# Patient Record
Sex: FEMALE | Race: WHITE | NOT HISPANIC OR LATINO | Employment: OTHER | URBAN - NONMETROPOLITAN AREA
[De-identification: names, ages, dates, MRNs, and addresses within clinical notes are randomized per-mention and may not be internally consistent; named-entity substitution may affect disease eponyms.]

---

## 2017-02-23 DIAGNOSIS — R10.9 RECURRING ABDOMINAL PAIN: ICD-10-CM

## 2017-02-23 DIAGNOSIS — K66.0 INTESTINAL ADHESIONS: ICD-10-CM

## 2017-02-23 NOTE — TELEPHONE ENCOUNTER
Tramadol 50mg      Last Written Prescription Date:  12/20/16  Last Fill Quantity: 20,   # refills: 0  Last Office Visit with [facility], [facility] or [facility] prescribing provider: 4/26/16  Future Office visit:       Routing refill request to provider for review/approval because:  Drug not on the [facility], [facility] or [facility] refill protocol or controlled substance    Olivia Lofton-Technician  [facility]  [phone]

## 2017-02-27 RX ORDER — TRAMADOL HYDROCHLORIDE 50 MG/1
50-100 TABLET ORAL EVERY 6 HOURS PRN
Qty: 20 TABLET | Refills: 0 | Status: SHIPPED | OUTPATIENT
Start: 2017-02-27 | End: 2017-07-17

## 2017-03-14 ENCOUNTER — TELEPHONE (OUTPATIENT)
Dept: FAMILY MEDICINE | Facility: OTHER | Age: 67
End: 2017-03-14

## 2017-03-14 ENCOUNTER — OFFICE VISIT (OUTPATIENT)
Dept: FAMILY MEDICINE | Facility: OTHER | Age: 67
End: 2017-03-14
Payer: COMMERCIAL

## 2017-03-14 ENCOUNTER — HOSPITAL ENCOUNTER (OUTPATIENT)
Dept: CT IMAGING | Facility: CLINIC | Age: 67
Discharge: HOME | End: 2017-03-14
Attending: PHYSICIAN ASSISTANT | Admitting: PHYSICIAN ASSISTANT
Payer: COMMERCIAL

## 2017-03-14 VITALS
DIASTOLIC BLOOD PRESSURE: 100 MMHG | RESPIRATION RATE: 20 BRPM | HEART RATE: 80 BPM | SYSTOLIC BLOOD PRESSURE: 160 MMHG | TEMPERATURE: 96.4 F

## 2017-03-14 DIAGNOSIS — G44.009 CLUSTER HEADACHE, NOT INTRACTABLE, UNSPECIFIED CHRONICITY PATTERN: Primary | ICD-10-CM

## 2017-03-14 DIAGNOSIS — G44.009 CLUSTER HEADACHE, NOT INTRACTABLE, UNSPECIFIED CHRONICITY PATTERN: ICD-10-CM

## 2017-03-14 DIAGNOSIS — R11.0 NAUSEA: ICD-10-CM

## 2017-03-14 PROCEDURE — 70450 CT HEAD/BRAIN W/O DYE: CPT

## 2017-03-14 PROCEDURE — 99214 OFFICE O/P EST MOD 30 MIN: CPT | Mod: 25 | Performed by: PHYSICIAN ASSISTANT

## 2017-03-14 PROCEDURE — 96372 THER/PROPH/DIAG INJ SC/IM: CPT | Performed by: PHYSICIAN ASSISTANT

## 2017-03-14 RX ORDER — ONDANSETRON 4 MG/1
8 TABLET, FILM COATED ORAL ONCE
Qty: 2 TABLET | Refills: 0 | COMMUNITY
Start: 2017-03-14 | End: 2017-05-08

## 2017-03-14 RX ORDER — TRAMADOL HYDROCHLORIDE 50 MG/1
50-100 TABLET ORAL EVERY 6 HOURS PRN
Qty: 60 TABLET | Refills: 0 | Status: SHIPPED | OUTPATIENT
Start: 2017-03-14 | End: 2017-05-08

## 2017-03-14 RX ORDER — ONDANSETRON 8 MG/1
8 TABLET, ORALLY DISINTEGRATING ORAL
Qty: 20 TABLET | Refills: 1 | Status: SHIPPED | OUTPATIENT
Start: 2017-03-14 | End: 2017-07-05

## 2017-03-14 RX ORDER — PROPRANOLOL HYDROCHLORIDE 40 MG/1
40 TABLET ORAL 2 TIMES DAILY
Qty: 60 TABLET | Refills: 0 | Status: SHIPPED | OUTPATIENT
Start: 2017-03-14 | End: 2017-04-21

## 2017-03-14 ASSESSMENT — PAIN SCALES - GENERAL: PAINLEVEL: WORST PAIN (10)

## 2017-03-14 NOTE — TELEPHONE ENCOUNTER
": 1950  PHONE #'s: [phone] (home)     PRESENTING PROBLEM:  Migraine HA since  NOC.  Last one was a long time ago.     NURSING ASSESSMENT  Description:  Nauseated, photophobic, HA pain scale 10/10. Pt in tears on the phone.   Onset/duration:  3/12/17  Precip. factors:  \" I had one other migraine like this, but it was years ago. \"   Assoc. Sx:  As above.Denies blurred or double vision. Is NOT confused. Is walking fine, but really nauseated.   Improves/worsens Sx:   Worse.   Pain scale (1-10)   10/10  Sx specific meds:  Ibuprofen, Tylenol- \"NOTHING HELPS.\"  LMP/preg/breast feeding:  NA  Last exam/Tx:  Has NOT been seen for this.     RECOMMENDED DISPOSITION:  To ED, another person to drive - REFUSES the ER.  \" Please , I just want to try getting something from the pharmacy for this. Dr. Byrne is my doctor.\"   Rn informed patient that Dr. MUÑOZ is out of office today. She would need to see another provider for this. Darrin Martinez, PAC, has an opening at 9:40 AM.  \" I will need to lay down as soon as I get there. \"  [facility] RN called [facility] RN to inform of this pt request. She will have their  notify her when she arrives to get her in a room to lay down.    Will comply with recommendation: NO - wants to see [facility] provider this AM ASAP.    If further questions/concerns or if Sx do not improve, worsen or new Sx develop, call your PCP or [facility] as soon as possible.    NOTES:  Disposition was determined by the first positive assessment question, therefore all previous assessment questions were negative.  Informed to check provider manual or call insurance company to assure coverage.    Guideline used:  Headache  Telephone Triage Protocols for Nurses, Fifth Edition, Migdalia Lovelace RN    "

## 2017-03-14 NOTE — MR AVS SNAPSHOT
"              After Visit Summary   3/14/2017    Komal Miller    MRN: [id number]           Patient Information     Date Of Birth          1950        Visit Information        Provider Department      3/14/2017 9:40 AM Darrin Borrego PA-C [facility]        Today's Diagnoses     Cluster headache, not intractable, unspecified chronicity pattern    -  1    Headache           Follow-ups after your visit        Future tests that were ordered for you today     Open Future Orders        Priority Expected Expires Ordered    CT Head w/o Contrast Routine  3/14/2018 3/14/2017            Who to contact     If you have questions or need follow up information about today's clinic visit or your schedule please contact [facility] directly at [phone].  Normal or non-critical lab and imaging results will be communicated to you by [employer], letter or phone within 4 business days after the clinic has received the results. If you do not hear from us within 7 days, please contact the clinic through [employer] or phone. If you have a critical or abnormal lab result, we will notify you by phone as soon as possible.  Submit refill requests through [employer] or call your pharmacy and they will forward the refill request to us. Please allow 3 business days for your refill to be completed.          Additional Information About Your Visit        MyChart Information     [employer] lets you send messages to your doctor, view your test results, renew your prescriptions, schedule appointments and more. To sign up, go to [email or web address] . Click on \"Log in\" on the left side of the screen, which will take you to the Welcome page. Then click on \"Sign up Now\" on the right side of the page.     You will be asked to enter the access code listed below, as well as some personal information. Please follow the directions to create your username and password.     Your access code is: [email or web address]  Expires: 6/12/2017 10:19 " AM     Your access code will  in 90 days. If you need help or a new code, please call your [facility] or [phone].        Care EveryWhere ID     This is your Care EveryWhere ID. This could be used by other organizations to access your [facility] medical records  [id number]        Your Vitals Were     Pulse Temperature Respirations             80 96.4  F (35.8  C) (Oral) 20          Blood Pressure from Last 3 Encounters:   17 (!) 160/100   16 160/88   16 138/88    Weight from Last 3 Encounters:   16 251 lb (113.9 kg)   16 248 lb 6.4 oz (112.7 kg)   12/28/15 254 lb 8 oz (115.4 kg)              We Performed the Following     INJECTION INTRAMUSCULAR OR SUB-Q     KETOROLAC TROMETHAMINE 15MG        Primary Care Provider Office Phone # Fax #    Augustine Byrne [phone]       [facility] [address] [facility] [address]        Thank you!     Thank you for choosing [facility]  for your care. Our goal is always to provide you with excellent care. Hearing back from our patients is one way we can continue to improve our services. Please take a few minutes to complete the written survey that you may receive in the mail after your visit with us. Thank you!             Your Updated Medication List - Protect others around you: Learn how to safely use, store and throw away your medicines at www.disposemymeds.org.          This list is accurate as of: 3/14/17 10:19 AM.  Always use your most recent med list.                   Brand Name Dispense Instructions for use    GARLIC PO          lisinopril-hydrochlorothiazide 20-25 MG per tablet    PRINZIDE/ZESTORETIC    90 tablet    TAKE ONE TABLET BY MOUTH EVERY DAY       omeprazole 40 MG capsule    priLOSEC    90 capsule    TAKE ONE CAPSULE BY MOUTH EVERY DAY .TAKE 30 TO 60 MINUTES BEFORE A MEAL       ondansetron 4 MG tablet    ZOFRAN    2 tablet    Take 2 tablets (8 mg) by mouth once for  1 dose       traMADol 50 MG tablet    ULTRAM    20 tablet    Take 1-2 tablets ( mg) by mouth every 6 hours as needed for moderate pain Call pt when ready

## 2017-03-14 NOTE — NURSING NOTE
"Chief Complaint   Patient presents with     Headache     started 3/12/2017       Initial BP (!) 160/100 (BP Location: Right arm, Patient Position: Chair, Cuff Size: Adult Large)  Pulse 80  Temp 96.4  F (35.8  C) (Oral)  Resp 20 Estimated body mass index is 41.13 kg/(m^2) as calculated from the following:    Height as of 4/26/16: 5' 5.5\" (1.664 m).    Weight as of 8/14/16: 251 lb (113.9 kg).  Medication Reconciliation: complete   Anu EDUARDO LPN    Due to severe headache patient declined weight and height and wanted to go lay down.  "

## 2017-03-14 NOTE — NURSING NOTE
The following medication was given:     MEDICATION: Ondansetron Orally Disintegrating Tablets 4mg  ROUTE: PO  SITE: Medication was given orally   DOSE: 4 mg  LOT #: [id number]  :  [employer]  EXPIRATION DATE:  6/2018  NDC#: 65862-390-10    The following medication was given:     MEDICATION: Ondansetron Orally Disintegrating Tablets 4mg  ROUTE: PO  SITE: Medication was given orally   DOSE: 4 mg  LOT #: [id number]  :  [employer]  EXPIRATION DATE:  5/2017  NDC#: 65862-390-10    The following medication was given:     MEDICATION: Toradol 60 mg  ROUTE: IM  SITE: RUQ - Gluteus  DOSE: 60 mg/ 2mL  LOT #: [id number]  :  [employer]  EXPIRATION DATE:  02/2018  NDC#: 28757-038-40    Medications given per verbal order of Darrin Bolaños PA-C  Patient instructed to remain in clinic for 20 minutes afterwards, and to report any adverse reaction to me immediately.  Prior to injection verified patient identity using patient's name and date of birth.  Irene Kilpatrick MA     3/14/2017

## 2017-03-14 NOTE — PROGRESS NOTES
SUBJECTIVE:                                                    Komal Miller is a 66 year old female who presents to clinic today for the following health issues:      Headache     Onset: started on 3/12/2017    Description:   Location: Left temporal region slowly progressive and building.   Character: squeezing pain, pressure over the left temporal region without any throbbing.  Frequency:  Ongoing over the last 24 hours or more.  Duration:  States it has not resolved since beginning on March 12    Intensity: severe    Progression of Symptoms:  worsening    Accompanying Signs & Symptoms:  Stiff neck: no  Neck or upper back pain: no  Fever: no  Sinus pressure: no  Nausea or vomiting: YES- both  Dizziness: no  Numbness: no  Weakness: no  Visual changes: no   History:   Head trauma: no  Family history of migraines: YES- states she had one 20 years ago or more.  Previous tests for headaches: denies  Neurologist evaluations: no  Able to do daily activities: no  Wake with a headaches: no  Do headaches wake you up: no  Daily pain medication use: no  Work/school stressors/changes: no    Precipitating factors:   Does light make it worse: YES- causes nausea and vomiting today  Does sound make it worse: YES    Alleviating factors:  Does sleep help: no         Therapies Tried and outcome: Ibuprofen (Advil, Motrin) and Tylenol without resolution.    Problem list and histories reviewed & adjusted, as indicated.  Additional history: as documented    Patient Active Problem List   Diagnosis     Absence of menstruation     Esophageal reflux     Recurring abdominal pain     Hyperlipidemia LDL goal <130     Hypertension goal BP (blood pressure) < 140/90     Advanced directives, counseling/discussion     Small bowel obstruction (H)     DVT prophylaxis     Cervical adenopathy     Intestinal adhesions     Gastroesophageal reflux disease with esophagitis     Past Surgical History   Procedure Laterality Date     C total abdom  hysterectomy       Hysterectomy, Total Abdominal     Hc removal of ovary/tube(s)       Salpingo-Oophorectomy, bilateral     Hc dilation/curettage diag/ther non ob       D & C     C appendectomy       C ligate fallopian tube       Colonoscopy  06/10/08     Internal hemorrhoids, otherwise normal.     Colonoscopy  01/20/10     Hc ugi endoscopy, simple exam  01/20/10     Video capsule endoscopy  02/15/10     normal sm intestine     Hc lap, surg enterolysis  05/07/10     Laparoscopy diagnostic (general)  5/6/2014     Procedure: LAPAROSCOPY DIAGNOSTIC (GENERAL);  Surgeon: Seth Matthews MD;  Location: [facility] OR     Laparotomy, lysis adhesions, combined  5/6/2014     Procedure: COMBINED LAPAROTOMY, LYSIS ADHESIONS;  Surgeon: Seth Matthews MD;  Location: [facility] OR       Social History   Substance Use Topics     Smoking status: Former Smoker     Packs/day: 0.50     Years: 20.00     Types: Cigarettes     Quit date: 1/1/1995     Smokeless tobacco: Never Used     Alcohol use Yes      Comment: very seldom     Family History   Problem Relation Age of Onset     C.A.D. Mother      bi-pass     DIABETES Mother      late onset     Allergies Mother      xray dye     Arthritis Mother      CANCER Mother      ovary     Respiratory Mother      C.A.D. Father      bi-pass     Hypertension Brother      Breast Cancer Sister      Allergies Sister      Allergies Brother      Eye Disorder Maternal Aunt      GASTROINTESTINAL DISEASE Brother      Gynecology Sister      Thyroid Disease Sister            Reviewed and updated as needed this visit by clinical staff  Allergies  Med Hx       Reviewed and updated as needed this visit by Provider  Med Hx         ROS:  Constitutional, HEENT, cardiovascular, pulmonary, gi and gu systems are negative, except as otherwise noted.    OBJECTIVE:                                                    BP (!) 160/100 (BP Location: Right arm, Patient Position: Chair, Cuff Size: Adult Large)  Pulse 80  Temp 96.4  F  (35.8  C) (Oral)  Resp 20  There is no height or weight on file to calculate BMI.  GENERAL: healthy, alert and no distress  HENT: ear canals and TM's normal, nose and mouth without ulcers or lesions  NECK: no adenopathy, no asymmetry, masses, or scars and thyroid normal to palpation  RESP: lungs clear to auscultation - no rales, rhonchi or wheezes  CV: regular rate and rhythm, normal S1 S2, no S3 or S4, no murmur, click or rub, no peripheral edema and peripheral pulses strong  ABDOMEN: soft, nontender, no hepatosplenomegaly, no masses and bowel sounds normal  MS: no gross musculoskeletal defects noted, no edema  NEURO: Normal strength and tone, mentation intact and speech normal  PSYCH: mentation appears normal, tearful, anxious and fatigued    Diagnostic Test Results:  No results found for this or any previous visit (from the past 24 hour(s)).     ASSESSMENT/PLAN:                                                    1. Cluster headache, not intractable, unspecified chronicity pattern  Other than light and sound sensitivity her headache seems to be more of a cluster type then of a migraine type. I am concerned because she has not had a headache like this in 20 years and this is new for her. We'll evaluate aggressively.  - CT Head w/o Contrast; Future    2. Headache  As above see #1  - KETOROLAC TROMETHAMINE 15MG  - INJECTION INTRAMUSCULAR OR SUB-Q  - ondansetron (ZOFRAN) 4 MG tablet; Take 2 tablets (8 mg) by mouth once for 1 dose  Dispense: 2 tablet; Refill: 0    3. Nausea  Treated today with Zofran and hopefully this will help and we can then add this to her medication mix if her CT scan is clear.  Follow-up with primary care practitioner of choice when necessary after this.    Darrin Bolaños PA-C  [facility]

## 2017-04-04 DIAGNOSIS — K21.00 GASTROESOPHAGEAL REFLUX DISEASE WITH ESOPHAGITIS: Primary | ICD-10-CM

## 2017-04-04 DIAGNOSIS — I10 HYPERTENSION GOAL BP (BLOOD PRESSURE) < 140/90: ICD-10-CM

## 2017-04-04 RX ORDER — OMEPRAZOLE 40 MG/1
CAPSULE, DELAYED RELEASE ORAL
Qty: 90 CAPSULE | Refills: 3 | Status: SHIPPED | OUTPATIENT
Start: 2017-04-04 | End: 2018-06-05

## 2017-04-04 RX ORDER — LISINOPRIL AND HYDROCHLOROTHIAZIDE 20; 25 MG/1; MG/1
TABLET ORAL
Qty: 90 TABLET | Refills: 0 | Status: SHIPPED | OUTPATIENT
Start: 2017-04-04 | End: 2017-05-08

## 2017-04-04 NOTE — TELEPHONE ENCOUNTER
Omeprazole 40mg      Last Written Prescription Date: 12/05/14  Last Fill Quantity: 90,  # refills: 3   Last Office Visit with [facility], [facility] or [facility] prescribing provider: 3/14/17    Olivia Lofton-Technician  [facility]  [phone]

## 2017-04-04 NOTE — TELEPHONE ENCOUNTER
lisinopril-hydrochlorothiazide (PRINZIDE,ZESTORETIC) 20-25 MG  Last Written Prescription Date: 4/5/16  Last Fill Quantity: 90, # refills: 3  Last Office Visit with [facility] or [facility] prescribing provider: 3/14/17       Potassium   Date Value Ref Range Status   04/26/2016 3.7 3.4 - 5.3 mmol/L Final     Creatinine   Date Value Ref Range Status   04/26/2016 0.65 0.52 - 1.04 mg/dL Final     BP Readings from Last 3 Encounters:   03/14/17 (!) 160/100   08/14/16 160/88   04/26/16 138/88

## 2017-04-04 NOTE — LETTER
[address] [address] [phone]          April 4, 2017  Komal Miller  [address] [facility] [address]            Dear Komal Miller      We at [facility] want to uphold a high quality of care for our patients. In order to provide the best possible care for you we would like to partner with you to manage your preventative health care.   In coordination of your health care, we see that you are overdue for the following [facility] as your last blood pressure was out of range.  Please call and schedule an appointment at your earliest convenience.      Sincerely,        FELTON Byrne M.D.  [address]   [address]  Tel. [phone]  Fax [phone]

## 2017-04-21 DIAGNOSIS — R11.0 NAUSEA: ICD-10-CM

## 2017-04-21 DIAGNOSIS — G44.009 CLUSTER HEADACHE, NOT INTRACTABLE, UNSPECIFIED CHRONICITY PATTERN: ICD-10-CM

## 2017-04-21 NOTE — TELEPHONE ENCOUNTER
Propranolol       Last Written Prescription Date: 3/14/2017  Last Fill Quantity: 60, # refills: 0  Last Office Visit with [facility], [facility] or [facility] prescribing provider: 3/14/2017       BP Readings from Last 3 Encounters:   03/14/17 (!) 160/100   08/14/16 160/88   04/26/16 138/88

## 2017-04-24 RX ORDER — PROPRANOLOL HYDROCHLORIDE 40 MG/1
TABLET ORAL
Qty: 60 TABLET | Refills: 5 | Status: SHIPPED | OUTPATIENT
Start: 2017-04-24 | End: 2017-05-08

## 2017-04-24 NOTE — TELEPHONE ENCOUNTER
Routing refill request to provider for review/approval because:  Labs out of range:  BP    Maylin Gorman, RN  [facility]

## 2017-05-08 ENCOUNTER — OFFICE VISIT (OUTPATIENT)
Dept: FAMILY MEDICINE | Facility: OTHER | Age: 67
End: 2017-05-08
Payer: COMMERCIAL

## 2017-05-08 VITALS
HEIGHT: 66 IN | WEIGHT: 246 LBS | OXYGEN SATURATION: 94 % | BODY MASS INDEX: 39.53 KG/M2 | HEART RATE: 70 BPM | SYSTOLIC BLOOD PRESSURE: 140 MMHG | RESPIRATION RATE: 20 BRPM | DIASTOLIC BLOOD PRESSURE: 78 MMHG | TEMPERATURE: 97.4 F

## 2017-05-08 DIAGNOSIS — I10 HYPERTENSION GOAL BP (BLOOD PRESSURE) < 140/90: ICD-10-CM

## 2017-05-08 DIAGNOSIS — E28.39 OTHER PRIMARY OVARIAN FAILURE: ICD-10-CM

## 2017-05-08 DIAGNOSIS — E66.01 MORBID OBESITY, UNSPECIFIED OBESITY TYPE (H): ICD-10-CM

## 2017-05-08 DIAGNOSIS — G44.009 CLUSTER HEADACHE, NOT INTRACTABLE, UNSPECIFIED CHRONICITY PATTERN: ICD-10-CM

## 2017-05-08 DIAGNOSIS — Z00.00 MEDICARE WELCOME VISIT: Primary | ICD-10-CM

## 2017-05-08 DIAGNOSIS — Z12.31 VISIT FOR SCREENING MAMMOGRAM: ICD-10-CM

## 2017-05-08 DIAGNOSIS — R11.0 NAUSEA: ICD-10-CM

## 2017-05-08 PROBLEM — G44.001 INTRACTABLE CLUSTER HEADACHE SYNDROME, UNSPECIFIED CHRONICITY PATTERN: Status: RESOLVED | Noted: 2017-05-08 | Resolved: 2017-05-08

## 2017-05-08 PROBLEM — G44.001 INTRACTABLE CLUSTER HEADACHE SYNDROME, UNSPECIFIED CHRONICITY PATTERN: Status: ACTIVE | Noted: 2017-05-08

## 2017-05-08 LAB
ALBUMIN SERPL-MCNC: 3.7 G/DL (ref 3.4–5)
ALP SERPL-CCNC: 59 U/L (ref 40–150)
ALT SERPL W P-5'-P-CCNC: 23 U/L (ref 0–50)
ANION GAP SERPL CALCULATED.3IONS-SCNC: 9 MMOL/L (ref 3–14)
AST SERPL W P-5'-P-CCNC: 14 U/L (ref 0–45)
BILIRUB SERPL-MCNC: 0.3 MG/DL (ref 0.2–1.3)
BUN SERPL-MCNC: 10 MG/DL (ref 7–30)
CALCIUM SERPL-MCNC: 9.1 MG/DL (ref 8.5–10.1)
CHLORIDE SERPL-SCNC: 98 MMOL/L (ref 94–109)
CHOLEST SERPL-MCNC: 215 MG/DL
CO2 SERPL-SCNC: 27 MMOL/L (ref 20–32)
CREAT SERPL-MCNC: 0.61 MG/DL (ref 0.52–1.04)
GFR SERPL CREATININE-BSD FRML MDRD: NORMAL ML/MIN/1.7M2
GLUCOSE SERPL-MCNC: 93 MG/DL (ref 70–99)
HDLC SERPL-MCNC: 50 MG/DL
LDLC SERPL CALC-MCNC: 145 MG/DL
NONHDLC SERPL-MCNC: 165 MG/DL
POTASSIUM SERPL-SCNC: 3.8 MMOL/L (ref 3.4–5.3)
PROT SERPL-MCNC: 7.5 G/DL (ref 6.8–8.8)
SODIUM SERPL-SCNC: 134 MMOL/L (ref 133–144)
TRIGL SERPL-MCNC: 99 MG/DL
TSH SERPL DL<=0.05 MIU/L-ACNC: 0.68 MU/L (ref 0.4–4)

## 2017-05-08 PROCEDURE — 36415 COLL VENOUS BLD VENIPUNCTURE: CPT | Performed by: FAMILY MEDICINE

## 2017-05-08 PROCEDURE — 80053 COMPREHEN METABOLIC PANEL: CPT | Performed by: FAMILY MEDICINE

## 2017-05-08 PROCEDURE — 84443 ASSAY THYROID STIM HORMONE: CPT | Performed by: FAMILY MEDICINE

## 2017-05-08 PROCEDURE — G0402 INITIAL PREVENTIVE EXAM: HCPCS | Performed by: FAMILY MEDICINE

## 2017-05-08 PROCEDURE — 80061 LIPID PANEL: CPT | Performed by: FAMILY MEDICINE

## 2017-05-08 RX ORDER — PROPRANOLOL HYDROCHLORIDE 40 MG/1
40 TABLET ORAL 2 TIMES DAILY
Qty: 180 TABLET | Refills: 3 | Status: SHIPPED | OUTPATIENT
Start: 2017-05-08 | End: 2017-12-20

## 2017-05-08 RX ORDER — LISINOPRIL AND HYDROCHLOROTHIAZIDE 20; 25 MG/1; MG/1
1 TABLET ORAL DAILY
Qty: 90 TABLET | Refills: 3 | Status: SHIPPED | OUTPATIENT
Start: 2017-05-08 | End: 2018-06-05

## 2017-05-08 ASSESSMENT — PAIN SCALES - GENERAL: PAINLEVEL: NO PAIN (0)

## 2017-05-08 NOTE — MR AVS SNAPSHOT
After Visit Summary   5/8/2017    Komal Miller    MRN: [id number]           Patient Information     Date Of Birth          1950        Visit Information        Provider Department      5/8/2017 1:00 PM Augustine Byrne MD [facility]'s Diagnoses     Medicare welcome visit    -  1    Morbid obesity, unspecified obesity type (H)        Cluster headache, not intractable, unspecified chronicity pattern        Hypertension goal BP (blood pressure) < 140/90        Nausea        Other primary ovarian failure         Visit for screening mammogram          Care Instructions          Services Typically covered by Medicare Recommended Completed   Vaccines    Pneumonoccol    Influenza    Hepatitis B (if medium/high risk)     Once for patients after age 65    Yearly  Medium/high risk factors:    End Stage Kidney Disease    Hemophiliacs who received Factor XIII or IX concentrates    Clients of institutions for developmentally disabled    Persons who live in same house as a Hepatitis B carrier    Homosexual men    Illicit injectable drug users    Health care workers     Mammogram Covered: One-time screen between age 35-39, annually for age 40+     Pap and Pelvic Exam Covered: Annually if  high risk,  or childbearing age with abnormal Pap in last 3 years.  Q24 months for all other women     Prostate Cancer Screening    Digital rectal exam    PSA Covered: Annually for all men > age 50     Corolrectal Cancer Screening Screening colonoscopy every 10 years, more often for high risk patients     Diabetes Self-Management Training Requires referral by treating physician for patient with diabetes     Diabetes Screening    Fasting blood sugar or glucose tolerance test   Once yearly, twice yearly if prediabetic     Cardiovascular Screening Blood Tests    Total Cholesterol    HDL    Triglycerides Every 5 years     Medical Nutrition Therapy for Diabetes or Renal Disease Requires referral by  treating physician for patient with diabetes or kidney disease     Glaucoma Screening Annually for patients with one of the following risk factors:    Diabetes Mellitus    Family history of Glaucoma    -American age 50 and over    -American age 65 and over     Bone Mass Measurement Every 24 months if one of the following risk factors:    Estrogen deficiency    Vertebral abnormalities on x-ray indicative of Osteoporosis, Osteopenia, or Vertebral fracture    Receiving/expected to receive the equivalent of at least 5 mg of Prednisone per day for > 3 months    Hyperparathyroidism    Patient being monitored for response to Osteoporosis Therapy     One-time AAA screen  Must be ordered as part of Medicare IPPE   Any patient with a family history of AAA    Males Age 65-75, with history of smoking at least 100 cigarettes in lifetime     Smoking Cessation Counseling Beneficiaries who use tobacco are eligible to receive 2 cessation attempts per year; each attempt includes maximum of 4 sessions     HIV Screening Annually for beneficiaries at increased risk:       Increased risk for HIV infection is defined in the  National Coverage Determinations (NCD) Manual,  Publication 100-03 Sections 190.14 (diagnostic) and 210.7 (screening). See http://www.cms.gov/manuals/downloads/ryo135c1_Bxhf2.pdf and http://www.cms.gov/manuals/downloads/eaa912l2_Wmoy8.pdf on the Internet.  Three times per pregnancy for beneficiaries who are pregnant.     Future Annual Wellness Visit Annually, for all beneficiaries.       Preventive Health Recommendations    Female Ages 65 +    Yearly exam:     See your health care provider every year in order to  o Review health changes.   o Discuss preventive care.    o Review your medicines if your doctor has prescribed any.      You no longer need a yearly Pap test unless you've had an abnormal Pap test in the past 10 years. If you have vaginal symptoms, such as bleeding or discharge, be sure to talk  with your provider about a Pap test.      Every 1 to 2 years, have a mammogram.  If you are over 69, talk with your health care provider about whether or not you want to continue having screening mammograms.      Every 10 years, have a colonoscopy. Or, have a yearly FIT test (stool test). These exams will check for colon cancer.       Have a cholesterol test every 5 years, or more often if your doctor advises it.       Have a diabetes test (fasting glucose) every three years. If you are at risk for diabetes, you should have this test more often.       At age 65, have a bone density scan (DEXA) to check for osteoporosis (brittle bone disease).    Shots:    Get a flu shot each year.    Get a tetanus shot every 10 years.    Talk to your doctor about your pneumonia vaccines. There are now two you should receive - Pneumovax (PPSV 23) and Prevnar (PCV 13).    Talk to your doctor about the shingles vaccine.    Talk to your doctor about the hepatitis B vaccine.    Nutrition:     Eat at least 5 servings of fruits and vegetables each day.      Eat whole-grain bread, whole-wheat pasta and brown rice instead of white grains and rice.      Talk to your provider about Calcium and Vitamin D.     Lifestyle    Exercise at least 150 minutes a week (30 minutes a day, 5 days a week). This will help you control your weight and prevent disease.      Limit alcohol to one drink per day.      No smoking.       Wear sunscreen to prevent skin cancer.       See your dentist twice a year for an exam and cleaning.      See your eye doctor every 1 to 2 years to screen for conditions such as glaucoma, macular degeneration and cataracts.        Follow-ups after your visit        Future tests that were ordered for you today     Open Future Orders        Priority Expected Expires Ordered    DX Hip/Pelvis/Spine Routine  5/8/2018 5/8/2017    *MA Screening Digital Bilateral Routine  5/8/2018 5/8/2017            Who to contact     If you have questions  "or need follow up information about today's clinic visit or your schedule please contact [facility] directly at [phone].  Normal or non-critical lab and imaging results will be communicated to you by [employer], letter or phone within 4 business days after the clinic has received the results. If you do not hear from us within 7 days, please contact the clinic through [employer] or phone. If you have a critical or abnormal lab result, we will notify you by phone as soon as possible.  Submit refill requests through [employer] or call your pharmacy and they will forward the refill request to us. Please allow 3 business days for your refill to be completed.          Additional Information About Your Visit        [employer] Information     [employer] lets you send messages to your doctor, view your test results, renew your prescriptions, schedule appointments and more. To sign up, go to [email or web address] . Click on \"Log in\" on the left side of the screen, which will take you to the Welcome page. Then click on \"Sign up Now\" on the right side of the page.     You will be asked to enter the access code listed below, as well as some personal information. Please follow the directions to create your username and password.     Your access code is: [email or web address]  Expires: 2017 10:19 AM     Your access code will  in 90 days. If you need help or a new code, please call your [facility] clinic or [phone].        Care EveryWhere ID     This is your Care EveryWhere ID. This could be used by other organizations to access your [facility] medical records  [id number]        Your Vitals Were     Pulse Temperature Respirations Height Pulse Oximetry BMI (Body Mass Index)    70 97.4  F (36.3  C) (Temporal) 20 5' 5.5\" (1.664 m) 94% 40.31 kg/m2       Blood Pressure from Last 3 Encounters:   17 140/78   17 (!) 160/100   16 160/88    Weight from Last 3 Encounters:   17 246 lb (111.6 kg)   16 251 " lb (113.9 kg)   04/26/16 248 lb 6.4 oz (112.7 kg)              We Performed the Following     Comprehensive metabolic panel     Lipid panel reflex to direct LDL     MIGRAINE ACTION PLAN     TSH          Today's Medication Changes          These changes are accurate as of: 5/8/17  2:03 PM.  If you have any questions, ask your nurse or doctor.               These medicines have changed or have updated prescriptions.        Dose/Directions    lisinopril-hydrochlorothiazide 20-25 MG per tablet   Commonly known as:  PRINZIDE/ZESTORETIC   This may have changed:  See the new instructions.   Used for:  Hypertension goal BP (blood pressure) < 140/90   Changed by:  Augustine Byrne MD        Dose:  1 tablet   Take 1 tablet by mouth daily   Quantity:  90 tablet   Refills:  3       propranolol 40 MG tablet   Commonly known as:  INDERAL   This may have changed:  See the new instructions.   Used for:  Cluster headache, not intractable, unspecified chronicity pattern, Nausea   Changed by:  Augustine Byrne MD        Dose:  40 mg   Take 1 tablet (40 mg) by mouth 2 times daily   Quantity:  180 tablet   Refills:  3            Where to get your medicines      These medications were sent to [facility] [address]   [address] [facility] [address]     Phone:  [phone]     lisinopril-hydrochlorothiazide 20-25 MG per tablet    propranolol 40 MG tablet                Primary Care Provider Office Phone # Fax #    Augustine Byrne [phone]       [facility] [address] [facility] [address]        Thank you!     Thank you for choosing [facility]  for your care. Our goal is always to provide you with excellent care. Hearing back from our patients is one way we can continue to improve our services. Please take a few minutes to complete the written survey that you may receive in the mail after your visit with us. Thank you!             Your  Updated Medication List - Protect others around you: Learn how to safely use, store and throw away your medicines at www.disposemymeds.org.          This list is accurate as of: 5/8/17  2:03 PM.  Always use your most recent med list.                   Brand Name Dispense Instructions for use    GARLIC PO          lisinopril-hydrochlorothiazide 20-25 MG per tablet    PRINZIDE/ZESTORETIC    90 tablet    Take 1 tablet by mouth daily       omeprazole 40 MG capsule    priLOSEC    90 capsule    TAKE ONE CAPSULE BY MOUTH EVERY DAY .TAKE 30 TO 60 MINUTES BEFORE A MEAL       ondansetron 8 MG ODT tab    ZOFRAN ODT    20 tablet    Take 1 tablet (8 mg) by mouth 3 times daily (before meals)       propranolol 40 MG tablet    INDERAL    180 tablet    Take 1 tablet (40 mg) by mouth 2 times daily       traMADol 50 MG tablet    ULTRAM    20 tablet    Take 1-2 tablets ( mg) by mouth every 6 hours as needed for moderate pain Call pt when ready

## 2017-05-08 NOTE — PROGRESS NOTES
SUBJECTIVE:                                                            Komal Miller is a 67 year old female who presents for Preventive Visit.      Are you in the first 12 months of your Medicare Part B coverage?  Yes,  Visual Acuity:  Right Eye: 20/60   Left Eye: 20/50  Both Eyes: 20/60    Healthy Habits:    Do you get at least three servings of calcium containing foods daily (dairy, green leafy vegetables, etc.)? yes    Amount of exercise or daily activities, outside of work: little    Problems taking medications regularly Yes stopped cholesterol med one month after ordered    Medication side effects: No    Have you had an eye exam in the past two years? yes    Do you see a dentist twice per year? once    Do you have sleep apnea, excessive snoring or daytime drowsiness?no    COGNITIVE SCREEN  1) Repeat 3 items (Banana, Sunrise, Chair)    2) Clock draw: NORMAL  3) 3 item recall: Recalls 3 objects  Results: 3 items recalled: COGNITIVE IMPAIRMENT LESS LIKELY    Mini-CogTM Copyright ALYCE Telles. Licensed by the author for use in [facility]; reprinted with permission ([email or web address]). All rights reserved.            Hyperlipidemia Follow-Up      Rate your low fat/cholesterol diet?: fair    Taking statin?  Stopped due to one month after ordered    Other lipid medications/supplements?:  garlic     Hypertension Follow-up      Outpatient blood pressures are not being checked.    Low Salt Diet: no added salt     Migraine Follow-Up    Headaches symptoms:  Improved     Frequency: only once     Duration of headaches: NA    Able to do normal daily activities/work with migraines: No - not at that time    Rescue/Relief medication:              Effectiveness:  relief    Preventative medication: Propanonol    Neurologic complications: No new stroke-like symptoms, loss of vision or speech, numbness or weakness    In the past 4 weeks, how often have you gone to Urgent Care or the emergency room because of your  headaches?  0       Reviewed and updated as needed this visit by clinical staff  Tobacco  Allergies  Meds  Med Hx  Surg Hx  Fam Hx  Soc Hx        Reviewed and updated as needed this visit by Provider        Social History   Substance Use Topics     Smoking status: Former Smoker     Packs/day: 0.50     Years: 20.00     Types: Cigarettes     Quit date: 1/1/1995     Smokeless tobacco: Never Used     Alcohol use Yes      Comment: very seldom       The patient does not drink >3 drinks per day nor >7 drinks per week.    Today's PHQ-2 Score:   PHQ-2 ( 1999 Pfizer) 5/8/2017 4/26/2016   Q1: Little interest or pleasure in doing things 0 0   Q2: Feeling down, depressed or hopeless 0 0   PHQ-2 Score 0 0       Do you feel safe in your environment - Yes    Do you have a Health Care Directive?:     Current providers sharing in care for this patient include:   Patient Care Team:  Augustine Byrne MD as PCP - General      Hearing impairment: No    Ability to successfully perform activities of daily living: Yes, no assistance needed     Fall risk:  Fallen 2 or more times in the past year?: No  Any fall with injury in the past year?: No    Home safety:  none identified      The following health maintenance items are reviewed in Epic and correct as of today:  Health Maintenance   Topic Date Due     HEPATITIS C SCREENING  05/05/1970     MAMMO SCREEN Q2 YR (SYSTEM ASSIGNED)  03/07/2010     LIPID MONITORING Q1 YEAR( NO INBASKET)  04/26/2017     ADVANCE DIRECTIVE PLANNING Q5 YRS (NO INBASKET)  05/07/2017     FALL RISK ASSESSMENT  05/05/2017     DEXA SCAN SCREENING (SYSTEM ASSIGNED)  05/05/2017     PNEUMOCOCCAL (1 of 2 - PCV13) 05/05/2017     INFLUENZA VACCINE (SYSTEM ASSIGNED)  09/01/2017     TETANUS IMMUNIZATION (SYSTEM ASSIGNED)  03/04/2019     COLON CANCER SCREEN (SYSTEM ASSIGNED)  01/20/2020         Mammogram Screening: Patient over age 50, mutual decision to screen reflected in health maintenance.     ROS:  C: NEGATIVE for  "fever, chills, change in weight  E/M: NEGATIVE for ear, mouth and throat problems  R: NEGATIVE for significant cough or SOB  CV: NEGATIVE for chest pain, palpitations or peripheral edema    Problem list, Medication list, Allergies, and Medical/Social/Surgical histories reviewed in [facility] and updated as appropriate.  OBJECTIVE:                                                            /78 (BP Location: Right arm, Patient Position: Chair, Cuff Size: Adult Large)  Pulse 70  Temp 97.4  F (36.3  C) (Temporal)  Resp 20  Ht 5' 5.5\" (1.664 m)  Wt 246 lb (111.6 kg)  SpO2 94%  BMI 40.31 kg/m2 Estimated body mass index is 40.31 kg/(m^2) as calculated from the following:    Height as of this encounter: 5' 5.5\" (1.664 m).    Weight as of this encounter: 246 lb (111.6 kg).  EXAM:   GENERAL APPEARANCE: healthy, alert and no distress  EYES: Eyes grossly normal to inspection, PERRL and conjunctivae and sclerae normal  HENT: ear canals and TM's normal, nose and mouth without ulcers or lesions, oropharynx clear and oral mucous membranes moist  NECK: no adenopathy, no asymmetry, masses, or scars and thyroid normal to palpation  RESP: lungs clear to auscultation - no rales, rhonchi or wheezes  BREAST:Not examijned  CV: regular rate and rhythm, normal S1 S2, no S3 or S4, no murmur, click or rub, no peripheral edema and peripheral pulses strong  ABDOMEN: soft, nontender, no hepatosplenomegaly, no masses and bowel sounds normal  MS: no musculoskeletal defects are noted and gait is age appropriate without ataxia  SKIN: no suspicious lesions or rashes  NEURO: Normal strength and tone, sensory exam grossly normal, mentation intact and speech normal  PSYCH: mentation appears normal and affect normal/bright    ASSESSMENT / PLAN:                                                                ICD-10-CM    1. Medicare welcome visit Z00.00 DX Hip/Pelvis/Spine     *MA Screening Digital Bilateral   2. Morbid obesity, unspecified obesity " "type (H) E66.01    3. Cluster headache, not intractable, unspecified chronicity pattern G44.009 MIGRAINE ACTION PLAN   4. Hypertension goal BP (blood pressure) < 140/90 I10    5. Nausea R11.0        End of Life Planning:  Patient currently has an advanced directive: No.  I have verified the patient's ablity to prepare an advanced directive/make health care decisions.  Literature was provided to assist patient in preparing an advanced directive.    COUNSELING:  Reviewed preventive health counseling, as reflected in patient instructions       Osteoporosis Prevention/Bone Health        Estimated body mass index is 40.31 kg/(m^2) as calculated from the following:    Height as of this encounter: 5' 5.5\" (1.664 m).    Weight as of this encounter: 246 lb (111.6 kg).     reports that she quit smoking about 22 years ago. Her smoking use included Cigarettes. She has a 10.00 pack-year smoking history. She has never used smokeless tobacco.      Appropriate preventive services were discussed with this patient, including applicable screening as appropriate for cardiovascular disease, diabetes, osteopenia/osteoporosis, and glaucoma.  As appropriate for age/gender, discussed screening for colorectal cancer, prostate cancer, breast cancer, and cervical cancer. Checklist reviewing preventive services available has been given to the patient.    Reviewed patients plan of care and provided an AVS. The Basic Care Plan (routine screening as documented in Health Maintenance) for Komal meets the Care Plan requirement. This Care Plan has been established and reviewed with the Patient.    Counseling Resources:  ATP IV Guidelines  Pooled Cohorts Equation Calculator  Breast Cancer Risk Calculator  FRAX Risk Assessment  ICSI Preventive Guidelines  Dietary Guidelines for Americans, 2010  USDA's MyPlate  ASA Prophylaxis  Lung CA Screening    Augustine Byrne MD, MD  [facility]  "

## 2017-05-08 NOTE — PATIENT INSTRUCTIONS
Services Typically covered by Medicare Recommended Completed   Vaccines    Pneumonoccol    Influenza    Hepatitis B (if medium/high risk)     Once for patients after age 65    Yearly  Medium/high risk factors:    End Stage Kidney Disease    Hemophiliacs who received Factor XIII or IX concentrates    Clients of institutions for developmentally disabled    Persons who live in same house as a Hepatitis B carrier    Homosexual men    Illicit injectable drug users    Health care workers     Mammogram Covered: One-time screen between age 35-39, annually for age 40+     Pap and Pelvic Exam Covered: Annually if  high risk,  or childbearing age with abnormal Pap in last 3 years.  Q24 months for all other women     Prostate Cancer Screening    Digital rectal exam    PSA Covered: Annually for all men > age 50     Corolrectal Cancer Screening Screening colonoscopy every 10 years, more often for high risk patients     Diabetes Self-Management Training Requires referral by treating physician for patient with diabetes     Diabetes Screening    Fasting blood sugar or glucose tolerance test   Once yearly, twice yearly if prediabetic     Cardiovascular Screening Blood Tests    Total Cholesterol    HDL    Triglycerides Every 5 years     Medical Nutrition Therapy for Diabetes or Renal Disease Requires referral by treating physician for patient with diabetes or kidney disease     Glaucoma Screening Annually for patients with one of the following risk factors:    Diabetes Mellitus    Family history of Glaucoma    -American age 50 and over    -American age 65 and over     Bone Mass Measurement Every 24 months if one of the following risk factors:    Estrogen deficiency    Vertebral abnormalities on x-ray indicative of Osteoporosis, Osteopenia, or Vertebral fracture    Receiving/expected to receive the equivalent of at least 5 mg of Prednisone per day for > 3 months    Hyperparathyroidism    Patient being monitored for  response to Osteoporosis Therapy     One-time AAA screen  Must be ordered as part of Medicare IPPE   Any patient with a family history of AAA    Males Age 65-75, with history of smoking at least 100 cigarettes in lifetime     Smoking Cessation Counseling Beneficiaries who use tobacco are eligible to receive 2 cessation attempts per year; each attempt includes maximum of 4 sessions     HIV Screening Annually for beneficiaries at increased risk:       Increased risk for HIV infection is defined in the  National Coverage Determinations (NCD) Manual,  Publication 100-03 Sections 190.14 (diagnostic) and 210.7 (screening). See http://www.cms.gov/manuals/downloads/mwg140z9_Ogmd0.pdf and http://www.cms.gov/manuals/downloads/num593u2_Mmag5.pdf on the Internet.  Three times per pregnancy for beneficiaries who are pregnant.     Future Annual Wellness Visit Annually, for all beneficiaries.       Preventive Health Recommendations    Female Ages 65 +    Yearly exam:     See your health care provider every year in order to  o Review health changes.   o Discuss preventive care.    o Review your medicines if your doctor has prescribed any.      You no longer need a yearly Pap test unless you've had an abnormal Pap test in the past 10 years. If you have vaginal symptoms, such as bleeding or discharge, be sure to talk with your provider about a Pap test.      Every 1 to 2 years, have a mammogram.  If you are over 69, talk with your health care provider about whether or not you want to continue having screening mammograms.      Every 10 years, have a colonoscopy. Or, have a yearly FIT test (stool test). These exams will check for colon cancer.       Have a cholesterol test every 5 years, or more often if your doctor advises it.       Have a diabetes test (fasting glucose) every three years. If you are at risk for diabetes, you should have this test more often.       At age 65, have a bone density scan (DEXA) to check for osteoporosis  (brittle bone disease).    Shots:    Get a flu shot each year.    Get a tetanus shot every 10 years.    Talk to your doctor about your pneumonia vaccines. There are now two you should receive - Pneumovax (PPSV 23) and Prevnar (PCV 13).    Talk to your doctor about the shingles vaccine.    Talk to your doctor about the hepatitis B vaccine.    Nutrition:     Eat at least 5 servings of fruits and vegetables each day.      Eat whole-grain bread, whole-wheat pasta and brown rice instead of white grains and rice.      Talk to your provider about Calcium and Vitamin D.     Lifestyle    Exercise at least 150 minutes a week (30 minutes a day, 5 days a week). This will help you control your weight and prevent disease.      Limit alcohol to one drink per day.      No smoking.       Wear sunscreen to prevent skin cancer.       See your dentist twice a year for an exam and cleaning.      See your eye doctor every 1 to 2 years to screen for conditions such as glaucoma, macular degeneration and cataracts.

## 2017-05-08 NOTE — LETTER
[facility]  [address] [facility] [address]  Phone: [phone]          May 9, 2017    Komal Miller  [address] [facility] [address]          Dear Komal,      LAB RESULTS:     The results of your recent LDL were elevated, continue same medications. Your remaining labs were normal.  If you have any further questions or problems, please contact our office.          Sincerely,      FELTON Byrne M.D.

## 2017-05-08 NOTE — LETTER
[facility]  [address] [facility] [address]  Phone: [phone]          May 9, 2017    Komal Miller  [address] [facility] [address]          Dear Komal,      LAB RESULTS:       The results of your recent LDL were elevated, I suggest you resume your cholesterol lowering medication. I have sent a prescription to you pharmacy. Your remaining labs were normal.  If you have any further questions or problems, please contact our office.          Sincerely,      FELTON Byrne M.D.

## 2017-05-09 ENCOUNTER — HOSPITAL ENCOUNTER (OUTPATIENT)
Dept: MAMMOGRAPHY | Facility: CLINIC | Age: 67
End: 2017-05-09
Attending: FAMILY MEDICINE
Payer: MEDICARE

## 2017-05-09 ENCOUNTER — HOSPITAL ENCOUNTER (OUTPATIENT)
Dept: BONE DENSITY | Facility: CLINIC | Age: 67
Discharge: HOME | End: 2017-05-09
Attending: FAMILY MEDICINE | Admitting: FAMILY MEDICINE
Payer: MEDICARE

## 2017-05-09 DIAGNOSIS — Z12.31 VISIT FOR SCREENING MAMMOGRAM: ICD-10-CM

## 2017-05-09 DIAGNOSIS — E28.39 OTHER PRIMARY OVARIAN FAILURE: ICD-10-CM

## 2017-05-09 DIAGNOSIS — E78.5 HYPERLIPIDEMIA LDL GOAL <130: ICD-10-CM

## 2017-05-09 DIAGNOSIS — Z00.00 MEDICARE WELCOME VISIT: ICD-10-CM

## 2017-05-09 PROCEDURE — 77080 DXA BONE DENSITY AXIAL: CPT

## 2017-05-09 PROCEDURE — G0202 SCR MAMMO BI INCL CAD: HCPCS

## 2017-05-09 RX ORDER — PRAVASTATIN SODIUM 40 MG
40 TABLET ORAL DAILY
Qty: 90 TABLET | Refills: 1 | Status: SHIPPED | OUTPATIENT
Start: 2017-05-09 | End: 2017-07-05

## 2017-05-09 NOTE — PROGRESS NOTES
SUBJECTIVE:  Komal Mariscal is in for a well exam, which is done and templated and essentially normal.  She is due for a mammogram which is scheduled and a DEXA scan which is scheduled.  Exam appears good.  She had recently been here with a severe migraine headache which cleared on Toradol and Zofran and she was started on propranolol, both for headache prevention and in fact her blood pressure was a bit elevated.  She has had no further migraine.  We do discuss logic in this and I would suggest she stay on it.  Her sister recently had a stroke, this has prompted her to be much more health conscious.  I do explain that the propranolol should be cardiovascular benefit as is the lisinopril.  Labs are drawn today and will notify her of results.  Mammogram is scheduled.         CAYETANO JEWELL M.D.             D: 2017 17:38   T: 2017 06:31   MT: SERENE#[id number]      Name:     KOMAL MARISCAL   MRN:      [id number]        Account:      [id number]   :      1950           Visit Date:   2017      Document: [id number]

## 2017-07-05 ENCOUNTER — OFFICE VISIT (OUTPATIENT)
Dept: FAMILY MEDICINE | Facility: OTHER | Age: 67
End: 2017-07-05
Payer: COMMERCIAL

## 2017-07-05 ENCOUNTER — TELEPHONE (OUTPATIENT)
Dept: FAMILY MEDICINE | Facility: OTHER | Age: 67
End: 2017-07-05

## 2017-07-05 VITALS
OXYGEN SATURATION: 96 % | HEART RATE: 75 BPM | SYSTOLIC BLOOD PRESSURE: 150 MMHG | TEMPERATURE: 97.7 F | DIASTOLIC BLOOD PRESSURE: 86 MMHG | BODY MASS INDEX: 39.77 KG/M2 | WEIGHT: 242.7 LBS

## 2017-07-05 DIAGNOSIS — Z71.89 ADVANCED DIRECTIVES, COUNSELING/DISCUSSION: ICD-10-CM

## 2017-07-05 DIAGNOSIS — E66.01 MORBID OBESITY DUE TO EXCESS CALORIES (H): ICD-10-CM

## 2017-07-05 DIAGNOSIS — R30.0 DYSURIA: Primary | ICD-10-CM

## 2017-07-05 DIAGNOSIS — E78.5 HYPERLIPIDEMIA LDL GOAL <130: ICD-10-CM

## 2017-07-05 DIAGNOSIS — I10 HYPERTENSION GOAL BP (BLOOD PRESSURE) < 140/90: ICD-10-CM

## 2017-07-05 LAB
ALBUMIN UR-MCNC: 100 MG/DL
APPEARANCE UR: ABNORMAL
BACTERIA #/AREA URNS HPF: ABNORMAL /HPF
BILIRUB UR QL STRIP: ABNORMAL
COLOR UR AUTO: ABNORMAL
GLUCOSE UR STRIP-MCNC: NEGATIVE MG/DL
HGB UR QL STRIP: ABNORMAL
KETONES UR STRIP-MCNC: ABNORMAL MG/DL
LEUKOCYTE ESTERASE UR QL STRIP: ABNORMAL
NITRATE UR QL: POSITIVE
NON-SQ EPI CELLS #/AREA URNS LPF: ABNORMAL /LPF
PH UR STRIP: 6 PH (ref 5–7)
RBC #/AREA URNS AUTO: ABNORMAL /HPF (ref 0–2)
SP GR UR STRIP: 1.02 (ref 1–1.03)
URN SPEC COLLECT METH UR: ABNORMAL
UROBILINOGEN UR STRIP-ACNC: 1 EU/DL (ref 0.2–1)
WBC #/AREA URNS AUTO: ABNORMAL /HPF (ref 0–2)

## 2017-07-05 PROCEDURE — 87088 URINE BACTERIA CULTURE: CPT | Performed by: INTERNAL MEDICINE

## 2017-07-05 PROCEDURE — 81001 URINALYSIS AUTO W/SCOPE: CPT | Performed by: INTERNAL MEDICINE

## 2017-07-05 PROCEDURE — 99213 OFFICE O/P EST LOW 20 MIN: CPT | Performed by: INTERNAL MEDICINE

## 2017-07-05 PROCEDURE — 87086 URINE CULTURE/COLONY COUNT: CPT | Performed by: INTERNAL MEDICINE

## 2017-07-05 PROCEDURE — 87186 SC STD MICRODIL/AGAR DIL: CPT | Performed by: INTERNAL MEDICINE

## 2017-07-05 RX ORDER — CHLORAL HYDRATE 500 MG
2 CAPSULE ORAL DAILY
COMMUNITY
End: 2021-12-21

## 2017-07-05 RX ORDER — CALCIUM CARBONATE 500(1250)
1 TABLET ORAL 2 TIMES DAILY
COMMUNITY
End: 2018-06-23

## 2017-07-05 RX ORDER — SULFAMETHOXAZOLE/TRIMETHOPRIM 800-160 MG
1 TABLET ORAL 2 TIMES DAILY
Qty: 14 TABLET | Refills: 0 | Status: SHIPPED | OUTPATIENT
Start: 2017-07-05 | End: 2017-09-29

## 2017-07-05 ASSESSMENT — PAIN SCALES - GENERAL: PAINLEVEL: MILD PAIN (2)

## 2017-07-05 NOTE — MR AVS SNAPSHOT
"              After Visit Summary   2017    Komal Miller    MRN: [id number]           Patient Information     Date Of Birth          1950        Visit Information        Provider Department      2017 3:20 PM Wesly Lehman DO [facility]        Today's Diagnoses     Dysuria    -  1    Hypertension goal BP (blood pressure) < 140/90        Hyperlipidemia LDL goal <130        Advanced directives, counseling/discussion        Morbid obesity due to excess calories (H)           Follow-ups after your visit        Who to contact     If you have questions or need follow up information about today's clinic visit or your schedule please contact [facility] directly at [phone].  Normal or non-critical lab and imaging results will be communicated to you by [employer], letter or phone within 4 business days after the clinic has received the results. If you do not hear from us within 7 days, please contact the clinic through [employer] or phone. If you have a critical or abnormal lab result, we will notify you by phone as soon as possible.  Submit refill requests through [employer] or call your pharmacy and they will forward the refill request to us. Please allow 3 business days for your refill to be completed.          Additional Information About Your Visit        MyChart Information     [employer]Position lets you send messages to your doctor, view your test results, renew your prescriptions, schedule appointments and more. To sign up, go to [email or web address] . Click on \"Log in\" on the left side of the screen, which will take you to the Welcome page. Then click on \"Sign up Now\" on the right side of the page.     You will be asked to enter the access code listed below, as well as some personal information. Please follow the directions to create your username and password.     Your access code is: [email or web address]  Expires: 10/24/2017  6:42 PM     Your access code will  in 90 days. " If you need help or a new code, please call your [facility] clinic or [phone].        Care EveryWhere ID     This is your Care EveryWhere ID. This could be used by other organizations to access your [facility] medical records  [id number]        Your Vitals Were     Pulse Temperature Pulse Oximetry BMI (Body Mass Index)          75 97.7  F (36.5  C) (Temporal) 96% 39.77 kg/m2         Blood Pressure from Last 3 Encounters:   07/05/17 150/86   05/08/17 140/78   03/14/17 (!) 160/100    Weight from Last 3 Encounters:   07/05/17 242 lb 11.2 oz (110.1 kg)   05/08/17 246 lb (111.6 kg)   08/14/16 251 lb (113.9 kg)              We Performed the Following     *UA reflex to Microscopic and Culture ([place] [facility] (except [place] and [place])     Urine Culture Aerobic Bacterial     Urine Microscopic          Today's Medication Changes          These changes are accurate as of: 7/5/17 11:59 PM.  If you have any questions, ask your nurse or doctor.               Start taking these medicines.        Dose/Directions    sulfamethoxazole-trimethoprim 800-160 MG per tablet   Commonly known as:  BACTRIM DS/SEPTRA DS   Used for:  Dysuria   Started by:  Wesly Lehman DO        Dose:  1 tablet   Take 1 tablet by mouth 2 times daily   Quantity:  14 tablet   Refills:  0            Where to get your medicines      These medications were sent to [facility] [place] [address]   [address] [facility] [address]     Phone:  [phone]     sulfamethoxazole-trimethoprim 800-160 MG per tablet                Primary Care Provider Office Phone # Fax #    Augustine Byrne [phone]       [facility] [address] [facility] [address]        Equal Access to Services     NIK RODRIGUEZ AH: Jessica Acosta, waaxda luqadaha, qaybta kaalmada brigitte, israel ho. So [facility] [phone].    ATENCIÓN: Lev montgomery,  tiene a dumont disposición servicios gratuitos de asistencia lingüística. Ketan verdugo [phone].    We comply with applicable federal civil rights laws and [place] laws. We do not discriminate on the basis of race, color, national origin, age, disability sex, sexual orientation or gender identity.            Thank you!     Thank you for choosing [facility]  for your care. Our goal is always to provide you with excellent care. Hearing back from our patients is one way we can continue to improve our services. Please take a few minutes to complete the written survey that you may receive in the mail after your visit with us. Thank you!             Your Updated Medication List - Protect others around you: Learn how to safely use, store and throw away your medicines at www.disposemymeds.org.          This list is accurate as of: 7/5/17 11:59 PM.  Always use your most recent med list.                   Brand Name Dispense Instructions for use Diagnosis    calcium carbonate 1250 MG tablet    OS-BAKARI 500 mg [place]. [place]     Take 1 tablet by mouth 2 times daily        fish oil-omega-3 fatty acids 1000 MG capsule      Take 2 g by mouth daily        GARLIC PO           lisinopril-hydrochlorothiazide 20-25 MG per tablet    PRINZIDE/ZESTORETIC    90 tablet    Take 1 tablet by mouth daily    Hypertension goal BP (blood pressure) < 140/90       omeprazole 40 MG capsule    priLOSEC    90 capsule    TAKE ONE CAPSULE BY MOUTH EVERY DAY .TAKE 30 TO 60 MINUTES BEFORE A MEAL    Gastroesophageal reflux disease with esophagitis       propranolol 40 MG tablet    INDERAL    180 tablet    Take 1 tablet (40 mg) by mouth 2 times daily    Cluster headache, not intractable, unspecified chronicity pattern, Nausea       sulfamethoxazole-trimethoprim 800-160 MG per tablet    BACTRIM DS/SEPTRA DS    14 tablet    Take 1 tablet by mouth 2 times daily    Dysuria

## 2017-07-05 NOTE — PROGRESS NOTES
SUBJECTIVE:                                                    Komal Miller is a 67 year old female who presents to clinic today for the following health issues:    URINARY TRACT SYMPTOMS      Duration: last night    Description  dysuria and hematuria    Intensity:  mild    Accompanying signs and symptoms:  Fever/chills: no   Flank pain no   Nausea and vomiting: no   Vaginal symptoms: none  Abdominal/Pelvic Pain: YES    History  History of frequent UTI's: no   History of kidney stones: YES  Sexually Active: no   Possibility of pregnancy: No    Precipitating or alleviating factors: None    Therapies tried and outcome: pyridium    Outcome: helped        Problem list and histories reviewed & adjusted, as indicated.  Additional history: as documented                  Chief Complaint           The patient is a pleasant 67-year-old female who presents today with frequency, urgency and dysuria.  She's also has some scant hematuria which began last evening.  She has some suprapubic discomfort but denies any flank or side pain.  She does not have a previous history of urinary tract infection but does have a distant history of kidney stones.  She has tried some over-the-counter Pyridium and this does seem to have helped briefly.  Her past history is primarily positive for Hyperlipidemia and hypertension.These are currently controlled on her current medications.                       PAST, FAMILY,SOCIAL HISTORY:     Medical  History:   has a past medical history of Unspecified essential hypertension.     Surgical History:   has a past surgical history that includes TOTAL ABDOM HYSTERECTOMY; REMOVAL OF OVARY/TUBE(S); DILATION/CURETTAGE DIAG/THER NON OB; APPENDECTOMY; LIGATE FALLOPIAN TUBE; colonoscopy (06/10/08); colonoscopy (01/20/10); UPPER GI ENDOSCOPY,EXAM (01/20/10); video capsule endoscopy (02/15/10); LAP, SURG ENTEROLYSIS (05/07/10); Laparoscopy diagnostic (general) (5/6/2014); and Laparotomy, lysis adhesions,  combined (5/6/2014).     Social History:   reports that she quit smoking about 22 years ago. Her smoking use included Cigarettes. She has a 10.00 pack-year smoking history. She has never used smokeless tobacco. She reports that she drinks alcohol. She reports that she does not use illicit drugs.     Family History:  family history includes Allergies in her brother, mother, and sister; Arthritis in her mother; Breast Cancer in her sister; C.A.D. in her father and mother; CANCER in her mother; DIABETES in her mother; Eye Disorder in her maternal aunt; GASTROINTESTINAL DISEASE in her brother; Gynecology in her sister; Hypertension in her brother; Respiratory in her mother; Thyroid Disease in her sister.            MEDICATIONS  Current Outpatient Prescriptions   Medication Sig Dispense Refill     calcium carbonate (OS-BAKARI 500 MG [place]. [place]) 1250 MG tablet Take 1 tablet by mouth 2 times daily       sulfamethoxazole-trimethoprim (BACTRIM DS/SEPTRA DS) 800-160 MG per tablet Take 1 tablet by mouth 2 times daily 14 tablet 0     lisinopril-hydrochlorothiazide (PRINZIDE/ZESTORETIC) 20-25 MG per tablet Take 1 tablet by mouth daily 90 tablet 3     propranolol (INDERAL) 40 MG tablet Take 1 tablet (40 mg) by mouth 2 times daily 180 tablet 3     omeprazole (PRILOSEC) 40 MG capsule TAKE ONE CAPSULE BY MOUTH EVERY DAY .TAKE 30 TO 60 MINUTES BEFORE A MEAL 90 capsule 3     GARLIC PO        traMADol (ULTRAM) 50 MG tablet Take 1-2 tablets ( mg) by mouth every 6 hours as needed for moderate pain 30 tablet 0     ciprofloxacin (CIPRO) 500 MG tablet Take 1 tablet (500 mg) by mouth 2 times daily 14 tablet 0     fish oil-omega-3 fatty acids 1000 MG capsule Take 2 g by mouth daily           --------------------------------------------------------------------------------------------------------------------                  Review of Systems     LUNGS: Pt denies: cough,excess sputum, hemoptysis, or shortness of breath.   HEART: Pt denies:  chest pain, arrythmia, syncope, tachy or bradyarrhythmia.   GI: Pt denies: nausea, vomitting, diarrhea, constipation, melena, or hematochezia.   NEURO: Pt denies: seizures, strokes, diplopia, weakness, paraesthesias, or paralysis.                        Examination       Vital Signs:  B/P: 150/86, T: 97.7, P: 75, R: Data Unavailable, BMI: Body mass index is 39.77 kg/(m^2).   Constitutional: The patient appears to be in no acute distress. The patient appears to be adequately hydrated. No acute respiratory or hemodynamic distress is noted at this time.   LUNGS: clear bilaterally, airflow is brisk, no intercostal retraction or stridor is noted. No coughing is noted during visit.   HEART:  regular without rubs, clicks, gallops, or murmurs. PMI is nondisplaced. Upstrokes are brisk. S1,S2 are heard.   GI: Abdomen is soft, without rebound, guarding or tenderness. Bowel sounds are appropriate. No renal bruits are heard.    URINARY: Pablo's punch is negative. Moderate suprapubic tenderness is noted with palpation.                       Decision-Making        1. Dysuria  Patient urinary analysis is grossly positive.  Will start antibiotic therapy and weight culture  - *UA reflex to Microscopic and Culture ([place] and [facility] (except [place] and [place])  - Urine Microscopic  - sulfamethoxazole-trimethoprim (BACTRIM DS/SEPTRA DS) 800-160 MG per tablet; Take 1 tablet by mouth 2 times daily  Dispense: 14 tablet; Refill: 0  - Urine Culture Aerobic Bacterial    2. Hypertension goal BP (blood pressure) < 140/90  Continue current medication.  Blood pressure is slightly elevated but this may be due to her discomfort.  Would recommend home blood pressure checks regularly    3. Hyperlipidemia LDL goal <130  Continue Dietary restriction.  Will follow up with primary care provider regarding  Ongoing statin usage.  4. Advanced directives, counseling/discussion  Information disseminated       And morbid obesity                       FOLLOW UP   I have asked the patient to make an appointment for followup with me Or her primary care provider in the upcoming week for repeat urinary analysis and blood pressure check        I have carefully explained the diagnosis and treatment options with the patient. The patient has displayed an understanding of the above, and all subsequent questions were answered.               DO PATRICE Arellano    Portions of this note were produced using [employer]  Although every attempt at real-time proof reading has been made, occasional grammar/syntax errors may have been missed.

## 2017-07-05 NOTE — NURSING NOTE
"Chief Complaint   Patient presents with     UTI       Initial /86 (BP Location: Left arm, Patient Position: Chair, Cuff Size: Adult Large)  Pulse 75  Temp 97.7  F (36.5  C) (Temporal)  Wt 242 lb 11.2 oz (110.1 kg)  SpO2 96%  BMI 39.77 kg/m2 Estimated body mass index is 39.77 kg/(m^2) as calculated from the following:    Height as of 5/8/17: 5' 5.5\" (1.664 m).    Weight as of this encounter: 242 lb 11.2 oz (110.1 kg).  Medication Reconciliation: complete  Charley BENNETT    "

## 2017-07-05 NOTE — TELEPHONE ENCOUNTER
"Reason for call:  Patient reporting a symptom    Symptom or request: burning with bleeding when urinating last night, bought some \"numbing cream\" but this morning still having mild bleeding. Wondering if she needs to be seen?    Duration (how long have symptoms been present): 1 day     Have you been treated for this before? No    Additional comments:     Phone Number patient can be reached at:  Home number on file [phone] (home)    Best Time:  any    Can we leave a detailed message on this number:  YES    Call taken on 7/5/2017 at 9:42 AM by Tanika Rivas    "

## 2017-07-08 ENCOUNTER — TELEPHONE (OUTPATIENT)
Dept: NURSING | Facility: CLINIC | Age: 67
End: 2017-07-08

## 2017-07-08 ENCOUNTER — NURSE TRIAGE (OUTPATIENT)
Dept: NURSING | Facility: CLINIC | Age: 67
End: 2017-07-08

## 2017-07-08 DIAGNOSIS — N39.0 URINARY TRACT INFECTION: Primary | ICD-10-CM

## 2017-07-08 LAB
BACTERIA SPEC CULT: ABNORMAL
MICRO REPORT STATUS: ABNORMAL
MICROORGANISM SPEC CULT: ABNORMAL
SPECIMEN SOURCE: ABNORMAL

## 2017-07-08 RX ORDER — CIPROFLOXACIN 500 MG/1
500 TABLET, FILM COATED ORAL 2 TIMES DAILY
Qty: 14 TABLET | Refills: 0 | Status: SHIPPED | OUTPATIENT
Start: 2017-07-08 | End: 2017-09-29

## 2017-07-08 NOTE — TELEPHONE ENCOUNTER
Reason for Disposition    [1] SEVERE pain (e.g., excruciating) AND [2] no improvement 2 hours after pain medications    Additional Information    Negative: Shock suspected (e.g., cold/pale/clammy skin, too weak to stand, low BP, rapid pulse)    Negative: Sounds like a life-threatening emergency to the triager    Negative: Urinary tract infection suspected, but not taking antibiotics    Negative: [1] Unable to urinate (or only a few drops) > 4 hours AND     [2] bladder feels very full (e.g., palpable bladder or strong urge to urinate)    Negative: Passing pure blood or large blood clots (i.e., size > a dime)  (Exceptions: flecks, small strands, or pinkish-red color)    Negative: Patient sounds very sick or weak to the triager    Protocols used: URINARY TRACT INFECTION ON ANTIBIOTIC FOLLOW-UP CALL - FEMALE-ADULT-      Dr Lehman called back and advised

## 2017-07-08 NOTE — TELEPHONE ENCOUNTER
worse on antibiotics since Wednesday. Blood in urine. 11:13 a.m. paged Dr Lehman to call RN via page .  Reviewed sensitivities with the MD who ordered Cipro 500 mg BID for 7 days. Order sent to [facility] pharmacy. Called and notified patient further advising pushing fluids, using Tylenol along with what she is already doing, taking over the counter pyridium.  Keena Jung RN-[facility]

## 2017-07-08 NOTE — TELEPHONE ENCOUNTER
Regarding: Bladder infection with pain  ----- Message from Jesenia Sneed sent at 7/8/2017 10:50 AM CDT -----  Reason for call:  Patient reporting a symptom    Symptom or request: Pain, Bladder infection    Duration (how long have symptoms been present): 4days    Have you been treated for this before? Yes    Additional comments:     Phone Number patient can be reached at:  [phone]    Best Time:      Can we leave a detailed message on this number:  YES    Call taken on 7/8/2017 at 10:49 AM by Jesenia Sneed

## 2017-07-13 DIAGNOSIS — K66.0 INTESTINAL ADHESIONS: ICD-10-CM

## 2017-07-13 DIAGNOSIS — R10.9 RECURRING ABDOMINAL PAIN: ICD-10-CM

## 2017-07-13 NOTE — TELEPHONE ENCOUNTER
Reason for Call:  Medication or medication refill:    Do you use a [facility]?  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 Komal is aware Dr Byrne is out of clinic until 7-17-18 and is ok to wait until next week for her refill.    Can we leave a detailed message on this number? YES    Phone number patient can be reached at: Home number on file [phone] (home)    Best Time:     Call taken on 7/13/2017 at 1:09 PM by Sally Moody

## 2017-07-17 RX ORDER — TRAMADOL HYDROCHLORIDE 50 MG/1
50-100 TABLET ORAL EVERY 6 HOURS PRN
Qty: 30 TABLET | Refills: 0 | Status: SHIPPED | OUTPATIENT
Start: 2017-07-17 | End: 2017-09-11

## 2017-07-26 PROBLEM — E66.01 MORBID OBESITY DUE TO EXCESS CALORIES (H): Status: ACTIVE | Noted: 2017-07-26

## 2017-09-05 ENCOUNTER — DOCUMENTATION ONLY (OUTPATIENT)
Dept: OTHER | Facility: CLINIC | Age: 67
End: 2017-09-05

## 2017-09-05 DIAGNOSIS — Z71.89 ADVANCED DIRECTIVES, COUNSELING/DISCUSSION: Chronic | ICD-10-CM

## 2017-09-11 DIAGNOSIS — R10.9 RECURRING ABDOMINAL PAIN: ICD-10-CM

## 2017-09-11 DIAGNOSIS — K66.0 INTESTINAL ADHESIONS: ICD-10-CM

## 2017-09-11 RX ORDER — TRAMADOL HYDROCHLORIDE 50 MG/1
TABLET ORAL
Qty: 30 TABLET | Refills: 0 | Status: SHIPPED | OUTPATIENT
Start: 2017-09-11 | End: 2017-10-23

## 2017-09-11 NOTE — TELEPHONE ENCOUNTER
traMADol (ULTRAM) 50 MG tablet  7/5/17      Last Written Prescription Date:  7/17/17  Last Fill Quantity: 30,   # refills: 0  Last Office Visit with [facility] or  Health prescribing provider: 7/5/17  Future Office visit:       Routing refill request to provider for review/approval because:  Drug not on the [facility], [facility] or [facility] refill protocol or controlled substance

## 2017-09-15 ENCOUNTER — DOCUMENTATION ONLY (OUTPATIENT)
Dept: OTHER | Facility: CLINIC | Age: 67
End: 2017-09-15

## 2017-09-29 ENCOUNTER — OFFICE VISIT (OUTPATIENT)
Dept: FAMILY MEDICINE | Facility: OTHER | Age: 67
End: 2017-09-29
Payer: COMMERCIAL

## 2017-09-29 DIAGNOSIS — G44.009 CLUSTER HEADACHE, NOT INTRACTABLE, UNSPECIFIED CHRONICITY PATTERN: Primary | ICD-10-CM

## 2017-09-29 PROCEDURE — 96372 THER/PROPH/DIAG INJ SC/IM: CPT | Performed by: INTERNAL MEDICINE

## 2017-09-29 PROCEDURE — 99214 OFFICE O/P EST MOD 30 MIN: CPT | Mod: 25 | Performed by: INTERNAL MEDICINE

## 2017-09-29 RX ORDER — KETOROLAC TROMETHAMINE 30 MG/ML
60 INJECTION, SOLUTION INTRAMUSCULAR; INTRAVENOUS ONCE
Qty: 2 ML | Refills: 0 | COMMUNITY
Start: 2017-09-29 | End: 2018-03-13

## 2017-09-29 ASSESSMENT — PAIN SCALES - GENERAL: PAINLEVEL: WORST PAIN (10)

## 2017-09-29 NOTE — NURSING NOTE
"Chief Complaint   Patient presents with     Headache       Initial /80 (BP Location: Left arm, Patient Position: Chair, Cuff Size: Adult Large)  Pulse 72  Temp 96.5  F (35.8  C) (Temporal)  SpO2 95% Estimated body mass index is 39.77 kg/(m^2) as calculated from the following:    Height as of 5/8/17: 5' 5.5\" (1.664 m).    Weight as of 7/5/17: 242 lb 11.2 oz (110.1 kg).  Medication Reconciliation: complete'  Charley BENNETT    "

## 2017-09-29 NOTE — PROGRESS NOTES
SUBJECTIVE:   Komal Miller is a 67 year old female who presents to clinic today for the following health issues:    Headaches      Duration: yesterday    Description  Location: unilateral in the left frontal area   Character: unsure  Frequency:  Has only had 1 other one  Duration:      Intensity:  severe    Accompanying signs and symptoms:    Precipitating or Alleviating factors:  Nausea/vomiting: always  Dizziness: no  Weakness or numbness: no  Visual changes: none  Fever: no   Sinus or URI symptoms no     History  Head trauma: no  Family history of migraines: no   Previous tests for headaches: YES  Neurologist evaluations: no   Able to do daily activities when headache present: no  Wake with headaches: YES  Daily pain medication use: no   Any changes in: none    Precipitating or Alleviating factors (light/sound/sleep/caffeine): none    Therapies tried and outcome: Ibuprofen (Advil, Motrin) and Excedrin    Outcome - not effective  Frequent/daily pain medication use: no        CHIEF COMPLAINT:    The patient is a pleasant 67-year-old female who presents today with a headache. It is primarily on the left side and she's had for last day and a half. It eased up a little bit last night when she tried to sleep but did not go away completely. It is made worse with bright light. Cervical range of motion does not seem to affect it. She's had no aura either visual or sensory. She had an episode like this in March and was worked up thoroughly including CAT scan and it was unremarkable. I do not believe that we need to reinvent the wheel and repeat that at this time. She does have some tramadol but has not taken that for her headache. She is on propranolol for suppression and this generally works up until today. Symptoms of headache or more typical for cluster headache given the extended period of time between episodes. She notes no neurologic lateralization, weakness, slurred speech, or acute visual changes.                          PAST, FAMILY,SOCIAL HISTORY:     Medical  History:   has a past medical history of Unspecified essential hypertension.     Surgical History:   has a past surgical history that includes TOTAL ABDOM HYSTERECTOMY; REMOVAL OF OVARY/TUBE(S); DILATION/CURETTAGE DIAG/THER NON OB; APPENDECTOMY; LIGATE FALLOPIAN TUBE; colonoscopy (06/10/08); colonoscopy (01/20/10); UPPER GI ENDOSCOPY,EXAM (01/20/10); video capsule endoscopy (02/15/10); LAP, SURG ENTEROLYSIS (05/07/10); Laparoscopy diagnostic (general) (5/6/2014); and Laparotomy, lysis adhesions, combined (5/6/2014).     Social History:   reports that she quit smoking about 22 years ago. Her smoking use included Cigarettes. She has a 10.00 pack-year smoking history. She has never used smokeless tobacco. She reports that she drinks alcohol. She reports that she does not use illicit drugs.     Family History:  family history includes Allergies in her brother, mother, and sister; Arthritis in her mother; Breast Cancer in her sister; C.A.D. in her father and mother; CANCER in her mother; DIABETES in her mother; Eye Disorder in her maternal aunt; GASTROINTESTINAL DISEASE in her brother; Gynecology in her sister; Hypertension in her brother; Respiratory in her mother; Thyroid Disease in her sister.            MEDICATIONS  Current Outpatient Prescriptions   Medication Sig Dispense Refill     traMADol (ULTRAM) 50 MG tablet TAKE 1 TO 2 TABLETS EVERY 6 HOURS AS NEEDED FOR MODERATE PAIN 30 tablet 0     calcium carbonate (OS-BAKARI 500 MG [place]. [place]) 1250 MG tablet Take 1 tablet by mouth 2 times daily       fish oil-omega-3 fatty acids 1000 MG capsule Take 2 g by mouth daily       lisinopril-hydrochlorothiazide (PRINZIDE/ZESTORETIC) 20-25 MG per tablet Take 1 tablet by mouth daily 90 tablet 3     propranolol (INDERAL) 40 MG tablet Take 1 tablet (40 mg) by mouth 2 times daily 180 tablet 3     omeprazole (PRILOSEC) 40 MG capsule TAKE ONE CAPSULE BY MOUTH EVERY DAY .TAKE 30 TO 60  MINUTES BEFORE A MEAL 90 capsule 3     GARLIC PO            --------------------------------------------------------------------------------------------------------------------                          REVIEW OF SYSTEMS:         LUNGS: Pt denies: cough,excess sputum, hemoptysis, or shortness of breath.   HEART: Pt denies: chest pain, arrythmia, syncope, tachy or bradyarrhythmia or excess edema.   GI: Pt denies: nausea, vomitting, diarrhea, constipation, melena, or hematochezia.   NEURO: Pt denies: seizures, strokes, diplopia, weakness, paraesthesias, or paralysis. As the headache as described. Rates the headache at approximately 9/10 maximum.                          EXAMINATION:         /80 (BP Location: Left arm, Patient Position: Chair, Cuff Size: Adult Large)  Pulse 72  Temp 96.5  F (35.8  C) (Temporal)  SpO2 95%   Constitutional: The patient appears to be in moderate acute distress. The patient appears to be adequately hydrated. No acute respiratory or hemodynamic distress is noted at this time.   LUNGS: clear bilaterally, airflow is brisk, no intercostal retraction or stridor is noted. No coughing is noted during visit.   HEART:  regular without rubs, clicks, gallops, or murmurs. PMI is nondisplaced. Upstrokes are brisk. S1,S2 are heard.   GI: Abdomen is soft, without rebound, guarding or tenderness. Bowel sounds are appropriate. No renal bruits are heard.    NEURO: Pt is alert and appropriate. No neurologic lateralization is noted. Cranial nerves 2-12 are intact. Peripheral sensory and motor function are grossly normal. Photophobia is noted. Pupils are equal, round and reactive to light and accommodation.                          DECISION MAKIN. Cluster headache, not intractable, unspecified chronicity pattern  60 mg of Toradol and 4 mg of sublingual Zofran given.  Patient instructed to go home and try to get some rest.  May take her tramadol when she gets home.  If symptoms worsen or do not  resolve, should present to the emergency department. Have explained to the patient that at this time, we do not have facilities or medications for more aggressive pain management.    The patient is obese.  We have discussed weight loss through responsible caloric restriction and exercise as possible.                               FOLLOW UP    I have asked the patient to make an appointment for follow up with me as needed        I have carefully explained the diagnosis and treatment options with the patient. The patient has displayed an understanding of the above, and all subsequent questions were answered.         DO PATRICE Arellano    Portions of this note were produced using [employer]  Although every attempt at real-time proof reading has been made, occasional grammar/syntax errors may have been missed.

## 2017-09-29 NOTE — MR AVS SNAPSHOT
"              After Visit Summary   2017    Komal Miller    MRN: [id number]           Patient Information     Date Of Birth          1950        Visit Information        Provider Department      2017 1:20 PM Wesly Lehman DO [facility]        Today's Diagnoses     Cluster headache, not intractable, unspecified chronicity pattern    -  1       Follow-ups after your visit        Who to contact     If you have questions or need follow up information about today's clinic visit or your schedule please contact [facility] directly at [phone].  Normal or non-critical lab and imaging results will be communicated to you by [employer], letter or phone within 4 business days after the clinic has received the results. If you do not hear from us within 7 days, please contact the clinic through [employer] or phone. If you have a critical or abnormal lab result, we will notify you by phone as soon as possible.  Submit refill requests through [employer] or call your pharmacy and they will forward the refill request to us. Please allow 3 business days for your refill to be completed.          Additional Information About Your Visit        MyChart Information     [employer] lets you send messages to your doctor, view your test results, renew your prescriptions, schedule appointments and more. To sign up, go to [email or web address] . Click on \"Log in\" on the left side of the screen, which will take you to the Welcome page. Then click on \"Sign up Now\" on the right side of the page.     You will be asked to enter the access code listed below, as well as some personal information. Please follow the directions to create your username and password.     Your access code is: [email or web address]  Expires: 2018  9:46 PM     Your access code will  in 90 days. If you need help or a new code, please call your [facility] or [phone].        Care EveryWhere ID     This is your Care " EveryWhere ID. This could be used by other organizations to access your [place] medical records  [id number]        Your Vitals Were     Pulse Temperature Pulse Oximetry             72 96.5  F (35.8  C) (Temporal) 95%          Blood Pressure from Last 3 Encounters:   09/29/17 135/80   07/05/17 150/86   05/08/17 140/78    Weight from Last 3 Encounters:   07/05/17 242 lb 11.2 oz (110.1 kg)   05/08/17 246 lb (111.6 kg)   08/14/16 251 lb (113.9 kg)              We Performed the Following     INJECTION INTRAMUSCULAR OR SUB-Q     KETOROLAC TROMETHAMINE 15MG        Primary Care Provider Office Phone # Fax #    Augustine Byrne [phone]       [address] [facility] [address]        Equal Access to Services     NIK RODRIGUEZ : Hadii chencho sousa hadasho Sodenise, waaxda luqadaha, qaybta kaalmada brigitte, israel villatoro . So [facility] [phone].    ATENCIÓN: Si habla español, tiene a dumont disposición servicios gratuitos de asistencia lingüística. Gradyame al [phone].    We comply with applicable federal civil rights laws and [place] laws. We do not discriminate on the basis of race, color, national origin, age, disability, sex, sexual orientation, or gender identity.            Thank you!     Thank you for choosing [facility]  for your care. Our goal is always to provide you with excellent care. Hearing back from our patients is one way we can continue to improve our services. Please take a few minutes to complete the written survey that you may receive in the mail after your visit with us. Thank you!             Your Updated Medication List - Protect others around you: Learn how to safely use, store and throw away your medicines at www.disposemymeds.org.          This list is accurate as of: 9/29/17 11:59 PM.  Always use your most recent med list.                   Brand Name Dispense Instructions for use Diagnosis    calcium carbonate 1250 MG tablet    OS-BAKARI  500 mg [place]. [place]     Take 1 tablet by mouth 2 times daily        fish oil-omega-3 fatty acids 1000 MG capsule      Take 2 g by mouth daily        GARLIC PO           ketorolac 60 MG/2ML Soln injection    TORADOL    2 mL    Inject 2 mLs (60 mg) into the muscle once for 1 dose    Cluster headache, not intractable, unspecified chronicity pattern       lisinopril-hydrochlorothiazide 20-25 MG per tablet    PRINZIDE/ZESTORETIC    90 tablet    Take 1 tablet by mouth daily    Hypertension goal BP (blood pressure) < 140/90       omeprazole 40 MG capsule    priLOSEC    90 capsule    TAKE ONE CAPSULE BY MOUTH EVERY DAY .TAKE 30 TO 60 MINUTES BEFORE A MEAL    Gastroesophageal reflux disease with esophagitis       propranolol 40 MG tablet    INDERAL    180 tablet    Take 1 tablet (40 mg) by mouth 2 times daily    Cluster headache, not intractable, unspecified chronicity pattern, Nausea

## 2017-10-23 ENCOUNTER — TELEPHONE (OUTPATIENT)
Dept: FAMILY MEDICINE | Facility: OTHER | Age: 67
End: 2017-10-23

## 2017-10-23 DIAGNOSIS — R10.9 RECURRING ABDOMINAL PAIN: ICD-10-CM

## 2017-10-23 DIAGNOSIS — K66.0 INTESTINAL ADHESIONS: ICD-10-CM

## 2017-10-24 RX ORDER — TRAMADOL HYDROCHLORIDE 50 MG/1
TABLET ORAL
Qty: 30 TABLET | Refills: 0 | Status: SHIPPED | OUTPATIENT
Start: 2017-10-24 | End: 2018-01-09

## 2017-10-24 NOTE — TELEPHONE ENCOUNTER
traMADol (ULTRAM) 50 MG tablet   Last Written Prescription Date:  09/11/2017  Last Fill Quantity: 30,   # refills: 0  Future Office visit:       Routing refill request to provider for review/approval because:  Drug not on the [facility], [facility] or [facility] refill protocol or controlled substance

## 2017-10-30 VITALS
OXYGEN SATURATION: 95 % | TEMPERATURE: 96.5 F | HEART RATE: 72 BPM | SYSTOLIC BLOOD PRESSURE: 135 MMHG | DIASTOLIC BLOOD PRESSURE: 80 MMHG

## 2017-11-06 NOTE — TELEPHONE ENCOUNTER
[facility] stated they didn't rec this refill (Tramadol). Please call. [phone].

## 2017-11-14 NOTE — TELEPHONE ENCOUNTER
Patient calling to follow up on RX, please advise if this can be approved today, patient states this has been back & forth for 3-4 wks.  Thank you,  Esther Carlson  Patient Representative

## 2017-12-19 ENCOUNTER — TELEPHONE (OUTPATIENT)
Dept: FAMILY MEDICINE | Facility: OTHER | Age: 67
End: 2017-12-19

## 2017-12-19 DIAGNOSIS — R11.0 NAUSEA: ICD-10-CM

## 2017-12-19 DIAGNOSIS — G44.009 CLUSTER HEADACHE, NOT INTRACTABLE, UNSPECIFIED CHRONICITY PATTERN: ICD-10-CM

## 2017-12-19 NOTE — TELEPHONE ENCOUNTER
Reason for Call:  Other call back    Detailed comments: On patients Propanolol prescription she received from [facility] the directions say to take 1 tablet every day. When this was first prescribed it was to take 1 tablet 2 times daily. [facility] has it wrong. Or did you change it? Can you call them and fix this? Please call pt and let them know when this has been addressed.     Phone Number Patient can be reached at: Home number on file [phone] (home)    Best Time: anytime    Can we leave a detailed message on this number? YES    Call taken on 12/19/2017 at 10:22 AM by Priscila Donahue

## 2017-12-20 RX ORDER — PROPRANOLOL HYDROCHLORIDE 40 MG/1
40 TABLET ORAL 2 TIMES DAILY
Qty: 180 TABLET | Refills: 3 | Status: SHIPPED | OUTPATIENT
Start: 2017-12-20 | End: 2018-06-05

## 2017-12-21 ENCOUNTER — TELEPHONE (OUTPATIENT)
Dept: FAMILY MEDICINE | Facility: OTHER | Age: 67
End: 2017-12-21

## 2018-01-09 ENCOUNTER — TELEPHONE (OUTPATIENT)
Dept: FAMILY MEDICINE | Facility: OTHER | Age: 68
End: 2018-01-09

## 2018-01-09 DIAGNOSIS — R10.9 RECURRING ABDOMINAL PAIN: ICD-10-CM

## 2018-01-09 DIAGNOSIS — K66.0 INTESTINAL ADHESIONS: ICD-10-CM

## 2018-01-09 RX ORDER — TRAMADOL HYDROCHLORIDE 50 MG/1
TABLET ORAL
Qty: 30 TABLET | Refills: 0 | Status: SHIPPED | OUTPATIENT
Start: 2018-01-09 | End: 2018-03-06

## 2018-03-06 DIAGNOSIS — K66.0 INTESTINAL ADHESIONS: ICD-10-CM

## 2018-03-06 DIAGNOSIS — R10.9 RECURRING ABDOMINAL PAIN: ICD-10-CM

## 2018-03-06 NOTE — TELEPHONE ENCOUNTER
Tramadol 50 MG       Last Written Prescription Date:  1/9/18  Last Fill Quantity: 30,   # refills: 0  Last Office Visit: 9-29-17  Future Office visit:       Routing refill request to provider for review/approval because:  Drug not on the [facility], [facility] or [facility] refill protocol or controlled substance

## 2018-03-07 RX ORDER — TRAMADOL HYDROCHLORIDE 50 MG/1
TABLET ORAL
Qty: 30 TABLET | Refills: 0 | Status: SHIPPED | OUTPATIENT
Start: 2018-03-07 | End: 2018-03-13

## 2018-03-13 ENCOUNTER — OFFICE VISIT (OUTPATIENT)
Dept: FAMILY MEDICINE | Facility: OTHER | Age: 68
End: 2018-03-13
Payer: COMMERCIAL

## 2018-03-13 ENCOUNTER — RADIANT APPOINTMENT (OUTPATIENT)
Dept: GENERAL RADIOLOGY | Facility: OTHER | Age: 68
End: 2018-03-13
Attending: PHYSICIAN ASSISTANT
Payer: COMMERCIAL

## 2018-03-13 VITALS
DIASTOLIC BLOOD PRESSURE: 80 MMHG | OXYGEN SATURATION: 93 % | RESPIRATION RATE: 16 BRPM | HEART RATE: 76 BPM | WEIGHT: 255 LBS | BODY MASS INDEX: 40.98 KG/M2 | HEIGHT: 66 IN | SYSTOLIC BLOOD PRESSURE: 136 MMHG | TEMPERATURE: 97.8 F

## 2018-03-13 DIAGNOSIS — R10.9 RECURRING ABDOMINAL PAIN: ICD-10-CM

## 2018-03-13 DIAGNOSIS — M70.52 PES ANSERINUS BURSITIS OF BOTH KNEES: ICD-10-CM

## 2018-03-13 DIAGNOSIS — M25.561 PAIN IN BOTH KNEES, UNSPECIFIED CHRONICITY: Primary | ICD-10-CM

## 2018-03-13 DIAGNOSIS — M25.562 PAIN IN BOTH KNEES, UNSPECIFIED CHRONICITY: Primary | ICD-10-CM

## 2018-03-13 DIAGNOSIS — M25.561 PAIN IN BOTH KNEES, UNSPECIFIED CHRONICITY: ICD-10-CM

## 2018-03-13 DIAGNOSIS — M70.51 PES ANSERINUS BURSITIS OF BOTH KNEES: ICD-10-CM

## 2018-03-13 DIAGNOSIS — M25.562 PAIN IN BOTH KNEES, UNSPECIFIED CHRONICITY: ICD-10-CM

## 2018-03-13 DIAGNOSIS — K66.0 INTESTINAL ADHESIONS: ICD-10-CM

## 2018-03-13 PROCEDURE — 73565 X-RAY EXAM OF KNEES: CPT | Mod: FY

## 2018-03-13 PROCEDURE — 99214 OFFICE O/P EST MOD 30 MIN: CPT | Performed by: PHYSICIAN ASSISTANT

## 2018-03-13 RX ORDER — TRAMADOL HYDROCHLORIDE 50 MG/1
TABLET ORAL
Qty: 30 TABLET | Refills: 0 | Status: SHIPPED | OUTPATIENT
Start: 2018-03-13 | End: 2018-05-30

## 2018-03-13 ASSESSMENT — PAIN SCALES - GENERAL: PAINLEVEL: MODERATE PAIN (5)

## 2018-03-13 NOTE — MR AVS SNAPSHOT
After Visit Summary   3/13/2018    Komal Miller    MRN: [id number]           Patient Information     Date Of Birth          1950        Visit Information        Provider Department      3/13/2018 9:40 AM Lashae Eli PA-C [facility]        Today's Diagnoses     Pain in both knees, unspecified chronicity    -  1    Pes anserinus bursitis of both knees        Intestinal adhesions        Recurring abdominal pain          Care Instructions    I will treat for bursitis/knee pain with an antiinflammatory medication you can take 2x daily for the next 3-5 days. I will also have you get xrays to evaluate knees and have you see sports medicine for further evaluation and treatment.           Follow-ups after your visit        Additional Services     ORTHO  REFERRAL       [facility] is referring you to the Orthopedic  Services at [facility].       The  Representative will assist you in the coordination of your Orthopedic and Musculoskeletal Care as prescribed by your physician.    The  Representative will call you within 1 business day to help schedule your appointment, or you may contact the  Representative at:    All areas ~ [phone]     Type of Referral : Non Surgical       Timeframe requested: 1 - 2 days    Coverage of these services is subject to the terms and limitations of your health insurance plan.  Please call member services at your health plan with any benefit or coverage questions.      If X-rays, CT or MRI's have been performed, please contact the facility where they were done to arrange for , prior to your scheduled appointment.  Please bring this referral request to your appointment and present it to your specialist.                  Future tests that were ordered for you today     Open Future Orders        Priority Expected Expires Ordered    XR Knee Bilateral 1/2 Views  "Routine 3/13/2018 3/13/2019 3/13/2018            Who to contact     If you have questions or need follow up information about today's clinic visit or your schedule please contact [facility] directly at [phone].  Normal or non-critical lab and imaging results will be communicated to you by [employer], letter or phone within 4 business days after the clinic has received the results. If you do not hear from us within 7 days, please contact the clinic through [employer] or phone. If you have a critical or abnormal lab result, we will notify you by phone as soon as possible.  Submit refill requests through [employer] or call your pharmacy and they will forward the refill request to us. Please allow 3 business days for your refill to be completed.          Additional Information About Your Visit        [employer] Information     [employer] lets you send messages to your doctor, view your test results, renew your prescriptions, schedule appointments and more. To sign up, go to [email or web address] . Click on \"Log in\" on the left side of the screen, which will take you to the Welcome page. Then click on \"Sign up Now\" on the right side of the page.     You will be asked to enter the access code listed below, as well as some personal information. Please follow the directions to create your username and password.     Your access code is: [email or web address]  Expires: 2018 10:13 AM     Your access code will  in 90 days. If you need help or a new code, please call your [facility] or [phone].        Care EveryWhere ID     This is your Care EveryWhere ID. This could be used by other organizations to access your [place] medical records  [id number]        Your Vitals Were     Pulse Temperature Respirations Height Pulse Oximetry BMI (Body Mass Index)    76 97.8  F (36.6  C) (Oral) 16 5' 6\" (1.676 m) 93% 41.16 kg/m2       Blood Pressure from Last 3 Encounters:   18 136/80   17 135/80   17 " 150/86    Weight from Last 3 Encounters:   03/13/18 255 lb (115.7 kg)   07/05/17 242 lb 11.2 oz (110.1 kg)   05/08/17 246 lb (111.6 kg)              We Performed the Following     ORTHO  REFERRAL          Today's Medication Changes          These changes are accurate as of 3/13/18 10:13 AM.  If you have any questions, ask your nurse or doctor.               These medicines have changed or have updated prescriptions.        Dose/Directions    traMADol 50 MG tablet   Commonly known as:  ULTRAM   This may have changed:  See the new instructions.   Used for:  Intestinal adhesions, Recurring abdominal pain   Changed by:  Lashae Eli PA-C        TAKE 1 TO 2 TABLETS EVERY 6 HOURS AS NEEDED  FOR  MODERATE  PAIN   Quantity:  30 tablet   Refills:  0            Where to get your medicines      Some of these will need a paper prescription and others can be bought over the counter.  Ask your nurse if you have questions.     Bring a paper prescription for each of these medications     traMADol 50 MG tablet                Primary Care Provider Fax #    Physician No Ref-Primary [phone]       No address on file        Equal Access to Services     [facility]: Haddeo hensleyo Sodenise, waaxda luqadaha, qaybta kaalmada adehangyarenetta, israel villatoro . So [facility] [phone].    ATENCIÓN: Si habla español, tiene a dumont disposición servicios gratuitos de asistencia lingüística. Llame al [phone].    We comply with applicable federal civil rights laws and [place] laws. We do not discriminate on the basis of race, color, national origin, age, disability, sex, sexual orientation, or gender identity.            Thank you!     Thank you for choosing [facility]  for your care. Our goal is always to provide you with excellent care. Hearing back from our patients is one way we can continue to improve our services. Please take a few minutes to complete the written survey that you  may receive in the mail after your visit with us. Thank you!             Your Updated Medication List - Protect others around you: Learn how to safely use, store and throw away your medicines at www.disposemymeds.org.          This list is accurate as of 3/13/18 10:13 AM.  Always use your most recent med list.                   Brand Name Dispense Instructions for use Diagnosis    calcium carbonate 1250 MG tablet    OS-BAKARI 500 mg [place]. [place]     Take 1 tablet by mouth 2 times daily        fish oil-omega-3 fatty acids 1000 MG capsule      Take 2 g by mouth daily        GARLIC PO           lisinopril-hydrochlorothiazide 20-25 MG per tablet    PRINZIDE/ZESTORETIC    90 tablet    Take 1 tablet by mouth daily    Hypertension goal BP (blood pressure) < 140/90       omeprazole 40 MG capsule    priLOSEC    90 capsule    TAKE ONE CAPSULE BY MOUTH EVERY DAY .TAKE 30 TO 60 MINUTES BEFORE A MEAL    Gastroesophageal reflux disease with esophagitis       propranolol 40 MG tablet    INDERAL    180 tablet    Take 1 tablet (40 mg) by mouth 2 times daily    Cluster headache, not intractable, unspecified chronicity pattern, Nausea       traMADol 50 MG tablet    ULTRAM    30 tablet    TAKE 1 TO 2 TABLETS EVERY 6 HOURS AS NEEDED  FOR  MODERATE  PAIN    Intestinal adhesions, Recurring abdominal pain

## 2018-03-13 NOTE — PATIENT INSTRUCTIONS
I will treat for bursitis/knee pain with an antiinflammatory medication you can take 2x daily for the next 3-5 days. I will also have you get xrays to evaluate knees and have you see sports medicine for further evaluation and treatment.

## 2018-03-13 NOTE — NURSING NOTE
"Chief Complaint   Patient presents with     Knee Pain     bilateral knee pain, x 3 weeks       Initial /80 (BP Location: Right arm, Patient Position: Chair, Cuff Size: Adult Large)  Pulse 76  Temp 97.8  F (36.6  C) (Oral)  Resp 16  Ht 5' 6\" (1.676 m)  Wt 255 lb (115.7 kg)  SpO2 93%  BMI 41.16 kg/m2 Estimated body mass index is 41.16 kg/(m^2) as calculated from the following:    Height as of this encounter: 5' 6\" (1.676 m).    Weight as of this encounter: 255 lb (115.7 kg).  Medication Reconciliation: complete     Anu EDUARDO LPN      "

## 2018-03-13 NOTE — PROGRESS NOTES
"  SUBJECTIVE:   Komal Miller is a 67 year old female who presents to clinic today for the following health issues:      Joint Pain    Onset: 3 weeks    Description:   Location: bilateral knee  Character: Gnawing    Intensity: severe    Progression of Symptoms: better    Accompanying Signs & Symptoms:  Other symptoms: none    History:   Previous similar pain: YES      Precipitating factors:   Trauma or overuse: YES- overuse    Alleviating factors:  Improved by: Tramadol    Therapies Tried and outcome: Tramadol, ibuprofen, good relief    Patient is here due to acute flare in bilateral knee pain over the last 3 weeks. She has a history of pain and was seen by the arthritis center in [place] and reports that she had rooster comb injections which were very helpful. This flare which she reports is worse on the left than the right involves pain below the joint space and to the inside of the knee.  Pain is worse with use of the knee and with touch to the area. SHe has had some improvement with tramadol in the past and is requesting a refill of that but is also questioning if injections would be helpful.     -------------------------------------    Problem list and histories reviewed & adjusted, as indicated.  Additional history: as documented    BP Readings from Last 3 Encounters:   03/13/18 136/80   09/29/17 135/80   07/05/17 150/86    Wt Readings from Last 3 Encounters:   03/13/18 255 lb (115.7 kg)   07/05/17 242 lb 11.2 oz (110.1 kg)   05/08/17 246 lb (111.6 kg)         Reviewed and updated as needed this visit by clinical staff  Tobacco  Allergies  Med Hx  Surg Hx  Fam Hx  Soc Hx      Reviewed and updated as needed this visit by Provider         ROS:  No other associated symptoms such as joint swelling, fevers or skin changes    OBJECTIVE:     /80 (BP Location: Right arm, Patient Position: Chair, Cuff Size: Adult Large)  Pulse 76  Temp 97.8  F (36.6  C) (Oral)  Resp 16  Ht 5' 6\" (1.676 m)  Wt 255 " lb (115.7 kg)  SpO2 93%  BMI 41.16 kg/m2  Body mass index is 41.16 kg/(m^2).  GENERAL: healthy, alert and no distress  MS: pain over pes anserine area of both knees, no other swelling or  Deformity noted on exam   SKIN: no suspicious lesions or rashes    Diagnostic Test Results:  Xray- read pending     ASSESSMENT/PLAN:       ICD-10-CM    1. Pain in both knees, unspecified chronicity M25.561 ORTHO  REFERRAL    M25.562 XR Knee AP Standing Bilateral     CANCELED: XR Knee Bilateral 1/2 Views   2. Pes anserinus bursitis of both knees M70.51 XR Knee AP Standing Bilateral    M70.52    3. Intestinal adhesions K66.0 traMADol (ULTRAM) 50 MG tablet   4. Recurring abdominal pain R10.9 traMADol (ULTRAM) 50 MG tablet       I will refill tramadol for pain and inflammation and will have patient see sports medicine for further evaluation and treatment of bilateral knee pain.   See Patient Instructions    Lashae Eli PA-C  [facility]

## 2018-03-20 ENCOUNTER — OFFICE VISIT (OUTPATIENT)
Dept: ORTHOPEDICS | Facility: CLINIC | Age: 68
End: 2018-03-20
Payer: COMMERCIAL

## 2018-03-20 ENCOUNTER — RADIANT APPOINTMENT (OUTPATIENT)
Dept: GENERAL RADIOLOGY | Facility: CLINIC | Age: 68
End: 2018-03-20
Attending: PHYSICAL MEDICINE & REHABILITATION
Payer: COMMERCIAL

## 2018-03-20 VITALS
DIASTOLIC BLOOD PRESSURE: 93 MMHG | SYSTOLIC BLOOD PRESSURE: 173 MMHG | WEIGHT: 255 LBS | HEART RATE: 59 BPM | BODY MASS INDEX: 40.98 KG/M2 | HEIGHT: 66 IN

## 2018-03-20 DIAGNOSIS — M25.561 BILATERAL KNEE PAIN: ICD-10-CM

## 2018-03-20 DIAGNOSIS — M17.0 BILATERAL PRIMARY OSTEOARTHRITIS OF KNEE: ICD-10-CM

## 2018-03-20 DIAGNOSIS — M70.50 PES ANSERINE BURSITIS: ICD-10-CM

## 2018-03-20 DIAGNOSIS — G89.29 CHRONIC PAIN OF BOTH KNEES: Primary | ICD-10-CM

## 2018-03-20 DIAGNOSIS — M25.561 CHRONIC PAIN OF BOTH KNEES: Primary | ICD-10-CM

## 2018-03-20 DIAGNOSIS — M25.562 BILATERAL KNEE PAIN: ICD-10-CM

## 2018-03-20 DIAGNOSIS — M25.562 CHRONIC PAIN OF BOTH KNEES: Primary | ICD-10-CM

## 2018-03-20 PROCEDURE — 99203 OFFICE O/P NEW LOW 30 MIN: CPT | Mod: 25 | Performed by: PHYSICAL MEDICINE & REHABILITATION

## 2018-03-20 PROCEDURE — 20610 DRAIN/INJ JOINT/BURSA W/O US: CPT | Mod: 50 | Performed by: PHYSICAL MEDICINE & REHABILITATION

## 2018-03-20 PROCEDURE — 73560 X-RAY EXAM OF KNEE 1 OR 2: CPT | Mod: TC

## 2018-03-20 RX ORDER — TRIAMCINOLONE ACETONIDE 40 MG/ML
80 INJECTION, SUSPENSION INTRA-ARTICULAR; INTRAMUSCULAR ONCE
Qty: 2 ML | Refills: 0 | OUTPATIENT
Start: 2018-03-20 | End: 2018-03-20

## 2018-03-20 NOTE — PATIENT INSTRUCTIONS
Today's Plan of Care:  -Steroid injections performed today.  Take it easy over the next few days. Keep in mind that the steroid may take up to 3 days to start working and up to 2 weeks to reach maximal effect.  Ice 15-20 minutes as needed for soreness.  Ibuprofen and/or Tylenol as needed for pain relief.  -Rehab: Physical Therapy: [facility] - [phone]. Please do 5-6 days of exercises per week between formal sessions and the home exercises they provide.  Ice or ice massage 15-20 minutes for pain relief or swelling as needed    Information for gel injections provided:    Your diagnosis codes are:  Chronic pain of both knees  - Primary M25.561, M25.562, G89.29     Bilateral primary osteoarthritis of knee  M17.0     Gel injection codes:  Synvisc One -   Euflexxa -   Gel One - 68926    -We also discussed other future treatment options:  Gel injections    Follow Up: 6-8 weeks or sooner if symptoms fail to improve or worsen. Please call with any questions or concerns.

## 2018-03-20 NOTE — MR AVS SNAPSHOT
After Visit Summary   3/20/2018    Komal Miller    MRN: [id number]           Patient Information     Date Of Birth          1950        Visit Information        Provider Department      3/20/2018 9:00 AM Freda Bang MD [facility]        Today's Diagnoses     Chronic pain of both knees    -  1    Bilateral primary osteoarthritis of knee          Care Instructions    Today's Plan of Care:  -Steroid injections performed today.  Take it easy over the next few days. Keep in mind that the steroid may take up to 3 days to start working and up to 2 weeks to reach maximal effect.  Ice 15-20 minutes as needed for soreness.  Ibuprofen and/or Tylenol as needed for pain relief.  -Rehab: Physical Therapy: [facility] - [phone]. Please do 5-6 days of exercises per week between formal sessions and the home exercises they provide.  Ice or ice massage 15-20 minutes for pain relief or swelling as needed    Information for gel injections provided:    Your diagnosis codes are:  Chronic pain of both knees  - Primary M25.561, M25.562, G89.29     Bilateral primary osteoarthritis of knee  M17.0     Gel injection codes:  Synvisc One -   Euflexxa -   Gel One - 44408    -We also discussed other future treatment options:  Gel injections    Follow Up: 6-8 weeks or sooner if symptoms fail to improve or worsen. Please call with any questions or concerns.               Follow-ups after your visit        Additional Services     PHYSICAL THERAPY REFERRAL       *This therapy referral will be filtered to a centralized scheduling office at [facility] and the patient will receive a call to schedule an appointment at a [place] location most convenient for them. *     [facility] provides Physical Therapy evaluation and treatment and many specialty services across the [place] system.  If requesting a specialty program, please choose  "from the list below.    If you have not heard from the scheduling office within 2 business days, please call [phone] for all locations, with the exception of [place], please call [phone] and [place], please call [phone]  Treatment: Evaluation & Treatment  Special Instructions/Modalities: none  Special Programs: None    Please be aware that coverage of these services is subject to the terms and limitations of your health insurance plan.  Call member services at your health plan with any benefit or coverage questions.      **Note to Provider:  If you are referring outside of [place] for the therapy appointment, please list the name of the location in the \"special instructions\" above, print the referral and give to the patient to schedule the appointment.                  Who to contact     If you have questions or need follow up information about today's clinic visit or your schedule please contact [facility] directly at [phone].  Normal or non-critical lab and imaging results will be communicated to you by MyChart, letter or phone within 4 business days after the clinic has received the results. If you do not hear from us within 7 days, please contact the clinic through [employer] or phone. If you have a critical or abnormal lab result, we will notify you by phone as soon as possible.  Submit refill requests through [employer] or call your pharmacy and they will forward the refill request to us. Please allow 3 business days for your refill to be completed.          Additional Information About Your Visit        [employer] Information     [employer] lets you send messages to your doctor, view your test results, renew your prescriptions, schedule appointments and more. To sign up, go to [email or web address] . Click on \"Log in\" on the left side of the screen, which will take you to the Welcome page. Then click on \"Sign up Now\" on the right side of the page.     You will be asked to enter " "the access code listed below, as well as some personal information. Please follow the directions to create your username and password.     Your access code is: [email or web address]  Expires: 2018 10:13 AM     Your access code will  in 90 days. If you need help or a new code, please call your [facility] clinic or [phone].        Care EveryWhere ID     This is your Care EveryWhere ID. This could be used by other organizations to access your [facility] medical records  [id number]        Your Vitals Were     Pulse Height BMI (Body Mass Index)             59 5' 6\" (1.676 m) 41.16 kg/m2          Blood Pressure from Last 3 Encounters:   18 (!) 173/93   18 136/80   17 135/80    Weight from Last 3 Encounters:   18 255 lb (115.7 kg)   18 255 lb (115.7 kg)   17 242 lb 11.2 oz (110.1 kg)              We Performed the Following     PHYSICAL THERAPY REFERRAL        Primary Care Provider Fax #    Physician No Ref-Primary [phone]       No address on file        Equal Access to Services     [facility] : Hadii chencho Acosta, wamarvinda cee, qaybta kaalmada aderonnie, israel villatoro . So [facility] [phone].    ATENCIÓN: Si habla español, tiene a dumont disposición servicios gratuitos de asistencia lingüística. Llame al [phone].    We comply with applicable federal civil rights laws and [place] laws. We do not discriminate on the basis of race, color, national origin, age, disability, sex, sexual orientation, or gender identity.            Thank you!     Thank you for choosing [facility]  for your care. Our goal is always to provide you with excellent care. Hearing back from our patients is one way we can continue to improve our services. Please take a few minutes to complete the written survey that you may receive in the mail after your visit with us. Thank you!             Your Updated Medication List - Protect others around you: " Learn how to safely use, store and throw away your medicines at www.disposemymeds.org.          This list is accurate as of 3/20/18 10:05 AM.  Always use your most recent med list.                   Brand Name Dispense Instructions for use Diagnosis    calcium carbonate 1250 MG tablet    OS-BAKARI 500 mg [place]. [place]     Take 1 tablet by mouth 2 times daily        fish oil-omega-3 fatty acids 1000 MG capsule      Take 2 g by mouth daily        GARLIC PO           lisinopril-hydrochlorothiazide 20-25 MG per tablet    PRINZIDE/ZESTORETIC    90 tablet    Take 1 tablet by mouth daily    Hypertension goal BP (blood pressure) < 140/90       omeprazole 40 MG capsule    priLOSEC    90 capsule    TAKE ONE CAPSULE BY MOUTH EVERY DAY .TAKE 30 TO 60 MINUTES BEFORE A MEAL    Gastroesophageal reflux disease with esophagitis       propranolol 40 MG tablet    INDERAL    180 tablet    Take 1 tablet (40 mg) by mouth 2 times daily    Cluster headache, not intractable, unspecified chronicity pattern, Nausea       traMADol 50 MG tablet    ULTRAM    30 tablet    TAKE 1 TO 2 TABLETS EVERY 6 HOURS AS NEEDED  FOR  MODERATE  PAIN    Intestinal adhesions, Recurring abdominal pain

## 2018-03-20 NOTE — LETTER
3/20/2018         RE: Komal Miller  [address]  [place] [address]        Dear Colleague,    Thank you for referring your patient, Komal Miller, to the [facility]. Please see a copy of my visit note below.    Sports Medicine Clinic Visit    PCP: No Ref-Primary, Physician    CC: Patient presents with:  Knee right  Knee left      HPI:  Komal Miller is a 67 year old female who is seen in consultation at the request of Lashae Eli PA-C.   She notes bilateral medial knee pain that began ~ 6 years ago with insidious onset.  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  She was seen at the [facility] ~ 1-1.5 years ago and had gel injections.  She rates the pain at a  8/10 at its worst and a 4/10 currently.  Symptoms are relieved with tramadol. Symptoms are worsened by stairs. She endorses pain.   She denies swelling, bruising, popping, grinding, catching, locking, instability, numbness, tingling, weakness, pain in other joints and fever, chills.  Other treatment has included gel injection which was really helpful.     Review of Systems:  Musculoskeletal: as above  Remainder of review of systems is negative including constitutional, eyes, ENT, CV, pulmonary, GI, , endocrine, skin, hematologic, and neurologic except as noted in HPI or medical history.    History reviewed. No pertinent past surgical/medical/family/social history other than as mentioned in HPI.    Past Medical History:   Diagnosis Date     Unspecified essential hypertension      Past Surgical History:   Procedure Laterality Date     C APPENDECTOMY       C LIGATE FALLOPIAN TUBE       C TOTAL ABDOM HYSTERECTOMY      Hysterectomy, Total Abdominal     COLONOSCOPY  06/10/08    Internal hemorrhoids, otherwise normal.     COLONOSCOPY  01/20/10     HC DILATION/CURETTAGE DIAG/THER NON OB      D & C     HC LAP, SURG ENTEROLYSIS  05/07/10     HC REMOVAL OF  OVARY/TUBE(S)      Salpingo-Oophorectomy, bilateral     HC UGI ENDOSCOPY, SIMPLE EXAM  01/20/10     LAPAROSCOPY DIAGNOSTIC (GENERAL)  5/6/2014    Procedure: LAPAROSCOPY DIAGNOSTIC (GENERAL);  Surgeon: Seth Matthews MD;  Location: [facility] OR     LAPAROTOMY, LYSIS ADHESIONS, COMBINED  5/6/2014    Procedure: COMBINED LAPAROTOMY, LYSIS ADHESIONS;  Surgeon: Seth Matthews MD;  Location: [facility] OR     VIDEO CAPSULE ENDOSCOPY  02/15/10    normal sm intestine     Family History   Problem Relation Age of Onset     C.A.D. Mother      bi-pass     DIABETES Mother      late onset     Allergies Mother      xray dye     Arthritis Mother      CANCER Mother      ovary     Respiratory Mother      C.A.D. 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[employer]     Social History Main Topics     Smoking status: Former Smoker     Packs/day: 0.50     Years: 20.00     Types: Cigarettes     Quit date: 1/1/1995     Smokeless tobacco: Never Used     Alcohol use Yes      Comment: very seldom     Drug use: No     Sexual activity: No     Other Topics Concern      Service No     Blood Transfusions Yes     with children--several units     Caffeine Concern No     coffee  5 cups/day      occ diet pepsi     Occupational Exposure Yes     works at the [employer] ([employer]  body fluids, comm. diseases     Hobby Hazards No     Sleep Concern Yes     sleeps more restless some nights     Stress Concern No     Weight Concern Yes     w'd like to weigh 50-60# less     Special Diet No     Back Care No     Exercise  "Yes     walking     Bike Helmet No     Seat Belt Yes     Self-Exams No     Social History Narrative       She is retired. She helps at the school sometimes.     Current Outpatient Prescriptions   Medication     traMADol (ULTRAM) 50 MG tablet     propranolol (INDERAL) 40 MG tablet     calcium carbonate (OS-BAKARI 500 MG [place]. [place]) 1250 MG tablet     fish oil-omega-3 fatty acids 1000 MG capsule     lisinopril-hydrochlorothiazide (PRINZIDE/ZESTORETIC) 20-25 MG per tablet     omeprazole (PRILOSEC) 40 MG capsule     GARLIC PO     No current facility-administered medications for this visit.      Allergies   Allergen Reactions     No Known Drug Allergies          Objective:  BP (!) 173/93  Pulse 59  Ht 5' 6\" (1.676 m)  Wt 255 lb (115.7 kg)  BMI 41.16 kg/m2    General: Alert and in no distress    Head: Normocephalic, atraumatic  Eyes: no scleral icterus or conjunctival erythema   Oropharynx:  Mucous membranes moist  Skin: no erythema, petechiae, or jaundice  CV: regular rhythm by palpation, 2+ distal pulses  Resp: normal respiratory effort without conversational dyspnea   Psych: normal mood and affect    Gait: Waddling gait  Neuro: Motor strength and sensation as noted below    Musculoskeletal:    Bilateral Knee exam    Inspection:  No erythema, ecchymosis, warmth, or edema.  Bulbous appearing knees bilaterally.      Palpation: Very tender over the bilateral pes anserine bursae    Knee ROM: Full active and passive ROM without pain    Hip ROM: Full active and passive ROM without pain    Patellar Motion:      Normal patellar tracking noted through range of motion    Strength:  5/5 Hip flexion/abduction/adduction, knee extension/flexion, ankle plantarflexion/dorsiflexion, great toe extension, toe flexion    Sensation:  Intact to light touch in the bilateral lower extremities        Radiology:  X-rays ordered and independent visualization of images performed.  Shows moderate to severe medial joint space narrowing bilaterally " and mild degenerative changes of the patellofemoral compartment.  Recent Results (from the past 744 hour(s))   XR Knee AP Standing Bilateral    Narrative    KNEE AP STANDING BILATERAL  3/13/2018 10:47 AM     HISTORY:  Pain in both knees, unspecified chronicity. Pes anserinus  bursitis of both knees.     COMPARISON: None.      Impression    IMPRESSION: There is moderate to severe narrowing of both medial  compartment joint spaces. The lateral joint spaces are maintained.     JOHNNY LINDER MD         Procedure:  Discussed steroid injection, including risks, potential benefits, and alternatives.  The patient expressed understanding.  Obtained verbal and written consent and the patient elected to proceed.  Procedure: Steroid injections was performed under aseptic technique at the bilateral pes anserine bursae using 3 mL of 1% plain Lidocaine and 1 mL of 40 mg/mL Kenalog.  Bandages applied. This was well tolerated.      Assessment:  1. Chronic pain of both knees    2. Bilateral primary osteoarthritis of knee    3. Pes anserine bursitis        Plan:  Discussed the assessment with the patient and developed a plan together:  -Steroid injections at the pes anserine bursae were performed today.  Take it easy over the next few days. Keep in mind that the steroid may take up to 3 days to start working and up to 2 weeks to reach maximal effect.  Ice 15-20 minutes as needed for soreness.  Ibuprofen and/or Tylenol as needed for pain relief.  -Rehab: Physical Therapy: [facility] - [phone]. Please do 5-6 days of exercises per week between formal sessions and the home exercises they provide.  -Ice or ice massage 15-20 minutes for pain relief or swelling as needed  -Also discussed viscosupplementation as this was helpful previously.    Information for gel injections provided:    Your diagnosis codes are:  Chronic pain of both knees  - Primary M25.561, M25.562, G89.29     Bilateral primary osteoarthritis of knee   M17.0     Gel injection codes:  Synvisc One -   Euflexxa -   Gel One - 28900    -We also discussed other future treatment options:  Gel injections    -Follow up with primary care provider regarding elevated blood pressure    Follow Up: 6-8 weeks or sooner if symptoms fail to improve or worsen. Please call with any questions or concerns.     Danita Bang MD, [facility]      Again, thank you for allowing me to participate in the care of your patient.        Sincerely,        Freda Bang MD

## 2018-03-23 ENCOUNTER — HOSPITAL ENCOUNTER (OUTPATIENT)
Dept: PHYSICAL THERAPY | Facility: CLINIC | Age: 68
Setting detail: THERAPIES SERIES
End: 2018-03-23
Attending: PHYSICAL MEDICINE & REHABILITATION
Payer: MEDICARE

## 2018-03-23 PROCEDURE — 40000718 ZZHC STATISTIC PT DEPARTMENT ORTHO VISIT: Performed by: PHYSICAL THERAPIST

## 2018-03-23 PROCEDURE — 97112 NEUROMUSCULAR REEDUCATION: CPT | Mod: GP | Performed by: PHYSICAL THERAPIST

## 2018-03-23 PROCEDURE — 97110 THERAPEUTIC EXERCISES: CPT | Mod: GP | Performed by: PHYSICAL THERAPIST

## 2018-03-23 PROCEDURE — G8979 MOBILITY GOAL STATUS: HCPCS | Mod: GP,CH | Performed by: PHYSICAL THERAPIST

## 2018-03-23 PROCEDURE — 97161 PT EVAL LOW COMPLEX 20 MIN: CPT | Mod: GP | Performed by: PHYSICAL THERAPIST

## 2018-03-23 PROCEDURE — G8978 MOBILITY CURRENT STATUS: HCPCS | Mod: GP,CI | Performed by: PHYSICAL THERAPIST

## 2018-03-23 NOTE — PROGRESS NOTES
03/23/18 1032   General Information   Type of Visit Initial OP Ortho PT Evaluation   Start of Care Date 03/23/18   Referring Physician Lashae Eli PAC   Patient/Family Goals Statement Learn to protect her joints   Orders Evaluate and Treat   Date of Order 03/20/18   Insurance Type Medicare;Other   Insurance Comments/Visits Authorized Medicare for HB supplement, BCBS [place] level   Medical Diagnosis Chronic pain of both knees, bilateral primary osteoarthritis of  knee   Surgical/Medical history reviewed Yes   Precautions/Limitations no known precautions/limitations   Weight-Bearing Status - LUE full weight-bearing   Weight-Bearing Status - RUE full weight-bearing   Weight-Bearing Status - LLE full weight-bearing   Weight-Bearing Status - RLE full weight-bearing       Present No   Body Part(s)   Body Part(s) Knee   Presentation and Etiology   Pertinent history of current problem (include personal factors and/or comorbidities that impact the POC) 68 yo female with history bilateral knee pain. She has had injections and this helped. 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 She does not feel she needs PT for pain as she does not have any since the injections. She does feel she would appreciate joint protection measures and home program. She and her  enjoy walking.    Impairments (previously she had pain)   Functional Limitations perform activities of daily living;perform required work activities;perform desired leisure / sports activities  (when symptomatic)   Symptom Location Medial knee symptoms bilaterally   How/Where did it occur (Walking up the bleachers - increased acute symptoms)   Onset date of current episode/exacerbation 03/13/18   Chronicity Chronic   Pain rating (0-10 point scale) Denies pain   Pain/symptoms exacerbated by M. Other   Pain exacerbation comment Bleachers   Pain/symptoms eased by (injections)   Progression of  symptoms since onset: Improved  (injection)   Current / Previous Interventions   Diagnostic Tests: X-ray   X-ray Results Results   X-ray results Per EPIC: moderate to severe narrowing of the bilateral compartment joint spaces, lateral joint spaces are maintained   Prior Level of Function   Functional Level Prior Comment Independent and likes to walk, go to local school sports.    Current Level of Function   Current Community Support Family/friend caregiver   Patient role/employment history F. Retired  (Teaching one day per week)   [facility]   Home/community accessibility No concerns   Current equipment-Gait/Locomotion None   Fall Risk Screen   Fall screen completed by PT   Have you fallen 2 or more times in the past year? No   Have you fallen and had an injury in the past year? No   Is patient a fall risk? No   Functional Scales   Functional Scales Other   Other Scales  LEFS: 72/80   Knee Objective Findings   Observation NO acute distress   Integumentary  negative   Posture In standing: Forward head and increased lumbar lordosis. Stands with knees locked.    Gait/Locomotion Normal   Balance/Proprioception (Single Leg Stance) 12 seconds R and 6 seconds L for single leg stand hands on hips   Knee ROM Comment Equal - approximately 0-120 degrees supine   Knee/Hip Strength Comments KNee seated: DF: 4-/5, quads: 5/5, Hamstrings: 5/5 L, 4+/5 R; Hip rotation: 4+/5; Uses her trunk to move sit<->stand   Planned Therapy Interventions   Planned Therapy Interventions Comment Home program   Planned Modality Interventions   Planned Modality Interventions Comments as needed   Clinical Impression   Criteria for Skilled Therapeutic Interventions Met yes, treatment indicated   PT Diagnosis Weakness and poor functional strength   Influenced by the following impairments weakness, decreased balance   Functional limitations due to impairments squatting, steps are most limited at this time   Clinical Presentation  Stable/Uncomplicated   Clinical Presentation Rationale decreased balance and strength   Clinical Decision Making (Complexity) Low complexity   Predicted Duration of Therapy Intervention (days/wks) 2 total visits over the next 4 weeks   Risk & Benefits of therapy have been explained Yes   Patient, Family & other staff in agreement with plan of care Yes   Clinical Impression Comments 66 yo female who has had a very active life. She understands that she needs to complete a program for strength and to protect her joints She is not having any knee pain today and has felt great since the injection. She has had knee pain for yrs, but increased with climbing bleachers. We agreed she would return x 1 more visit within 4 weeks to reassess and advance exercises as needed   Education Assessment   Preferred Learning Style Listening   Barriers to Learning No barriers   ORTHO GOALS   PT Ortho Eval Goals 1   Ortho Goal 1   Goal Identifier Home program   Goal Description Komal will complete her home program and exercises so she can walk 1-2 miles, negotiate 12 steps with or without railing without increasing knee symptoms   Target Date 04/20/18   Total Evaluation Time   Total Evaluation Time 16   Therapy Certification   Certification date from 03/23/18   Certification date to 04/20/18   Medical Diagnosis Chronic pain of both knees, bilateral primary osteoarthritis of  knee     Thank you for referring Komal  to [facility] - Katherine Mcnulty, PT  [phone]

## 2018-03-23 NOTE — PROGRESS NOTES
[facility]          OUTPATIENT PHYSICAL THERAPY ORTHOPEDIC EVALUATION  PLAN OF TREATMENT FOR OUTPATIENT REHABILITATION  (COMPLETE FOR INITIAL CLAIMS ONLY)  Patient's Last Name, First Name, M.I.  YOB: 1950  Komal Miller    Provider s Name:  [facility]   Medical Record No.  [id number]   Start of Care Date:  03/23/18   Onset Date:  03/13/18   Type:     _X__PT   ___OT   ___SLP Medical Diagnosis:  Chronic pain of both knees, bilateral primary osteoarthritis of  knee     PT Diagnosis:  Weakness and poor functional strength   Visits from SOC:  1      _________________________________________________________________________________  Plan of Treatment/Functional Goals:     Home program     as needed  Goals  Goal Identifier: Home program  Goal Description: Komal will complete her home program and exercises so she can walk 1-2 miles, negotiate 12 steps with or without railing without increasing knee symptoms  Target Date: 04/20/18      Therapy Frequency:     Predicted Duration of Therapy Intervention:  2 total visits over the next 4 weeks    Micheline Mcnulty, PT                 I CERTIFY THE NEED FOR THESE SERVICES FURNISHED UNDER        THIS PLAN OF TREATMENT AND WHILE UNDER MY CARE     (Physician co-signature of this document indicates review and certification of the therapy plan).                       Certification Date From:  03/23/18   Certification Date To:  04/20/18    Referring Provider:  Lashae SWAN    Initial Assessment        See Epic Evaluation Start of Care Date: 03/23/18

## 2018-05-02 ENCOUNTER — TELEPHONE (OUTPATIENT)
Dept: PHYSICAL THERAPY | Facility: CLINIC | Age: 68
End: 2018-05-02

## 2018-05-02 NOTE — ADDENDUM NOTE
Encounter addended by: Micheline Mcnulty PT on: 5/2/2018  8:57 AM<BR>     Actions taken: Sign clinical note, Episode resolved

## 2018-05-02 NOTE — DISCHARGE SUMMARY
Outpatient Physical Therapy Discharge Note     Patient: Komal Miller  : 1950    Beginning/End Dates of Reporting Period:  1 visit: 3-23-18    Referring Provider: Lashae Mueller [facility]    Therapy Diagnosis: Weakness and poor functional strength     Client Self Report: PLease see initial evaluation.She had her injection and no longer has pain. She reports she changed the way she squats as well.     Objective Measurements:  Please see initial evaluation    Goals:  Goal Identifier Home program   Goal Description Komal will complete her home program and exercises so she can walk 1-2 miles, negotiate 12 steps with or without railing without increasing knee symptoms   Target Date 18   Date Met      Progress:         Progress Toward Goals:   Not assessed this period.    Plan:  Discharge from therapy.    Discharge:    Reason for Discharge: Patient has met all goals.  Medicare G-code: Patient did not attend a final scheduled session prior to discharge. Unable to determine discharge functional status - only seen for initial evaluation did not need follow up appt.     Equipment Issued: none    Discharge Plan: Patient to continue home program.    Thank you for referring Emily  to [facility] [place]    Micheline Mcnulty, PT  [phone]

## 2018-05-15 ENCOUNTER — OFFICE VISIT (OUTPATIENT)
Dept: FAMILY MEDICINE | Facility: OTHER | Age: 68
End: 2018-05-15
Payer: COMMERCIAL

## 2018-05-15 VITALS
HEART RATE: 94 BPM | RESPIRATION RATE: 16 BRPM | BODY MASS INDEX: 40.75 KG/M2 | OXYGEN SATURATION: 94 % | DIASTOLIC BLOOD PRESSURE: 94 MMHG | WEIGHT: 252.5 LBS | TEMPERATURE: 96.4 F | SYSTOLIC BLOOD PRESSURE: 142 MMHG

## 2018-05-15 DIAGNOSIS — Z11.59 NEED FOR HEPATITIS C SCREENING TEST: ICD-10-CM

## 2018-05-15 DIAGNOSIS — Z00.00 ENCOUNTER FOR ROUTINE ADULT HEALTH EXAMINATION WITHOUT ABNORMAL FINDINGS: ICD-10-CM

## 2018-05-15 DIAGNOSIS — E66.01 MORBID OBESITY DUE TO EXCESS CALORIES (H): ICD-10-CM

## 2018-05-15 DIAGNOSIS — I10 HYPERTENSION GOAL BP (BLOOD PRESSURE) < 140/90: ICD-10-CM

## 2018-05-15 DIAGNOSIS — E78.5 HYPERLIPIDEMIA LDL GOAL <130: Primary | ICD-10-CM

## 2018-05-15 LAB
ALBUMIN SERPL-MCNC: 3.2 G/DL (ref 3.4–5)
ALBUMIN UR-MCNC: ABNORMAL MG/DL
ALP SERPL-CCNC: 59 U/L (ref 40–150)
ALT SERPL W P-5'-P-CCNC: 24 U/L (ref 0–50)
AMORPH CRY #/AREA URNS HPF: ABNORMAL /HPF
ANION GAP SERPL CALCULATED.3IONS-SCNC: 7 MMOL/L (ref 3–14)
APPEARANCE UR: CLEAR
AST SERPL W P-5'-P-CCNC: 15 U/L (ref 0–45)
BACTERIA #/AREA URNS HPF: ABNORMAL /HPF
BILIRUB DIRECT SERPL-MCNC: 0.1 MG/DL (ref 0–0.2)
BILIRUB SERPL-MCNC: 0.3 MG/DL (ref 0.2–1.3)
BILIRUB UR QL STRIP: NEGATIVE
BUN SERPL-MCNC: 9 MG/DL (ref 7–30)
CALCIUM SERPL-MCNC: 8.3 MG/DL (ref 8.5–10.1)
CHLORIDE SERPL-SCNC: 101 MMOL/L (ref 94–109)
CHOLEST SERPL-MCNC: 229 MG/DL
CO2 SERPL-SCNC: 27 MMOL/L (ref 20–32)
COLOR UR AUTO: YELLOW
CREAT SERPL-MCNC: 0.69 MG/DL (ref 0.52–1.04)
GFR SERPL CREATININE-BSD FRML MDRD: 85 ML/MIN/1.7M2
GLUCOSE SERPL-MCNC: 93 MG/DL (ref 70–99)
GLUCOSE UR STRIP-MCNC: NEGATIVE MG/DL
HDLC SERPL-MCNC: 66 MG/DL
HGB UR QL STRIP: NEGATIVE
HYALINE CASTS #/AREA URNS LPF: ABNORMAL /LPF
KETONES UR STRIP-MCNC: NEGATIVE MG/DL
LDLC SERPL CALC-MCNC: 148 MG/DL
LEUKOCYTE ESTERASE UR QL STRIP: NEGATIVE
MUCOUS THREADS #/AREA URNS LPF: PRESENT /LPF
NITRATE UR QL: NEGATIVE
NON-SQ EPI CELLS #/AREA URNS LPF: ABNORMAL /LPF
NONHDLC SERPL-MCNC: 163 MG/DL
PH UR STRIP: 8.5 PH (ref 5–7)
POTASSIUM SERPL-SCNC: 4 MMOL/L (ref 3.4–5.3)
PROT SERPL-MCNC: 6.9 G/DL (ref 6.8–8.8)
RBC #/AREA URNS AUTO: ABNORMAL /HPF
SODIUM SERPL-SCNC: 135 MMOL/L (ref 133–144)
SOURCE: ABNORMAL
SP GR UR STRIP: 1.01 (ref 1–1.03)
TRIGL SERPL-MCNC: 76 MG/DL
UROBILINOGEN UR STRIP-ACNC: 0.2 EU/DL (ref 0.2–1)
WBC #/AREA URNS AUTO: ABNORMAL /HPF

## 2018-05-15 PROCEDURE — G0438 PPPS, INITIAL VISIT: HCPCS | Performed by: INTERNAL MEDICINE

## 2018-05-15 PROCEDURE — 36415 COLL VENOUS BLD VENIPUNCTURE: CPT | Performed by: INTERNAL MEDICINE

## 2018-05-15 PROCEDURE — 80076 HEPATIC FUNCTION PANEL: CPT | Performed by: INTERNAL MEDICINE

## 2018-05-15 PROCEDURE — 80061 LIPID PANEL: CPT | Performed by: INTERNAL MEDICINE

## 2018-05-15 PROCEDURE — 81001 URINALYSIS AUTO W/SCOPE: CPT | Performed by: INTERNAL MEDICINE

## 2018-05-15 PROCEDURE — 80048 BASIC METABOLIC PNL TOTAL CA: CPT | Performed by: INTERNAL MEDICINE

## 2018-05-15 ASSESSMENT — ACTIVITIES OF DAILY LIVING (ADL)
I_NEED_ASSISTANCE_FOR_THE_FOLLOWING_DAILY_ACTIVITIES:: NO ASSISTANCE IS NEEDED
CURRENT_FUNCTION: NO ASSISTANCE NEEDED

## 2018-05-15 ASSESSMENT — PATIENT HEALTH QUESTIONNAIRE - PHQ9
SUM OF ALL RESPONSES TO PHQ QUESTIONS 1-9: 0
SUM OF ALL RESPONSES TO PHQ QUESTIONS 1-9: 0
10. IF YOU CHECKED OFF ANY PROBLEMS, HOW DIFFICULT HAVE THESE PROBLEMS MADE IT FOR YOU TO DO YOUR WORK, TAKE CARE OF THINGS AT HOME, OR GET ALONG WITH OTHER PEOPLE: NOT DIFFICULT AT ALL

## 2018-05-15 ASSESSMENT — PAIN SCALES - GENERAL: PAINLEVEL: NO PAIN (0)

## 2018-05-15 NOTE — PROGRESS NOTES
SUBJECTIVE:   Komal Miller is a 68 year old female who presents for Preventive Visit.    Are you in the first 12 months of your Medicare coverage?  No    Physical   Annual:     Getting at least 3 servings of Calcium per day::  Yes    Bi-annual eye exam::  Yes    Dental care twice a year::  Yes    Sleep apnea or symptoms of sleep apnea::  Daytime drowsiness    Diet::  Regular (no restrictions)    Taking medications regularly::  Yes    Medication side effects::  Not applicable    Additional concerns today::  No    Ability to successfully perform activities of daily living: no assistance needed  Home Safety:  No safety concerns identified  Hearing Impairment: no hearing concerns      Fall risk:  Fallen 2 or more times in the past year?: No  Any fall with injury in the past year?: No    COGNITIVE SCREEN  1) Repeat 3 items (Banana, Sunrise, Chair)    2) Clock draw: NORMAL  3) 3 item recall: Recalls 3 objects  Results: 3 items recalled: COGNITIVE IMPAIRMENT LESS LIKELY    Mini-CogTM Copyright ALYCE Telles. Licensed by the author for use in [facility]; reprinted with permission (soob@[facility]). All rights reserved.        Reviewed and updated as needed this visit by clinical staff  Tobacco  Allergies  Meds  Med Hx  Surg Hx  Fam Hx  Soc Hx        Reviewed and updated as needed this visit by Provider        Social History   Substance Use Topics     Smoking status: Former Smoker     Packs/day: 0.50     Years: 20.00     Types: Cigarettes     Quit date: 1/1/1995     Smokeless tobacco: Never Used     Alcohol use Yes      Comment: very seldom       Alcohol Use 5/15/2018   If you drink alcohol do you typically have greater than 3 drinks per day OR greater than 7 drinks per week? No   No flowsheet data found.      Today's PHQ-2 Score:   PHQ-2 ( 1999 [employer]) 5/15/2018   Q1: Little interest or pleasure in doing things 3   Q2: Feeling down, depressed or hopeless 0   PHQ-2 Score 3   Q1: Little interest or pleasure in  doing things Nearly every day   Q2: Feeling down, depressed or hopeless Not at all   PHQ-2 Score 3       Do you feel safe in your environment - Yes    Do you have a Health Care Directive?: Yes: Advance Directive has been received and scanned.    Current providers sharing in care for this patient include:   Patient Care Team:  Wesly Lehman DO as PCP - General (Internal Medicine)    The following health maintenance items are reviewed in Epic and correct as of today:  Health Maintenance   Topic Date Due     HEPATITIS C SCREENING  05/05/1970     PNEUMOCOCCAL (1 of 2 - PCV13) 05/05/2017     LIPID MONITORING Q1 YEAR  05/08/2018     FALL RISK ASSESSMENT  05/08/2018     INFLUENZA VACCINE (Season Ended) 09/01/2018     TETANUS IMMUNIZATION (SYSTEM ASSIGNED)  03/04/2019     MAMMO SCREEN Q2 YR (SYSTEM ASSIGNED)  05/09/2019     COLON CANCER SCREEN (SYSTEM ASSIGNED)  01/20/2020     ADVANCE DIRECTIVE PLANNING Q5 YRS  09/05/2022     DEXA SCAN SCREENING (SYSTEM ASSIGNED)  Completed     Labs reviewed in EPIC  BP Readings from Last 3 Encounters:   05/15/18 (!) 142/94   03/20/18 (!) 173/93   03/13/18 136/80    Wt Readings from Last 3 Encounters:   05/15/18 252 lb 8 oz (114.5 kg)   03/20/18 255 lb (115.7 kg)   03/13/18 255 lb (115.7 kg)                  Patient Active Problem List   Diagnosis     Absence of menstruation     Esophageal reflux     Recurring abdominal pain     Hyperlipidemia LDL goal <130     Hypertension goal BP (blood pressure) < 140/90     Advance Care Planning     Small bowel obstruction     DVT prophylaxis     Cervical adenopathy     Intestinal adhesions     Gastroesophageal reflux disease with esophagitis     Cluster headache, not intractable, unspecified chronicity pattern     Morbid obesity due to excess calories (H)     Past Surgical History:   Procedure Laterality Date     C APPENDECTOMY       C LIGATE FALLOPIAN TUBE       C TOTAL ABDOM HYSTERECTOMY      Hysterectomy, Total Abdominal      COLONOSCOPY  06/10/08    Internal hemorrhoids, otherwise normal.     COLONOSCOPY  01/20/10     HC DILATION/CURETTAGE DIAG/THER NON OB      D & C     HC LAP, SURG ENTEROLYSIS  05/07/10     HC REMOVAL OF OVARY/TUBE(S)      Salpingo-Oophorectomy, bilateral     HC UGI ENDOSCOPY, SIMPLE EXAM  01/20/10     LAPAROSCOPY DIAGNOSTIC (GENERAL)  5/6/2014    Procedure: LAPAROSCOPY DIAGNOSTIC (GENERAL);  Surgeon: Seth Matthews MD;  Location: [facility] OR     LAPAROTOMY, LYSIS ADHESIONS, COMBINED  5/6/2014    Procedure: COMBINED LAPAROTOMY, LYSIS ADHESIONS;  Surgeon: Seth Matthews MD;  Location: [facility] OR     VIDEO CAPSULE ENDOSCOPY  02/15/10    normal sm intestine       Social History   Substance Use Topics     Smoking status: Former Smoker     Packs/day: 0.50     Years: 20.00     Types: Cigarettes     Quit date: 1/1/1995     Smokeless tobacco: Never Used     Alcohol use Yes      Comment: very seldom     Family History   Problem Relation Age of Onset     C.A.D. Mother      bi-pass     DIABETES Mother      late onset     Allergies Mother      xray dye     Arthritis Mother      CANCER Mother      ovary     Respiratory Mother      C.A.D. Father      bi-pass     Hypertension Brother      Breast Cancer Sister      Allergies Sister      Allergies Brother      Eye Disorder Maternal Aunt      GASTROINTESTINAL DISEASE Brother      Gynecology Sister      Thyroid Disease Sister          Current Outpatient Prescriptions   Medication Sig Dispense Refill     fish oil-omega-3 fatty acids 1000 MG capsule Take 2 g by mouth daily       GARLIC PO        lisinopril-hydrochlorothiazide (PRINZIDE/ZESTORETIC) 20-25 MG per tablet Take 1 tablet by mouth daily 90 tablet 3     propranolol (INDERAL) 40 MG tablet Take 1 tablet (40 mg) by mouth 2 times daily 180 tablet 3     TURMERIC PO        calcium carbonate (OS-BAKARI 500 MG [place]. [place]) 1250 MG tablet Take 1 tablet by mouth 2 times daily       omeprazole (PRILOSEC) 40 MG capsule TAKE ONE CAPSULE BY MOUTH  "EVERY DAY .TAKE 30 TO 60 MINUTES BEFORE A MEAL (Patient not taking: Reported on 5/15/2018) 90 capsule 3     traMADol (ULTRAM) 50 MG tablet TAKE 1 TO 2 TABLETS EVERY 6 HOURS AS NEEDED  FOR  MODERATE  PAIN (Patient not taking: Reported on 5/15/2018) 30 tablet 0     Allergies   Allergen Reactions     No Known Drug Allergies            Review of Systems  CONSTITUTIONAL: NEGATIVE for fever, chills, change in weight  INTEGUMENTARY/SKIN: NEGATIVE for worrisome rashes, moles or lesions  EYES: NEGATIVE for vision changes or irritation  ENT/MOUTH: NEGATIVE for ear, mouth and throat problems  RESP: NEGATIVE for significant cough or SOB  BREAST: NEGATIVE for masses, tenderness or discharge  CV: NEGATIVE for chest pain, palpitations or peripheral edema  GI: NEGATIVE for nausea, abdominal pain, heartburn, or change in bowel habits  : NEGATIVE for frequency, dysuria, or hematuria  MUSCULOSKELETAL: NEGATIVE for significant arthralgias or myalgia  NEURO: NEGATIVE for weakness, dizziness or paresthesias  ENDOCRINE: NEGATIVE for temperature intolerance, skin/hair changes  HEME: NEGATIVE for bleeding problems  PSYCHIATRIC: NEGATIVE for changes in mood or affect    OBJECTIVE:   BP (!) 142/94  Pulse 94  Temp 96.4  F (35.8  C) (Temporal)  Resp 16  Wt 252 lb 8 oz (114.5 kg)  SpO2 94%  BMI 40.75 kg/m2 Estimated body mass index is 40.75 kg/(m^2) as calculated from the following:    Height as of 3/20/18: 5' 6\" (1.676 m).    Weight as of this encounter: 252 lb 8 oz (114.5 kg).  Physical Exam  GENERAL APPEARANCE: healthy, alert and no distress  EYES: Eyes grossly normal to inspection, PERRL and conjunctivae and sclerae normal  HENT: ear canals and TM's normal, nose and mouth without ulcers or lesions, oropharynx clear and oral mucous membranes moist  NECK: no adenopathy, no asymmetry, masses, or scars and thyroid normal to palpation  RESP: lungs clear to auscultation - no rales, rhonchi or wheezes  CV: regular rate and rhythm, normal S1 " "S2, no S3 or S4, no murmur, click or rub, no peripheral edema and peripheral pulses strong  ABDOMEN: soft, nontender, no hepatosplenomegaly, no masses and bowel sounds normal.  Abdomen is obese.  MS: no musculoskeletal defects are noted and gait is age appropriate without ataxia  SKIN: no suspicious lesions or rashes  NEURO: Normal strength and tone, sensory exam grossly normal, mentation intact and speech normal  PSYCH: mentation appears normal and affect normal/bright    ASSESSMENT / PLAN:       ICD-10-CM    1. Hyperlipidemia LDL goal <130 E78.5 Lipid Profile     Hepatic panel   2. Hypertension goal BP (blood pressure) < 140/90 I10 Basic metabolic panel     *UA reflex to Microscopic and Culture ([facility] (except [place] and [place])   3. Morbid obesity due to excess calories (H) E66.01    4. Encounter for routine adult health examination without abnormal findings Z00.00    5. Need for hepatitis C screening test Z11.59 **Hepatitis C Screen Reflex to RNA FUTURE anytime       End of Life Planning:  Patient currently has an advanced directive: No.  I have verified the patient's ablity to prepare an advanced directive/make health care decisions.  Literature was provided to assist patient in preparing an advanced directive.    COUNSELING:  Reviewed preventive health counseling, as reflected in patient instructions       Regular exercise       Healthy diet/nutrition       Vision screening       Hearing screening       Dental care       Aspirin Prophylaxsis       Hepatitis C screening        Estimated body mass index is 40.75 kg/(m^2) as calculated from the following:    Height as of 3/20/18: 5' 6\" (1.676 m).    Weight as of this encounter: 252 lb 8 oz (114.5 kg).  Weight management plan: Discussed healthy diet and exercise guidelines and patient will follow up in 3 months in clinic to re-evaluate.   reports that she quit smoking about 23 years ago. Her smoking use included Cigarettes. She has a " 10.00 pack-year smoking history. She has never used smokeless tobacco.      Appropriate preventive services were discussed with this patient, including applicable screening as appropriate for cardiovascular disease, diabetes, osteopenia/osteoporosis, and glaucoma.  As appropriate for age/gender, discussed screening for colorectal cancer, prostate cancer, breast cancer, and cervical cancer. Checklist reviewing preventive services available has been given to the patient.    Reviewed patients plan of care and provided an AVS. The Basic Care Plan (routine screening as documented in Health Maintenance) for Komal meets the Care Plan requirement. This Care Plan has been established and reviewed with the Patient.    Counseling Resources:  ATP IV Guidelines  Pooled Cohorts Equation Calculator  Breast Cancer Risk Calculator  FRAX Risk Assessment  ICSI Preventive Guidelines  Dietary Guidelines for Americans, 2010  USDA's MyPlate  ASA Prophylaxis  Lung CA Screening    Wesly Lehman DO  [facility]

## 2018-05-15 NOTE — LETTER
May 18, 2018      Komal Miller  [address]  [place] [facility] [address]        Dear ,    We are writing to inform you of your test results.    The urinary analysis demonstrates essentially normal findings.   The chemistry panel was normal including blood sugar and renal function.   The cholesterol is slightly elevated with an LDL of 148.   The liver tests are normal.    Resulted Orders   Lipid Profile   Result Value Ref [facility]    Cholesterol 229 (H) <200 mg/dL      Comment:      Desirable:       <200 mg/dl    Triglycerides 76 <150 mg/dL      Comment:      Fasting specimen    HDL Cholesterol 66 >49 mg/dL    LDL Cholesterol Calculated 148 (H) <100 mg/dL      Comment:      Above desirable:  100-129 mg/dl  Borderline High:  130-159 mg/dL  High:             160-189 mg/dL  Very high:       >189 mg/dl      Non HDL Cholesterol 163 (H) <130 mg/dL      Comment:      Above Desirable:  130-159 mg/dl  Borderline high:  160-189 mg/dl  High:             190-219 mg/dl  Very high:       >219 mg/dl     Basic metabolic panel   Result Value Ref [facility]    Sodium 135 133 - 144 mmol/L    Potassium 4.0 3.4 - 5.3 mmol/L    Chloride 101 94 - 109 mmol/L    Carbon Dioxide 27 20 - 32 mmol/L    Anion Gap 7 3 - 14 mmol/L    Glucose 93 70 - 99 mg/dL      Comment:      Fasting specimen    Urea Nitrogen 9 7 - 30 mg/dL    Creatinine 0.69 0.52 - 1.04 mg/dL    GFR Estimate 85 >60 mL/min/1.7m2      Comment:      Non  GFR Calc    GFR Estimate If Black >90 >60 mL/min/1.7m2      Comment:       GFR Calc    Calcium 8.3 (L) 8.5 - 10.1 mg/dL   *UA reflex to Microscopic and Culture ([facility] (except [place] and [place])   Result Value Ref [facility]    Color Urine Yellow     Appearance Urine Clear     Glucose Urine Negative NEG^Negative mg/dL    Bilirubin Urine Negative NEG^Negative    Ketones Urine Negative NEG^Negative mg/dL    Specific Gravity Urine 1.015 1.003 - 1.035    Blood Urine Negative  NEG^Negative    pH Urine 8.5 (H) 5.0 - 7.0 pH    Protein Albumin Urine Trace (A) NEG^Negative mg/dL    Urobilinogen Urine 0.2 0.2 - 1.0 EU/dL    Nitrite Urine Negative NEG^Negative    Leukocyte Esterase Urine Negative NEG^Negative    Source Midstream Urine    Hepatic panel   Result Value Ref [facility]    Bilirubin Direct 0.1 0.0 - 0.2 mg/dL    Bilirubin Total 0.3 0.2 - 1.3 mg/dL    Albumin 3.2 (L) 3.4 - 5.0 g/dL    Protein Total 6.9 6.8 - 8.8 g/dL    Alkaline Phosphatase 59 40 - 150 U/L    ALT 24 0 - 50 U/L    AST 15 0 - 45 U/L   Urine Microscopic   Result Value Ref [facility]    WBC Urine 0 - 5 OTO5^0 - 5 /HPF    RBC Urine O - 2 OTO2^O - 2 /HPF    Hyaline Casts 2-5 (A) OTO2^O - 2 /LPF    Squamous Epithelial /LPF Urine Few FEW^Few /LPF    Bacteria Urine Few (A) NEG^Negative /HPF    Amorphous Crystals Many (A) NEG^Negative /HPF    Mucous Urine Present (A) NEG^Negative /LPF       If you have any questions or concerns, please call the clinic at the number listed above.       Sincerely,        Wesly Lehman DO

## 2018-05-15 NOTE — MR AVS SNAPSHOT
"              After Visit Summary   5/15/2018    Komal Miller    MRN: [id number]           Patient Information     Date Of Birth          1950        Visit Information        Provider Department      5/15/2018 8:00 AM Wesly Lehman DO [facility]        Today's Diagnoses     Hyperlipidemia LDL goal <130    -  1    Hypertension goal BP (blood pressure) < 140/90        Morbid obesity due to excess calories (H)        Encounter for routine adult health examination without abnormal findings        Need for hepatitis C screening test           Follow-ups after your visit        Follow-up notes from your care team     Return in about 4 months (around 9/15/2018).      Future tests that were ordered for you today     Open Future Orders        Priority Expected Expires Ordered    **Hepatitis C Screen Reflex to RNA FUTURE anytime Routine 5/15/2018 5/15/2019 5/15/2018            Who to contact     If you have questions or need follow up information about today's clinic visit or your schedule please contact [facility] directly at [phone].  Normal or non-critical lab and imaging results will be communicated to you by [employer], letter or phone within 4 business days after the clinic has received the results. If you do not hear from us within 7 days, please contact the clinic through [employer] or phone. If you have a critical or abnormal lab result, we will notify you by phone as soon as possible.  Submit refill requests through [employer] or call your pharmacy and they will forward the refill request to us. Please allow 3 business days for your refill to be completed.          Additional Information About Your Visit        Imagin[employer] Information     [employer] lets you send messages to your doctor, view your test results, renew your prescriptions, schedule appointments and more. To sign up, go to [email or web address] . Click on \"Log in\" on the left side of the screen, which will take " "you to the Welcome page. Then click on \"Sign up Now\" on the right side of the page.     You will be asked to enter the access code listed below, as well as some personal information. Please follow the directions to create your username and password.     Your access code is: [email or web address]  Expires: 2018 10:13 AM     Your access code will  in 90 days. If you need help or a new code, please call your [facility] clinic or [phone].        Care EveryWhere ID     This is your Care EveryWhere ID. This could be used by other organizations to access your [facility] medical records  [id number]        Your Vitals Were     Pulse Temperature Respirations Pulse Oximetry BMI (Body Mass Index)       94 96.4  F (35.8  C) (Temporal) 16 94% 40.75 kg/m2        Blood Pressure from Last 3 Encounters:   05/15/18 (!) 142/94   18 (!) 173/93   18 136/80    Weight from Last 3 Encounters:   05/15/18 252 lb 8 oz (114.5 kg)   18 255 lb (115.7 kg)   18 255 lb (115.7 kg)              We Performed the Following     *UA reflex to Microscopic and Culture ([place] [facility] (except [place] and [place])     Basic metabolic panel     Hepatic panel     Lipid Profile     Urine Microscopic        Primary Care Provider Office Phone # Fax #    Kgpoahbe Apolinar Leroyadair, [phone]       [address] [place] [address]        Equal Access to Services     [facility]: Hadii aad ku hadasho Soomaali, waaxda luqadaha, qaybta kaalmada adeegyada, waxisabela ho. So [facility] [phone].    ATENCIÓN: Si jiela ulises, tiene a dumont disposición servicios gratuitos de asistencia lingüística. Llame al [phone].    We comply with applicable federal civil rights laws and [place] laws. We do not discriminate on the basis of race, color, national origin, age, disability, sex, sexual orientation, or gender identity.            Thank you!     Thank you for choosing [facility]  for your care. Our goal is always to provide you with excellent care. Hearing back from our patients is one way we can continue to improve our services. Please take a few minutes to complete the written survey that you may receive in the mail after your visit with us. Thank you!             Your Updated Medication List - Protect others around you: Learn how to safely use, store and throw away your medicines at www.disposemymeds.org.          This list is accurate as of 5/15/18 11:59 PM.  Always use your most recent med list.                   Brand Name Dispense Instructions for use Diagnosis    calcium carbonate 500 MG tablet    OS-BAKARI 500 mg [place]. Ca     Take 1 tablet by mouth 2 times daily        fish oil-omega-3 fatty acids 1000 MG capsule      Take 2 g by mouth daily        GARLIC PO           lisinopril-hydrochlorothiazide 20-25 MG per tablet    PRINZIDE/ZESTORETIC    90 tablet    Take 1 tablet by mouth daily    Hypertension goal BP (blood pressure) < 140/90       omeprazole 40 MG capsule    priLOSEC    90 capsule    TAKE ONE CAPSULE BY MOUTH EVERY DAY .TAKE 30 TO 60 MINUTES BEFORE A MEAL    Gastroesophageal reflux disease with esophagitis       propranolol 40 MG tablet    INDERAL    180 tablet    Take 1 tablet (40 mg) by mouth 2 times daily    Cluster headache, not intractable, unspecified chronicity pattern, Nausea       traMADol 50 MG tablet    ULTRAM    30 tablet    TAKE 1 TO 2 TABLETS EVERY 6 HOURS AS NEEDED  FOR  MODERATE  PAIN    Intestinal adhesions, Recurring abdominal pain       TURMERIC PO

## 2018-05-16 ASSESSMENT — PATIENT HEALTH QUESTIONNAIRE - PHQ9: SUM OF ALL RESPONSES TO PHQ QUESTIONS 1-9: 0

## 2018-05-17 ENCOUNTER — TELEPHONE (OUTPATIENT)
Dept: FAMILY MEDICINE | Facility: OTHER | Age: 68
End: 2018-05-17

## 2018-05-17 NOTE — PROGRESS NOTES
Please contact the patient and notify her of the following:  The urinary analysis demonstrates essentially normal findings.  The chemistry panel was normal including blood sugar and renal function.  The cholesterol is slightly elevated with an LDL of 148.  The liver tests are normal.    Thank you.   DO PATRICE Arellano

## 2018-05-17 NOTE — TELEPHONE ENCOUNTER
Reason for Call:  Request for results:    Name of test or procedure: labs    Date of test of procedure:  luis     Location of the test or procedure: 05/15/2018    OK to leave the result message on voice mail or with a family member? YES    Phone number Patient can be reached at:  Home number on file [phone] (home)    Additional comments:     Call taken on 5/17/2018 at 2:42 PM by Tanika Rivas

## 2018-05-30 DIAGNOSIS — R10.9 RECURRING ABDOMINAL PAIN: ICD-10-CM

## 2018-05-30 DIAGNOSIS — K66.0 INTESTINAL ADHESIONS: ICD-10-CM

## 2018-05-30 RX ORDER — TRAMADOL HYDROCHLORIDE 50 MG/1
TABLET ORAL
Qty: 30 TABLET | Refills: 0 | Status: SHIPPED | OUTPATIENT
Start: 2018-05-30 | End: 2018-07-23

## 2018-06-05 DIAGNOSIS — I10 HYPERTENSION GOAL BP (BLOOD PRESSURE) < 140/90: ICD-10-CM

## 2018-06-05 DIAGNOSIS — R11.0 NAUSEA: ICD-10-CM

## 2018-06-05 DIAGNOSIS — G44.009 CLUSTER HEADACHE, NOT INTRACTABLE, UNSPECIFIED CHRONICITY PATTERN: ICD-10-CM

## 2018-06-05 DIAGNOSIS — K21.00 GASTROESOPHAGEAL REFLUX DISEASE WITH ESOPHAGITIS: ICD-10-CM

## 2018-06-05 RX ORDER — LISINOPRIL AND HYDROCHLOROTHIAZIDE 20; 25 MG/1; MG/1
1 TABLET ORAL DAILY
Qty: 90 TABLET | Refills: 3 | Status: SHIPPED | OUTPATIENT
Start: 2018-06-05 | End: 2019-05-31

## 2018-06-05 RX ORDER — PROPRANOLOL HYDROCHLORIDE 40 MG/1
40 TABLET ORAL 2 TIMES DAILY
Qty: 180 TABLET | Refills: 3 | Status: SHIPPED | OUTPATIENT
Start: 2018-06-05 | End: 2019-05-31

## 2018-06-05 RX ORDER — OMEPRAZOLE 40 MG/1
CAPSULE, DELAYED RELEASE ORAL
Qty: 90 CAPSULE | Refills: 3 | Status: SHIPPED | OUTPATIENT
Start: 2018-06-05 | End: 2019-05-31

## 2018-06-05 NOTE — TELEPHONE ENCOUNTER
"Requested Prescriptions   Pending Prescriptions Disp Refills     lisinopril-hydrochlorothiazide (PRINZIDE/ZESTORETIC) 20-25 MG per tablet 90 tablet 3     Sig: Take 1 tablet by mouth daily    Diuretics (Including Combos) Protocol Failed    6/5/2018 12:48 PM       Failed - Blood pressure under 140/90 in past 12 months    BP Readings from Last 3 Encounters:   05/15/18 (!) 142/94   03/20/18 (!) 173/93   03/13/18 136/80                Passed - Recent (12 mo) or future (30 days) visit within the authorizing provider's specialty    Patient had office visit in the last 12 months or has a visit in the next 30 days with authorizing provider or within the authorizing provider's specialty.  See \"Patient Info\" tab in inbasket, or \"Choose Columns\" in Meds & Orders section of the refill encounter.           Passed - Patient is age 18 or older       Passed - No active pregancy on record       Passed - Normal serum creatinine on file in past 12 months    Recent Labs   Lab Test  05/15/18   0827   CR  0.69             Passed - Normal serum potassium on file in past 12 months    Recent Labs   Lab Test  05/15/18   0827   POTASSIUM  4.0                   Passed - Normal serum sodium on file in past 12 months    Recent Labs   Lab Test  05/15/18   0827   NA  135             Passed - No positive pregnancy test in past 12 months        omeprazole (PRILOSEC) 40 MG capsule 90 capsule 3     Sig: TAKE ONE CAPSULE BY MOUTH EVERY DAY .TAKE 30 TO 60 MINUTES BEFORE A MEAL    PPI Protocol Passed    6/5/2018 12:48 PM       Passed - Not on Clopidogrel (unless Pantoprazole ordered)       Passed - No diagnosis of osteoporosis on record       Passed - Recent (12 mo) or future (30 days) visit within the authorizing provider's specialty    Patient had office visit in the last 12 months or has a visit in the next 30 days with authorizing provider or within the authorizing provider's specialty.  See \"Patient Info\" tab in inbasket, or \"Choose Columns\" in Meds " "& Orders section of the refill encounter.           Passed - Patient is age 18 or older       Passed - No active pregnacy on record       Passed - No positive pregnancy test in past 12 months        propranolol (INDERAL) 40 MG tablet 180 tablet 3     Sig: Take 1 tablet (40 mg) by mouth 2 times daily    Beta-Blockers Protocol Failed    6/5/2018 12:48 PM       Failed - Blood pressure under 140/90 in past 12 months    BP Readings from Last 3 Encounters:   05/15/18 (!) 142/94   03/20/18 (!) 173/93   03/13/18 136/80                Passed - Patient is age 6 or older       Passed - Recent (12 mo) or future (30 days) visit within the authorizing provider's specialty    Patient had office visit in the last 12 months or has a visit in the next 30 days with authorizing provider or within the authorizing provider's specialty.  See \"Patient Info\" tab in inbasket, or \"Choose Columns\" in Meds & Orders section of the refill encounter.              Last Written Prescription Date:  5/8/17  Last Fill Quantity: 90,  # refills: 3   Last Office Visit with [facility] or [facility] prescribing provider:  5/15/18   Future Office Visit:     Omeprazole 40 GM       Last Written Prescription Date:  4/4/17  Last Fill Quantity: 90,   # refills: 3  Last Office Visit: 5/15/18  Future Office visit:         Propranolol 40 MG       Last Written Prescription Date:  12/20/17  Last Fill Quantity: 180,   # refills: 3  Last Office Visit: 5/15/18  Future Office visit:           "

## 2018-06-05 NOTE — TELEPHONE ENCOUNTER
Reason for Call:  Medication or medication refill:    Do you use a [facility]?  Name of the pharmacy and phone number for the current request:  [facility]     Name of the medication requested: Prescriptions are about to  and [facility] will need new prescriptions sent  for Lisinopril, Omeprazole and Propranolol    Other request:      Can we leave a detailed message on this number? YES    Phone number patient can be reached at: Home number on file [phone] (home)    Best Time:      Call taken on 2018 at 8:58 AM by Liyah Zuleta

## 2018-06-23 ENCOUNTER — HOSPITAL ENCOUNTER (EMERGENCY)
Facility: CLINIC | Age: 68
Discharge: HOME | End: 2018-06-23
Attending: FAMILY MEDICINE | Admitting: FAMILY MEDICINE
Payer: COMMERCIAL

## 2018-06-23 VITALS
SYSTOLIC BLOOD PRESSURE: 184 MMHG | OXYGEN SATURATION: 98 % | HEART RATE: 70 BPM | RESPIRATION RATE: 18 BRPM | TEMPERATURE: 98.6 F | BODY MASS INDEX: 38.74 KG/M2 | WEIGHT: 240 LBS | DIASTOLIC BLOOD PRESSURE: 102 MMHG

## 2018-06-23 DIAGNOSIS — M62.838 SPASM OF MUSCLE: ICD-10-CM

## 2018-06-23 DIAGNOSIS — M71.21 BAKER'S CYST OF KNEE, RIGHT: ICD-10-CM

## 2018-06-23 DIAGNOSIS — E66.01 MORBID OBESITY DUE TO EXCESS CALORIES (H): ICD-10-CM

## 2018-06-23 DIAGNOSIS — M17.11 PRIMARY OSTEOARTHRITIS OF RIGHT KNEE: ICD-10-CM

## 2018-06-23 DIAGNOSIS — I10 HYPERTENSION GOAL BP (BLOOD PRESSURE) < 140/90: ICD-10-CM

## 2018-06-23 PROCEDURE — 99282 EMERGENCY DEPT VISIT SF MDM: CPT | Performed by: FAMILY MEDICINE

## 2018-06-23 PROCEDURE — 99284 EMERGENCY DEPT VISIT MOD MDM: CPT | Mod: Z6 | Performed by: FAMILY MEDICINE

## 2018-06-23 RX ORDER — PREDNISONE 20 MG/1
20 TABLET ORAL 2 TIMES DAILY
Qty: 10 TABLET | Refills: 0 | Status: SHIPPED | OUTPATIENT
Start: 2018-06-23 | End: 2018-06-28

## 2018-06-23 ASSESSMENT — ENCOUNTER SYMPTOMS
FLANK PAIN: 0
FEVER: 0
EYE REDNESS: 0
ABDOMINAL PAIN: 0
NAUSEA: 0
FACIAL ASYMMETRY: 0
SHORTNESS OF BREATH: 0
CHILLS: 0
SORE THROAT: 0
COUGH: 0
BACK PAIN: 0
WEAKNESS: 0
DIARRHEA: 0
HEMATURIA: 0
LIGHT-HEADEDNESS: 0
HEADACHES: 0
DIZZINESS: 0
NUMBNESS: 0
POLYDIPSIA: 0
EYE PAIN: 0
CONFUSION: 0

## 2018-06-23 NOTE — ED TRIAGE NOTES
Pt c/o right upper outer thigh and behind her right knee/ham string.  Denies any recent travel/over use.  Pt is having to use a cane which she normally does not use.

## 2018-06-23 NOTE — ED AVS SNAPSHOT
[facility]    [address] [place] [address]    Phone:  [phone]    Fax:  [phone]                                       Komal Miller   MRN: [id number]    Department:  [facility]   Date of Visit:  6/23/2018           After Visit Summary Signature Page     I have received my discharge instructions, and my questions have been answered. I have discussed any challenges I see with this plan with the nurse or doctor.    ..........................................................................................................................................  Patient/Patient Representative Signature      ..........................................................................................................................................  Patient Representative Print Name and Relationship to Patient    ..................................................               ................................................  Date                                            Time    ..........................................................................................................................................  Reviewed by Signature/Title    ...................................................              ..............................................  Date                                                            Time

## 2018-06-23 NOTE — ED AVS SNAPSHOT
[facility]    [address] [place] [address]    Phone:  [phone]    Fax:  [phone]                                       Komal Miller   MRN: [id number]    Department:  [facility]   Date of Visit:  6/23/2018           Patient Information     Date Of Birth          1950        Your diagnoses for this visit were:     Primary osteoarthritis of right knee     Baker's cyst of knee, right     Spasm of muscle     Morbid obesity due to excess calories (H)     Hypertension goal BP (blood pressure) < 140/90        You were seen by Phoenix Chapa MD.      Follow-up Information     Schedule an appointment as soon as possible for a visit with Freda Bang MD.    Specialty:  Family Medicine - Sports Medicine    Why:  Discuss another joint injection    Contact information:    [address]  [facility] [address]  [phone]          Follow up with Wesly Lehman DO.    Specialty:  Internal Medicine    Why:  You need to follow-up as your blood pressure is out of control.    Contact information:    [address] [facility] [address] [phone]          Follow up with [facility].    Specialty:  EMERGENCY MEDICINE    Why:  If symptoms worsen    Contact information:    [address] [place] [address] [phone]    Additional information:    From [address]: Exit at [address] on south side of [place]. Turn right on [address]. Turn left at stoplight on [address]. [facility] will be in view two blocks ahead        Discharge Instructions       Follow-up next week for another knee injection. Until then take prednisone 20 mg twice a day. Carry your cane for safety. Return to the ED if further problems.    24 Hour Appointment Hotline       To make an appointment at any [facility] clinic, call [phone] ([phone]). If you don't have a  family doctor or clinic, we will help you find one. [facility] clinics are conveniently located to serve the needs of you and your family.             Review of your medicines      START taking        Dose / Directions Last dose taken    predniSONE 20 MG tablet   Commonly known as:  DELTASONE   Dose:  20 mg   Quantity:  10 tablet        Take 1 tablet (20 mg) by mouth 2 times daily for 5 days   Refills:  0          Our records show that you are taking the medicines listed below. If these are incorrect, please call your family doctor or clinic.        Dose / Directions Last dose taken    fish oil-omega-3 fatty acids 1000 MG capsule   Dose:  2 g        Take 2 g by mouth daily   Refills:  0        GARLIC PO        Refills:  0        lisinopril-hydrochlorothiazide 20-25 MG per tablet   Commonly known as:  PRINZIDE/ZESTORETIC   Dose:  1 tablet   Quantity:  90 tablet        Take 1 tablet by mouth daily   Refills:  3        omeprazole 40 MG capsule   Commonly known as:  priLOSEC   Quantity:  90 capsule        TAKE ONE CAPSULE BY MOUTH EVERY DAY .TAKE 30 TO 60 MINUTES BEFORE A MEAL   Refills:  3        propranolol 40 MG tablet   Commonly known as:  INDERAL   Dose:  40 mg   Quantity:  180 tablet        Take 1 tablet (40 mg) by mouth 2 times daily   Refills:  3        traMADol 50 MG tablet   Commonly known as:  ULTRAM   Quantity:  30 tablet        TAKE 1 TO 2 TABLETS EVERY 6 HOURS AS NEEDED  FOR  MODERATE  PAIN   Refills:  0        TURMERIC PO        Refills:  0                Prescriptions were sent or printed at these locations (1 Prescription)                   [facility] [place] - [address]   [address], [facility] [address]    Telephone:  [phone]   Fax:  [phone]   Hours:                  E-Prescribed (1 of 1)         predniSONE (DELTASONE) 20 MG tablet                Orders Needing Specimen Collection     None      Pending Results     No orders found from 6/21/2018 to 6/24/2018.  "           Pending Culture Results     No orders found from 2018 to 2018.            Pending Results Instructions     If you had any lab results that were not finalized at the time of your Discharge, you can call the ED Lab Result RN at [phone]. You will be contacted by this team for any positive Lab results or changes in treatment. The nurses are available 7 days a week from 10A to 6:30P.  You can leave a message 24 hours per day and they will return your call.        Thank you for choosing [facility]       Thank you for choosing [facility] for your care. Our goal is always to provide you with excellent care. Hearing back from our patients is one way we can continue to improve our services. Please take a few minutes to complete the written survey that you may receive in the mail after you visit with us. Thank you!        [employer] Information     [employer] lets you send messages to your doctor, view your test results, renew your prescriptions, schedule appointments and more. To sign up, go to [email or web address] . Click on \"Log in\" on the left side of the screen, which will take you to the Welcome page. Then click on \"Sign up Now\" on the right side of the page.     You will be asked to enter the access code listed below, as well as some personal information. Please follow the directions to create your username and password.     Your access code is: [email or web address]  Expires: 2018 12:06 PM     Your access code will  in 90 days. If you need help or a new code, please call your [facility] clinic or [phone].        Care EveryWhere ID     This is your Care EveryWhere ID. This could be used by other organizations to access your [facility] medical records  [id number]        Equal Access to Services     NIK RODRIGUEZ : isamar Timmons, israel brown. So [facility] [phone].    ATENCIÓN: Si jo ann potts " disposición servicios gratuitos de asistencia lingüística. Ketan al [phone].    We comply with applicable federal civil rights laws and [place] laws. We do not discriminate on the basis of race, color, national origin, age, disability, sex, sexual orientation, or gender identity.            After Visit Summary       This is your record. Keep this with you and show to your community pharmacist(s) and doctor(s) at your next visit.

## 2018-06-23 NOTE — DISCHARGE INSTRUCTIONS
Follow-up next week for another knee injection. Until then take prednisone 20 mg twice a day. Carry your cane for safety. Return to the ED if further problems.

## 2018-06-23 NOTE — ED PROVIDER NOTES
History     Chief Complaint   Patient presents with     Leg Pain     HPI  Komal Miller is a 68 year old female who presents to the emergency department with right leg pain. She has been having chronic pain in both knees but particularly her right. She had pain in the muscle behind the knee and one hand breath superior. She notices no swelling in this leg. She has no history or risk factors for DVT. No family history of DVT or PE. No long car trips or sedentary periods. She also has a little discomfort to her right upper lateral hip area right at the trochanter. She has no hip joint pain and has had no hip arthritis. She has been told she has severe osteoarthritis of both knees and is near bone-on-bone. She has had Synvisc and steroid injections which give her great relief. She has never been told that she has a Baker's cyst. She has been walking a little different secondary to the pain and has thought that it may be muscle discomfort because of the way she is walking. She will get twinges of severe pain and it feels like her leg gives out. She has no leg weakness. She has no neurological symptoms of right-sided weakness. No facial droop. No headache. No history of stroke or TIAs. No risk factors. Her right knee buckling is always associated with pain and she believes it's a pain response.    Problem List:    Patient Active Problem List    Diagnosis Date Noted     Morbid obesity due to excess calories (H) 07/26/2017     Priority: Medium     Cluster headache, not intractable, unspecified chronicity pattern 05/08/2017     Priority: Medium     Gastroesophageal reflux disease with esophagitis 10/02/2015     Priority: Medium     Intestinal adhesions 05/06/2014     Priority: Medium     Cervical adenopathy 04/29/2014     Priority: Medium     Small bowel obstruction 04/28/2014     Priority: Medium     DVT prophylaxis 04/28/2014     Priority: Medium     Advance Care Planning 05/07/2012     Priority: Medium     Discussed  advance care planning with patient; information given to patient to review. 5/7/2012        Hypertension goal BP (blood pressure) < 140/90 11/15/2011     Priority: Medium     Hyperlipidemia LDL goal <130 04/26/2011     Priority: Medium     Recurring abdominal pain 12/21/2009     Priority: Medium     Esophageal reflux 04/18/2006     Priority: Medium     Absence of menstruation 11/04/2005     Priority: Medium        Past Medical History:    Past Medical History:   Diagnosis Date     Unspecified essential hypertension        Past Surgical History:    Past Surgical History:   Procedure Laterality Date     C APPENDECTOMY       C LIGATE FALLOPIAN TUBE       C TOTAL ABDOM HYSTERECTOMY      Hysterectomy, Total Abdominal     COLONOSCOPY  06/10/08    Internal hemorrhoids, otherwise normal.     COLONOSCOPY  01/20/10     HC DILATION/CURETTAGE DIAG/THER NON OB      D & C     HC LAP, SURG ENTEROLYSIS  05/07/10     HC REMOVAL OF OVARY/TUBE(S)      Salpingo-Oophorectomy, bilateral     HC UGI ENDOSCOPY, SIMPLE EXAM  01/20/10     LAPAROSCOPY DIAGNOSTIC (GENERAL)  5/6/2014    Procedure: LAPAROSCOPY DIAGNOSTIC (GENERAL);  Surgeon: Seth Matthews MD;  Location: [facility]     LAPAROTOMY, LYSIS ADHESIONS, COMBINED  5/6/2014    Procedure: COMBINED LAPAROTOMY, LYSIS ADHESIONS;  Surgeon: Seth Matthews MD;  Location: [facility]     VIDEO CAPSULE ENDOSCOPY  02/15/10    normal sm intestine       Family History:    Family History   Problem Relation Age of Onset     C.A.D. Mother      bi-pass     Diabetes Mother      late onset     Allergies Mother      xray dye     Arthritis Mother      Cancer Mother      ovary     Respiratory Mother      C.A.D. Father      bi-pass     Hypertension Brother      Breast Cancer Sister      Allergies Sister      Allergies Brother      Eye Disorder Maternal Aunt      GASTROINTESTINAL DISEASE Brother      Gynecology Sister      Thyroid Disease Sister        Social History:  Marital Status:   [2]  Social  History   Substance Use Topics     Smoking status: Former Smoker     Packs/day: 0.50     Years: 20.00     Types: Cigarettes     Quit date: 1/1/1995     Smokeless tobacco: Never Used     Alcohol use Yes      Comment: very seldom        Medications:      fish oil-omega-3 fatty acids 1000 MG capsule   GARLIC PO   lisinopril-hydrochlorothiazide (PRINZIDE/ZESTORETIC) 20-25 MG per tablet   omeprazole (PRILOSEC) 40 MG capsule   predniSONE (DELTASONE) 20 MG tablet   propranolol (INDERAL) 40 MG tablet   traMADol (ULTRAM) 50 MG tablet   TURMERIC PO         Review of Systems   Constitutional: Negative for chills and fever.   HENT: Negative for congestion and sore throat.    Eyes: Negative for pain, redness and visual disturbance.   Respiratory: Negative for cough and shortness of breath.    Cardiovascular: Negative for chest pain.   Gastrointestinal: Negative for abdominal pain, diarrhea and nausea.   Endocrine: Negative for polydipsia and polyuria.   Genitourinary: Negative for flank pain and hematuria.   Musculoskeletal: Negative for back pain.   Skin: Negative for pallor.   Allergic/Immunologic: Negative for immunocompromised state.   Neurological: Negative for dizziness, syncope, facial asymmetry, weakness, light-headedness, numbness and headaches.   Psychiatric/Behavioral: Negative for confusion.       Physical Exam   BP: (!) 208/113  Pulse: 70  Temp: 98.6  F (37  C)  Resp: 18  Weight: 108.9 kg (240 lb)  SpO2: 98 %      Physical Exam   Constitutional: She is oriented to person, place, and time.   Pleasant smiling obese female. She does not appear toxic or ill. She does not appear in any distress. She appears well-nourished well-hydrated. She is hypertensive.BP (!) 184/102  Pulse 70  Temp 98.6  F (37  C) (Oral)  Resp 18  Wt 108.9 kg (240 lb)  SpO2 98%  BMI 38.74 kg/m2     HENT:   Head: Normocephalic.   Right Ear: External ear normal.   Left Ear: External ear normal.   Nose: Nose normal.   Mouth/Throat: Oropharynx is  clear and moist.   Eyes: Conjunctivae are normal.   Neck: Normal range of motion. Neck supple.   Cardiovascular: Normal rate, regular rhythm and normal heart sounds.    Pulmonary/Chest: Effort normal and breath sounds normal.   Abdominal: Soft.   Musculoskeletal:        Right knee: She exhibits decreased range of motion and swelling. She exhibits no bony tenderness. Tenderness found. Lateral joint line tenderness noted.        Legs:  Patient has right lateral joint line tenderness. She has a little discomfort in the back and superior to the knee joint. This would be consistent with a Baker's cyst however I do not palpate one. No ultrasound done. She also has some tenderness to the upper right lateral thigh area. No rash to this area. There is no pain in the right hip or knee and they allow range of motion unrestricted. Distal CMS to both legs is normal. Leg sizes identical. There is no right leg swelling. Negative Homans sign. No palpable calf tenderness or cords.   Neurological: She is alert and oriented to person, place, and time. She has normal strength. No cranial nerve deficit or sensory deficit. She displays a negative Romberg sign. GCS eye subscore is 4. GCS verbal subscore is 5. GCS motor subscore is 6.   Right alert oriented. Face is symmetric. Pupils are equal react to accommodate. Extraocular movements intact without nystagmus. Good equal bilateral hand  and flexion and extension of arms. Good bilateral leg strength with hip flexion and knee flexion and extension. Reflexes equal. No evidence of any neurological deficits.   Skin: Skin is warm and dry.   Psychiatric: She has a normal mood and affect. Her behavior is normal.   Nursing note and vitals reviewed.      ED Course     ED Course     Procedures               Critical Care time:  none               No results found for this or any previous visit (from the past 24 hour(s)).    Medications - No data to display    Assessments & Plan (with Medical  Decision Making)   MDM--68-year-old female who has chronic osteoarthritis principally of her knees. She has had previous injection of both steroids and Synvisc. She has been having pain behind her right knee and has started feeling muscle spasms and pain above and anterior to the right knee. She also has some discomfort to the right lateral hip area. She is having no hip joint pain. The pain in her right knee is chronic but she will have severe spasms at time which cause her leg to give out. She otherwise is not having any weakness of this leg. No weakness of her hand grasp or right arm strength. No facial droop. Neurological exam is normal. On examination she has pain and swelling behind her knee consistent with a Baker's cyst. She also has right lateral joint line tenderness consistent with her DJD of her right knee. I recommend the patient continue to carry a cane for safety. She should follow-up for another joint injection with sports medicine. Discussed other options and she would like to take some prednisone until she is seen and I prescribed 20 mg b.i.d. for 5 days maximum. Tylenol for discomfort. She has no history or risk of DVT and I see no evidence of DVT or swelling but I did discuss this with her.  I have reviewed the nursing notes.    I have reviewed the findings, diagnosis, plan and need for follow up with the patient.       New Prescriptions    PREDNISONE (DELTASONE) 20 MG TABLET    Take 1 tablet (20 mg) by mouth 2 times daily for 5 days       Final diagnoses:   Primary osteoarthritis of right knee   Baker's cyst of knee, right   Spasm of muscle   Morbid obesity due to excess calories (H)   Hypertension goal BP (blood pressure) < 140/90       6/23/2018   [facility] EMERGENCY DEPARTMENT     Eduard, [place] MUÑOZ MD  06/23/18 [id number]

## 2018-06-26 ENCOUNTER — OFFICE VISIT (OUTPATIENT)
Dept: ORTHOPEDICS | Facility: CLINIC | Age: 68
End: 2018-06-26
Payer: COMMERCIAL

## 2018-06-26 VITALS
BODY MASS INDEX: 40.82 KG/M2 | HEART RATE: 65 BPM | WEIGHT: 254 LBS | DIASTOLIC BLOOD PRESSURE: 89 MMHG | HEIGHT: 66 IN | SYSTOLIC BLOOD PRESSURE: 141 MMHG

## 2018-06-26 DIAGNOSIS — M17.0 BILATERAL PRIMARY OSTEOARTHRITIS OF KNEE: ICD-10-CM

## 2018-06-26 DIAGNOSIS — M25.562 CHRONIC PAIN OF BOTH KNEES: Primary | ICD-10-CM

## 2018-06-26 DIAGNOSIS — G89.29 CHRONIC PAIN OF BOTH KNEES: Primary | ICD-10-CM

## 2018-06-26 DIAGNOSIS — M25.561 CHRONIC PAIN OF BOTH KNEES: Primary | ICD-10-CM

## 2018-06-26 PROCEDURE — 20610 DRAIN/INJ JOINT/BURSA W/O US: CPT | Mod: 50 | Performed by: PHYSICAL MEDICINE & REHABILITATION

## 2018-06-26 PROCEDURE — 99213 OFFICE O/P EST LOW 20 MIN: CPT | Mod: 25 | Performed by: PHYSICAL MEDICINE & REHABILITATION

## 2018-06-26 RX ORDER — TRIAMCINOLONE ACETONIDE 40 MG/ML
80 INJECTION, SUSPENSION INTRA-ARTICULAR; INTRAMUSCULAR ONCE
Qty: 2 ML | Refills: 0 | OUTPATIENT
Start: 2018-06-26 | End: 2018-06-26

## 2018-06-26 NOTE — MR AVS SNAPSHOT
After Visit Summary   6/26/2018    Komal Miller    MRN: [id number]           Patient Information     Date Of Birth          1950        Visit Information        Provider Department      6/26/2018 11:20 AM Freda Bang MD [facility]        Today's Diagnoses     Chronic pain of both knees    -  1    Bilateral primary osteoarthritis of knee          Care Instructions    -Steroid injections performed today.  Take it easy over the next few days. Keep in mind that the steroid may take up to 3 days to start working and up to 2 weeks to reach maximal effect.  Ice 15-20 minutes as needed for soreness.  Ibuprofen and/or Tylenol as needed for pain relief.  -Ice or ice massage 15-20 minutes for pain relief or swelling as needed    Please call your insurance to inquire whether prior authorization is needed for the gel injection. It is also recommended that you call our Consumer Price Line [phone] to find out if there will be any cost to you for the gel injection.    Gel injection codes:  Synvisc One -   Euflexxa -   Gel One - 27069    Your diagnosis codes are:  Chronic pain of both knees  - Primary M25.561, M25.562, G89.29     Bilateral primary osteoarthritis of knee  M17.0     -Patient to follow up with Primary Care provider regarding elevated blood pressure.    -Follow up as needed.  Please call with questions or concerns.             Follow-ups after your visit        Who to contact     If you have questions or need follow up information about today's clinic visit or your schedule please contact [facility] directly at [phone].  Normal or non-critical lab and imaging results will be communicated to you by MyChart, letter or phone within 4 business days after the clinic has received the results. If you do not hear from us within 7 days, please contact the clinic through MyChart or phone. If you have a critical or abnormal lab result, we will  "notify you by phone as soon as possible.  Submit refill requests through [employer] or call your pharmacy and they will forward the refill request to us. Please allow 3 business days for your refill to be completed.          Additional Information About Your Visit        [employer] Information     [employer] lets you send messages to your doctor, view your test results, renew your prescriptions, schedule appointments and more. To sign up, go to [email or web address] . Click on \"Log in\" on the left side of the screen, which will take you to the Welcome page. Then click on \"Sign up Now\" on the right side of the page.     You will be asked to enter the access code listed below, as well as some personal information. Please follow the directions to create your username and password.     Your access code is: [email or web address]  Expires: 2018 12:06 PM     Your access code will  in 90 days. If you need help or a new code, please call your [facility] clinic or [phone].        Care EveryWhere ID     This is your Care EveryWhere ID. This could be used by other organizations to access your [facility] medical records  [id number]        Your Vitals Were     Pulse Height BMI (Body Mass Index)             65 5' 6\" (1.676 m) 41 kg/m2          Blood Pressure from Last 3 Encounters:   18 141/89   18 (!) 184/102   05/15/18 (!) 142/94    Weight from Last 3 Encounters:   18 254 lb (115.2 kg)   18 240 lb (108.9 kg)   05/15/18 252 lb 8 oz (114.5 kg)              Today, you had the following     No orders found for display       Primary Care Provider Office Phone # Fax #    Wesly Apolinar Lehman [phone]       [address] [facility] [address]        Equal Access to Services     NIK RODRIGUEZ : Jessica Acosta, isamar mike, qaisrael english . [facility] [phone].    ATENCIÓN: Si gigi montgomery, tiene a dumont disposición " servicios gratuitos de asistencia lingüística. Ketan verdugo [phone].    We comply with applicable federal civil rights laws and [place] laws. We do not discriminate on the basis of race, color, national origin, age, disability, sex, sexual orientation, or gender identity.            Thank you!     Thank you for choosing [facility]  for your care. Our goal is always to provide you with excellent care. Hearing back from our patients is one way we can continue to improve our services. Please take a few minutes to complete the written survey that you may receive in the mail after your visit with us. Thank you!             Your Updated Medication List - Protect others around you: Learn how to safely use, store and throw away your medicines at www.disposemymeds.org.          This list is accurate as of 6/26/18 11:50 AM.  Always use your most recent med list.                   Brand Name Dispense Instructions for use Diagnosis    fish oil-omega-3 fatty acids 1000 MG capsule      Take 2 g by mouth daily        GARLIC PO           lisinopril-hydrochlorothiazide 20-25 MG per tablet    PRINZIDE/ZESTORETIC    90 tablet    Take 1 tablet by mouth daily    Hypertension goal BP (blood pressure) < 140/90       omeprazole 40 MG capsule    priLOSEC    90 capsule    TAKE ONE CAPSULE BY MOUTH EVERY DAY .TAKE 30 TO 60 MINUTES BEFORE A MEAL    Gastroesophageal reflux disease with esophagitis       predniSONE 20 MG tablet    DELTASONE    10 tablet    Take 1 tablet (20 mg) by mouth 2 times daily for 5 days        propranolol 40 MG tablet    INDERAL    180 tablet    Take 1 tablet (40 mg) by mouth 2 times daily    Cluster headache, not intractable, unspecified chronicity pattern, Nausea       traMADol 50 MG tablet    ULTRAM    30 tablet    TAKE 1 TO 2 TABLETS EVERY 6 HOURS AS NEEDED  FOR  MODERATE  PAIN    Intestinal adhesions, Recurring abdominal pain       TURMERIC PO

## 2018-06-26 NOTE — LETTER
6/26/2018         RE: Komal Miller  [address]  [facility] [address]        Dear Colleague,    Thank you for referring your patient, Komal Miller, to the [facility]. Please see a copy of my visit note below.    Sports Medicine Clinic Visit - Interim History June 26, 2018    Initial Visit Date 3/20/2018    PCP: Wesly Lehman    Komal Miller is a 68 year old female who is seen in follow up for bilateral knee pain. Since last visit on 3/20/2018 patient has had a return of right knee and lateral thigh pain. She was seen in the ED on 6/23/2018 because she was having trouble with her knee/hip giving out and she was concerned due to the giving out. She had to use a cane due to the pain and the risk of falling. She has had pain in the posterior, lateral knee and hamstring. She rates the pain at a 2/10 currently. Symptoms are relieved with past CSI and prednisone (stopped today).  Symptoms are worsened by  nothing specific.     Review of Systems  Musculoskeletal: as above  Remainder of review of systems is negative including constitutional, eyes, ENT, CV, pulmonary, GI, , endocrine, skin, hematologic, and neurologic except as noted in HPI or medical history.    History reviewed. No pertinent past surgical/medical/family/social history other than as mentioned in HPI.    Patient Active Problem List   Diagnosis     Absence of menstruation     Esophageal reflux     Recurring abdominal pain     Hyperlipidemia LDL goal <130     Hypertension goal BP (blood pressure) < 140/90     Advance Care Planning     Small bowel obstruction     DVT prophylaxis     Cervical adenopathy     Intestinal adhesions     Gastroesophageal reflux disease with esophagitis     Cluster headache, not intractable, unspecified chronicity pattern     Morbid obesity due to excess calories (H)     Past Medical History:   Diagnosis Date     Unspecified essential hypertension      Past Surgical History:   Procedure  Laterality Date     C APPENDECTOMY       C LIGATE FALLOPIAN TUBE       C TOTAL ABDOM HYSTERECTOMY      Hysterectomy, Total Abdominal     COLONOSCOPY  06/10/08    Internal hemorrhoids, otherwise normal.     COLONOSCOPY  01/20/10     HC DILATION/CURETTAGE DIAG/THER NON OB      D & C     HC LAP, SURG ENTEROLYSIS  05/07/10     HC REMOVAL OF OVARY/TUBE(S)      Salpingo-Oophorectomy, bilateral     HC UGI ENDOSCOPY, SIMPLE EXAM  01/20/10     LAPAROSCOPY DIAGNOSTIC (GENERAL)  5/6/2014    Procedure: LAPAROSCOPY DIAGNOSTIC (GENERAL);  Surgeon: Seth Matthews MD;  Location: [facility]     LAPAROTOMY, LYSIS ADHESIONS, COMBINED  5/6/2014    Procedure: COMBINED LAPAROTOMY, LYSIS ADHESIONS;  Surgeon: Seth Matthews MD;  Location: [facility]     VIDEO CAPSULE ENDOSCOPY  02/15/10    normal sm intestine     Family History   Problem Relation Age of Onset     C.A.D. Mother      bi-pass     Diabetes Mother      late onset     Allergies Mother      xray dye     Arthritis Mother      Cancer Mother      ovary     Respiratory Mother      C.A.D. 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[employer]     [employer]     Social History Main Topics     Smoking status: Former Smoker     Packs/day: 0.50     Years: 20.00     Types: Cigarettes     Quit date: 1/1/1995     Smokeless tobacco: Never Used     Alcohol use Yes      Comment: very seldom     Drug use: No     Sexual activity: No     Other Topics Concern      Service No     Blood Transfusions Yes     with children--several units     Caffeine Concern No     coffee  " 5 cups/day      occ diet pepsi     Occupational Exposure Yes     works at the [employer]  body fluids, comm. diseases     Hobby Hazards No     Sleep Concern Yes     sleeps more restless some nights     Stress Concern No     Weight Concern Yes     w'd like to weigh 50-60# less     Special Diet No     Back Care No     Exercise Yes     walking     Bike Helmet No     Seat Belt Yes     Self-Exams No     Social History Narrative       She is retired. She helps at the school sometimes.     Current Outpatient Prescriptions   Medication     fish oil-omega-3 fatty acids 1000 MG capsule     GARLIC PO     lisinopril-hydrochlorothiazide (PRINZIDE/ZESTORETIC) 20-25 MG per tablet     omeprazole (PRILOSEC) 40 MG capsule     predniSONE (DELTASONE) 20 MG tablet     propranolol (INDERAL) 40 MG tablet     traMADol (ULTRAM) 50 MG tablet     TURMERIC PO     No current facility-administered medications for this visit.      Allergies   Allergen Reactions     No Known Drug Allergies          Objective:  /89  Pulse 65  Ht 5' 6\" (1.676 m)  Wt 254 lb (115.2 kg)  BMI 41 kg/m2    General: Alert and in no distress    Head: Normocephalic, atraumatic  Eyes: no scleral icterus or conjunctival erythema   Oropharynx:  Mucous membranes moist  Skin: no erythema, petechiae, or jaundice  CV: regular rhythm by palpation, 2+ distal pulses  Resp: normal respiratory effort without conversational dyspnea   Psych: normal mood and affect    Gait: Mildly antalgic  Neuro: Motor strength and sensation as noted below    Musculoskeletal:    Bilateral Knee exam    Inspection:  No erythema, ecchymosis, warmth, or edema    Palpation: Tender to palpation over the bilateral lateral joint lines    Knee ROM: Full active knee extension without pain    Strength:  5/5 Hip flexion/abduction/adduction, knee extension/flexion, ankle plantarflexion/dorsiflexion, great toe extension, toe flexion    Sensation:  Intact to light touch in the bilateral lower " extremities    Radiology:  No new imaging today    Procedure:  Discussed steroid injection, including risks, potential benefits, and alternatives.  The patient expressed understanding.  Obtained verbal and written consent and the patient elected to proceed.  Procedure: Steroid injections were performed under aseptic technique at the bilateral knee joints using 2 mL of 1% plain Lidocaine, 2 mL of 0.5% Marcaine, and 1 mL of 40 mg/mL Kenalog on each side.  Bandage applied. This was well tolerated.      Assessment:  1. Chronic pain of both knees    2. Bilateral primary osteoarthritis of knee        Plan:  Discussed the assessment with the patient and developed a plan together:  -Steroid injections performed today.  Take it easy over the next few days. Keep in mind that the steroid may take up to 3 days to start working and up to 2 weeks to reach maximal effect.  Ice 15-20 minutes as needed for soreness.  Ibuprofen and/or Tylenol as needed for pain relief.  -Ice or ice massage 15-20 minutes for pain relief or swelling as needed  -Also discussed gel injections.    She should call her insurance to inquire whether prior authorization is needed for the gel injection. It is also recommended that she call our Consumer Price Line [phone] to find out if there will be any cost to her for the gel injection.    Gel injection codes:  Synvisc One -   Euflexxa -   Gel One - 91548    Dagnosis codes are:  Chronic pain of both knees  - Primary M25.561, M25.562, G89.29     Bilateral primary osteoarthritis of knee  M17.0     -Patient to follow up with Primary Care provider regarding elevated blood pressure.    -Follow up as needed.  Please call with questions or concerns.     Danita Bang MD, [facility]  [facility]        Again, thank you for allowing me to participate in the care of your patient.        Sincerely,        Freda Bang MD

## 2018-06-26 NOTE — PATIENT INSTRUCTIONS
-Steroid injections performed today.  Take it easy over the next few days. Keep in mind that the steroid may take up to 3 days to start working and up to 2 weeks to reach maximal effect.  Ice 15-20 minutes as needed for soreness.  Ibuprofen and/or Tylenol as needed for pain relief.  -Ice or ice massage 15-20 minutes for pain relief or swelling as needed    Please call your insurance to inquire whether prior authorization is needed for the gel injection. It is also recommended that you call our Consumer Price Line [phone] to find out if there will be any cost to you for the gel injection.    Gel injection codes:  Synvisc One -   Euflexxa -   Gel One - 85162    Your diagnosis codes are:  Chronic pain of both knees  - Primary M25.561, M25.562, G89.29     Bilateral primary osteoarthritis of knee  M17.0     -Patient to follow up with Primary Care provider regarding elevated blood pressure.    -Follow up as needed.  Please call with questions or concerns.

## 2018-06-26 NOTE — PROGRESS NOTES
Sports Medicine Clinic Visit - Interim History June 26, 2018    Initial Visit Date 3/20/2018    PCP: Wesly Lehman    Komal LIDIA Miller is a 68 year old female who is seen in follow up for bilateral knee pain. Since last visit on 3/20/2018 patient has had a return of right knee and lateral thigh pain. She was seen in the ED on 6/23/2018 because she was having trouble with her knee/hip giving out and she was concerned due to the giving out. She had to use a cane due to the pain and the risk of falling. She has had pain in the posterior, lateral knee and hamstring. She rates the pain at a 2/10 currently. Symptoms are relieved with past CSI and prednisone (stopped today).  Symptoms are worsened by  nothing specific.     Review of Systems  Musculoskeletal: as above  Remainder of review of systems is negative including constitutional, eyes, ENT, CV, pulmonary, GI, , endocrine, skin, hematologic, and neurologic except as noted in HPI or medical history.    History reviewed. No pertinent past surgical/medical/family/social history other than as mentioned in HPI.    Patient Active Problem List   Diagnosis     Absence of menstruation     Esophageal reflux     Recurring abdominal pain     Hyperlipidemia LDL goal <130     Hypertension goal BP (blood pressure) < 140/90     Advance Care Planning     Small bowel obstruction     DVT prophylaxis     Cervical adenopathy     Intestinal adhesions     Gastroesophageal reflux disease with esophagitis     Cluster headache, not intractable, unspecified chronicity pattern     Morbid obesity due to excess calories (H)     Past Medical History:   Diagnosis Date     Unspecified essential hypertension      Past Surgical History:   Procedure Laterality Date     C APPENDECTOMY       C LIGATE FALLOPIAN TUBE       C TOTAL ABDOM HYSTERECTOMY      Hysterectomy, Total Abdominal     COLONOSCOPY  06/10/08    Internal hemorrhoids, otherwise normal.     COLONOSCOPY  01/20/10       DILATION/CURETTAGE DIAG/THER NON OB      D & C     HC LAP, SURG ENTEROLYSIS  05/07/10     HC REMOVAL OF OVARY/TUBE(S)      Salpingo-Oophorectomy, bilateral     HC UGI ENDOSCOPY, SIMPLE EXAM  01/20/10     LAPAROSCOPY DIAGNOSTIC (GENERAL)  5/6/2014    Procedure: LAPAROSCOPY DIAGNOSTIC (GENERAL);  Surgeon: Seth Matthews MD;  [facility]     LAPAROTOMY, LYSIS ADHESIONS, COMBINED  5/6/2014    Procedure: COMBINED LAPAROTOMY, LYSIS ADHESIONS;  Surgeon: Seth Matthews MD;  Location[facility]     VIDEO CAPSULE ENDOSCOPY  02/15/10    normal sm intestine     Family History   Problem Relation Age of Onset     C.A.D. Mother      bi-pass     Diabetes Mother      late onset     Allergies Mother      xray dye     Arthritis Mother      Cancer Mother      ovary     Respiratory Mother      C.A.D. 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 diseases     Hobby Hazards No     Sleep Concern Yes     sleeps more restless some nights     Stress Concern No  "    Weight Concern Yes     w'd like to weigh 50-60# less     Special Diet No     Back Care No     Exercise Yes     walking     Bike Helmet No     Seat Belt Yes     Self-Exams No     Social History Narrative       She is retired. She helps at the school sometimes.     Current Outpatient Prescriptions   Medication     fish oil-omega-3 fatty acids 1000 MG capsule     GARLIC PO     lisinopril-hydrochlorothiazide (PRINZIDE/ZESTORETIC) 20-25 MG per tablet     omeprazole (PRILOSEC) 40 MG capsule     predniSONE (DELTASONE) 20 MG tablet     propranolol (INDERAL) 40 MG tablet     traMADol (ULTRAM) 50 MG tablet     TURMERIC PO     No current facility-administered medications for this visit.      Allergies   Allergen Reactions     No Known Drug Allergies          Objective:  /89  Pulse 65  Ht 5' 6\" (1.676 m)  Wt 254 lb (115.2 kg)  BMI 41 kg/m2    General: Alert and in no distress    Head: Normocephalic, atraumatic  Eyes: no scleral icterus or conjunctival erythema   Oropharynx:  Mucous membranes moist  Skin: no erythema, petechiae, or jaundice  CV: regular rhythm by palpation, 2+ distal pulses  Resp: normal respiratory effort without conversational dyspnea   Psych: normal mood and affect    Gait: Mildly antalgic  Neuro: Motor strength and sensation as noted below    Musculoskeletal:    Bilateral Knee exam    Inspection:  No erythema, ecchymosis, warmth, or edema    Palpation: Tender to palpation over the bilateral lateral joint lines    Knee ROM: Full active knee extension without pain    Strength:  5/5 Hip flexion/abduction/adduction, knee extension/flexion, ankle plantarflexion/dorsiflexion, great toe extension, toe flexion    Sensation:  Intact to light touch in the bilateral lower extremities    Radiology:  No new imaging today    Procedure:  Discussed steroid injection, including risks, potential benefits, and alternatives.  The patient expressed understanding.  Obtained verbal and written consent and the patient " elected to proceed.  Procedure: Steroid injections were performed under aseptic technique at the bilateral knee joints using 2 mL of 1% plain Lidocaine, 2 mL of 0.5% Marcaine, and 1 mL of 40 mg/mL Kenalog on each side.  Bandage applied. This was well tolerated.      Assessment:  1. Chronic pain of both knees    2. Bilateral primary osteoarthritis of knee        Plan:  Discussed the assessment with the patient and developed a plan together:  -Steroid injections performed today.  Take it easy over the next few days. Keep in mind that the steroid may take up to 3 days to start working and up to 2 weeks to reach maximal effect.  Ice 15-20 minutes as needed for soreness.  Ibuprofen and/or Tylenol as needed for pain relief.  -Ice or ice massage 15-20 minutes for pain relief or swelling as needed  -Stop prednisone.  -Also discussed gel injections.    She should call her insurance to inquire whether prior authorization is needed for the gel injection. It is also recommended that she call our Consumer Price Line [phone] to find out if there will be any cost to her for the gel injection.    Gel injection codes:  Synvisc One -   Euflexxa -   Gel One - 83159    Dagnosis codes are:  Chronic pain of both knees  - Primary M25.561, M25.562, G89.29     Bilateral primary osteoarthritis of knee  M17.0     -Patient to follow up with Primary Care provider regarding elevated blood pressure.    -Follow up as needed.  Please call with questions or concerns.     Danita Bang MD, CAQ Sports Medicine  [facility]

## 2018-07-23 DIAGNOSIS — K66.0 INTESTINAL ADHESIONS: ICD-10-CM

## 2018-07-23 DIAGNOSIS — R10.9 RECURRING ABDOMINAL PAIN: ICD-10-CM

## 2018-07-23 RX ORDER — TRAMADOL HYDROCHLORIDE 50 MG/1
TABLET ORAL
Qty: 30 TABLET | Refills: 0 | Status: SHIPPED | OUTPATIENT
Start: 2018-07-23 | End: 2018-09-09

## 2018-07-23 NOTE — TELEPHONE ENCOUNTER
Requested Prescriptions   Pending Prescriptions Disp Refills     traMADol (ULTRAM) 50 MG tablet [Pharmacy Med Name: TRAMADOL HCL 50 MG Tablet] 30 tablet 0     Sig: TAKE 1 TO 2 TABLETS EVERY 6 HOURS AS NEEDED FOR MODERATE PAIN    There is no refill protocol information for this order        Last Written Prescription Date:  05/30/2018  Last Fill Quantity: 30,  # refills: 0   Last office visit: 5/15/2018 with prescribing provider: 05/15/2018  Future Office Visit:   Next 5 appointments (look out 90 days)     Aug 03, 2018  9:20 AM CDT   [facility] Bang [facility]   [facility]    [address] [place] [id number] [phone]

## 2018-08-01 ENCOUNTER — TELEPHONE (OUTPATIENT)
Dept: FAMILY MEDICINE | Facility: OTHER | Age: 68
End: 2018-08-01

## 2018-08-01 NOTE — TELEPHONE ENCOUNTER
PA initiated by pharmacy via Cover my Meds  KEY: [id number]    Prior Authorization Retail Medication Request    Medication/Dose: traMADol (ULTRAM) 50 MG tablet  ICD code (if different than what is on RX):    Intestinal adhesions [K66.0]       Recurring abdominal pain [R10.9]           Previously Tried and Failed:  hydrocodone-acetaminophen 5-325 MG per tablet   Rationale: na    Insurance Name:  PA initiated by pharmacy via Cover my Meds  Insurance ID:  na      Pharmacy Information (if different than what is on RX)  Name:  [facility]  Phone:  [phone]

## 2018-08-02 NOTE — TELEPHONE ENCOUNTER
Prior Authorization Approval    Authorization Effective Date: 8/2/2018  Authorization Expiration Date: 8/2/2019  Medication: TRAMADOL 50MG - APPROVED  Approved Dose/Quantity: DAILY  Reference #:     Insurance Company: [employer] - Phone [phone] Fax [phone]  Expected CoPay: NA     CoPay Card Available:      Foundation Assistance Needed:    Which Pharmacy is filling the prescription (Not needed for infusion/clinic administered): [facility] [address] [facility] [facility]  Pharmacy Notified: Yes  Patient Notified: No

## 2018-08-03 ENCOUNTER — OFFICE VISIT (OUTPATIENT)
Dept: ORTHOPEDICS | Facility: CLINIC | Age: 68
End: 2018-08-03
Payer: COMMERCIAL

## 2018-08-03 VITALS
DIASTOLIC BLOOD PRESSURE: 100 MMHG | SYSTOLIC BLOOD PRESSURE: 138 MMHG | WEIGHT: 249 LBS | HEIGHT: 66 IN | HEART RATE: 149 BPM | BODY MASS INDEX: 40.02 KG/M2

## 2018-08-03 DIAGNOSIS — M25.561 CHRONIC PAIN OF BOTH KNEES: Primary | ICD-10-CM

## 2018-08-03 DIAGNOSIS — M17.0 BILATERAL PRIMARY OSTEOARTHRITIS OF KNEE: ICD-10-CM

## 2018-08-03 DIAGNOSIS — M25.562 CHRONIC PAIN OF BOTH KNEES: Primary | ICD-10-CM

## 2018-08-03 DIAGNOSIS — G89.29 CHRONIC PAIN OF BOTH KNEES: Primary | ICD-10-CM

## 2018-08-03 DIAGNOSIS — M70.50 PES ANSERINE BURSITIS: ICD-10-CM

## 2018-08-03 PROCEDURE — 20610 DRAIN/INJ JOINT/BURSA W/O US: CPT | Mod: 50 | Performed by: PHYSICAL MEDICINE & REHABILITATION

## 2018-08-03 NOTE — PROGRESS NOTES
Sports Medicine Clinic Visit - Interim History August 3, 2018    Initial Visit Date 3/20/2018    PCP: Wesly Lehman    Komal LIDIA Miller is a 68 year old female who is seen in follow up for bilateral knee pain.  Since last visit on 6/26/2018 patient has had good relief with the steroid injection completed on 6/26/2018 however she is hoping to obtain gel injections today as they gave her good relief. The last gel injection she obtained was last August or September. She states that her right hip is giving out and was told it was due to her right knee.  She rates the pain at a 4/10 currently.  Symptoms are relieved with past CSI and gel injection and ibuprofen.  Symptoms are worsened by nothing specific.       Review of Systems  Musculoskeletal: as above  Remainder of review of systems is negative including constitutional, eyes, ENT, CV, pulmonary, GI, , endocrine, skin, hematologic, and neurologic except as noted in HPI or medical history.    History reviewed. No pertinent past surgical/medical/family/social history other than as mentioned in HPI.    Patient Active Problem List   Diagnosis     Absence of menstruation     Esophageal reflux     Recurring abdominal pain     Hyperlipidemia LDL goal <130     Hypertension goal BP (blood pressure) < 140/90     Advance Care Planning     Small bowel obstruction     DVT prophylaxis     Cervical adenopathy     Intestinal adhesions     Gastroesophageal reflux disease with esophagitis     Cluster headache, not intractable, unspecified chronicity pattern     Morbid obesity due to excess calories (H)     Past Medical History:   Diagnosis Date     Unspecified essential hypertension      Past Surgical History:   Procedure Laterality Date     C APPENDECTOMY       C LIGATE FALLOPIAN TUBE       C TOTAL ABDOM HYSTERECTOMY      Hysterectomy, Total Abdominal     COLONOSCOPY  06/10/08    Internal hemorrhoids, otherwise normal.     COLONOSCOPY  01/20/10     HC DILATION/CURETTAGE  DIAG/THER NON OB      D & C     HC LAP, SURG ENTEROLYSIS  05/07/10     HC REMOVAL OF OVARY/TUBE(S)      Salpingo-Oophorectomy, bilateral     HC UGI ENDOSCOPY, SIMPLE EXAM  01/20/10     LAPAROSCOPY DIAGNOSTIC (GENERAL)  5/6/2014    Procedure: LAPAROSCOPY DIAGNOSTIC (GENERAL);  Surgeon: Seth Matthews MD;  [facility]     LAPAROTOMY, LYSIS ADHESIONS, COMBINED  5/6/2014    Procedure: COMBINED LAPAROTOMY, LYSIS ADHESIONS;  Surgeon: Seth Matthews MD;  [facility]     VIDEO CAPSULE ENDOSCOPY  02/15/10    normal sm intestine     Family History   Problem Relation Age of Onset     C.A.D. Mother      bi-pass     Diabetes Mother      late onset     Allergies Mother      xray dye     Arthritis Mother      Cancer Mother      ovary     Respiratory Mother      C.A.D. 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 diseases     Hobby Hazards No     Sleep Concern Yes     sleeps more restless some nights     Stress Concern No     Weight Concern  "Yes     w'd like to weigh 50-60# less     Special Diet No     Back Care No     Exercise Yes     walking     Bike Helmet No     Seat Belt Yes     Self-Exams No     Social History Narrative       She is retired. She helps at the school sometimes.     Current Outpatient Prescriptions   Medication     fish oil-omega-3 fatty acids 1000 MG capsule     GARLIC PO     lisinopril-hydrochlorothiazide (PRINZIDE/ZESTORETIC) 20-25 MG per tablet     omeprazole (PRILOSEC) 40 MG capsule     propranolol (INDERAL) 40 MG tablet     TURMERIC PO     traMADol (ULTRAM) 50 MG tablet     No current facility-administered medications for this visit.      Allergies   Allergen Reactions     No Known Drug Allergies          Objective:  BP (!) 175/96  Pulse 149  Ht 5' 6\" (1.676 m)  Wt 249 lb (112.9 kg)  BMI 40.19 kg/m2    General: Alert and in no distress    Head: Normocephalic, atraumatic  Eyes: no scleral icterus or conjunctival erythema   Oropharynx:  Mucous membranes moist  Skin: no erythema, petechiae, or jaundice  Resp: normal respiratory effort without conversational dyspnea   Psych: normal mood and affect    Gait: Mildly antalgic      Radiology:  No new imaging    Procedure:  Discussed Synvisc One injection, including risks, potential benefits, and alternatives.  The patient expressed understanding.  Obtained verbal and written consent and the patient elected to proceed.  Procedure: Bilateral Synvisc One injections (6 mL) were performed under aseptic technique at the bilateral knees.  Bandages applied.  These were well tolerated.      Assessment:  1. Chronic pain of both knees    2. Bilateral primary osteoarthritis of knee    3. Pes anserine bursitis        Plan:  Discussed the assessment with the patient and developed a plan together:  -Synvisc One injections performed today.  Take it easy over the next few days. Keep in mind that the steroid may take up to 3 days to start working and up to 2 weeks to reach maximal effect.  Ice 15-20 " minutes as needed for soreness.  Patient's preferred over the counter medication as needed for pain as directed on packaging.    -Patient to follow up with Primary Care provider regarding elevated blood pressure.    -Follow up as needed if symptoms fail to improve or worsen.  Please call with questions or concerns.          Danita Bang MD, CAQ Sports Medicine  [facility]

## 2018-08-03 NOTE — PATIENT INSTRUCTIONS
-Synvisc One injections performed today.  Take it easy over the next few days. Keep in mind that the steroid may take up to 3 days to start working and up to 2 weeks to reach maximal effect.  Ice 15-20 minutes as needed for soreness.  Patient's preferred over the counter medication as needed for pain as directed on packaging.    -Patient to follow up with Primary Care provider regarding elevated blood pressure.    -Follow up as needed if symptoms fail to improve or worsen.  Please call with questions or concerns.

## 2018-08-03 NOTE — LETTER
8/3/2018         RE: Komal Miller  [address]  [facility] [address]        Dear Colleague,    Thank you for referring your patient, Komal Miller, to the [facility]. Please see a copy of my visit note below.    Sports Medicine Clinic Visit - Interim History August 3, 2018    Initial Visit Date 3/20/2018    PCP: Wesly Lehman    Komal Miller is a 68 year old female who is seen in follow up for bilateral knee pain.  Since last visit on 6/26/2018 patient has had good relief with the steroid injection completed on 6/26/2018 however she is hoping to obtain gel injections today as they gave her good relief. The last gel injection she obtained was last August or September. She states that her right hip is giving out and was told it was due to her right knee.  She rates the pain at a 4/10 currently.  Symptoms are relieved with past CSI and gel injection and ibuprofen.  Symptoms are worsened by nothing specific.       Review of Systems  Musculoskeletal: as above  Remainder of review of systems is negative including constitutional, eyes, ENT, CV, pulmonary, GI, , endocrine, skin, hematologic, and neurologic except as noted in HPI or medical history.    History reviewed. No pertinent past surgical/medical/family/social history other than as mentioned in HPI.    Patient Active Problem List   Diagnosis     Absence of menstruation     Esophageal reflux     Recurring abdominal pain     Hyperlipidemia LDL goal <130     Hypertension goal BP (blood pressure) < 140/90     Advance Care Planning     Small bowel obstruction     DVT prophylaxis     Cervical adenopathy     Intestinal adhesions     Gastroesophageal reflux disease with esophagitis     Cluster headache, not intractable, unspecified chronicity pattern     Morbid obesity due to excess calories (H)     Past Medical History:   Diagnosis Date     Unspecified essential hypertension      Past Surgical History:   Procedure Laterality  Date     C APPENDECTOMY       C LIGATE FALLOPIAN TUBE       C TOTAL ABDOM HYSTERECTOMY      Hysterectomy, Total Abdominal     COLONOSCOPY  06/10/08    Internal hemorrhoids, otherwise normal.     COLONOSCOPY  01/20/10     HC DILATION/CURETTAGE DIAG/THER NON OB      D & C     HC LAP, SURG ENTEROLYSIS  05/07/10     HC REMOVAL OF OVARY/TUBE(S)      Salpingo-Oophorectomy, bilateral     HC UGI ENDOSCOPY, SIMPLE EXAM  01/20/10     LAPAROSCOPY DIAGNOSTIC (GENERAL)  5/6/2014    Procedure: LAPAROSCOPY DIAGNOSTIC (GENERAL);  Surgeon: Seth Matthews MD;  [facility]     LAPAROTOMY, LYSIS ADHESIONS, COMBINED  5/6/2014    Procedure: COMBINED LAPAROTOMY, LYSIS ADHESIONS;  Surgeon: Seth Matthews MD;  [facility]     VIDEO CAPSULE ENDOSCOPY  02/15/10    normal sm intestine     Family History   Problem Relation Age of Onset     C.A.D. Mother      bi-pass     Diabetes Mother      late onset     Allergies Mother      xray dye     Arthritis Mother      Cancer Mother      ovary     Respiratory Mother      C.A.D. 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[employer]     [employer]     Social History Main Topics     Smoking status: Former Smoker     Packs/day: 0.50     Years: 20.00     Types: Cigarettes     Quit date: 1/1/1995     Smokeless tobacco: Never Used     Alcohol use Yes      Comment: very seldom     Drug use: No     Sexual activity: No     Other Topics Concern      Service No     Blood Transfusions Yes     with children--several units     Caffeine Concern No     coffee  5  "cups/day      occ diet pepsi     Occupational Exposure Yes     works at the [employer]  body fluids, comm. diseases     Hobby Hazards No     Sleep Concern Yes     sleeps more restless some nights     Stress Concern No     Weight Concern Yes     w'd like to weigh 50-60# less     Special Diet No     Back Care No     Exercise Yes     walking     Bike Helmet No     Seat Belt Yes     Self-Exams No     Social History Narrative       She is retired. She helps at the school sometimes.     Current Outpatient Prescriptions   Medication     fish oil-omega-3 fatty acids 1000 MG capsule     GARLIC PO     lisinopril-hydrochlorothiazide (PRINZIDE/ZESTORETIC) 20-25 MG per tablet     omeprazole (PRILOSEC) 40 MG capsule     propranolol (INDERAL) 40 MG tablet     TURMERIC PO     traMADol (ULTRAM) 50 MG tablet     No current facility-administered medications for this visit.      Allergies   Allergen Reactions     No Known Drug Allergies          Objective:  BP (!) 175/96  Pulse 149  Ht 5' 6\" (1.676 m)  Wt 249 lb (112.9 kg)  BMI 40.19 kg/m2    General: Alert and in no distress    Head: Normocephalic, atraumatic  Eyes: no scleral icterus or conjunctival erythema   Oropharynx:  Mucous membranes moist  Skin: no erythema, petechiae, or jaundice  Resp: normal respiratory effort without conversational dyspnea   Psych: normal mood and affect    Gait: Mildly antalgic      Radiology:  No new imaging    Procedure:  Discussed Synvisc One injection, including risks, potential benefits, and alternatives.  The patient expressed understanding.  Obtained verbal and written consent and the patient elected to proceed.  Procedure: Bilateral Synvisc One injections (6 mL) were performed under aseptic technique at the bilateral knees.  Bandages applied.  These were well tolerated.      Assessment:  1. Chronic pain of both knees    2. Bilateral primary osteoarthritis of knee    3. Pes anserine bursitis        Plan:  Discussed the assessment " with the patient and developed a plan together:  -Synvisc One injections performed today.  Take it easy over the next few days. Keep in mind that the steroid may take up to 3 days to start working and up to 2 weeks to reach maximal effect.  Ice 15-20 minutes as needed for soreness.  Patient's preferred over the counter medication as needed for pain as directed on packaging.    -Patient to follow up with Primary Care provider regarding elevated blood pressure.    -Follow up as needed if symptoms fail to improve or worsen.  Please call with questions or concerns.          Danita Bang MD, [facility] Sports Medicine  [facility]        Again, thank you for allowing me to participate in the care of your patient.        Sincerely,        Freda Bang MD

## 2018-08-03 NOTE — MR AVS SNAPSHOT
After Visit Summary   8/3/2018    Komal Miller    MRN: [id number]           Patient Information     Date Of Birth          1950        Visit Information        Provider Department      8/3/2018 9:20 AM Freda Bang MD [facility]        Today's Diagnoses     Chronic pain of both knees    -  1    Bilateral primary osteoarthritis of knee        Pes anserine bursitis          Care Instructions    -Synvisc One injections performed today.  Take it easy over the next few days. Keep in mind that the steroid may take up to 3 days to start working and up to 2 weeks to reach maximal effect.  Ice 15-20 minutes as needed for soreness.  Patient's preferred over the counter medication as needed for pain as directed on packaging.    -Patient to follow up with Primary Care provider regarding elevated blood pressure.    -Follow up as needed if symptoms fail to improve or worsen.  Please call with questions or concerns.                  Follow-ups after your visit        Who to contact     If you have questions or need follow up information about today's clinic visit or your schedule please contact [facility] directly at [phone].  Normal or non-critical lab and imaging results will be communicated to you by [email or web address], letter or phone within 4 business days after the clinic has received the results. If you do not hear from us within 7 days, please contact the clinic through [employer] or phone. If you have a critical or abnormal lab result, we will notify you by phone as soon as possible.  Submit refill requests through YODIL or call your pharmacy and they will forward the refill request to us. Please allow 3 business days for your refill to be completed.          Additional Information About Your Visit        MyChart Information     YODIL lets you send messages to your doctor, view your test results, renew your prescriptions, schedule appointments and more. To  "sign up, go to [email or web address] . Click on \"Log in\" on the left side of the screen, which will take you to the Welcome page. Then click on \"Sign up Now\" on the right side of the page.     You will be asked to enter the access code listed below, as well as some personal information. Please follow the directions to create your username and password.     Your access code is: [email or web address]  Expires: 2018 12:06 PM     Your access code will  in 90 days. If you need help or a new code, please call your [facility] clinic or [phone].        Care EveryWhere ID     This is your Care EveryWhere ID. This could be used by other organizations to access your [facility] medical records  [id number]        Your Vitals Were     Pulse Height BMI (Body Mass Index)             149 5' 6\" (1.676 m) 40.19 kg/m2          Blood Pressure from Last 3 Encounters:   18 (!) 175/96   18 141/89   18 (!) 184/102    Weight from Last 3 Encounters:   18 249 lb (112.9 kg)   18 254 lb (115.2 kg)   18 240 lb (108.9 kg)              Today, you had the following     No orders found for display       Primary Care Provider Office Phone # Fax #    Wesly Apolinar LehmanDO [phone]       [address] [place] [address]        Equal Access to Services     [facility] AH: Hadii aad ku hadasho Soomaali, waaxda luqadaha, qaybta kaalmada adeegyada, israel villatoro . So [facility] [phone].    ATENCIÓN: Si jiela ulises, tiene a dumont disposición servicios gratuitos de asistencia lingüística. Llame al [phone].    We comply with applicable federal civil rights laws and [place] laws. We do not discriminate on the basis of race, color, national origin, age, disability, sex, sexual orientation, or gender identity.            Thank you!     Thank you for choosing [facility]  for your care. Our goal is always to provide you with excellent care. Hearing " back from our patients is one way we can continue to improve our services. Please take a few minutes to complete the written survey that you may receive in the mail after your visit with us. Thank you!             Your Updated Medication List - Protect others around you: Learn how to safely use, store and throw away your medicines at www.disposemymeds.org.          This list is accurate as of 8/3/18  9:48 AM.  Always use your most recent med list.                   Brand Name Dispense Instructions for use Diagnosis    fish oil-omega-3 fatty acids 1000 MG capsule      Take 2 g by mouth daily        GARLIC PO           lisinopril-hydrochlorothiazide 20-25 MG per tablet    PRINZIDE/ZESTORETIC    90 tablet    Take 1 tablet by mouth daily    Hypertension goal BP (blood pressure) < 140/90       omeprazole 40 MG capsule    priLOSEC    90 capsule    TAKE ONE CAPSULE BY MOUTH EVERY DAY .TAKE 30 TO 60 MINUTES BEFORE A MEAL    Gastroesophageal reflux disease with esophagitis       propranolol 40 MG tablet    INDERAL    180 tablet    Take 1 tablet (40 mg) by mouth 2 times daily    Cluster headache, not intractable, unspecified chronicity pattern, Nausea       traMADol 50 MG tablet    ULTRAM    30 tablet    TAKE 1 TO 2 TABLETS EVERY 6 HOURS AS NEEDED FOR MODERATE PAIN    Intestinal adhesions, Recurring abdominal pain       TURMERIC PO

## 2018-09-09 DIAGNOSIS — R10.9 RECURRING ABDOMINAL PAIN: ICD-10-CM

## 2018-09-09 DIAGNOSIS — K66.0 INTESTINAL ADHESIONS: ICD-10-CM

## 2018-09-10 RX ORDER — TRAMADOL HYDROCHLORIDE 50 MG/1
TABLET ORAL
Qty: 30 TABLET | Refills: 0 | Status: SHIPPED | OUTPATIENT
Start: 2018-09-10 | End: 2018-10-05

## 2018-09-10 NOTE — TELEPHONE ENCOUNTER
Tramadol 50 MG      Last Written Prescription Date:  7/23/18  Last Fill Quantity: 30,   # refills: 0  Last Office Visit: 5/15/18  Future Office visit:       Routing refill request to provider for review/approval because:  Drug not on the [facility], [facility] or [facility] refill protocol or controlled substance

## 2018-09-12 ENCOUNTER — TELEPHONE (OUTPATIENT)
Dept: ORTHOPEDICS | Facility: CLINIC | Age: 68
End: 2018-09-12

## 2018-09-12 NOTE — TELEPHONE ENCOUNTER
Patient was informed that gel injections can be done 6 months and 1 day after the injection performed on 8/3/2018. Informed that if she has a return of pain prior to 6 months to contact us and discuss other treatments. informed her that Dr. Bang may consider doing a CSI if she has a return of pain before then.     All questions were answered. She was thankful for the call.    Anny Adam M.Ed., ATR, ATC

## 2018-09-12 NOTE — TELEPHONE ENCOUNTER
Reason for Call:  Other call back    Detailed comments: Patient received an injection 8/3/18 bi knees and is doing fine.  She states she waited too long the last time she had this done and wants to ask when she should plan to come back for another injection?  Please call    Phone Number Patient can be reached at: Cell number on file:    Telephone Information:   Mobile [phone]       Best Time: any    Can we leave a detailed message on this number? YES    Call taken on 9/12/2018 at 1:15 PM by Trinidad Bruno

## 2018-10-05 DIAGNOSIS — K66.0 INTESTINAL ADHESIONS: ICD-10-CM

## 2018-10-05 DIAGNOSIS — R10.9 RECURRING ABDOMINAL PAIN: ICD-10-CM

## 2018-10-05 RX ORDER — TRAMADOL HYDROCHLORIDE 50 MG/1
TABLET ORAL
Qty: 90 TABLET | Refills: 3 | Status: SHIPPED | OUTPATIENT
Start: 2018-10-05 | End: 2019-04-30

## 2018-10-05 NOTE — TELEPHONE ENCOUNTER
I called pt and informed her that Per Dr. Lehman ok to take that Tramadol 1-2 tabs every 6 hours and that will send in a refill. She asked that it be sent to her mail order and if we can put some refills on it as well.  Barbara Connor MA

## 2018-10-05 NOTE — TELEPHONE ENCOUNTER
Reason for call:  Patient reporting a symptom    Symptom or request: knee pain     Duration (how long have symptoms been present): ongoing     Have you been treated for this before? Yes    Additional comments: Has seen sports med and has injections. She has Tramadol that she usually takes 1 every 3 days. She is wondering if she can take 1 or 2 a day? Or is there another medication she can try that you may suggest? She states that Dr. Bang told her it is bursitis. If Ok to increase the Tramadol- can you send in new Rx? [facility].    Phone Number patient can be reached at:  Home number on file [phone] (home)    Best Time:  ny     Can we leave a detailed message on this number:  YES    Call taken on 10/5/2018 at 9:40 AM by Mildred Wasserman

## 2018-10-24 ENCOUNTER — TELEPHONE (OUTPATIENT)
Dept: FAMILY MEDICINE | Facility: OTHER | Age: 68
End: 2018-10-24

## 2018-10-24 NOTE — TELEPHONE ENCOUNTER
Reason for call:  Patient reporting a symptom    Symptom or request: flu sx-patient states that she feels like she has the flu but not that bad. States that she has nausea and vomiting, would like to know if she needs to come in?     Duration (how long have symptoms been present): 4 days     Have you been treated for this before? No    Additional comments:     Phone Number patient can be reached at:  Home number on file [phone] (home)    Best Time:  any    Can we leave a detailed message on this number:  YES    Call taken on 10/24/2018 at 8:44 AM by Tanika Rivas

## 2018-10-24 NOTE — TELEPHONE ENCOUNTER
Called and spoke to the patient. She is wondering if maybe she has the flu. She said she has been sick since Monday. Monday she had nausea and vomiting pretty much the entire day. Patient said Tuesday she threw up less. Today she has had no vomiting but just feels like blah. Low grade fever. Patient said she has not been able to eat much. She has been drinking fluids and keeping them down. Patient denies signs of dehydration. She said she has been resting the last 2 days. Patient denies all other symptoms. She said she has been able to keep everything down today so far. Denies cough or any other typical influenza symptom.     RECOMMENDED DISPOSITION:  Home care advice - reviewed home care advise with patient. Advised when she should be seen and any red flag symptoms she should watch for. Lots of rest, tylenol/ibuprofen for fever, and lots of fluids. Patient understands and agrees with the plan of care.   Will comply with recommendation: YES   If further questions/concerns or if Sx do not improve, worsen or new Sx develop, call your PCP or [facility] as soon as possible.    NOTES:  Disposition was determined by the first positive assessment question, therefore all previous assessment questions were negative.  Informed to check provider manual or call insurance company to assure coverage.    Guideline used: Vomiting, adult  Telephone Triage Protocols for Nurses, Fifth Edition, Migdalia Gorman RN

## 2018-11-20 ENCOUNTER — OFFICE VISIT (OUTPATIENT)
Dept: ORTHOPEDICS | Facility: CLINIC | Age: 68
End: 2018-11-20
Payer: COMMERCIAL

## 2018-11-20 VITALS — WEIGHT: 242.5 LBS | HEIGHT: 66 IN | BODY MASS INDEX: 38.97 KG/M2

## 2018-11-20 DIAGNOSIS — M17.0 BILATERAL PRIMARY OSTEOARTHRITIS OF KNEE: ICD-10-CM

## 2018-11-20 DIAGNOSIS — G89.29 CHRONIC PAIN OF BOTH KNEES: Primary | ICD-10-CM

## 2018-11-20 DIAGNOSIS — M25.562 CHRONIC PAIN OF BOTH KNEES: Primary | ICD-10-CM

## 2018-11-20 DIAGNOSIS — M25.561 CHRONIC PAIN OF BOTH KNEES: Primary | ICD-10-CM

## 2018-11-20 PROCEDURE — 20610 DRAIN/INJ JOINT/BURSA W/O US: CPT | Mod: 50 | Performed by: PHYSICAL MEDICINE & REHABILITATION

## 2018-11-20 RX ORDER — TRIAMCINOLONE ACETONIDE 40 MG/ML
40 INJECTION, SUSPENSION INTRA-ARTICULAR; INTRAMUSCULAR
Status: DISCONTINUED | OUTPATIENT
Start: 2018-11-20 | End: 2019-12-30

## 2018-11-20 RX ORDER — BUPIVACAINE HYDROCHLORIDE 5 MG/ML
2 INJECTION, SOLUTION EPIDURAL; INTRACAUDAL
Status: DISCONTINUED | OUTPATIENT
Start: 2018-11-20 | End: 2019-01-25

## 2018-11-20 RX ORDER — LIDOCAINE HYDROCHLORIDE 10 MG/ML
2 INJECTION, SOLUTION INFILTRATION; PERINEURAL
Status: DISCONTINUED | OUTPATIENT
Start: 2018-11-20 | End: 2019-01-25

## 2018-11-20 RX ADMIN — BUPIVACAINE HYDROCHLORIDE 2 ML: 5 INJECTION, SOLUTION EPIDURAL; INTRACAUDAL at 15:41

## 2018-11-20 RX ADMIN — LIDOCAINE HYDROCHLORIDE 2 ML: 10 INJECTION, SOLUTION INFILTRATION; PERINEURAL at 15:41

## 2018-11-20 RX ADMIN — TRIAMCINOLONE ACETONIDE 40 MG: 40 INJECTION, SUSPENSION INTRA-ARTICULAR; INTRAMUSCULAR at 15:41

## 2018-11-20 NOTE — MR AVS SNAPSHOT
After Visit Summary   11/20/2018    Komal Miller    MRN: [id number]           Patient Information     Date Of Birth          1950        Visit Information        Provider Department      11/20/2018 3:20 PM Freda Bang MD [facility]        Today's Diagnoses     Chronic pain of both knees    -  1    Bilateral primary osteoarthritis of knee          Care Instructions    Today's Plan of Care:  -Steroid injection performed today.  Take it easy over the next few days. Keep in mind that the steroid may take up to 3 days to start working and up to 2 weeks to reach maximal effect.  Ice 15-20 minutes as needed for soreness.  Patient's preferred over the counter medication as needed for pain as directed on packaging.    Follow Up: As needed. Please call with any questions or concerns.     Patient to follow up with Primary Care provider regarding elevated blood pressure.            Follow-ups after your visit        Who to contact     If you have questions or need follow up information about today's clinic visit or your schedule please contact [facility] directly at [phone].  Normal or non-critical lab and imaging results will be communicated to you by MyChart, letter or phone within 4 business days after the clinic has received the results. If you do not hear from us within 7 days, please contact the clinic through MyChart or phone. If you have a critical or abnormal lab result, we will notify you by phone as soon as possible.  Submit refill requests through [employer] or call your pharmacy and they will forward the refill request to us. Please allow 3 business days for your refill to be completed.          Additional Information About Your Visit        Care EveryWhere ID     This is your Care EveryWhere ID. This could be used by other organizations to access your [facility] medical records  [id number]        Your Vitals Were     Height BMI (Body Mass  "Index)                5' 6\" (1.676 m) 39.14 kg/m2           Blood Pressure from Last 3 Encounters:   08/03/18 (!) 138/100   06/26/18 141/89   06/23/18 (!) 184/102    Weight from Last 3 Encounters:   11/20/18 242 lb 8 oz (110 kg)   08/03/18 249 lb (112.9 kg)   06/26/18 254 lb (115.2 kg)              Today, you had the following     No orders found for display       Primary Care Provider Office Phone # Fax #    Wesly Lehman,  [phone]       [address] [facility] [address]        Equal Access to Services     NIK RODRIGUEZ : Jessica adkins Sodenise, wamarvinda luqadaha, qaybta kaalmada adehangyada, israel villatoro . So [facility] [phone].    ATENCIÓN: Si habla español, tiene a dumont disposición servicios gratuitos de asistencia lingüística. Llame al [phone].    We comply with applicable federal civil rights laws and [place] laws. We do not discriminate on the basis of race, color, national origin, age, disability, sex, sexual orientation, or gender identity.            Thank you!     Thank you for choosing [facility]  for your care. Our goal is always to provide you with excellent care. Hearing back from our patients is one way we can continue to improve our services. Please take a few minutes to complete the written survey that you may receive in the mail after your visit with us. Thank you!             Your Updated Medication List - Protect others around you: Learn how to safely use, store and throw away your medicines at www.disposemymeds.org.          This list is accurate as of 11/20/18  3:28 PM.  Always use your most recent med list.                   Brand Name Dispense Instructions for use Diagnosis    fish oil-omega-3 fatty acids 1000 MG capsule      Take 2 g by mouth daily        GARLIC PO           lisinopril-hydrochlorothiazide 20-25 MG per tablet    PRINZIDE/ZESTORETIC    90 tablet    Take 1 tablet by mouth daily    Hypertension " goal BP (blood pressure) < 140/90       omeprazole 40 MG capsule    priLOSEC    90 capsule    TAKE ONE CAPSULE BY MOUTH EVERY DAY .TAKE 30 TO 60 MINUTES BEFORE A MEAL    Gastroesophageal reflux disease with esophagitis       propranolol 40 MG tablet    INDERAL    180 tablet    Take 1 tablet (40 mg) by mouth 2 times daily    Cluster headache, not intractable, unspecified chronicity pattern, Nausea       traMADol 50 MG tablet    ULTRAM    90 tablet    TAKE 1 TO 2 TABLETS EVERY 6 HOURS AS NEEDED FOR  MODERATE  PAIN    Intestinal adhesions, Recurring abdominal pain       TURMERIC PO

## 2018-11-20 NOTE — LETTER
11/20/2018         RE: Komal Miller  [address]  [facility] [address]        Dear Colleague,    Thank you for referring your patient, Komal Miller, to the [facility]. Please see a copy of my visit note below.    Sports Medicine Clinic Visit - Interim History November 20, 2018    Initial Visit Date 3/20/2018      PCP: Wesly Lehman    Komal Miller is a 68 year old female who is seen in follow up for bilateral knee pain. Since last visit on 8/3/2018 patient has had some relief from the gel injections however she would like to do bilateral steroid injections today.  She notes pain over the medial joint line and just distal. Symptoms are relieved with past CSI, past gel injection, and ibuprofen.  Symptoms are worsened by weather. She was prescribed tramadol by her PCP for severe pain.        Review of Systems  Musculoskeletal: as above  Remainder of review of systems is negative including constitutional, eyes, ENT, CV, pulmonary, GI, , endocrine, skin, hematologic, and neurologic except as noted in HPI or medical history.    History reviewed. No pertinent past surgical/medical/family/social history other than as mentioned in HPI.    Patient Active Problem List   Diagnosis     Absence of menstruation     Esophageal reflux     Recurring abdominal pain     Hyperlipidemia LDL goal <130     Hypertension goal BP (blood pressure) < 140/90     Advance Care Planning     Small bowel obstruction (H)     DVT prophylaxis     Cervical adenopathy     Intestinal adhesions     Gastroesophageal reflux disease with esophagitis     Cluster headache, not intractable, unspecified chronicity pattern     Morbid obesity due to excess calories (H)     Past Medical History:   Diagnosis Date     Unspecified essential hypertension      Past Surgical History:   Procedure Laterality Date     C APPENDECTOMY       C LIGATE FALLOPIAN TUBE       C TOTAL ABDOM HYSTERECTOMY      Hysterectomy, Total  Abdominal     COLONOSCOPY  06/10/08    Internal hemorrhoids, otherwise normal.     COLONOSCOPY  01/20/10     HC DILATION/CURETTAGE DIAG/THER NON OB      D & C     HC LAP, SURG ENTEROLYSIS  05/07/10     HC REMOVAL OF OVARY/TUBE(S)      Salpingo-Oophorectomy, bilateral     HC UGI ENDOSCOPY, SIMPLE EXAM  01/20/10     LAPAROSCOPY DIAGNOSTIC (GENERAL)  5/6/2014    Procedure: LAPAROSCOPY DIAGNOSTIC (GENERAL);  Surgeon: Seth Matthews MD;  Location: [facility] OR     LAPAROTOMY, LYSIS ADHESIONS, COMBINED  5/6/2014    Procedure: COMBINED LAPAROTOMY, LYSIS ADHESIONS;  Surgeon: Seth Matthews MD;  Location: [facility] OR     VIDEO CAPSULE ENDOSCOPY  02/15/10    normal sm intestine     Family History   Problem Relation Age of Onset     C.A.D. Mother      bi-pass     Diabetes Mother      late onset     Allergies Mother      xray dye     Arthritis Mother      Cancer Mother      ovary     Respiratory Mother      C.A.D. 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  "diseases     Hobby Hazards No     Sleep Concern Yes     sleeps more restless some nights     Stress Concern No     Weight Concern Yes     w'd like to weigh 50-60# less     Special Diet No     Back Care No     Exercise Yes     walking     Bike Helmet No     Seat Belt Yes     Self-Exams No     Social History Narrative       Current Outpatient Prescriptions   Medication     traMADol (ULTRAM) 50 MG tablet     fish oil-omega-3 fatty acids 1000 MG capsule     GARLIC PO     lisinopril-hydrochlorothiazide (PRINZIDE/ZESTORETIC) 20-25 MG per tablet     omeprazole (PRILOSEC) 40 MG capsule     propranolol (INDERAL) 40 MG tablet     TURMERIC PO     No current facility-administered medications for this visit.      Allergies   Allergen Reactions     No Known Drug Allergies          Objective:  Ht 5' 6\" (1.676 m)  Wt 242 lb 8 oz (110 kg)  BMI 39.14 kg/m2    General: Alert and in no distress    Head: Normocephalic, atraumatic  Eyes: no scleral icterus or conjunctival erythema   Oropharynx:  Mucous membranes moist  Skin: no erythema, petechiae, or jaundice  Resp: normal respiratory effort without conversational dyspnea   Psych: normal mood and affect    Gait: Non-antalgic, appropriate coordination and balance   Musculoskeletal:  -Tender over the left lateral patellar facet  -Tender over the right medial joint line    Radiology:  No new imaging obtained today    Large Joint Injection/Arthocentesis  Date/Time: 11/20/2018 3:41 PM  Performed by: AYSE ARMENTA  Authorized by: AYSE ARMENTA     Indications:  Pain  Needle Size:  25 G  Guidance: landmark guided    Approach:  Anterolateral  Location:  Knee  Laterality:  Bilateral  Site:  Bilateral knee  Medications (Right):  40 mg triamcinolone acetonide 40 MG/ML; 2 mL bupivacaine (PF) 0.5 %; 2 mL lidocaine 1 %  Medications (Left):  40 mg triamcinolone acetonide 40 MG/ML; 2 mL bupivacaine (PF) 0.5 %; 2 mL lidocaine 1 %  Outcome:  Tolerated well, no immediate " complications  Procedure discussed: discussed risks, benefits, and alternatives    Consent Given by:  Patient              Assessment:  1. Chronic pain of both knees    2. Bilateral primary osteoarthritis of knee        Plan:  Discussed the assessment with the patient and developed a plan together:  -Steroid injection performed today.  Take it easy over the next few days. Keep in mind that the steroid may take up to 3 days to start working and up to 2 weeks to reach maximal effect.  Ice 15-20 minutes as needed for soreness.  Patient's preferred over the counter medication as needed for pain as directed on packaging.    Follow Up: As needed. Please call with any questions or concerns.     Patient to follow up with Primary Care provider regarding elevated blood pressure.    Danita Bang MD, [facility]  [facility]        Again, thank you for allowing me to participate in the care of your patient.        Sincerely,        Freda Bang MD

## 2018-11-20 NOTE — PATIENT INSTRUCTIONS
Today's Plan of Care:  -Steroid injection performed today.  Take it easy over the next few days. Keep in mind that the steroid may take up to 3 days to start working and up to 2 weeks to reach maximal effect.  Ice 15-20 minutes as needed for soreness.  Patient's preferred over the counter medication as needed for pain as directed on packaging.    Follow Up: As needed. Please call with any questions or concerns.     Patient to follow up with Primary Care provider regarding elevated blood pressure.

## 2018-11-20 NOTE — PROGRESS NOTES
Sports Medicine Clinic Visit - Interim History November 20, 2018    Initial Visit Date 3/20/2018      PCP: Wesly Lehman LIDIA Miller is a 68 year old female who is seen in follow up for bilateral knee pain. Since last visit on 8/3/2018 patient has had some relief from the gel injections however she would like to do bilateral steroid injections today.  She notes pain over the medial joint line and just distal. Symptoms are relieved with past CSI, past gel injection, and ibuprofen.  Symptoms are worsened by weather. She was prescribed tramadol by her PCP for severe pain.        Review of Systems  Musculoskeletal: as above  Remainder of review of systems is negative including constitutional, eyes, ENT, CV, pulmonary, GI, , endocrine, skin, hematologic, and neurologic except as noted in HPI or medical history.    History reviewed. No pertinent past surgical/medical/family/social history other than as mentioned in HPI.    Patient Active Problem List   Diagnosis     Absence of menstruation     Esophageal reflux     Recurring abdominal pain     Hyperlipidemia LDL goal <130     Hypertension goal BP (blood pressure) < 140/90     Advance Care Planning     Small bowel obstruction (H)     DVT prophylaxis     Cervical adenopathy     Intestinal adhesions     Gastroesophageal reflux disease with esophagitis     Cluster headache, not intractable, unspecified chronicity pattern     Morbid obesity due to excess calories (H)     Past Medical History:   Diagnosis Date     Unspecified essential hypertension      Past Surgical History:   Procedure Laterality Date     C APPENDECTOMY       C LIGATE FALLOPIAN TUBE       C TOTAL ABDOM HYSTERECTOMY      Hysterectomy, Total Abdominal     COLONOSCOPY  06/10/08    Internal hemorrhoids, otherwise normal.     COLONOSCOPY  01/20/10     HC DILATION/CURETTAGE DIAG/THER NON OB      D & C     HC LAP, SURG ENTEROLYSIS  05/07/10     HC REMOVAL OF OVARY/TUBE(S)       Salpingo-Oophorectomy, bilateral     HC UGI ENDOSCOPY, SIMPLE EXAM  01/20/10     LAPAROSCOPY DIAGNOSTIC (GENERAL)  5/6/2014    Procedure: LAPAROSCOPY DIAGNOSTIC (GENERAL);  Surgeon: Seth Matthews MD;  [facility]     LAPAROTOMY, LYSIS ADHESIONS, COMBINED  5/6/2014    Procedure: COMBINED LAPAROTOMY, LYSIS ADHESIONS;  Surgeon: Seth Matthews MD;  Location: [facility]     VIDEO CAPSULE ENDOSCOPY  02/15/10    normal sm intestine     Family History   Problem Relation Age of Onset     C.A.D. Mother      bi-pass     Diabetes Mother      late onset     Allergies Mother      xray dye     Arthritis Mother      Cancer Mother      ovary     Respiratory Mother      C.A.D. 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 diseases     Hobby Hazards No     Sleep Concern Yes     sleeps more restless some nights     Stress Concern No     Weight Concern Yes     w'd like to weigh 50-60# less     Special Diet No     Back Care No     Exercise Yes     walking  "    Bike Helmet No     Seat Belt Yes     Self-Exams No     Social History Narrative       Current Outpatient Prescriptions   Medication     traMADol (ULTRAM) 50 MG tablet     fish oil-omega-3 fatty acids 1000 MG capsule     GARLIC PO     lisinopril-hydrochlorothiazide (PRINZIDE/ZESTORETIC) 20-25 MG per tablet     omeprazole (PRILOSEC) 40 MG capsule     propranolol (INDERAL) 40 MG tablet     TURMERIC PO     No current facility-administered medications for this visit.      Allergies   Allergen Reactions     No Known Drug Allergies          Objective:  Ht 5' 6\" (1.676 m)  Wt 242 lb 8 oz (110 kg)  BMI 39.14 kg/m2    General: Alert and in no distress    Head: Normocephalic, atraumatic  Eyes: no scleral icterus or conjunctival erythema   Oropharynx:  Mucous membranes moist  Skin: no erythema, petechiae, or jaundice  Resp: normal respiratory effort without conversational dyspnea   Psych: normal mood and affect    Gait: Non-antalgic, appropriate coordination and balance   Musculoskeletal:  -Tender over the left lateral patellar facet  -Tender over the right medial joint line    Radiology:  No new imaging obtained today    Large Joint Injection/Arthocentesis  Date/Time: 11/20/2018 3:41 PM  Performed by: AYSE ARMENTA  Authorized by: AYSE ARMENTA     Indications:  Pain  Needle Size:  25 G  Guidance: landmark guided    Approach:  Anterolateral  Location:  Knee  Laterality:  Bilateral  Site:  Bilateral knee  Medications (Right):  40 mg triamcinolone acetonide 40 MG/ML; 2 mL bupivacaine (PF) 0.5 %; 2 mL lidocaine 1 %  Medications (Left):  40 mg triamcinolone acetonide 40 MG/ML; 2 mL bupivacaine (PF) 0.5 %; 2 mL lidocaine 1 %  Outcome:  Tolerated well, no immediate complications  Procedure discussed: discussed risks, benefits, and alternatives    Consent Given by:  Patient              Assessment:  1. Chronic pain of both knees    2. Bilateral primary osteoarthritis of knee        Plan:  Discussed the " assessment with the patient and developed a plan together:  -Steroid injection performed today.  Take it easy over the next few days. Keep in mind that the steroid may take up to 3 days to start working and up to 2 weeks to reach maximal effect.  Ice 15-20 minutes as needed for soreness.  Patient's preferred over the counter medication as needed for pain as directed on packaging.    Follow Up: As needed. Please call with any questions or concerns.     Patient to follow up with Primary Care provider regarding elevated blood pressure.    Danita Bang MD, CAQ Sports Medicine  [facility]

## 2018-12-19 ENCOUNTER — TELEPHONE (OUTPATIENT)
Dept: INTERNAL MEDICINE | Facility: CLINIC | Age: 68
End: 2018-12-19

## 2018-12-19 NOTE — TELEPHONE ENCOUNTER
She would need to be seen to discuss starting a medication for migraines.  If she wants to just come in and get a Toradol injection on the nurse schedule, I would be okay ordering that as she has done that before.     Electronically signed by Diana Ellis CNP.

## 2018-12-19 NOTE — TELEPHONE ENCOUNTER
Attempted to call the patient at [phone], no answer. Left message for a return call to the clinic when available.     RAZIA CarN, RN  [facility]

## 2018-12-19 NOTE — TELEPHONE ENCOUNTER
Reason for call:  Patient reporting a symptom    Symptom or request: Migraine    Duration (how long have symptoms been present): started yesterday, and has nausea this am. Pain is getting worse. Has had migraines  in the past.     Have you been treated for this before? Yes    Additional comments: was treated before for this by Dr Lehman.  Is asking if someone can see her today or order medications and send to [facility].    Phone Number patient can be reached at:  Cell number on file:    Telephone Information:   Mobile [phone]       Best Time:  As soon as possible    Can we leave a detailed message on this number:  YES    Call taken on 12/19/2018 at 9:01 AM by Siomara Nolan

## 2018-12-19 NOTE — TELEPHONE ENCOUNTER
Spoke to the patient. She said she has had 3 migraines in the past. All 3 times she has come into the clinic and gotten a Toradol shot. Patient said it has helped her instantly.     Patient said last night a migraine started. She said it has been slowly getting worse since. She said it usually takes 2-3 days for her migraine to be full blown to the point where she is stuck in bed. Patient is hoping to stop her migraine now before it gets so bad she is left in bed miserable. Patient is wondering if there is something that can be prescribed for her? She said she spoke to a pharmacist or friend and they suggest Imitrex or something along those lines. Patient denies nausea, vomiting, worst headache ever, confusion, weakness, numbness, tingling, or vision concerns. Patient is wondering if there is something a covering provider can send in for her.     Will route to provider to advise.     Maylin Gorman, RAZIAN, RN  [facility]

## 2018-12-19 NOTE — TELEPHONE ENCOUNTER
Called and spoke to the patient. Informed her of the message below. Patient decided at this time, she would like to wait it out and see what happens. She will call for a nurse appointment if she decides she wants to come in for a Toradol shot. Patient said she wants to see how her migraine does as it just started last night. She will also call with any new/worsening symptoms or questions/concerns.     NURA Car, RN  [facility]

## 2019-01-18 ENCOUNTER — TELEPHONE (OUTPATIENT)
Dept: ORTHOPEDICS | Facility: OTHER | Age: 69
End: 2019-01-18

## 2019-01-18 NOTE — TELEPHONE ENCOUNTER
Reason for Call:  Other appointment    Detailed comments: Patient had injections in both of her knees on 11/20 and she's wondering when she would be able to get the next one? If she would have to wait a full 90 days or if she could have it any sooner?      Phone Number Patient can be reached at: Home number on file [phone] (home)    Best Time: any     Can we leave a detailed message on this number? YES    Call taken on 1/18/2019 at 3:34 PM by Barbara Maciel

## 2019-01-18 NOTE — TELEPHONE ENCOUNTER
She would like to repeat the steroid injection. The synvisc injection was very painful. She is scheduled for 1/25.    Anny Adam M.Ed., LAT, ATC  Clinic Coordinator for Dr. Danita Bang

## 2019-01-25 ENCOUNTER — OFFICE VISIT (OUTPATIENT)
Dept: ORTHOPEDICS | Facility: CLINIC | Age: 69
End: 2019-01-25
Payer: COMMERCIAL

## 2019-01-25 VITALS
DIASTOLIC BLOOD PRESSURE: 82 MMHG | WEIGHT: 235 LBS | BODY MASS INDEX: 37.77 KG/M2 | HEIGHT: 66 IN | SYSTOLIC BLOOD PRESSURE: 118 MMHG

## 2019-01-25 DIAGNOSIS — M70.50 PES ANSERINE BURSITIS: ICD-10-CM

## 2019-01-25 DIAGNOSIS — M25.562 CHRONIC PAIN OF BOTH KNEES: ICD-10-CM

## 2019-01-25 DIAGNOSIS — M17.0 BILATERAL PRIMARY OSTEOARTHRITIS OF KNEE: Primary | ICD-10-CM

## 2019-01-25 DIAGNOSIS — G89.29 CHRONIC PAIN OF BOTH KNEES: ICD-10-CM

## 2019-01-25 DIAGNOSIS — M25.561 CHRONIC PAIN OF BOTH KNEES: ICD-10-CM

## 2019-01-25 PROCEDURE — 99213 OFFICE O/P EST LOW 20 MIN: CPT | Mod: 25 | Performed by: PHYSICAL MEDICINE & REHABILITATION

## 2019-01-25 PROCEDURE — 20610 DRAIN/INJ JOINT/BURSA W/O US: CPT | Mod: 50 | Performed by: PHYSICAL MEDICINE & REHABILITATION

## 2019-01-25 RX ORDER — TRIAMCINOLONE ACETONIDE 40 MG/ML
40 INJECTION, SUSPENSION INTRA-ARTICULAR; INTRAMUSCULAR
Status: DISCONTINUED | OUTPATIENT
Start: 2019-01-25 | End: 2019-12-30

## 2019-01-25 RX ORDER — LIDOCAINE HYDROCHLORIDE 10 MG/ML
2 INJECTION, SOLUTION INFILTRATION; PERINEURAL
Status: DISCONTINUED | OUTPATIENT
Start: 2019-01-25 | End: 2019-12-30

## 2019-01-25 RX ADMIN — LIDOCAINE HYDROCHLORIDE 2 ML: 10 INJECTION, SOLUTION INFILTRATION; PERINEURAL at 10:04

## 2019-01-25 RX ADMIN — TRIAMCINOLONE ACETONIDE 40 MG: 40 INJECTION, SUSPENSION INTRA-ARTICULAR; INTRAMUSCULAR at 10:04

## 2019-01-25 ASSESSMENT — MIFFLIN-ST. JEOR: SCORE: 1620.57

## 2019-01-25 NOTE — PATIENT INSTRUCTIONS
Today's Plan of Care:  -Steroid injection performed today.  Take it easy over the next few days. Keep in mind that the steroid may take up to 3 days to start working and up to 2 weeks to reach maximal effect.  Ice 15-20 minutes as needed for soreness.  Patient's preferred over the counter medication as needed for pain as directed on packaging.  -Rehab: Physical Therapy: [facility] - [phone]. Please do 5-6 days of exercises per week between formal sessions and the home exercises they provide.      -We also discussed other future treatment options:  Gel injections and ortho surgery referral     Please call your insurance to inquire whether prior authorization is needed for the gel injection. It is also recommended that you call our Consumer Price Line [phone] to find out if there will be any cost to you for the gel injection.  Diagnosis code: Bilateral primary osteoarthritis of knee: M17.0  Gel injection codes:  Synvisc One -   Euflexxa -   Gel One - 24200      Follow Up: As needed or sooner if symptoms fail to improve or worsen. Please call with any questions or concerns.

## 2019-01-25 NOTE — PROGRESS NOTES
Sports Medicine Clinic Visit - Interim History January 25, 2019    Initial Visit Date 6/26//2018    PCP: Wesly Lehman Angela is a 68 year old female who is seen in follow up for bilateral knee pain.  Since last visit on 11/20/2018 patient has got some relief from the gel injection. She notes the last week or so the pain has been returning. She would like to repeat the steroid injections. She received a lot of relief from the past CSI. Symptoms are relieved with CSI and Tramadol.  Symptoms are worsened by  standing, stairs, activity and walking.         Review of Systems  Musculoskeletal: as above  Remainder of review of systems is negative including constitutional, eyes, ENT, CV, pulmonary, GI, , endocrine, skin, hematologic, and neurologic except as noted in HPI or medical history.    History reviewed. No pertinent past surgical/medical/family/social history other than as mentioned in HPI.    Patient Active Problem List   Diagnosis     Absence of menstruation     Esophageal reflux     Recurring abdominal pain     Hyperlipidemia LDL goal <130     Hypertension goal BP (blood pressure) < 140/90     Advance Care Planning     Small bowel obstruction (H)     DVT prophylaxis     Cervical adenopathy     Intestinal adhesions     Gastroesophageal reflux disease with esophagitis     Cluster headache, not intractable, unspecified chronicity pattern     Morbid obesity due to excess calories (H)     Past Medical History:   Diagnosis Date     Unspecified essential hypertension      Past Surgical History:   Procedure Laterality Date     C APPENDECTOMY       C LIGATE FALLOPIAN TUBE       C TOTAL ABDOM HYSTERECTOMY      Hysterectomy, Total Abdominal     COLONOSCOPY  06/10/08    Internal hemorrhoids, otherwise normal.     COLONOSCOPY  01/20/10     HC DILATION/CURETTAGE DIAG/THER NON OB      D & C     HC LAP, SURG ENTEROLYSIS  05/07/10     HC REMOVAL OF OVARY/TUBE(S)      Salpingo-Oophorectomy, bilateral      HC UGI ENDOSCOPY, SIMPLE EXAM  01/20/10     LAPAROSCOPY DIAGNOSTIC (GENERAL)  2014    Procedure: LAPAROSCOPY DIAGNOSTIC (GENERAL);  Surgeon: Seth Matthews MD;  [facility]     LAPAROTOMY, LYSIS ADHESIONS, COMBINED  2014    Procedure: COMBINED LAPAROTOMY, LYSIS ADHESIONS;  Surgeon: Seth Matthews MD;  Location: [facility]     VIDEO CAPSULE ENDOSCOPY  02/15/10    normal sm intestine     Family History   Problem Relation Age of Onset     C.A.D. Mother         bi-pass     Diabetes Mother         late onset     Allergies Mother         xray dye     Arthritis Mother      Cancer Mother         ovary     Respiratory Mother      C.A.D. Father         bi-pass     Hypertension Brother      Breast Cancer Sister      Allergies Sister      Allergies Brother      Eye Disorder Maternal Aunt      Gastrointestinal Disease Brother      Gynecology Sister      Thyroid Disease Sister      Social History     Socioeconomic History     Marital status:      Spouse name: Waylon     Number of children: 4     Years of education: 13     Highest education level: Not on file   Social Needs     Financial resource strain: Not on file     Food insecurity - worry: Not on file     Food insecurity - inability: Not on file     Transportation needs - medical: Not on file     Transportation needs - non-medical: Not on file   Occupational History     Occupation:  dispatcher/     Employer: [employer]     Comment: [employer]   Tobacco Use     Smoking status: Former Smoker     Packs/day: 0.50     Years: 20.00     Pack years: 10.00     Types: Cigarettes     Last attempt to quit: 1995     Years since quittin.0     Smokeless tobacco: Never Used   Substance and Sexual Activity     Alcohol use: Yes     Comment: very seldom     Drug use: No     Sexual activity: No   Other Topics Concern      Service No     Blood Transfusions Yes     Comment: with children--several units  "    Caffeine Concern No     Comment: coffee  5 cups/day      occ diet pepsi     Occupational Exposure Yes     Comment: works at the [employer]s de[employer])  body fluids, comm. diseases     Hobby Hazards No     Sleep Concern Yes     Comment: sleeps more restless some nights     Stress Concern No     Weight Concern Yes     Comment: w'd like to weigh 50-60# less     Special Diet No     Back Care No     Exercise Yes     Comment: walking     Bike Helmet No     Seat Belt Yes     Self-Exams No     Parent/sibling w/ CABG, MI or angioplasty before 65F 55M? Not Asked   Social History Narrative     Not on file       She is retired. She helps at the school sometimes.     Current Outpatient Medications   Medication     lisinopril-hydrochlorothiazide (PRINZIDE/ZESTORETIC) 20-25 MG per tablet     omeprazole (PRILOSEC) 40 MG capsule     propranolol (INDERAL) 40 MG tablet     traMADol (ULTRAM) 50 MG tablet     fish oil-omega-3 fatty acids 1000 MG capsule     GARLIC PO     TURMERIC PO     Current Facility-Administered Medications   Medication     triamcinolone acetonide (KENALOG-40) injection 40 mg     triamcinolone acetonide (KENALOG-40) injection 40 mg     Allergies   Allergen Reactions     No Known Drug Allergies          Objective:  /82   Ht 1.673 m (5' 5.87\")   Wt 106.6 kg (235 lb)   BMI 38.08 kg/m      General: Alert and in no distress    Head: Normocephalic, atraumatic  Eyes: no scleral icterus or conjunctival erythema   Oropharynx:  Mucous membranes moist  Skin: no erythema, petechiae, or jaundice  Resp: normal respiratory effort without conversational dyspnea   Psych: normal mood and affect    Gait: Non-antalgic, appropriate coordination and balance   Musculoskeletal:  -Tender over the bilateral pes anserine bursa  -Tender over the right lateral joint line    Radiology:  Independent visualization of images performed  KNEE BILATERAL TWO VIEWS  3/20/2018 9:11 AM      HISTORY:  Medial bilateral knee pain.     COMPARISON: " Weightbearing AP views of the bilateral knees dated  3/13/2018. Right knee x-rays dated 4/16/2012.     FINDINGS: Lateral and sunrise views of the knees demonstrate probable  tiny bilateral knee joint effusions, slightly larger on the right.  Mild enthesopathic changes of the quadriceps tendon insertions into  the bilateral patellae. There is also mild joint space loss of the  medial aspect of the left patellofemoral compartment of the knee.  There is moderate medial compartment joint space loss of the bilateral  knees. This is better seen on the prior weightbearing AP view of the  knees.                                                                      IMPRESSION:  1. Small bilateral knee joint effusions.  2. Mild enthesopathic changes of the bilateral quadriceps tendon  insertions into the bilateral patellae.  3. Mild to moderate joint space loss of the medial aspect of the left  patellofemoral compartment of the knee.  4. Moderate medial compartment joint space loss of the bilateral  knees.      MELVIN FUNG MD    Large Joint Injection/Arthocentesis: bilateral anserine bursa  Date/Time: 1/25/2019 10:04 AM  Performed by: Freda Bang MD  Authorized by: Freda Bang MD     Indications:  Pain  Needle Size:  25 G  Guidance: landmark guided    Approach:  Anteromedial  Location:  Knee  Laterality:  Bilateral  Site:  Bilateral anserine bursa  Medications (Right):  40 mg triamcinolone 40 MG/ML; 2 mL lidocaine 1 %  Medications (Left):  40 mg triamcinolone 40 MG/ML; 2 mL lidocaine 1 %  Outcome:  Tolerated well, no immediate complications  Procedure discussed: discussed risks, benefits, and alternatives    Consent Given by:  Patient          Assessment:  1. Bilateral primary osteoarthritis of knee    2. Chronic pain of both knees    3. Pes anserine bursitis        Plan:  Discussed the assessment with the patient and developed a plan together:  -Pes anserine bursa steroid injections performed  today.  Take it easy over the next few days. Keep in mind that the steroid may take up to 3 days to start working and up to 2 weeks to reach maximal effect.  Ice 15-20 minutes as needed for soreness.  Patient's preferred over the counter medication as needed for pain as directed on packaging.  -Rehab: Physical Therapy: [facility] - [phone]. Please do 5-6 days of exercises per week between formal sessions and the home exercises they provide.      -We also discussed other future treatment options:  Gel injections and ortho surgery referral     Please call your insurance to inquire whether prior authorization is needed for the gel injection. It is also recommended that you call our Consumer Price Line [phone] to find out if there will be any cost to you for the gel injection.    Diagnosis code: Bilateral primary osteoarthritis of knee: M17.0  Gel injection codes:  Synvisc One -   Euflexxa -   Gel One - 10471      Follow Up: As needed or sooner if symptoms fail to improve or worsen. Please call with any questions or concerns.       Danita Bang MD, CAQ Sports Medicine  [facility]

## 2019-01-25 NOTE — LETTER
1/25/2019         RE: Komal Miller  [address]  [facility] [address]        Dear Colleague,    Thank you for referring your patient, Komal Miller, to the [facility]. Please see a copy of my visit note below.    Sports Medicine Clinic Visit - Interim History January 25, 2019    Initial Visit Date 6/26//2018    PCP: Wesly Lehman    Komal Miller is a 68 year old female who is seen in follow up for bilateral knee pain.  Since last visit on 11/20/2018 patient has got some relief from the gel injection. She notes the last week or so the pain has been returning. She would like to repeat the steroid injections. She received a lot of relief from the past CSI. Symptoms are relieved with CSI and Tramadol.  Symptoms are worsened by  standing, stairs, activity and walking.         Review of Systems  Musculoskeletal: as above  Remainder of review of systems is negative including constitutional, eyes, ENT, CV, pulmonary, GI, , endocrine, skin, hematologic, and neurologic except as noted in HPI or medical history.    History reviewed. No pertinent past surgical/medical/family/social history other than as mentioned in HPI.    Patient Active Problem List   Diagnosis     Absence of menstruation     Esophageal reflux     Recurring abdominal pain     Hyperlipidemia LDL goal <130     Hypertension goal BP (blood pressure) < 140/90     Advance Care Planning     Small bowel obstruction (H)     DVT prophylaxis     Cervical adenopathy     Intestinal adhesions     Gastroesophageal reflux disease with esophagitis     Cluster headache, not intractable, unspecified chronicity pattern     Morbid obesity due to excess calories (H)     Past Medical History:   Diagnosis Date     Unspecified essential hypertension      Past Surgical History:   Procedure Laterality Date     C APPENDECTOMY       C LIGATE FALLOPIAN TUBE       C TOTAL ABDOM HYSTERECTOMY      Hysterectomy, Total Abdominal     COLONOSCOPY   06/10/08    Internal hemorrhoids, otherwise normal.     COLONOSCOPY  01/20/10     HC DILATION/CURETTAGE DIAG/THER NON OB      D & C     HC LAP, SURG ENTEROLYSIS  05/07/10     HC REMOVAL OF OVARY/TUBE(S)      Salpingo-Oophorectomy, bilateral     HC UGI ENDOSCOPY, SIMPLE EXAM  01/20/10     LAPAROSCOPY DIAGNOSTIC (GENERAL)  2014    Procedure: LAPAROSCOPY DIAGNOSTIC (GENERAL);  Surgeon: Seth Matthews MD;  Location: [facility]     LAPAROTOMY, LYSIS ADHESIONS, COMBINED  2014    Procedure: COMBINED LAPAROTOMY, LYSIS ADHESIONS;  Surgeon: Seth Matthews MD;  Location: [facility]     VIDEO CAPSULE ENDOSCOPY  02/15/10    normal sm intestine     Family History   Problem Relation Age of Onset     C.A.D. Mother         bi-pass     Diabetes Mother         late onset     Allergies Mother         xray dye     Arthritis Mother      Cancer Mother         ovary     Respiratory Mother      C.A.D. Father         bi-pass     Hypertension Brother      Breast Cancer Sister      Allergies Sister      Allergies Brother      Eye Disorder Maternal Aunt      Gastrointestinal Disease Brother      Gynecology Sister      Thyroid Disease Sister      Social History     Socioeconomic History     Marital status:      Spouse name: Waylon     Number of children: 4     Years of education: 13     Highest education level: Not on file   Social Needs     Financial resource strain: Not on file     Food insecurity - worry: Not on file     Food insecurity - inability: Not on file     Transportation needs - medical: Not on file     Transportation needs - non-medical: Not on file   Occupational History     Occupation:  dispatcher/     Employer: [employer]     Comment: [employer]   Tobacco Use     Smoking status: Former Smoker     Packs/day: 0.50     Years: 20.00     Pack years: 10.00     Types: Cigarettes     Last attempt to quit: 1995     Years since quittin.0     Smokeless tobacco:  "Never Used   Substance and Sexual Activity     Alcohol use: Yes     Comment: very seldom     Drug use: No     Sexual activity: No   Other Topics Concern      Service No     Blood Transfusions Yes     Comment: with children--several units     Caffeine Concern No     Comment: coffee  5 cups/day      occ diet pepsi     Occupational Exposure Yes     Comment: works at the [employer] ('s dept)  body fluids, comm. diseases     Hobby Hazards No     Sleep Concern Yes     Comment: sleeps more restless some nights     Stress Concern No     Weight Concern Yes     Comment: w'd like to weigh 50-60# less     Special Diet No     Back Care No     Exercise Yes     Comment: walking     Bike Helmet No     Seat Belt Yes     Self-Exams No     Parent/sibling w/ CABG, MI or angioplasty before 65F 55M? Not Asked   Social History Narrative     Not on file       She is retired. She helps at the school sometimes.     Current Outpatient Medications   Medication     lisinopril-hydrochlorothiazide (PRINZIDE/ZESTORETIC) 20-25 MG per tablet     omeprazole (PRILOSEC) 40 MG capsule     propranolol (INDERAL) 40 MG tablet     traMADol (ULTRAM) 50 MG tablet     fish oil-omega-3 fatty acids 1000 MG capsule     GARLIC PO     TURMERIC PO     Current Facility-Administered Medications   Medication     triamcinolone acetonide (KENALOG-40) injection 40 mg     triamcinolone acetonide (KENALOG-40) injection 40 mg     Allergies   Allergen Reactions     No Known Drug Allergies          Objective:  /82   Ht 1.673 m (5' 5.87\")   Wt 106.6 kg (235 lb)   BMI 38.08 kg/m       General: Alert and in no distress    Head: Normocephalic, atraumatic  Eyes: no scleral icterus or conjunctival erythema   Oropharynx:  Mucous membranes moist  Skin: no erythema, petechiae, or jaundice  Resp: normal respiratory effort without conversational dyspnea   Psych: normal mood and affect    Gait: Non-antalgic, appropriate coordination and balance "   Musculoskeletal:  -Tender over the bilateral pes anserine bursa  -Tender over the right lateral joint line    Radiology:  Independent visualization of images performed  KNEE BILATERAL TWO VIEWS  3/20/2018 9:11 AM      HISTORY:  Medial bilateral knee pain.     COMPARISON: Weightbearing AP views of the bilateral knees dated  3/13/2018. Right knee x-rays dated 4/16/2012.     FINDINGS: Lateral and sunrise views of the knees demonstrate probable  tiny bilateral knee joint effusions, slightly larger on the right.  Mild enthesopathic changes of the quadriceps tendon insertions into  the bilateral patellae. There is also mild joint space loss of the  medial aspect of the left patellofemoral compartment of the knee.  There is moderate medial compartment joint space loss of the bilateral  knees. This is better seen on the prior weightbearing AP view of the  knees.                                                                      IMPRESSION:  1. Small bilateral knee joint effusions.  2. Mild enthesopathic changes of the bilateral quadriceps tendon  insertions into the bilateral patellae.  3. Mild to moderate joint space loss of the medial aspect of the left  patellofemoral compartment of the knee.  4. Moderate medial compartment joint space loss of the bilateral  knees.      MELVIN FUNG MD    Large Joint Injection/Arthocentesis: bilateral anserine bursa  Date/Time: 1/25/2019 10:04 AM  Performed by: Freda Bang MD  Authorized by: Freda Bang MD     Indications:  Pain  Needle Size:  25 G  Guidance: landmark guided    Approach:  Anteromedial  Location:  Knee  Laterality:  Bilateral  Site:  Bilateral anserine bursa  Medications (Right):  40 mg triamcinolone 40 MG/ML; 2 mL lidocaine 1 %  Medications (Left):  40 mg triamcinolone 40 MG/ML; 2 mL lidocaine 1 %  Outcome:  Tolerated well, no immediate complications  Procedure discussed: discussed risks, benefits, and alternatives    Consent Given by:   Patient          Assessment:  1. Bilateral primary osteoarthritis of knee    2. Chronic pain of both knees    3. Pes anserine bursitis        Plan:  Discussed the assessment with the patient and developed a plan together:  -Pes anserine bursa steroid injections performed today.  Take it easy over the next few days. Keep in mind that the steroid may take up to 3 days to start working and up to 2 weeks to reach maximal effect.  Ice 15-20 minutes as needed for soreness.  Patient's preferred over the counter medication as needed for pain as directed on packaging.  -Rehab: Physical Therapy: [facility] - [phone]. Please do 5-6 days of exercises per week between formal sessions and the home exercises they provide.      -We also discussed other future treatment options:  Gel injections and ortho surgery referral     Please call your insurance to inquire whether prior authorization is needed for the gel injection. It is also recommended that you call our Consumer Price Line [phone] to find out if there will be any cost to you for the gel injection.    Diagnosis code: Bilateral primary osteoarthritis of knee: M17.0  Gel injection codes:  Synvisc One -   Euflexxa -   Gel One - 66558      Follow Up: As needed or sooner if symptoms fail to improve or worsen. Please call with any questions or concerns.       Danita Bang MD, [facility] Sports Medicine  [facility]        Again, thank you for allowing me to participate in the care of your patient.        Sincerely,        Freda Bang MD

## 2019-02-08 ENCOUNTER — OFFICE VISIT (OUTPATIENT)
Dept: ORTHOPEDICS | Facility: CLINIC | Age: 69
End: 2019-02-08
Payer: COMMERCIAL

## 2019-02-08 VITALS
DIASTOLIC BLOOD PRESSURE: 96 MMHG | SYSTOLIC BLOOD PRESSURE: 144 MMHG | HEIGHT: 66 IN | WEIGHT: 235 LBS | BODY MASS INDEX: 37.77 KG/M2

## 2019-02-08 DIAGNOSIS — M25.561 CHRONIC PAIN OF BOTH KNEES: ICD-10-CM

## 2019-02-08 DIAGNOSIS — M25.562 CHRONIC PAIN OF BOTH KNEES: ICD-10-CM

## 2019-02-08 DIAGNOSIS — M17.0 BILATERAL PRIMARY OSTEOARTHRITIS OF KNEE: Primary | ICD-10-CM

## 2019-02-08 DIAGNOSIS — G89.29 CHRONIC PAIN OF BOTH KNEES: ICD-10-CM

## 2019-02-08 PROCEDURE — 20610 DRAIN/INJ JOINT/BURSA W/O US: CPT | Mod: 50 | Performed by: PHYSICAL MEDICINE & REHABILITATION

## 2019-02-08 ASSESSMENT — MIFFLIN-ST. JEOR: SCORE: 1620.64

## 2019-02-08 NOTE — PATIENT INSTRUCTIONS
Today's Plan of Care:  -Gel injections performed today.  Take it easy over the next few days. Keep in mind that the gel injection may take 4-6 weeks to reach full effect.  Ice 15-20 minutes as needed for soreness.  Patient's preferred over the counter medication as needed for pain as directed on packaging.  -Begin Rehab: Physical Therapy: [facility] - [phone]. Please do 5-6 days of exercises per week between formal sessions and the home exercises they provide.  -Could also consider orthopedic surgery referral      Follow Up: As needed or sooner if symptoms fail to improve or worsen. Please call with any questions or concerns.     Patient to follow up with Primary Care provider regarding elevated blood pressure.

## 2019-02-08 NOTE — LETTER
2/8/2019         RE: Komal Miller  [address]  [facility] [address]        Dear Colleague,    Thank you for referring your patient, Komal Miller, to the [facility]. Please see a copy of my visit note below.    Sports Medicine Clinic Visit - Interim History February 8, 2019    Initial Visit Date 3/20/2018    PCP: Wesly Lehman    Komal Miller is a 68 year old female who is seen in follow up for bilateral knee pain. Since last visit on 1/25/2019 patient has not had any relief yet from the pes anserine bursa injections. She feels that that knee joint injections have been more effective in the past. She is going to stop by physical therapy today to schedule an evaluation. She notes pain in the hips, she feels that it is how she is walking due to the knees. She rates the pain at a 3/10 currently.  Symptoms are relieved with tramadol.  Symptoms are worsened by standing, stairs, activity and walking. She is wanting to do bilateral gel injections today.        Review of Systems  Musculoskeletal: as above  Remainder of review of systems is negative including constitutional, eyes, ENT, CV, pulmonary, GI, , endocrine, skin, hematologic, and neurologic except as noted in HPI or medical history.    History reviewed. No pertinent past surgical/medical/family/social history other than as mentioned in HPI.    Patient Active Problem List   Diagnosis     Absence of menstruation     Esophageal reflux     Recurring abdominal pain     Hyperlipidemia LDL goal <130     Hypertension goal BP (blood pressure) < 140/90     Advance Care Planning     Small bowel obstruction (H)     DVT prophylaxis     Cervical adenopathy     Intestinal adhesions     Gastroesophageal reflux disease with esophagitis     Cluster headache, not intractable, unspecified chronicity pattern     Morbid obesity due to excess calories (H)     Past Medical History:   Diagnosis Date     Unspecified essential hypertension       Past Surgical History:   Procedure Laterality Date     C APPENDECTOMY       C LIGATE FALLOPIAN TUBE       C TOTAL ABDOM HYSTERECTOMY      Hysterectomy, Total Abdominal     COLONOSCOPY  06/10/08    Internal hemorrhoids, otherwise normal.     COLONOSCOPY  01/20/10     HC DILATION/CURETTAGE DIAG/THER NON OB      D & C     HC LAP, SURG ENTEROLYSIS  05/07/10     HC REMOVAL OF OVARY/TUBE(S)      Salpingo-Oophorectomy, bilateral     HC UGI ENDOSCOPY, SIMPLE EXAM  01/20/10     LAPAROSCOPY DIAGNOSTIC (GENERAL)  5/6/2014    Procedure: LAPAROSCOPY DIAGNOSTIC (GENERAL);  Surgeon: Seth Matthews MD;  Location[facility]     LAPAROTOMY, LYSIS ADHESIONS, COMBINED  5/6/2014    Procedure: COMBINED LAPAROTOMY, LYSIS ADHESIONS;  Surgeon: Seth Matthews MD;  Location: [facility]     VIDEO CAPSULE ENDOSCOPY  02/15/10    normal sm intestine     Family History   Problem Relation Age of Onset     C.A.D. Mother         bi-pass     Diabetes Mother         late onset     Allergies Mother         xray dye     Arthritis Mother      Cancer Mother         ovary     Respiratory Mother      C.A.D. Father         bi-pass     Hypertension Brother      Breast Cancer Sister      Allergies Sister      Allergies Brother      Eye Disorder Maternal Aunt      Gastrointestinal Disease Brother      Gynecology Sister      Thyroid Disease Sister      Social History     Socioeconomic History     Marital status:      Spouse name: Waylon     Number of children: 4     Years of education: 13     Highest education level: Not on file   Social Needs     Financial resource strain: Not on file     Food insecurity - worry: Not on file     Food insecurity - inability: Not on file     Transportation needs - medical: Not on file     Transportation needs - non-medical: Not on file   Occupational History     Occupation:  dispatcher/     Employer: [employer]     Comment[facility] [employer]   Tobacco Use     Smoking status:  "Former Smoker     Packs/day: 0.50     Years: 20.00     Pack years: 10.00     Types: Cigarettes     Last attempt to quit: 1995     Years since quittin.1     Smokeless tobacco: Never Used   Substance and Sexual Activity     Alcohol use: Yes     Comment: very seldom     Drug use: No     Sexual activity: No   Other Topics Concern      Service No     Blood Transfusions Yes     Comment: with children--several units     Caffeine Concern No     Comment: coffee  5 cups/day      occ diet pepsi     Occupational Exposure Yes     Comment: works at the [employer] ('s dept)  body fluids, comm. diseases     Hobby Hazards No     Sleep Concern Yes     Comment: sleeps more restless some nights     Stress Concern No     Weight Concern Yes     Comment: w'd like to weigh 50-60# less     Special Diet No     Back Care No     Exercise Yes     Comment: walking     Bike Helmet No     Seat Belt Yes     Self-Exams No     Parent/sibling w/ CABG, MI or angioplasty before 65F 55M? Not Asked   Social History Narrative     Not on file       She is retired. She helps at the school sometimes.     Current Outpatient Medications   Medication     fish oil-omega-3 fatty acids 1000 MG capsule     GARLIC PO     lisinopril-hydrochlorothiazide (PRINZIDE/ZESTORETIC) 20-25 MG per tablet     omeprazole (PRILOSEC) 40 MG capsule     propranolol (INDERAL) 40 MG tablet     traMADol (ULTRAM) 50 MG tablet     TURMERIC PO     Current Facility-Administered Medications   Medication     lidocaine 1 % injection 2 mL     lidocaine 1 % injection 2 mL     triamcinolone (KENALOG-40) injection 40 mg     triamcinolone (KENALOG-40) injection 40 mg     triamcinolone acetonide (KENALOG-40) injection 40 mg     triamcinolone acetonide (KENALOG-40) injection 40 mg     Allergies   Allergen Reactions     No Known Drug Allergies          Objective:  BP (!) 144/96   Ht 1.673 m (5' 5.87\")   Wt 106.6 kg (235 lb)   BMI 38.08 kg/m       General: Alert and in no distress  "   Head: Normocephalic, atraumatic  Eyes: no scleral icterus or conjunctival erythema   Oropharynx:  Mucous membranes moist  Skin: Bruising right pes anserine bursa  Resp: normal respiratory effort without conversational dyspnea   Psych: normal mood and affect      Radiology:  No new imaging today    Large Joint Injection/Arthocentesis: bilateral knee  Date/Time: 2/8/2019 10:57 AM  Performed by: Freda Bang MD  Authorized by: Freda Bang MD     Indications:  Osteoarthritis  Needle Size:  22 G  Guidance: landmark guided    Approach:  Anterolateral  Location:  Knee  Laterality:  Bilateral  Site:  Bilateral knee  Medications (Right):  48 mg hylan 48 MG/6ML  Medications (Left):  48 mg hylan 48 MG/6ML  Outcome:  Tolerated well, no immediate complications  Procedure discussed: discussed risks, benefits, and alternatives    Consent Given by:  Patient          Assessment:  1. Bilateral primary osteoarthritis of knee    2. Chronic pain of both knees        Plan:  Discussed the assessment with the patient and developed a plan together:  -Gel injections performed today.  Take it easy over the next few days. Keep in mind that the gel injection may take 4-6 weeks to reach full effect.  Ice 15-20 minutes as needed for soreness.  Patient's preferred over the counter medication as needed for pain as directed on packaging.  -Begin Rehab: Physical Therapy: [facility] - [phone]. Please do 5-6 days of exercises per week between formal sessions and the home exercises they provide.  -Could also consider orthopedic surgery referral      Follow Up: As needed or sooner if symptoms fail to improve or worsen. Please call with any questions or concerns.     Patient to follow up with Primary Care provider regarding elevated blood pressure.        Danita Bang MD, [facility] Sports Medicine  [facility]        Again, thank you for allowing me to participate in the care of your  patient.        Sincerely,        Freda Bang MD

## 2019-02-08 NOTE — PROGRESS NOTES
Sports Medicine Clinic Visit - Interim History February 8, 2019    Initial Visit Date 3/20/2018    PCP: Wesly Lehman Angela is a 68 year old female who is seen in follow up for bilateral knee pain. Since last visit on 1/25/2019 patient has not had any relief yet from the pes anserine bursa injections. She feels that that knee joint injections have been more effective in the past. She is going to stop by physical therapy today to schedule an evaluation. She notes pain in the hips, she feels that it is how she is walking due to the knees. She rates the pain at a 3/10 currently.  Symptoms are relieved with tramadol.  Symptoms are worsened by standing, stairs, activity and walking. She is wanting to do bilateral gel injections today.        Review of Systems  Musculoskeletal: as above  Remainder of review of systems is negative including constitutional, eyes, ENT, CV, pulmonary, GI, , endocrine, skin, hematologic, and neurologic except as noted in HPI or medical history.    History reviewed. No pertinent past surgical/medical/family/social history other than as mentioned in HPI.    Patient Active Problem List   Diagnosis     Absence of menstruation     Esophageal reflux     Recurring abdominal pain     Hyperlipidemia LDL goal <130     Hypertension goal BP (blood pressure) < 140/90     Advance Care Planning     Small bowel obstruction (H)     DVT prophylaxis     Cervical adenopathy     Intestinal adhesions     Gastroesophageal reflux disease with esophagitis     Cluster headache, not intractable, unspecified chronicity pattern     Morbid obesity due to excess calories (H)     Past Medical History:   Diagnosis Date     Unspecified essential hypertension      Past Surgical History:   Procedure Laterality Date     C APPENDECTOMY       C LIGATE FALLOPIAN TUBE       C TOTAL ABDOM HYSTERECTOMY      Hysterectomy, Total Abdominal     COLONOSCOPY  06/10/08    Internal hemorrhoids, otherwise normal.      COLONOSCOPY  01/20/10     HC DILATION/CURETTAGE DIAG/THER NON OB      D & C     HC LAP, SURG ENTEROLYSIS  05/07/10     HC REMOVAL OF OVARY/TUBE(S)      Salpingo-Oophorectomy, bilateral     HC UGI ENDOSCOPY, SIMPLE EXAM  01/20/10     LAPAROSCOPY DIAGNOSTIC (GENERAL)  2014    Procedure: LAPAROSCOPY DIAGNOSTIC (GENERAL);  Surgeon: Seth Matthews MD;  Location: [facility]     LAPAROTOMY, LYSIS ADHESIONS, COMBINED  2014    Procedure: COMBINED LAPAROTOMY, LYSIS ADHESIONS;  Surgeon: Seth Matthews MD;  Location: [facility]     VIDEO CAPSULE ENDOSCOPY  02/15/10    normal sm intestine     Family History   Problem Relation Age of Onset     C.A.D. Mother         bi-pass     Diabetes Mother         late onset     Allergies Mother         xray dye     Arthritis Mother      Cancer Mother         ovary     Respiratory Mother      C.A.D. Father         bi-pass     Hypertension Brother      Breast Cancer Sister      Allergies Sister      Allergies Brother      Eye Disorder Maternal Aunt      Gastrointestinal Disease Brother      Gynecology Sister      Thyroid Disease Sister      Social History     Socioeconomic History     Marital status:      Spouse name: Waylon     Number of children: 4     Years of education: 13     Highest education level: Not on file   Social Needs     Financial resource strain: Not on file     Food insecurity - worry: Not on file     Food insecurity - inability: Not on file     Transportation needs - medical: Not on file     Transportation needs - non-medical: Not on file   Occupational History     Occupation:  dispatcher/     Employer: [employer]     Comment: [employer]   Tobacco Use     Smoking status: Former Smoker     Packs/day: 0.50     Years: 20.00     Pack years: 10.00     Types: Cigarettes     Last attempt to quit: 1995     Years since quittin.1     Smokeless tobacco: Never Used   Substance and Sexual Activity     Alcohol  "use: Yes     Comment: very seldom     Drug use: No     Sexual activity: No   Other Topics Concern      Service No     Blood Transfusions Yes     Comment: with children--several units     Caffeine Concern No     Comment: coffee  5 cups/day      occ diet pepsi     Occupational Exposure Yes     Comment: works at the [employer]  body fluids, comm. diseases     Hobby Hazards No     Sleep Concern Yes     Comment: sleeps more restless some nights     Stress Concern No     Weight Concern Yes     Comment: w'd like to weigh 50-60# less     Special Diet No     Back Care No     Exercise Yes     Comment: walking     Bike Helmet No     Seat Belt Yes     Self-Exams No     Parent/sibling w/ CABG, MI or angioplasty before 65F 55M? Not Asked   Social History Narrative     Not on file       She is retired. She helps at the school sometimes.     Current Outpatient Medications   Medication     fish oil-omega-3 fatty acids 1000 MG capsule     GARLIC PO     lisinopril-hydrochlorothiazide (PRINZIDE/ZESTORETIC) 20-25 MG per tablet     omeprazole (PRILOSEC) 40 MG capsule     propranolol (INDERAL) 40 MG tablet     traMADol (ULTRAM) 50 MG tablet     TURMERIC PO     Current Facility-Administered Medications   Medication     lidocaine 1 % injection 2 mL     lidocaine 1 % injection 2 mL     triamcinolone (KENALOG-40) injection 40 mg     triamcinolone (KENALOG-40) injection 40 mg     triamcinolone acetonide (KENALOG-40) injection 40 mg     triamcinolone acetonide (KENALOG-40) injection 40 mg     Allergies   Allergen Reactions     No Known Drug Allergies          Objective:  BP (!) 144/96   Ht 1.673 m (5' 5.87\")   Wt 106.6 kg (235 lb)   BMI 38.08 kg/m      General: Alert and in no distress    Head: Normocephalic, atraumatic  Eyes: no scleral icterus or conjunctival erythema   Oropharynx:  Mucous membranes moist  Skin: Bruising right pes anserine bursa  Resp: normal respiratory effort without conversational dyspnea   Psych: " normal mood and affect      Radiology:  No new imaging today    Large Joint Injection/Arthocentesis: bilateral knee  Date/Time: 2/8/2019 10:57 AM  Performed by: Freda Bang MD  Authorized by: Freda Bang MD     Indications:  Osteoarthritis  Needle Size:  22 G  Guidance: landmark guided    Approach:  Anterolateral  Location:  Knee  Laterality:  Bilateral  Site:  Bilateral knee  Medications (Right):  48 mg hylan 48 MG/6ML  Medications (Left):  48 mg hylan 48 MG/6ML  Outcome:  Tolerated well, no immediate complications  Procedure discussed: discussed risks, benefits, and alternatives    Consent Given by:  Patient          Assessment:  1. Bilateral primary osteoarthritis of knee    2. Chronic pain of both knees        Plan:  Discussed the assessment with the patient and developed a plan together:  -Gel injections performed today.  Take it easy over the next few days. Keep in mind that the gel injection may take 4-6 weeks to reach full effect.  Ice 15-20 minutes as needed for soreness.  Patient's preferred over the counter medication as needed for pain as directed on packaging.  -Begin Rehab: Physical Therapy: [facility] - [phone]. Please do 5-6 days of exercises per week between formal sessions and the home exercises they provide.  -Could also consider orthopedic surgery referral      Follow Up: As needed or sooner if symptoms fail to improve or worsen. Please call with any questions or concerns.     Patient to follow up with Primary Care provider regarding elevated blood pressure.        Danita Bang MD, CAQ Sports Medicine  [facility]

## 2019-02-25 ENCOUNTER — HOSPITAL ENCOUNTER (OUTPATIENT)
Dept: PHYSICAL THERAPY | Facility: CLINIC | Age: 69
Setting detail: THERAPIES SERIES
End: 2019-02-25
Attending: PHYSICAL MEDICINE & REHABILITATION
Payer: MEDICARE

## 2019-02-25 PROCEDURE — 97530 THERAPEUTIC ACTIVITIES: CPT | Mod: GP

## 2019-02-25 PROCEDURE — 97161 PT EVAL LOW COMPLEX 20 MIN: CPT | Mod: GP

## 2019-02-25 NOTE — PROGRESS NOTES
[facility]        OUTPATIENT PHYSICAL THERAPY ORTHOPEDIC EVALUATION  PLAN OF TREATMENT FOR OUTPATIENT REHABILITATION  (COMPLETE FOR INITIAL CLAIMS ONLY)  Patient's Last Name, First Name, M.I.  YOB: 1950  AngelaKomaljimmie Arreguin s Name:  [facility]   Medical Record No.  [id number]   Start of Care Date:  02/25/19   Onset Date:  08/25/18 (Most recently, 6 months ago. Chronic pain for years)   Type:     _X__PT   ___OT   ___SLP Medical Diagnosis:  Bilateral primary osteoarthritis of knee; Chronic pain of both knees; Pes anserine bursitis     PT Diagnosis:  Bilateral knee pain secondary to decreased LE strength and joint mobility   Visits from SOC:  1      _________________________________________________________________________________  Plan of Treatment/Functional Goals:    joint mobilization, manual therapy, neuromuscular re-education, ROM, strengthening, stretching  Other interventions as indicated    Cryotherapy, Electrical stimulation, Hot packs, Ultrasound  Other modalities as indicated    Goals  Goal Identifier: 1  Goal Description: Patient will improve LEFS score by at least 20 points in order to demonstrate functional improvements.  Target Date: 03/25/19    Goal Identifier: 2  Goal Description: Patient will perform 15 repetitions of side lying hip abduction without hip flexor substitution to demonstrate improved hip abductor strength needed to maintain proper LE alignment during gait.  Target Date: 04/22/19    Goal Identifier: 3  Goal Description: Patient will perform 10 double leg squats off an 18 inch mat without knee valgus or excessive anterior translation of tibia to demonstrate improved functional hip abductor and quad strength needed to maintain proper LE alignment when coming from sit to stand.  Target Date: 04/22/19          Therapy Frequency:  2 times/Week  Predicted Duration of Therapy Intervention:  8 weeks    Tanika Barba, PT                  I CERTIFY THE NEED FOR THESE SERVICES FURNISHED UNDER        THIS PLAN OF TREATMENT AND WHILE UNDER MY CARE     (Physician co-signature of this document indicates review and certification of the therapy plan).                       Certification Date From:  02/25/19   Certification Date To:  05/04/19    Referring Provider:  Malik Morel, DO    Initial Assessment        See Epic Evaluation Start of Care Date: 02/25/19                     Thank you for your referral.     Tanika Barba PT, DPT    [facility]    O: [phone]  E: [email or web address]

## 2019-02-25 NOTE — PROGRESS NOTES
02/25/19 1254   General Information   Type of Visit Initial OP Ortho PT Evaluation   Start of Care Date 02/25/19   Referring Physician Freda Bang MD   Patient/Family Goals Statement Patient would like to decrease knee pain in order to perform ADLs and hobbies without restrictions.    Orders Evaluate and Treat   Date of Order 01/25/19   Insurance Type Medicare;Other   Insurance Comments/Visits Authorized Medicare for HB supplement, BCBS [place] Blue level   Medical Diagnosis Bilateral primary osteoarthritis of knee; Chronic pain of both knees; Pes anserine bursitis   Surgical/Medical history reviewed Yes   Precautions/Limitations no known precautions/limitations   Weight-Bearing Status - LUE full weight-bearing   Weight-Bearing Status - RUE full weight-bearing   Weight-Bearing Status - LLE full weight-bearing   Weight-Bearing Status - RLE full weight-bearing       Present No   Body Part(s)   Body Part(s) Knee   Presentation and Etiology   Pertinent history of current problem (include personal factors and/or comorbidities that impact the POC) Patient presents today with bilateral knee pain secondary to osteoarthritis. She has been dealing with knee pain for a few years, however about 6 months ago knee pain significantly increased. She has had steroid and gel injections in both knees which temporarily help. She had a PT eval March 2018 and was given exercises which seemed to help but she has not been performing these. Patient enjoys walking for exercise and would like to be able to go on long walks without pain as well as navigate stairs safely.    Impairments A. Pain;B. Decreased WB tolerance;C. Swelling;D. Decreased ROM;E. Decreased flexibility;F. Decreased strength and endurance;G. Impaired balance   Functional Limitations perform activities of daily living;perform desired leisure / sports activities   Symptom Location Bilateral knees   How/Where did it occur From Degenerative Joint  Disease   Onset date of current episode/exacerbation 08/25/18  (Most recently, 6 months ago. Chronic pain for years)   Chronicity Chronic   Pain rating (0-10 point scale) Best (/10);Worst (/10)   Best (/10) 0   Worst (/10) 6-7   Pain quality A. Sharp;B. Dull;C. Aching   Frequency of pain/symptoms C. With activity   Pain/symptoms are: The same all the time   Pain/symptoms exacerbated by B. Walking;M. Other   Pain exacerbation comment Stairs   Pain/symptoms eased by K. Other   Pain eased by comment Gel injection   Progression of symptoms since onset: Worsened   Current / Previous Interventions   X-ray Results Results   X-ray results Small joint effusion, mild to moderate bilateral joint space loss  (3/2018)   Prior Level of Function   Prior Level of Function-Mobility Independent   Prior Level of Function-ADLs Independent   Current Level of Function   Current Community Support Family/friend caregiver  ()   Patient role/employment history F. Retired   Living environment House[facility]   Home/community accessibility Split house, 6-8 steps   Current equipment-Gait/Locomotion None   Current equipment-ADL Shower/tub chair;Shower/tub grab bar   Fall Risk Screen   Fall screen completed by PT   Have you fallen 2 or more times in the past year? No   Have you fallen and had an injury in the past year? No   Is patient a fall risk? No   System Outcome Measures   Outcome Measures (LEFS)   Knee Objective Findings   Gait/Locomotion Mild bilateral Trendelenburg   Balance/Proprioception (Single Leg Stance) Did not assess today.    Knee/Hip Strength Comments Hip Flexion 4+/5 bilateral   Observation Patient alone at [facility], in no apparent distress   Integumentary  No apparent deficits noted   Posture Forward head, rounded shoulders   Right Knee Extension AROM Lacking 3 degrees   Right Knee Flexion AROM 115   Right Knee Flexion Strength 5/5   Right Knee Extension Strength 5/5   Right Hip Abduction Strength 4+/5   Right Quad Set  Strength Fair   Right Hamstring Flexibility Limitations noted   Left Knee Extension AROM Lacking 3 degrees   Left Knee Flexion AROM 111   Left Knee Flexion Strength 5/5   Left Knee Extension Strength 5/5   Left Hip Abduction Strength 4+/5   Left Quad Set Strength Fair   Left Hamstring Flexibility Limitations noted   Side (if bilateral, select both right and left) Right;Left   Foot Position In Standing Bilateral ER of LEs   Lachmans Test Negative   Posterior Drawer Test Negative   Varus Stress Test Negative   Valgus Stress Test Negative   Devin's Test Negative   Apprehension Test Negative   Palpation No TTP noted.    Accessory Motion/Joint Mobility Hypomobility at bilateral patellofemoral joints   Knee Special Test Comments Increased bilateral anterior tibial translation noted when performing double leg squat   Planned Therapy Interventions   Planned Therapy Interventions joint mobilization;manual therapy;neuromuscular re-education;ROM;strengthening;stretching   Planned Therapy Interventions Comment Other interventions as indicated   Planned Modality Interventions   Planned Modality Interventions Cryotherapy;Electrical stimulation;Hot packs;Ultrasound   Planned Modality Interventions Comments Other modalities as indicated   Clinical Impression   Criteria for Skilled Therapeutic Interventions Met yes, treatment indicated   PT Diagnosis Bilateral knee pain secondary to decreased LE strength and joint mobility   Influenced by the following impairments strength, ROM, flexibility, joint mobility, pain   Functional limitations due to impairments squatting, stairs, ADLs, hobbies   Clinical Presentation Stable/Uncomplicated   Clinical Presentation Rationale Patient is a 68 y.o. female presenting today with bilateral knee pain. Patient demonstrates decreased LE strength and flexibility, decreased knee ROM, and decreased patellofemoral joint mobility. In addition, patient presents with bilateral Trendelenburg gait and  bilateral anterior tibial translation while performing double leg squats. Patient will benefit from skilled physical therapy intervention to address aforementioned deficits and limitations.    Clinical Decision Making (Complexity) Low complexity   Therapy Frequency 2 times/Week   Predicted Duration of Therapy Intervention (days/wks) 8 weeks   Risk & Benefits of therapy have been explained Yes   Patient, Family & other staff in agreement with plan of care Yes   Education Assessment   Preferred Learning Style Listening;Reading   Barriers to Learning No barriers   ORTHO GOALS   PT Ortho Eval Goals 1;2;3   Ortho Goal 1   Goal Identifier 1   Goal Description Patient will improve LEFS score by at least 20 points in order to demonstrate functional improvements.   Target Date 03/25/19   Ortho Goal 2   Goal Identifier 2   Goal Description Patient will perform 15 repetitions of side lying hip abduction without hip flexor substitution to demonstrate improved hip abductor strength needed to maintain proper LE alignment during gait.   Target Date 04/22/19   Ortho Goal 3   Goal Identifier 3   Goal Description Patient will perform 10 double leg squats off an 18 inch mat without knee valgus or excessive anterior translation of tibia to demonstrate improved functional hip abductor and quad strength needed to maintain proper LE alignment when coming from sit to stand.   Target Date 04/22/19   Total Evaluation Time   PT Eval, Low Complexity Minutes (11689) 40   Therapy Certification   Certification date from 02/25/19   Certification date to 05/04/19   Medical Diagnosis Bilateral primary osteoarthritis of knee; Chronic pain of both knees; Pes anserine bursitis         Thank you for your referral.     Tanika Barba PT, DPT    [facility]    O: [phone]  E: [email or web address]

## 2019-02-27 ENCOUNTER — HOSPITAL ENCOUNTER (OUTPATIENT)
Dept: PHYSICAL THERAPY | Facility: CLINIC | Age: 69
Setting detail: THERAPIES SERIES
End: 2019-02-27
Attending: PHYSICAL MEDICINE & REHABILITATION
Payer: MEDICARE

## 2019-02-27 PROCEDURE — 97110 THERAPEUTIC EXERCISES: CPT | Mod: GP

## 2019-02-27 PROCEDURE — 97112 NEUROMUSCULAR REEDUCATION: CPT | Mod: GP

## 2019-03-04 ENCOUNTER — HOSPITAL ENCOUNTER (OUTPATIENT)
Dept: PHYSICAL THERAPY | Facility: CLINIC | Age: 69
Setting detail: THERAPIES SERIES
End: 2019-03-04
Attending: PHYSICAL MEDICINE & REHABILITATION
Payer: MEDICARE

## 2019-03-04 PROCEDURE — 97110 THERAPEUTIC EXERCISES: CPT | Mod: GP | Performed by: PHYSICAL THERAPIST

## 2019-03-04 PROCEDURE — 97112 NEUROMUSCULAR REEDUCATION: CPT | Mod: GP | Performed by: PHYSICAL THERAPIST

## 2019-03-06 ENCOUNTER — HOSPITAL ENCOUNTER (OUTPATIENT)
Dept: PHYSICAL THERAPY | Facility: CLINIC | Age: 69
Setting detail: THERAPIES SERIES
End: 2019-03-06
Attending: PHYSICAL MEDICINE & REHABILITATION
Payer: MEDICARE

## 2019-03-06 PROCEDURE — 97110 THERAPEUTIC EXERCISES: CPT | Mod: GP | Performed by: PHYSICAL THERAPIST

## 2019-03-06 PROCEDURE — 97112 NEUROMUSCULAR REEDUCATION: CPT | Mod: GP | Performed by: PHYSICAL THERAPIST

## 2019-03-11 ENCOUNTER — HOSPITAL ENCOUNTER (OUTPATIENT)
Dept: PHYSICAL THERAPY | Facility: CLINIC | Age: 69
Setting detail: THERAPIES SERIES
End: 2019-03-11
Attending: PHYSICAL MEDICINE & REHABILITATION
Payer: MEDICARE

## 2019-03-11 PROCEDURE — 97110 THERAPEUTIC EXERCISES: CPT | Mod: GP

## 2019-03-11 NOTE — PROGRESS NOTES
"Outpatient Physical Therapy Discharge Note     Patient: Komal Miller  : 1950    Beginning/End Dates of Reporting Period:  2019 to 3/11/2019    Referring Provider: Dr. Freda Bang    Therapy Diagnosis: Bilateral knee pain secondary to decreased LE strength and joint mobility     Client Self Report: Pt arrives to PT session today. She inquires about stationary bike. She notes improvement in compleiton of stair at home. States that this will be her last PT session and she plans to continue with HEP to continue progressing strength and balance.    Objective Measurements:  Objective Measure: LEFS  Details: Lower Extremity Functional Scale (LEFS) assesses the patients level of difficulty with various activities. The higher the score, the greater the level of function a patient demonstrates. Pt scored 55 points out of 80 possible indicating patient is at 68.75% of maximal function. MCID is 9 points. Noted no signficant change in score from eval, however, patient reports improvement in function at home. Will continue to progress through consistency with HEP.  Objective Measure: Sit <> stand 18\"  Details: Completed x 10 reps with hands across chest.   Objective Measure: Sidelying hip abduction  Details: LLE x 15 reps good form; RLE x 8 reps and started to increase hip flexor compensation pattern     Goals:  Goal Identifier 1   Goal Description Patient will improve LEFS score by at least 20 points in order to demonstrate functional improvements.(3/11: no significant change in score at this time)   Target Date 19   Date Met      Progress:     Goal Identifier 2   Goal Description Patient will perform 15 repetitions of side lying hip abduction without hip flexor substitution to demonstrate improved hip abductor strength needed to maintain proper LE alignment during gait.(3/11: met on LLE, noted hip flexor comp on RLE after 8 reps)   Target Date 19   Date Met      Progress:     Goal Identifier 3 "   Goal Description Patient will perform 10 double leg squats off an 18 inch mat without knee valgus or excessive anterior translation of tibia to demonstrate improved functional hip abductor and quad strength needed to maintain proper LE alignment when coming from sit to stand.(3/11: 10 squats, fair form, no UE support)   Target Date 04/22/19   Date Met  03/11/19   Progress:     Progress Toward Goals:   Progress this reporting period: Pt has reported improvements in her functional mobility. She reports having improved ability with stair negotiation and feels that she has improved her overall strength. She feels comfortable with all the exercises she has received and has been completing them consistently at home.     Phone call on 4/15/2019:   Phone call with patient: patient reports that she is doing well and that she feels comfortable with her HEP. She is agreeable to discharge from formal PT at this time and will continue with her HEP    Plan:  Discharge from therapy.    Discharge:    Reason for Discharge: Patient chooses to discontinue therapy.    Equipment Issued: N/A    Discharge Plan: Patient to continue home program.    Thank you for your referral,     Halina Orta PT, DPT  [phone]  [facility]      Thank you for your referral,     Halina Orta PT, DPT  [phone]  [facility]

## 2019-04-08 ENCOUNTER — TELEPHONE (OUTPATIENT)
Dept: FAMILY MEDICINE | Facility: OTHER | Age: 69
End: 2019-04-08

## 2019-04-08 NOTE — TELEPHONE ENCOUNTER
Reason for Call:  Other personal    Detailed comments: pt would like to speak to CM regarding a personal matter. Stated it was Urgent. Aware CM is not in clinic today. Please call pt tomorrow.    Phone Number Patient can be reached at: Home number on file [phone] (home)    Best Time:     Can we leave a detailed message on this number? YES    Call taken on 4/8/2019 at 8:01 AM by Katarina Haas

## 2019-04-10 DIAGNOSIS — I10 HYPERTENSION GOAL BP (BLOOD PRESSURE) < 140/90: ICD-10-CM

## 2019-04-10 DIAGNOSIS — R11.0 NAUSEA: ICD-10-CM

## 2019-04-10 DIAGNOSIS — G44.009 CLUSTER HEADACHE, NOT INTRACTABLE, UNSPECIFIED CHRONICITY PATTERN: ICD-10-CM

## 2019-04-10 DIAGNOSIS — K21.00 GASTROESOPHAGEAL REFLUX DISEASE WITH ESOPHAGITIS: ICD-10-CM

## 2019-04-10 NOTE — TELEPHONE ENCOUNTER
I have attempted to contact this patient by phone with the following results: no answer.  Irene Luna MA     4/10/2019

## 2019-04-11 RX ORDER — PROPRANOLOL HYDROCHLORIDE 40 MG/1
TABLET ORAL
Qty: 180 TABLET | Refills: 3 | OUTPATIENT
Start: 2019-04-11

## 2019-04-11 RX ORDER — OMEPRAZOLE 40 MG/1
CAPSULE, DELAYED RELEASE ORAL
Qty: 90 CAPSULE | Refills: 3 | OUTPATIENT
Start: 2019-04-11

## 2019-04-11 RX ORDER — LISINOPRIL AND HYDROCHLOROTHIAZIDE 20; 25 MG/1; MG/1
TABLET ORAL
Qty: 90 TABLET | Refills: 3 | OUTPATIENT
Start: 2019-04-11

## 2019-04-11 NOTE — TELEPHONE ENCOUNTER
"Medications denied, refill through 06/2019.     Lisinopril-hydrochlorothiazide  Last Written Prescription Date:  06/05/2018  Last Fill Quantity: 90,  # refills: 3   Last office visit: 5/15/2018 with prescribing provider:  Fallon Valdez Office Visit:  None    Omeprazole  Last Written Prescription Date:  06/05/2018  Last Fill Quantity: 90,  # refills: 3   Last office visit: 5/15/2018 with prescribing provider:  Fallon Valdez Office Visit:  None    Propranolol  Last Written Prescription Date:  06/05/2018  Last Fill Quantity: 90,  # refills: 3   Last office visit: 5/15/2018 with prescribing provider:  Fallon Valdez Office Visit:  None    Requested Prescriptions   Pending Prescriptions Disp Refills     propranolol (INDERAL) 40 MG tablet [Pharmacy Med Name: PROPRANOLOL HCL 40 MG Tablet] 180 tablet 3     Sig: TAKE 1 TABLET TWICE DAILY       Beta-Blockers Protocol Failed - 4/10/2019  2:08 PM        Failed - Blood pressure under 140/90 in past 12 months     BP Readings from Last 3 Encounters:   02/08/19 (!) 144/96   01/25/19 118/82   08/03/18 (!) 138/100           Passed - Patient is age 6 or older        Passed - Recent (12 mo) or future (30 days) visit within the authorizing provider's specialty     Patient had office visit in the last 12 months or has a visit in the next 30 days with authorizing provider or within the authorizing provider's specialty.  See \"Patient Info\" tab in inbasket, or \"Choose Columns\" in Meds & Orders section of the refill encounter.          Passed - Medication is active on med list        omeprazole (PRILOSEC) 40 MG DR capsule [Pharmacy Med Name: OMEPRAZOLE 40 MG Capsule Delayed Release] 90 capsule 3     Sig: TAKE ONE CAPSULE BY MOUTH EVERY DAY .  TAKE 30 TO 60 MINUTES BEFORE A MEAL       PPI Protocol Passed - 4/10/2019  2:08 PM        Passed - Not on Clopidogrel (unless Pantoprazole ordered)        Passed - No diagnosis of osteoporosis on record        Passed - Recent (12 mo) or " "future (30 days) visit within the authorizing provider's specialty     Patient had office visit in the last 12 months or has a visit in the next 30 days with authorizing provider or within the authorizing provider's specialty.  See \"Patient Info\" tab in inbasket, or \"Choose Columns\" in Meds & Orders section of the refill encounter.          Passed - Medication is active on med list        Passed - Patient is age 18 or older        Passed - No active pregnacy on record        Passed - No positive pregnancy test in past 12 months        lisinopril-hydrochlorothiazide (PRINZIDE/ZESTORETIC) 20-25 MG tablet [Pharmacy Med Name: LISINOPRIL/HYDROCHLOROTHIAZIDE 20-25 MG Tablet] 90 tablet 3     Sig: TAKE 1 TABLET EVERY DAY       Diuretics (Including Combos) Protocol Failed - 4/10/2019  2:08 PM        Failed - Blood pressure under 140/90 in past 12 months     BP Readings from Last 3 Encounters:   02/08/19 (!) 144/96   01/25/19 118/82   08/03/18 (!) 138/100           Passed - Recent (12 mo) or future (30 days) visit within the authorizing provider's specialty     Patient had office visit in the last 12 months or has a visit in the next 30 days with authorizing provider or within the authorizing provider's specialty.  See \"Patient Info\" tab in inbasket, or \"Choose Columns\" in Meds & Orders section of the refill encounter.          Passed - Medication is active on med list        Passed - Patient is age 18 or older        Passed - No active pregancy on record        Passed - Normal serum creatinine on file in past 12 months     Recent Labs   Lab Test 05/15/18  0827   CR 0.69           Passed - Normal serum potassium on file in past 12 months     Recent Labs   Lab Test 05/15/18  0827   POTASSIUM 4.0           Passed - Normal serum sodium on file in past 12 months     Recent Labs   Lab Test 05/15/18  0827              Passed - No positive pregnancy test in past 12 months      Ekta Guzmán RN   "

## 2019-04-15 ENCOUNTER — HOSPITAL ENCOUNTER (EMERGENCY)
Facility: CLINIC | Age: 69
Discharge: HOME | End: 2019-04-15
Attending: FAMILY MEDICINE | Admitting: FAMILY MEDICINE
Payer: MEDICARE

## 2019-04-15 ENCOUNTER — APPOINTMENT (OUTPATIENT)
Dept: MRI IMAGING | Facility: CLINIC | Age: 69
End: 2019-04-15
Attending: FAMILY MEDICINE
Payer: MEDICARE

## 2019-04-15 VITALS
WEIGHT: 230 LBS | HEART RATE: 60 BPM | DIASTOLIC BLOOD PRESSURE: 90 MMHG | RESPIRATION RATE: 16 BRPM | OXYGEN SATURATION: 97 % | SYSTOLIC BLOOD PRESSURE: 160 MMHG | BODY MASS INDEX: 37.27 KG/M2 | TEMPERATURE: 98.6 F

## 2019-04-15 DIAGNOSIS — S46.212A STRAIN OF LEFT BICEPS MUSCLE, INITIAL ENCOUNTER: ICD-10-CM

## 2019-04-15 PROCEDURE — 99284 EMERGENCY DEPT VISIT MOD MDM: CPT | Mod: 25 | Performed by: FAMILY MEDICINE

## 2019-04-15 PROCEDURE — 25000131 ZZH RX MED GY IP 250 OP 636 PS 637: Performed by: FAMILY MEDICINE

## 2019-04-15 PROCEDURE — A9270 NON-COVERED ITEM OR SERVICE: HCPCS | Mod: GY | Performed by: FAMILY MEDICINE

## 2019-04-15 PROCEDURE — 99284 EMERGENCY DEPT VISIT MOD MDM: CPT | Mod: Z6 | Performed by: FAMILY MEDICINE

## 2019-04-15 PROCEDURE — 73221 MRI JOINT UPR EXTREM W/O DYE: CPT | Mod: LT

## 2019-04-15 PROCEDURE — 25000132 ZZH RX MED GY IP 250 OP 250 PS 637: Mod: GY | Performed by: FAMILY MEDICINE

## 2019-04-15 RX ORDER — ONDANSETRON 4 MG/1
4 TABLET, ORALLY DISINTEGRATING ORAL
Status: COMPLETED | OUTPATIENT
Start: 2019-04-15 | End: 2019-04-15

## 2019-04-15 RX ORDER — ONDANSETRON 4 MG/1
4 TABLET, ORALLY DISINTEGRATING ORAL ONCE
Status: COMPLETED | OUTPATIENT
Start: 2019-04-15 | End: 2019-04-15

## 2019-04-15 RX ORDER — ONDANSETRON 4 MG/1
4 TABLET, ORALLY DISINTEGRATING ORAL EVERY 6 HOURS PRN
Qty: 6 TABLET | Refills: 0 | Status: SHIPPED | OUTPATIENT
Start: 2019-04-15 | End: 2019-05-31

## 2019-04-15 RX ORDER — OXYCODONE AND ACETAMINOPHEN 5; 325 MG/1; MG/1
2 TABLET ORAL ONCE
Status: COMPLETED | OUTPATIENT
Start: 2019-04-15 | End: 2019-04-15

## 2019-04-15 RX ORDER — HYDROCODONE BITARTRATE AND ACETAMINOPHEN 5; 325 MG/1; MG/1
1 TABLET ORAL EVERY 6 HOURS PRN
Qty: 10 TABLET | Refills: 0 | Status: SHIPPED | OUTPATIENT
Start: 2019-04-15 | End: 2019-05-21

## 2019-04-15 RX ADMIN — ONDANSETRON 4 MG: 4 TABLET, ORALLY DISINTEGRATING ORAL at 09:59

## 2019-04-15 RX ADMIN — OXYCODONE HYDROCHLORIDE AND ACETAMINOPHEN 2 TABLET: 5; 325 TABLET ORAL at 09:26

## 2019-04-15 RX ADMIN — ONDANSETRON 4 MG: 4 TABLET, ORALLY DISINTEGRATING ORAL at 12:17

## 2019-04-15 NOTE — ADDENDUM NOTE
Encounter addended by: Halina Orta, PT on: 4/15/2019 3:05 PM   Actions taken: Sign clinical note, Episode resolved

## 2019-04-15 NOTE — ED PROVIDER NOTES
History     Chief Complaint   Patient presents with     Shoulder Injury     HPI  Komal Miller is a 68 year old female who presents to the emergency room with left anterior shoulder upper biceps pain.  The patient was lifting a heavy mattress on Friday and started having severe pain to this area the next morning.  She has pain with any movement of her left arm but particularly with flexion at the elbow and the upper biceps area.  She has not seen any asymmetry in her arms.  She has no history of shoulder or elbow problems.  Denies history of arthritis.  No previous injuries or surgeries to these areas.  Denies any numbness tingling or weakness.    Allergies:  Allergies   Allergen Reactions     No Known Drug Allergies        Problem List:    Patient Active Problem List    Diagnosis Date Noted     Morbid obesity due to excess calories (H) 07/26/2017     Priority: Medium     Cluster headache, not intractable, unspecified chronicity pattern 05/08/2017     Priority: Medium     Gastroesophageal reflux disease with esophagitis 10/02/2015     Priority: Medium     Intestinal adhesions 05/06/2014     Priority: Medium     Cervical adenopathy 04/29/2014     Priority: Medium     Small bowel obstruction (H) 04/28/2014     Priority: Medium     DVT prophylaxis 04/28/2014     Priority: Medium     Advance Care Planning 05/07/2012     Priority: Medium     Discussed advance care planning with patient; information given to patient to review. 5/7/2012        Hypertension goal BP (blood pressure) < 140/90 11/15/2011     Priority: Medium     Hyperlipidemia LDL goal <130 04/26/2011     Priority: Medium     Recurring abdominal pain 12/21/2009     Priority: Medium     Esophageal reflux 04/18/2006     Priority: Medium     Absence of menstruation 11/04/2005     Priority: Medium        Past Medical History:    Past Medical History:   Diagnosis Date     Unspecified essential hypertension        Past Surgical History:    Past Surgical  History:   Procedure Laterality Date     C APPENDECTOMY       C LIGATE FALLOPIAN TUBE       C TOTAL ABDOM HYSTERECTOMY      Hysterectomy, Total Abdominal     COLONOSCOPY  06/10/08    Internal hemorrhoids, otherwise normal.     COLONOSCOPY  01/20/10     HC DILATION/CURETTAGE DIAG/THER NON OB      D & C     HC LAP, SURG ENTEROLYSIS  05/07/10     HC REMOVAL OF OVARY/TUBE(S)      Salpingo-Oophorectomy, bilateral     HC UGI ENDOSCOPY, SIMPLE EXAM  01/20/10     LAPAROSCOPY DIAGNOSTIC (GENERAL)  2014    Procedure: LAPAROSCOPY DIAGNOSTIC (GENERAL);  Surgeon: Seth Matthews MD;  Location: [facility]     LAPAROTOMY, LYSIS ADHESIONS, COMBINED  2014    Procedure: COMBINED LAPAROTOMY, LYSIS ADHESIONS;  Surgeon: Seth Matthews MD;  Location: [facility]     VIDEO CAPSULE ENDOSCOPY  02/15/10    normal sm intestine       Family History:    Family History   Problem Relation Age of Onset     C.A.D. Mother         bi-pass     Diabetes Mother         late onset     Allergies Mother         xray dye     Arthritis Mother      Cancer Mother         ovary     Respiratory Mother      C.A.D. Father         bi-pass     Hypertension Brother      Breast Cancer Sister      Allergies Sister      Allergies Brother      Eye Disorder Maternal Aunt      Gastrointestinal Disease Brother      Gynecology Sister      Thyroid Disease Sister        Social History:  Marital Status:   [2]  Social History     Tobacco Use     Smoking status: Former Smoker     Packs/day: 0.50     Years: 20.00     Pack years: 10.00     Types: Cigarettes     Last attempt to quit: 1995     Years since quittin.3     Smokeless tobacco: Never Used   Substance Use Topics     Alcohol use: Yes     Comment: very seldom     Drug use: No        Medications:      HYDROcodone-acetaminophen (NORCO) 5-325 MG tablet   ondansetron (ZOFRAN ODT) 4 MG ODT tab   fish oil-omega-3 fatty acids 1000 MG capsule   GARLIC PO   lisinopril-hydrochlorothiazide (PRINZIDE/ZESTORETIC)  20-25 MG per tablet   omeprazole (PRILOSEC) 40 MG capsule   propranolol (INDERAL) 40 MG tablet   traMADol (ULTRAM) 50 MG tablet   TURMERIC PO         Review of Systems   All other systems reviewed and are negative.      Physical Exam   BP: (!) 205/118  Pulse: 68  Temp: 98.6  F (37  C)  Resp: 16  Weight: 104.3 kg (230 lb)  SpO2: 98 %      Physical Exam   Constitutional: She appears well-developed and well-nourished.   HENT:   Head: Normocephalic.   Eyes: Conjunctivae are normal.   Neck: Normal range of motion. Neck supple.   Cardiovascular: Normal rate.   Pulmonary/Chest: Effort normal.   Musculoskeletal:        Left shoulder: She exhibits tenderness, pain, spasm and decreased strength. She exhibits no bony tenderness, no swelling, no effusion and no deformity.        Arms:  Nursing note and vitals reviewed.      ED Course        Procedures               Critical Care time:  none               No results found for this or any previous visit (from the past 24 hour(s)).    Medications   oxyCODONE-acetaminophen (PERCOCET) 5-325 MG per tablet 2 tablet (2 tablets Oral Given 4/15/19 8031)   ondansetron (ZOFRAN-ODT) ODT tab 4 mg (4 mg Oral Given 4/15/19 2607)       Assessments & Plan (with Medical Decision Making)   MDM--patient is a 68-year-old who presents the emergency room with severe pain in her upper bicep area.  Patient was lifting a heavy mattress and had pain that occurred the next day.  She does not recall any specific incident or injury.  The pain is been bad enough to keep her awake at night.  The pain is all anterior to the shoulder and does not seem to involve the shoulder joint or any shoulder movement.  Any movement of her left bicep or flexion at the elbow causes pain in the upper arm.  She has been holding it talked to her side.  On examination she has no visible swelling but she is rather obese limiting the exam.  She has severe tenderness in the upper biceps area but no deformity with flexion.  Her MRI  shows no evidence of biceps tendon rupture.  The lower tendon to her biceps is completely intact nontender and palpably normal.  Her MRI does show some inflammation and fluid in the upper biceps area consistent with a muscle strain/tear.  No evidence of rupture.  I discussed all these findings with the patient.  She was given 2 Percocet in the ED which she felt were too strong.  She was discharged home with Vicodin which she has taken before and the dose reduced to 1 tablet.  She otherwise is feeling much improved.  We gave her a sling for the next several days and she should transition out of it.  Also recommended limited use and icing it.  If she has ongoing problems she may need to follow-up with orthopedics.  Patient and  are comfortable with this evaluation treatment and discharge plan and the patient discharged in good condition.        I have reviewed the nursing notes.    I have reviewed the findings, diagnosis, plan and need for follow up with the patient.             Medication List      Started    HYDROcodone-acetaminophen 5-325 MG tablet  Commonly known as:  NORCO  1 tablet, Oral, EVERY 6 HOURS PRN     ondansetron 4 MG ODT tab  Commonly known as:  ZOFRAN ODT  4 mg, Oral, EVERY 6 HOURS PRN            Final diagnoses:   Strain of left biceps muscle, initial encounter       4/15/2019   [facility] EMERGENCY DEPARTMENT     Eduard, [place] MUÑOZ MD  04/15/19 1937

## 2019-04-15 NOTE — ED AVS SNAPSHOT
[facility] Emergency Department  [address] [place] [address]  Phone:  [phone]  Fax:  [phone]                                    Komal Miller   MRN: [id number]    Department:  [facility] Emergency Department   Date of Visit:  4/15/2019           After Visit Summary Signature Page    I have received my discharge instructions, and my questions have been answered. I have discussed any challenges I see with this plan with the nurse or doctor.    ..........................................................................................................................................  Patient/Patient Representative Signature      ..........................................................................................................................................  Patient Representative Print Name and Relationship to Patient    ..................................................               ................................................  Date                                   Time    ..........................................................................................................................................  Reviewed by Signature/Title    ...................................................              ..............................................  Date                                               Time          22EPIC Rev 08/18

## 2019-04-30 DIAGNOSIS — K66.0 INTESTINAL ADHESIONS: ICD-10-CM

## 2019-04-30 DIAGNOSIS — R10.9 RECURRING ABDOMINAL PAIN: ICD-10-CM

## 2019-05-01 RX ORDER — TRAMADOL HYDROCHLORIDE 50 MG/1
TABLET ORAL
Qty: 42 TABLET | Refills: 0 | Status: SHIPPED | OUTPATIENT
Start: 2019-05-01 | End: 2019-09-18

## 2019-05-01 NOTE — TELEPHONE ENCOUNTER
Requested Prescriptions   Pending Prescriptions Disp Refills     traMADol (ULTRAM) 50 MG tablet [Pharmacy Med Name: TRAMADOL HCL 50 MG Tablet] 90 tablet 3     Sig: TAKE 1 TO 2 TABLETS EVERY 6 HOURS AS NEEDED  FOR  MODERATE  PAIN       There is no refill protocol information for this order

## 2019-05-01 NOTE — TELEPHONE ENCOUNTER
traMADol (ULTRAM) 50 MG tablet      Last Written Prescription Date:  10/5/18  Last Fill Quantity: 90,   # refills: 3  Last Office Visit: 5/15/18  Future Office visit:       Routing refill request to provider for review/approval because:  Drug not on the [facility], [facility] or [facility] refill protocol or controlled substance

## 2019-05-20 NOTE — PROGRESS NOTES
Sports Medicine Clinic Visit - Interim History May 20, 2019    Initial Visit Date 3/20/2018      PCP: Wesly Lehman    Komal LIDIA Miller is a 69 year old female who is seen in follow up for bilateral knee pain. Since last visit on 2/8/2019 patient has had a return of knee pain in the past week. She last received bilateral gel injections at her last visit on 2/8/2019. She notes that the gel injection provided some relief. She had her last steroid injections on 1/25/19.   She rates the pain at a 3/10 currently.  Symptoms are relieved with ibuprofen, CSI, and tramadol.  Symptoms are worsened by standing, stairs, activity and walking.         Review of Systems  Musculoskeletal: as above  Remainder of review of systems is negative including constitutional, eyes, ENT, CV, pulmonary, GI, , endocrine, skin, hematologic, and neurologic except as noted in HPI or medical history.    History reviewed. No pertinent past surgical/medical/family/social history other than as mentioned in HPI.    Patient Active Problem List   Diagnosis     Absence of menstruation     Esophageal reflux     Recurring abdominal pain     Hyperlipidemia LDL goal <130     Hypertension goal BP (blood pressure) < 140/90     Advance Care Planning     Small bowel obstruction (H)     DVT prophylaxis     Cervical adenopathy     Intestinal adhesions     Gastroesophageal reflux disease with esophagitis     Cluster headache, not intractable, unspecified chronicity pattern     Morbid obesity due to excess calories (H)     Past Medical History:   Diagnosis Date     Unspecified essential hypertension      Past Surgical History:   Procedure Laterality Date     C APPENDECTOMY       C LIGATE FALLOPIAN TUBE       C TOTAL ABDOM HYSTERECTOMY      Hysterectomy, Total Abdominal     COLONOSCOPY  06/10/08    Internal hemorrhoids, otherwise normal.     COLONOSCOPY  01/20/10     HC DILATION/CURETTAGE DIAG/THER NON OB      D & C     HC LAP, SURG ENTEROLYSIS   05/07/10     HC REMOVAL OF OVARY/TUBE(S)      Salpingo-Oophorectomy, bilateral     HC UGI ENDOSCOPY, SIMPLE EXAM  01/20/10     LAPAROSCOPY DIAGNOSTIC (GENERAL)  2014    Procedure: LAPAROSCOPY DIAGNOSTIC (GENERAL);  Surgeon: Seth Matthews MD;  Location[facility]     LAPAROTOMY, LYSIS ADHESIONS, COMBINED  2014    Procedure: COMBINED LAPAROTOMY, LYSIS ADHESIONS;  Surgeon: Seth Matthews MD;  Location: [facility] OR     VIDEO CAPSULE ENDOSCOPY  02/15/10    normal sm intestine     Family History   Problem Relation Age of Onset     C.A.D. Mother         bi-pass     Diabetes Mother         late onset     Allergies Mother         xray dye     Arthritis Mother      Cancer Mother         ovary     Respiratory Mother      C.A.D. Father         bi-pass     Hypertension Brother      Breast Cancer Sister      Allergies Sister      Allergies Brother      Eye Disorder Maternal Aunt      Gastrointestinal Disease Brother      Gynecology Sister      Thyroid Disease Sister      Social History     Socioeconomic History     Marital status:      Spouse name: Waylon     Number of children: 4     Years of education: 13     Highest education level: Not on file   Occupational History     Occupation:  dispatcher/     Employer: [employer]     Comment: [place] [employer]   Social Needs     Financial resource strain: Not on file     Food insecurity:     Worry: Not on file     Inability: Not on file     Transportation needs:     Medical: Not on file     Non-medical: Not on file   Tobacco Use     Smoking status: Former Smoker     Packs/day: 0.50     Years: 20.00     Pack years: 10.00     Types: Cigarettes     Last attempt to quit: 1995     Years since quittin.4     Smokeless tobacco: Never Used   Substance and Sexual Activity     Alcohol use: Yes     Comment: very seldom     Drug use: No     Sexual activity: Never   Lifestyle     Physical activity:     Days per week: Not on file      Minutes per session: Not on file     Stress: Not on file   Relationships     Social connections:     Talks on phone: Not on file     Gets together: Not on file     Attends Voodoo service: Not on file     Active member of club or organization: Not on file     Attends meetings of clubs or organizations: Not on file     Relationship status: Not on file     Intimate partner violence:     Fear of current or ex partner: Not on file     Emotionally abused: Not on file     Physically abused: Not on file     Forced sexual activity: Not on file   Other Topics Concern      Service No     Blood Transfusions Yes     Comment: with children--several units     Caffeine Concern No     Comment: coffee  5 cups/day      occ diet pepsi     Occupational Exposure Yes     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 diseases     Hobby Hazards No     Sleep Concern Yes     Comment: sleeps more restless some nights     Stress Concern No     Weight Concern Yes     Comment: w'd like to weigh 50-60# less     Special Diet No     Back Care No     Exercise Yes     Comment: walking     Bike Helmet No     Seat Belt Yes     Self-Exams No     Parent/sibling w/ CABG, MI or angioplasty before 65F 55M? Not Asked   Social History Narrative     Not on file         She is retired. She helps at the school sometimes.       Current Outpatient Medications   Medication     traMADol (ULTRAM) 50 MG tablet     fish oil-omega-3 fatty acids 1000 MG capsule     GARLIC PO     lisinopril-hydrochlorothiazide (PRINZIDE/ZESTORETIC) 20-25 MG per tablet     omeprazole (PRILOSEC) 40 MG capsule     propranolol (INDERAL) 40 MG tablet     TURMERIC PO     Current Facility-Administered Medications   Medication     hylan (SYNVISC ONE) injection 48 mg     hylan (SYNVISC ONE) injection 48 mg     lidocaine 1 % injection 2 mL     lidocaine 1 % injection 2 mL     triamcinolone (KENALOG-40) injection 40 mg     triamcinolone (KENALOG-40) injection 40 mg      "triamcinolone acetonide (KENALOG-40) injection 40 mg     triamcinolone acetonide (KENALOG-40) injection 40 mg     Allergies   Allergen Reactions     No Known Drug Allergies          Objective:  /88   Ht 1.673 m (5' 5.87\")   Wt 105.5 kg (232 lb 8 oz)   BMI 37.67 kg/m      General: Alert and in no distress    Head: Normocephalic, atraumatic  Eyes: no scleral icterus or conjunctival erythema   Oropharynx:  Mucous membranes moist  Skin: no erythema, petechiae, or jaundice  CV: regular rhythm by palpation, 2+ distal pulses  Resp: normal respiratory effort without conversational dyspnea   Psych: normal mood and affect    Gait: Non-antalgic, appropriate coordination and balance   Neuro: Motor strength and sensation as noted below    Musculoskeletal:    Bilateral Knee exam    Inspection:  No erythema, ecchymosis, warmth, or edema    Palpation: Tender over the bilateral pes anserine bursae    Knee ROM:  Full seated active knee flexion and extension bilaterally    Strength:  5/5 Hip flexion/abduction/adduction, knee extension/flexion, ankle plantarflexion/dorsiflexion, great toe extension, toe flexion    Sensation:  Intact to light touch in the bilateral lower extremities        Radiology:  KNEE BILATERAL TWO VIEWS  3/20/2018 9:11 AM      HISTORY:  Medial bilateral knee pain.     COMPARISON: Weightbearing AP views of the bilateral knees dated  3/13/2018. Right knee x-rays dated 4/16/2012.     FINDINGS: Lateral and sunrise views of the knees demonstrate probable  tiny bilateral knee joint effusions, slightly larger on the right.  Mild enthesopathic changes of the quadriceps tendon insertions into  the bilateral patellae. There is also mild joint space loss of the  medial aspect of the left patellofemoral compartment of the knee.  There is moderate medial compartment joint space loss of the bilateral  knees. This is better seen on the prior weightbearing AP view of the  knees.                                            "                           IMPRESSION:  1. Small bilateral knee joint effusions.  2. Mild enthesopathic changes of the bilateral quadriceps tendon  insertions into the bilateral patellae.  3. Mild to moderate joint space loss of the medial aspect of the left  patellofemoral compartment of the knee.  4. Moderate medial compartment joint space loss of the bilateral  knees.      MELVIN FUNG MD    Large Joint Injection/Arthocentesis: bilateral knee  Date/Time: 5/21/2019 9:23 AM  Performed by: Freda Bang MD  Authorized by: Freda Bang MD     Indications:  Pain  Needle Size:  22 G  Guidance: landmark guided    Approach:  Anterolateral  Location:  Knee  Laterality:  Bilateral      Medications (Right):  2 mL lidocaine 1 %; 40 mg triamcinolone 40 MG/ML   Medications (Right) comment:  2mL Bupivacaine 0.5%  NDC 0103-1538-38  Lot -DK  Exp 5/1/20  Medications (Left):  2 mL lidocaine 1 %; 40 mg triamcinolone 40 MG/ML   Medications (Left) comment:  2mL Bupivacaine 0.5%  NDC 4844-0037-81  Lot -DK  Exp 5/1/20  Outcome:  Tolerated well, no immediate complications  Procedure discussed: discussed risks, benefits, and alternatives    Consent Given by:  Patient          Assessment:  1. Bilateral primary osteoarthritis of knee    2. Chronic pain of both knees    3. Pes anserine bursitis        Plan:  Discussed the assessment with the patient and developed a plan together:  -Steroid injection performed today.  Take it easy over the next few days. Keep in mind that the steroid may take up to 3 days to start working and up to 2 weeks to reach maximal effect.  Ice 15-20 minutes as needed for soreness.  Patient's preferred over the counter medication as needed for pain as directed on packaging.    -At next visit, will plan on repeat x-rays and possibly place a referral to orthopedic surgery to discuss knee replacements.        Danita Bang MD, [facility]  [facility]

## 2019-05-21 ENCOUNTER — OFFICE VISIT (OUTPATIENT)
Dept: ORTHOPEDICS | Facility: CLINIC | Age: 69
End: 2019-05-21
Payer: COMMERCIAL

## 2019-05-21 VITALS
HEIGHT: 66 IN | BODY MASS INDEX: 37.37 KG/M2 | WEIGHT: 232.5 LBS | SYSTOLIC BLOOD PRESSURE: 126 MMHG | DIASTOLIC BLOOD PRESSURE: 88 MMHG

## 2019-05-21 DIAGNOSIS — M25.562 CHRONIC PAIN OF BOTH KNEES: ICD-10-CM

## 2019-05-21 DIAGNOSIS — M25.561 CHRONIC PAIN OF BOTH KNEES: ICD-10-CM

## 2019-05-21 DIAGNOSIS — M70.50 PES ANSERINE BURSITIS: ICD-10-CM

## 2019-05-21 DIAGNOSIS — M17.0 BILATERAL PRIMARY OSTEOARTHRITIS OF KNEE: Primary | ICD-10-CM

## 2019-05-21 DIAGNOSIS — G89.29 CHRONIC PAIN OF BOTH KNEES: ICD-10-CM

## 2019-05-21 PROCEDURE — 99213 OFFICE O/P EST LOW 20 MIN: CPT | Mod: 25 | Performed by: PHYSICAL MEDICINE & REHABILITATION

## 2019-05-21 PROCEDURE — 20610 DRAIN/INJ JOINT/BURSA W/O US: CPT | Mod: 50 | Performed by: PHYSICAL MEDICINE & REHABILITATION

## 2019-05-21 RX ORDER — LIDOCAINE HYDROCHLORIDE 10 MG/ML
2 INJECTION, SOLUTION INFILTRATION; PERINEURAL
Status: DISCONTINUED | OUTPATIENT
Start: 2019-05-21 | End: 2019-12-30

## 2019-05-21 RX ORDER — TRIAMCINOLONE ACETONIDE 40 MG/ML
40 INJECTION, SUSPENSION INTRA-ARTICULAR; INTRAMUSCULAR
Status: DISCONTINUED | OUTPATIENT
Start: 2019-05-21 | End: 2019-12-30

## 2019-05-21 RX ADMIN — TRIAMCINOLONE ACETONIDE 40 MG: 40 INJECTION, SUSPENSION INTRA-ARTICULAR; INTRAMUSCULAR at 09:23

## 2019-05-21 RX ADMIN — LIDOCAINE HYDROCHLORIDE 2 ML: 10 INJECTION, SOLUTION INFILTRATION; PERINEURAL at 09:23

## 2019-05-21 ASSESSMENT — MIFFLIN-ST. JEOR: SCORE: 1604.3

## 2019-05-21 NOTE — LETTER
5/21/2019         RE: Komal Miller  [address]  [facility] [address]        Dear Colleague,    Thank you for referring your patient, Komal Miller, to the [facility]. Please see a copy of my visit note below.    Sports Medicine Clinic Visit - Interim History May 20, 2019    Initial Visit Date 3/20/2018      PCP: Wesly Lehman    Komal Miller is a 69 year old female who is seen in follow up for bilateral knee pain. Since last visit on 2/8/2019 patient has had a return of knee pain in the past week. She last received bilateral gel injections at her last visit on 2/8/2019. She notes that the gel injection provided some relief. She had her last steroid injections on 1/25/19.   She rates the pain at a 3/10 currently.  Symptoms are relieved with ibuprofen, CSI, and tramadol.  Symptoms are worsened by standing, stairs, activity and walking.         Review of Systems  Musculoskeletal: as above  Remainder of review of systems is negative including constitutional, eyes, ENT, CV, pulmonary, GI, , endocrine, skin, hematologic, and neurologic except as noted in HPI or medical history.    History reviewed. No pertinent past surgical/medical/family/social history other than as mentioned in HPI.    Patient Active Problem List   Diagnosis     Absence of menstruation     Esophageal reflux     Recurring abdominal pain     Hyperlipidemia LDL goal <130     Hypertension goal BP (blood pressure) < 140/90     Advance Care Planning     Small bowel obstruction (H)     DVT prophylaxis     Cervical adenopathy     Intestinal adhesions     Gastroesophageal reflux disease with esophagitis     Cluster headache, not intractable, unspecified chronicity pattern     Morbid obesity due to excess calories (H)     Past Medical History:   Diagnosis Date     Unspecified essential hypertension      Past Surgical History:   Procedure Laterality Date     C APPENDECTOMY       C LIGATE FALLOPIAN TUBE       C  TOTAL ABDOM HYSTERECTOMY      Hysterectomy, Total Abdominal     COLONOSCOPY  06/10/08    Internal hemorrhoids, otherwise normal.     COLONOSCOPY  01/20/10     HC DILATION/CURETTAGE DIAG/THER NON OB      D & C     HC LAP, SURG ENTEROLYSIS  05/07/10     HC REMOVAL OF OVARY/TUBE(S)      Salpingo-Oophorectomy, bilateral     HC UGI ENDOSCOPY, SIMPLE EXAM  01/20/10     LAPAROSCOPY DIAGNOSTIC (GENERAL)  5/6/2014    Procedure: LAPAROSCOPY DIAGNOSTIC (GENERAL);  Surgeon: Seth Matthews MD;  [facility]: [facility]     LAPAROTOMY, LYSIS ADHESIONS, COMBINED  5/6/2014    Procedure: COMBINED LAPAROTOMY, LYSIS ADHESIONS;  Surgeon: Seth Matthews MD;  Location: [facility]     VIDEO CAPSULE ENDOSCOPY  02/15/10    normal sm intestine     Family History   Problem Relation Age of Onset     C.A.D. Mother         bi-pass     Diabetes Mother         late onset     Allergies Mother         xray dye     Arthritis Mother      Cancer Mother         ovary     Respiratory Mother      C.A.D. Father         bi-pass     Hypertension Brother      Breast Cancer Sister      Allergies Sister      Allergies Brother      Eye Disorder Maternal Aunt      Gastrointestinal Disease Brother      Gynecology Sister      Thyroid Disease Sister      Social History     Socioeconomic History     Marital status:      Spouse name: Waylon     Number of children: 4     Years of education: 13     Highest education level: Not on file   Occupational History     Occupation:  dispatcher/     Employer: [employer]     Comment: [employer]   Social Needs     Financial resource strain: Not on file     Food insecurity:     Worry: Not on file     Inability: Not on file     Transportation needs:     Medical: Not on file     Non-medical: Not on file   Tobacco Use     Smoking status: Former Smoker     Packs/day: 0.50     Years: 20.00     Pack years: 10.00     Types: Cigarettes     Last attempt to quit: 1/1/1995     Years since  quittin.4     Smokeless tobacco: Never Used   Substance and Sexual Activity     Alcohol use: Yes     Comment: very seldom     Drug use: No     Sexual activity: Never   Lifestyle     Physical activity:     Days per week: Not on file     Minutes per session: Not on file     Stress: Not on file   Relationships     Social connections:     Talks on phone: Not on file     Gets together: Not on file     Attends Moravian service: Not on file     Active member of club or organization: Not on file     Attends meetings of clubs or organizations: Not on file     Relationship status: Not on file     Intimate partner violence:     Fear of current or ex partner: Not on file     Emotionally abused: Not on file     Physically abused: Not on file     Forced sexual activity: Not on file   Other Topics Concern      Service No     Blood Transfusions Yes     Comment: with children--several units     Caffeine Concern No     Comment: coffee  5 cups/day      occ diet pepsi     Occupational Exposure Yes     Comment: works at the [employer]  body fluids, comm. diseases     Hobby Hazards No     Sleep Concern Yes     Comment: sleeps more restless some nights     Stress Concern No     Weight Concern Yes     Comment: w'd like to weigh 50-60# less     Special Diet No     Back Care No     Exercise Yes     Comment: walking     Bike Helmet No     Seat Belt Yes     Self-Exams No     Parent/sibling w/ CABG, MI or angioplasty before 65F 55M? Not Asked   Social History Narrative     Not on file         She is retired. She helps at the school sometimes.       Current Outpatient Medications   Medication     traMADol (ULTRAM) 50 MG tablet     fish oil-omega-3 fatty acids 1000 MG capsule     GARLIC PO     lisinopril-hydrochlorothiazide (PRINZIDE/ZESTORETIC) 20-25 MG per tablet     omeprazole (PRILOSEC) 40 MG capsule     propranolol (INDERAL) 40 MG tablet     TURMERIC PO     Current Facility-Administered Medications   Medication      "hylan (SYNVISC ONE) injection 48 mg     hylan (SYNVISC ONE) injection 48 mg     lidocaine 1 % injection 2 mL     lidocaine 1 % injection 2 mL     triamcinolone (KENALOG-40) injection 40 mg     triamcinolone (KENALOG-40) injection 40 mg     triamcinolone acetonide (KENALOG-40) injection 40 mg     triamcinolone acetonide (KENALOG-40) injection 40 mg     Allergies   Allergen Reactions     No Known Drug Allergies          Objective:  /88   Ht 1.673 m (5' 5.87\")   Wt 105.5 kg (232 lb 8 oz)   BMI 37.67 kg/m       General: Alert and in no distress    Head: Normocephalic, atraumatic  Eyes: no scleral icterus or conjunctival erythema   Oropharynx:  Mucous membranes moist  Skin: no erythema, petechiae, or jaundice  CV: regular rhythm by palpation, 2+ distal pulses  Resp: normal respiratory effort without conversational dyspnea   Psych: normal mood and affect    Gait: Non-antalgic, appropriate coordination and balance   Neuro: Motor strength and sensation as noted below    Musculoskeletal:    Bilateral Knee exam    Inspection:  No erythema, ecchymosis, warmth, or edema    Palpation: Tender over the bilateral pes anserine bursae    Knee ROM:  Full seated active knee flexion and extension bilaterally    Strength:  5/5 Hip flexion/abduction/adduction, knee extension/flexion, ankle plantarflexion/dorsiflexion, great toe extension, toe flexion    Sensation:  Intact to light touch in the bilateral lower extremities        Radiology:  KNEE BILATERAL TWO VIEWS  3/20/2018 9:11 AM      HISTORY:  Medial bilateral knee pain.     COMPARISON: Weightbearing AP views of the bilateral knees dated  3/13/2018. Right knee x-rays dated 4/16/2012.     FINDINGS: Lateral and sunrise views of the knees demonstrate probable  tiny bilateral knee joint effusions, slightly larger on the right.  Mild enthesopathic changes of the quadriceps tendon insertions into  the bilateral patellae. There is also mild joint space loss of the  medial aspect of " the left patellofemoral compartment of the knee.  There is moderate medial compartment joint space loss of the bilateral  knees. This is better seen on the prior weightbearing AP view of the  knees.                                                                      IMPRESSION:  1. Small bilateral knee joint effusions.  2. Mild enthesopathic changes of the bilateral quadriceps tendon  insertions into the bilateral patellae.  3. Mild to moderate joint space loss of the medial aspect of the left  patellofemoral compartment of the knee.  4. Moderate medial compartment joint space loss of the bilateral  knees.      MELVIN FUNG MD    Large Joint Injection/Arthocentesis: bilateral knee  Date/Time: 5/21/2019 9:23 AM  Performed by: Freda Bang MD  Authorized by: Freda Bang MD     Indications:  Pain  Needle Size:  22 G  Guidance: landmark guided    Approach:  Anterolateral  Location:  Knee  Laterality:  Bilateral      Medications (Right):  2 mL lidocaine 1 %; 40 mg triamcinolone 40 MG/ML   Medications (Right) comment:  2mL Bupivacaine 0.5%  NDC 4442-8498-12  Lot -DK  Exp 5/1/20  Medications (Left):  2 mL lidocaine 1 %; 40 mg triamcinolone 40 MG/ML   Medications (Left) comment:  2mL Bupivacaine 0.5%  NDC 0157-1332-12  Lot -DK  Exp 5/1/20  Outcome:  Tolerated well, no immediate complications  Procedure discussed: discussed risks, benefits, and alternatives    Consent Given by:  Patient          Assessment:  1. Bilateral primary osteoarthritis of knee    2. Chronic pain of both knees    3. Pes anserine bursitis        Plan:  Discussed the assessment with the patient and developed a plan together:  -Steroid injection performed today.  Take it easy over the next few days. Keep in mind that the steroid may take up to 3 days to start working and up to 2 weeks to reach maximal effect.  Ice 15-20 minutes as needed for soreness.  Patient's preferred over the counter medication as needed  for pain as directed on packaging.    -At next visit, will plan on repeat x-rays and possibly place a referral to orthopedic surgery to discuss knee replacements.        Danita Bang MD, [facility] Sports Medicine  [facility]        Again, thank you for allowing me to participate in the care of your patient.        Sincerely,        Freda Bang MD

## 2019-05-31 ENCOUNTER — OFFICE VISIT (OUTPATIENT)
Dept: FAMILY MEDICINE | Facility: OTHER | Age: 69
End: 2019-05-31
Payer: COMMERCIAL

## 2019-05-31 VITALS
OXYGEN SATURATION: 93 % | WEIGHT: 230.6 LBS | HEIGHT: 66 IN | RESPIRATION RATE: 16 BRPM | DIASTOLIC BLOOD PRESSURE: 80 MMHG | HEART RATE: 78 BPM | BODY MASS INDEX: 37.06 KG/M2 | TEMPERATURE: 97.9 F | SYSTOLIC BLOOD PRESSURE: 134 MMHG

## 2019-05-31 DIAGNOSIS — G44.009 CLUSTER HEADACHE, NOT INTRACTABLE, UNSPECIFIED CHRONICITY PATTERN: ICD-10-CM

## 2019-05-31 DIAGNOSIS — E78.5 HYPERLIPIDEMIA LDL GOAL <130: ICD-10-CM

## 2019-05-31 DIAGNOSIS — Z11.59 NEED FOR HEPATITIS C SCREENING TEST: Primary | ICD-10-CM

## 2019-05-31 DIAGNOSIS — I10 HYPERTENSION GOAL BP (BLOOD PRESSURE) < 140/90: ICD-10-CM

## 2019-05-31 DIAGNOSIS — K21.00 GASTROESOPHAGEAL REFLUX DISEASE WITH ESOPHAGITIS: ICD-10-CM

## 2019-05-31 LAB
ALBUMIN UR-MCNC: NEGATIVE MG/DL
ANION GAP SERPL CALCULATED.3IONS-SCNC: 5 MMOL/L (ref 3–14)
APPEARANCE UR: CLEAR
BILIRUB UR QL STRIP: NEGATIVE
BUN SERPL-MCNC: 11 MG/DL (ref 7–30)
CALCIUM SERPL-MCNC: 9.1 MG/DL (ref 8.5–10.1)
CHLORIDE SERPL-SCNC: 100 MMOL/L (ref 94–109)
CHOLEST SERPL-MCNC: 235 MG/DL
CO2 SERPL-SCNC: 29 MMOL/L (ref 20–32)
COLOR UR AUTO: YELLOW
CREAT SERPL-MCNC: 0.7 MG/DL (ref 0.52–1.04)
ERYTHROCYTE [DISTWIDTH] IN BLOOD BY AUTOMATED COUNT: 12.2 % (ref 10–15)
GFR SERPL CREATININE-BSD FRML MDRD: 90 ML/MIN/{1.73_M2}
GLUCOSE SERPL-MCNC: 93 MG/DL (ref 70–99)
GLUCOSE UR STRIP-MCNC: NEGATIVE MG/DL
HCT VFR BLD AUTO: 40.7 % (ref 35–47)
HDLC SERPL-MCNC: 66 MG/DL
HGB BLD-MCNC: 13.6 G/DL (ref 11.7–15.7)
HGB UR QL STRIP: NEGATIVE
KETONES UR STRIP-MCNC: NEGATIVE MG/DL
LDLC SERPL CALC-MCNC: 159 MG/DL
LEUKOCYTE ESTERASE UR QL STRIP: NEGATIVE
MCH RBC QN AUTO: 29.5 PG (ref 26.5–33)
MCHC RBC AUTO-ENTMCNC: 33.4 G/DL (ref 31.5–36.5)
MCV RBC AUTO: 88 FL (ref 78–100)
NITRATE UR QL: NEGATIVE
NONHDLC SERPL-MCNC: 169 MG/DL
PH UR STRIP: 8.5 PH (ref 5–7)
PLATELET # BLD AUTO: 316 10E9/L (ref 150–450)
POTASSIUM SERPL-SCNC: 4 MMOL/L (ref 3.4–5.3)
RBC # BLD AUTO: 4.61 10E12/L (ref 3.8–5.2)
SODIUM SERPL-SCNC: 134 MMOL/L (ref 133–144)
SOURCE: ABNORMAL
SP GR UR STRIP: 1.01 (ref 1–1.03)
TRIGL SERPL-MCNC: 52 MG/DL
UROBILINOGEN UR STRIP-ACNC: 0.2 EU/DL (ref 0.2–1)
WBC # BLD AUTO: 10.1 10E9/L (ref 4–11)

## 2019-05-31 PROCEDURE — 81003 URINALYSIS AUTO W/O SCOPE: CPT | Performed by: INTERNAL MEDICINE

## 2019-05-31 PROCEDURE — G0439 PPPS, SUBSEQ VISIT: HCPCS | Performed by: INTERNAL MEDICINE

## 2019-05-31 PROCEDURE — 80061 LIPID PANEL: CPT | Performed by: INTERNAL MEDICINE

## 2019-05-31 PROCEDURE — 80048 BASIC METABOLIC PNL TOTAL CA: CPT | Performed by: INTERNAL MEDICINE

## 2019-05-31 PROCEDURE — 85027 COMPLETE CBC AUTOMATED: CPT | Performed by: INTERNAL MEDICINE

## 2019-05-31 PROCEDURE — 36415 COLL VENOUS BLD VENIPUNCTURE: CPT | Performed by: INTERNAL MEDICINE

## 2019-05-31 RX ORDER — PROPRANOLOL HYDROCHLORIDE 40 MG/1
40 TABLET ORAL 2 TIMES DAILY
Qty: 180 TABLET | Refills: 3 | Status: SHIPPED | OUTPATIENT
Start: 2019-05-31 | End: 2020-03-23

## 2019-05-31 RX ORDER — LISINOPRIL AND HYDROCHLOROTHIAZIDE 20; 25 MG/1; MG/1
1 TABLET ORAL DAILY
Qty: 90 TABLET | Refills: 3 | Status: SHIPPED | OUTPATIENT
Start: 2019-05-31 | End: 2020-03-23

## 2019-05-31 RX ORDER — OMEPRAZOLE 40 MG/1
CAPSULE, DELAYED RELEASE ORAL
Qty: 90 CAPSULE | Refills: 3 | Status: SHIPPED | OUTPATIENT
Start: 2019-05-31 | End: 2020-03-23

## 2019-05-31 ASSESSMENT — PAIN SCALES - GENERAL: PAINLEVEL: NO PAIN (0)

## 2019-05-31 ASSESSMENT — ENCOUNTER SYMPTOMS
BREAST MASS: 0
ARTHRALGIAS: 0

## 2019-05-31 ASSESSMENT — MIFFLIN-ST. JEOR: SCORE: 1594.57

## 2019-05-31 ASSESSMENT — ACTIVITIES OF DAILY LIVING (ADL): CURRENT_FUNCTION: NO ASSISTANCE NEEDED

## 2019-05-31 NOTE — PROGRESS NOTES
"SUBJECTIVE:   Komal Miller is a 69 year old female who presents for Preventive Visit.  Are you in the first 12 months of your Medicare coverage?  No    Healthy Habits:     In general, how would you rate your overall health?  Good    Frequency of exercise:  None    Do you usually eat at least 4 servings of fruit and vegetables a day, include whole grains    & fiber and avoid regularly eating high fat or \"junk\" foods?  Yes    Taking medications regularly:  Yes    Medication side effects:  Not applicable    Ability to successfully perform activities of daily living:  No assistance needed    Home Safety:  No safety concerns identified    Hearing Impairment:  No hearing concerns    In the past 6 months, have you been bothered by leaking of urine?  No    In general, how would you rate your overall mental or emotional health?  Good      PHQ-2 Total Score: 0    Additional concerns today:  No    Do you feel safe in your environment? Yes    Do you have a Health Care Directive? Yes: Advance Directive has been received and scanned.      Fall risk  Fallen 2 or more times in the past year?: No  Any fall with injury in the past year?: No    Cognitive Screening   1) Repeat 3 items (Leader, Season, Table)    2) Clock draw: NORMAL  3) 3 item recall: Recalls 3 objects  Results: 3 items recalled: COGNITIVE IMPAIRMENT LESS LIKELY    Mini-CogTM Copyright S [employer]. Licensed by the author for use in [facility]; reprinted with permission ([email or web address]). All rights reserved.      Do you have sleep apnea, excessive snoring or daytime drowsiness?: no    Reviewed and updated as needed this visit by clinical staff  Tobacco  Allergies  Meds  Med Hx  Surg Hx  Fam Hx  Soc Hx        Reviewed and updated as needed this visit by Provider        Social History     Tobacco Use     Smoking status: Former Smoker     Packs/day: 0.50     Years: 20.00     Pack years: 10.00     Types: Cigarettes     Last attempt to quit: 1/1/1995     " Years since quittin.4     Smokeless tobacco: Never Used   Substance Use Topics     Alcohol use: Yes     Comment: very seldom     If you drink alcohol do you typically have >3 drinks per day or >7 drinks per week? No    Alcohol Use 2019   Prescreen: >3 drinks/day or >7 drinks/week? No   Prescreen: >3 drinks/day or >7 drinks/week? -               Current providers sharing in care for this patient include:   Patient Care Team:  Wesly Lehman DO as PCP - General (Internal Medicine)  Wesly Lehman DO as Assigned PCP    The following health maintenance items are reviewed in Epic and correct as of today:  Health Maintenance   Topic Date Due     URINE DRUG SCREEN  1950     HEPATITIS C SCREENING  1950     DTAP/TDAP/TD IMMUNIZATION (1 - Tdap) 1977     ZOSTER IMMUNIZATION (2 of 3) 2015     PNEUMOCOCCAL IMMUNIZATION 65+ LOW/MEDIUM RISK (1 of 2 - PCV13) 2017     PHQ-2  2019     DEXA  2019     FALL RISK ASSESSMENT  05/15/2019     MAMMO SCREENING  2019     MEDICARE ANNUAL WELLNESS VISIT  05/15/2019     INFLUENZA VACCINE (Season Ended) 2019     COLONOSCOPY  2020     ADVANCED DIRECTIVE PLANNING  2022     LIPID  05/15/2023     IPV IMMUNIZATION  Aged Out     MENINGITIS IMMUNIZATION  Aged Out     Lab work is in process  BP Readings from Last 3 Encounters:   19 134/80   19 126/88   04/15/19 160/90    Wt Readings from Last 3 Encounters:   19 104.6 kg (230 lb 9.6 oz)   19 105.5 kg (232 lb 8 oz)   04/15/19 104.3 kg (230 lb)                  Patient Active Problem List   Diagnosis     Absence of menstruation     Esophageal reflux     Recurring abdominal pain     Hyperlipidemia LDL goal <130     Hypertension goal BP (blood pressure) < 140/90     Advance Care Planning     Small bowel obstruction (H)     DVT prophylaxis     Cervical adenopathy     Intestinal adhesions     Gastroesophageal reflux disease with  esophagitis     Cluster headache, not intractable, unspecified chronicity pattern     Morbid obesity due to excess calories (H)     Past Surgical History:   Procedure Laterality Date     C APPENDECTOMY       C LIGATE FALLOPIAN TUBE       C TOTAL ABDOM HYSTERECTOMY      Hysterectomy, Total Abdominal     COLONOSCOPY  06/10/08    Internal hemorrhoids, otherwise normal.     COLONOSCOPY  01/20/10     HC DILATION/CURETTAGE DIAG/THER NON OB      D & C     HC LAP, SURG ENTEROLYSIS  05/07/10     HC REMOVAL OF OVARY/TUBE(S)      Salpingo-Oophorectomy, bilateral     HC UGI ENDOSCOPY, SIMPLE EXAM  01/20/10     LAPAROSCOPY DIAGNOSTIC (GENERAL)  2014    Procedure: LAPAROSCOPY DIAGNOSTIC (GENERAL);  Surgeon: Seth Matthews MD;  [facility]     LAPAROTOMY, LYSIS ADHESIONS, COMBINED  2014    Procedure: COMBINED LAPAROTOMY, LYSIS ADHESIONS;  Surgeon: Seth Matthews MD;  [facility]     VIDEO CAPSULE ENDOSCOPY  02/15/10    normal sm intestine       Social History     Tobacco Use     Smoking status: Former Smoker     Packs/day: 0.50     Years: 20.00     Pack years: 10.00     Types: Cigarettes     Last attempt to quit: 1995     Years since quittin.4     Smokeless tobacco: Never Used   Substance Use Topics     Alcohol use: Yes     Comment: very seldom     Family History   Problem Relation Age of Onset     C.A.D. Mother         bi-pass     Diabetes Mother         late onset     Allergies Mother         xray dye     Arthritis Mother      Cancer Mother         ovary     Respiratory Mother      C.A.D. Father         bi-pass     Hypertension Brother      Breast Cancer Sister      Allergies Sister      Allergies Brother      Eye Disorder Maternal Aunt      Gastrointestinal Disease Brother      Gynecology Sister      Thyroid Disease Sister          Current Outpatient Medications   Medication Sig Dispense Refill     fish oil-omega-3 fatty acids 1000 MG capsule Take 2 g by mouth daily       GARLIC PO         "lisinopril-hydrochlorothiazide (PRINZIDE/ZESTORETIC) 20-25 MG tablet Take 1 tablet by mouth daily 90 tablet 3     omeprazole (PRILOSEC) 40 MG DR capsule TAKE ONE CAPSULE BY MOUTH EVERY DAY .TAKE 30 TO 60 MINUTES BEFORE A MEAL 90 capsule 3     propranolol (INDERAL) 40 MG tablet Take 1 tablet (40 mg) by mouth 2 times daily 180 tablet 3     traMADol (ULTRAM) 50 MG tablet Take 1 pill up to 3 times daily for pain for 7 days, then take 1 pill every twice daily as needed for pain for 7 days, then take 1 pill at bedtime as needed for 7 days, then discontinued the medication 42 tablet 0     Allergies   Allergen Reactions     No Known Drug Allergies      Mammogram due     Review of Systems  CONSTITUTIONAL: NEGATIVE for fever, chills, change in weight  INTEGUMENTARY/SKIN: NEGATIVE for worrisome rashes, moles or lesions  EYES: NEGATIVE for vision changes or irritation  ENT/MOUTH: NEGATIVE for ear, mouth and throat problems  RESP: NEGATIVE for significant cough or SOB  BREAST: NEGATIVE for masses, tenderness or discharge  CV: NEGATIVE for chest pain, palpitations or peripheral edema  GI: NEGATIVE for nausea, abdominal pain, heartburn, or change in bowel habits  : NEGATIVE for frequency, dysuria, or hematuria  MUSCULOSKELETAL: NEGATIVE for significant arthralgias or myalgia  NEURO: NEGATIVE for weakness, dizziness or paresthesias  ENDOCRINE: NEGATIVE for temperature intolerance, skin/hair changes  HEME: NEGATIVE for bleeding problems  PSYCHIATRIC: NEGATIVE for changes in mood or affect    OBJECTIVE:   /80 (BP Location: Left arm, Patient Position: Sitting, Cuff Size: Adult Large)   Pulse 78   Temp 97.9  F (36.6  C) (Temporal)   Resp 16   Ht 1.671 m (5' 5.8\")   Wt 104.6 kg (230 lb 9.6 oz)   SpO2 93%   BMI 37.45 kg/m   Estimated body mass index is 37.45 kg/m  as calculated from the following:    Height as of this encounter: 1.671 m (5' 5.8\").    Weight as of this encounter: 104.6 kg (230 lb 9.6 oz).  Physical " Exam  GENERAL APPEARANCE: healthy, alert and no distress  EYES: Eyes grossly normal to inspection, PERRL and conjunctivae and sclerae normal  HENT: ear canals and TM's normal, nose and mouth without ulcers or lesions, oropharynx clear and oral mucous membranes moist  NECK: no adenopathy, no asymmetry, masses, or scars and thyroid normal to palpation  RESP: lungs clear to auscultation - no rales, rhonchi or wheezes  BREAST: normal without masses, tenderness or nipple discharge and no palpable axillary masses or adenopathy  CV: regular rate and rhythm, normal S1 S2, no S3 or S4, no murmur, click or rub, no peripheral edema and peripheral pulses strong  ABDOMEN: soft, nontender, no hepatosplenomegaly, no masses and bowel sounds normal  MS: no musculoskeletal defects are noted and gait is age appropriate without ataxia  SKIN: no suspicious lesions or rashes  NEURO: Normal strength and tone, sensory exam grossly normal, mentation intact and speech normal  PSYCH: mentation appears normal and affect normal/bright    Diagnostic Test Results:  Results for orders placed or performed in visit on 05/31/19   CBC with platelets   Result Value Ref Range    WBC 10.1 4.0 - 11.0 10e9/L    RBC Count 4.61 3.8 - 5.2 10e12/L    Hemoglobin 13.6 11.7 - 15.7 g/dL    Hematocrit 40.7 35.0 - 47.0 %    MCV 88 78 - 100 fl    MCH 29.5 26.5 - 33.0 pg    MCHC 33.4 31.5 - 36.5 g/dL    RDW 12.2 10.0 - 15.0 %    Platelet Count 316 150 - 450 10e9/L   Basic metabolic panel   Result Value Ref Range    Sodium 134 133 - 144 mmol/L    Potassium 4.0 3.4 - 5.3 mmol/L    Chloride 100 94 - 109 mmol/L    Carbon Dioxide 29 20 - 32 mmol/L    Anion Gap 5 3 - 14 mmol/L    Glucose 93 70 - 99 mg/dL    Urea Nitrogen 11 7 - 30 mg/dL    Creatinine 0.70 0.52 - 1.04 mg/dL    GFR Estimate 90 >60 mL/min/[1.73_m2]    GFR Estimate If Black >90 >60 mL/min/[1.73_m2]    Calcium 9.1 8.5 - 10.1 mg/dL   *UA reflex to Microscopic and Culture ([facility] ([facility]  [place])   Result Value Ref Range    Color Urine Yellow     Appearance Urine Clear     Glucose Urine Negative NEG^Negative mg/dL    Bilirubin Urine Negative NEG^Negative    Ketones Urine Negative NEG^Negative mg/dL    Specific Gravity Urine 1.015 1.003 - 1.035    Blood Urine Negative NEG^Negative    pH Urine 8.5 (H) 5.0 - 7.0 pH    Protein Albumin Urine Negative NEG^Negative mg/dL    Urobilinogen Urine 0.2 0.2 - 1.0 EU/dL    Nitrite Urine Negative NEG^Negative    Leukocyte Esterase Urine Negative NEG^Negative    Source Midstream Urine    Lipid Profile   Result Value Ref Range    Cholesterol 235 (H) <200 mg/dL    Triglycerides 52 <150 mg/dL    HDL Cholesterol 66 >49 mg/dL    LDL Cholesterol Calculated 159 (H) <100 mg/dL    Non HDL Cholesterol 169 (H) <130 mg/dL       ASSESSMENT / PLAN:       ICD-10-CM    1. Need for hepatitis C screening test Z11.59 **Hepatitis C Screen Reflex to RNA FUTURE anytime   2. Hypertension goal BP (blood pressure) < 140/90 I10 lisinopril-hydrochlorothiazide (PRINZIDE/ZESTORETIC) 20-25 MG tablet     propranolol (INDERAL) 40 MG tablet     Basic metabolic panel     *UA reflex to Microscopic and Culture ([facility] (except [place] and [place])   3. Cluster headache, not intractable, unspecified chronicity pattern G44.009 propranolol (INDERAL) 40 MG tablet   4. Hyperlipidemia LDL goal <130 E78.5 Lipid Profile   5. Gastroesophageal reflux disease with esophagitis K21.0 omeprazole (PRILOSEC) 40 MG DR capsule     CBC with platelets       End of Life Planning:  Patient currently has an advanced directive: Yes.  Practitioner is supportive of decision.    COUNSELING:  Reviewed preventive health counseling, as reflected in patient instructions       Regular exercise       Healthy diet/nutrition       Vision screening       Hearing screening       Dental care       Immunizations    Patient will obtain at pharmacy.        Estimated body mass index is 37.45 kg/m  as  "calculated from the following:    Height as of this encounter: 1.671 m (5' 5.8\").    Weight as of this encounter: 104.6 kg (230 lb 9.6 oz).    Weight management plan: Discussed healthy diet and exercise guidelines     reports that she quit smoking about 24 years ago. Her smoking use included cigarettes. She has a 10.00 pack-year smoking history. She has never used smokeless tobacco.      Appropriate preventive services were discussed with this patient, including applicable screening as appropriate for cardiovascular disease, diabetes, osteopenia/osteoporosis, and glaucoma.  As appropriate for age/gender, discussed screening for colorectal cancer, prostate cancer, breast cancer, and cervical cancer. Checklist reviewing preventive services available has been given to the patient.    Reviewed patients plan of care and provided an AVS. The Basic Care Plan (routine screening as documented in Health Maintenance) for Komal meets the Care Plan requirement. This Care Plan has been established and reviewed with the Patient.    Counseling Resources:  ATP IV Guidelines  Pooled Cohorts Equation Calculator  Breast Cancer Risk Calculator  FRAX Risk Assessment  ICSI Preventive Guidelines  Dietary Guidelines for Americans, 2010  USDA's MyPlate  ASA Prophylaxis  Lung CA Screening    Wesly Lehman,   [facility]    Identified Health Risks:  "

## 2019-05-31 NOTE — LETTER
June 11, 2019      Komal Miller  [address]  [facility] [address]        Dear Komal, your recent test results are attached.   Urinary analysis is negative.   The cholesterol is somewhat elevated with an LDL of 159.   Chemistry panel is normal including blood sugar and kidney function.   The blood cell count is normal without evidence of infection or anemia.   Please continue your low-fat diet.  We should recheck the cholesterol in 4 months and if not better, consider statin medications.   Feel free to contact me via the office or My Chart if you have any questions regarding the above.     Resulted Orders   CBC with platelets   Result Value Ref Range    WBC 10.1 4.0 - 11.0 10e9/L    RBC Count 4.61 3.8 - 5.2 10e12/L    Hemoglobin 13.6 11.7 - 15.7 g/dL    Hematocrit 40.7 35.0 - 47.0 %    MCV 88 78 - 100 fl    MCH 29.5 26.5 - 33.0 pg    MCHC 33.4 31.5 - 36.5 g/dL    RDW 12.2 10.0 - 15.0 %    Platelet Count 316 150 - 450 10e9/L   Basic metabolic panel   Result Value Ref Range    Sodium 134 133 - 144 mmol/L    Potassium 4.0 3.4 - 5.3 mmol/L    Chloride 100 94 - 109 mmol/L    Carbon Dioxide 29 20 - 32 mmol/L    Anion Gap 5 3 - 14 mmol/L    Glucose 93 70 - 99 mg/dL    Urea Nitrogen 11 7 - 30 mg/dL    Creatinine 0.70 0.52 - 1.04 mg/dL    GFR Estimate 90 >60 mL/min/[1.73_m2]      Comment:      Non  GFR Calc  Starting 12/18/2018, serum creatinine based estimated GFR (eGFR) will be   calculated using the Chronic Kidney Disease Epidemiology Collaboration   (CKD-EPI) equation.      GFR Estimate If Black >90 >60 mL/min/[1.73_m2]      Comment:       GFR Calc  Starting 12/18/2018, serum creatinine based estimated GFR (eGFR) will be   calculated using the Chronic Kidney Disease Epidemiology Collaboration   (CKD-EPI) equation.      Calcium 9.1 8.5 - 10.1 mg/dL   *UA reflex to Microscopic and Culture ([facility] (except [place] and [place])   Result Value Ref Range    Color  Urine Yellow     Appearance Urine Clear     Glucose Urine Negative NEG^Negative mg/dL    Bilirubin Urine Negative NEG^Negative    Ketones Urine Negative NEG^Negative mg/dL    Specific Gravity Urine 1.015 1.003 - 1.035    Blood Urine Negative NEG^Negative    pH Urine 8.5 (H) 5.0 - 7.0 pH    Protein Albumin Urine Negative NEG^Negative mg/dL    Urobilinogen Urine 0.2 0.2 - 1.0 EU/dL    Nitrite Urine Negative NEG^Negative    Leukocyte Esterase Urine Negative NEG^Negative    Source Midstream Urine    Lipid Profile   Result Value Ref Range    Cholesterol 235 (H) <200 mg/dL      Comment:      Desirable:       <200 mg/dl    Triglycerides 52 <150 mg/dL    HDL Cholesterol 66 >49 mg/dL    LDL Cholesterol Calculated 159 (H) <100 mg/dL      Comment:      Above desirable:  100-129 mg/dl  Borderline High:  130-159 mg/dL  High:             160-189 mg/dL  Very high:       >189 mg/dl      Non HDL Cholesterol 169 (H) <130 mg/dL      Comment:      Above Desirable:  130-159 mg/dl  Borderline high:  160-189 mg/dl  High:             190-219 mg/dl  Very high:       >219 mg/dl       Sincerely,        Wesly Lehman DO

## 2019-05-31 NOTE — Clinical Note
Patient is due for mammogram.  Could you please see if she would like to set one up?  If so, please help her.  Thank you.[place]on

## 2019-06-11 ENCOUNTER — TELEPHONE (OUTPATIENT)
Dept: FAMILY MEDICINE | Facility: OTHER | Age: 69
End: 2019-06-11

## 2019-06-11 NOTE — RESULT ENCOUNTER NOTE
Dear Komal, your recent test results are attached.  Urinary analysis is negative.  The cholesterol is somewhat elevated with an LDL of 159.  Chemistry panel is normal including blood sugar and kidney function.  The blood cell count is normal without evidence of infection or anemia.  Please continue your low-fat diet.  We should recheck the cholesterol in 4 months and if not better, consider statin medications.  Feel free to contact me via the office or My Chart if you have any questions regarding the above.

## 2019-06-11 NOTE — TELEPHONE ENCOUNTER
Wesly Lehman, DO [facility]             Patient is due for mammogram.  Could you please see if she would like to set one up?  If so, please help her.  Thank you.     [place]      Spoke with pt and she declined the Mammogram as of right now. Pt states if she changes her mind she will call back.

## 2019-07-01 ENCOUNTER — TRANSFERRED RECORDS (OUTPATIENT)
Dept: HEALTH INFORMATION MANAGEMENT | Facility: CLINIC | Age: 69
End: 2019-07-01

## 2019-07-10 ENCOUNTER — OFFICE VISIT (OUTPATIENT)
Dept: FAMILY MEDICINE | Facility: OTHER | Age: 69
End: 2019-07-10
Payer: COMMERCIAL

## 2019-07-10 VITALS
OXYGEN SATURATION: 97 % | TEMPERATURE: 98.1 F | DIASTOLIC BLOOD PRESSURE: 76 MMHG | HEIGHT: 66 IN | BODY MASS INDEX: 36.71 KG/M2 | HEART RATE: 70 BPM | RESPIRATION RATE: 16 BRPM | WEIGHT: 228.4 LBS | SYSTOLIC BLOOD PRESSURE: 132 MMHG

## 2019-07-10 DIAGNOSIS — K21.00 GASTROESOPHAGEAL REFLUX DISEASE WITH ESOPHAGITIS: ICD-10-CM

## 2019-07-10 DIAGNOSIS — E78.5 HYPERLIPIDEMIA LDL GOAL <130: ICD-10-CM

## 2019-07-10 DIAGNOSIS — E66.01 MORBID OBESITY DUE TO EXCESS CALORIES (H): ICD-10-CM

## 2019-07-10 DIAGNOSIS — M17.0 PRIMARY OSTEOARTHRITIS OF BOTH KNEES: ICD-10-CM

## 2019-07-10 DIAGNOSIS — Z01.818 PREOP GENERAL PHYSICAL EXAM: Primary | ICD-10-CM

## 2019-07-10 DIAGNOSIS — I10 HYPERTENSION GOAL BP (BLOOD PRESSURE) < 140/90: ICD-10-CM

## 2019-07-10 LAB
ALBUMIN UR-MCNC: NEGATIVE MG/DL
ANION GAP SERPL CALCULATED.3IONS-SCNC: 3 MMOL/L (ref 3–14)
APPEARANCE UR: CLEAR
BILIRUB UR QL STRIP: NEGATIVE
BUN SERPL-MCNC: 16 MG/DL (ref 7–30)
CALCIUM SERPL-MCNC: 8.7 MG/DL (ref 8.5–10.1)
CHLORIDE SERPL-SCNC: 98 MMOL/L (ref 94–109)
CO2 SERPL-SCNC: 33 MMOL/L (ref 20–32)
COLOR UR AUTO: YELLOW
CREAT SERPL-MCNC: 1 MG/DL (ref 0.52–1.04)
ERYTHROCYTE [DISTWIDTH] IN BLOOD BY AUTOMATED COUNT: 12.6 % (ref 10–15)
GFR SERPL CREATININE-BSD FRML MDRD: 58 ML/MIN/{1.73_M2}
GLUCOSE SERPL-MCNC: 101 MG/DL (ref 70–99)
GLUCOSE UR STRIP-MCNC: NEGATIVE MG/DL
HCT VFR BLD AUTO: 39.9 % (ref 35–47)
HGB BLD-MCNC: 13.5 G/DL (ref 11.7–15.7)
HGB UR QL STRIP: NEGATIVE
KETONES UR STRIP-MCNC: NEGATIVE MG/DL
LEUKOCYTE ESTERASE UR QL STRIP: NEGATIVE
MCH RBC QN AUTO: 30.1 PG (ref 26.5–33)
MCHC RBC AUTO-ENTMCNC: 33.8 G/DL (ref 31.5–36.5)
MCV RBC AUTO: 89 FL (ref 78–100)
NITRATE UR QL: NEGATIVE
PH UR STRIP: 6.5 PH (ref 5–7)
PLATELET # BLD AUTO: 321 10E9/L (ref 150–450)
POTASSIUM SERPL-SCNC: 3.7 MMOL/L (ref 3.4–5.3)
RBC # BLD AUTO: 4.48 10E12/L (ref 3.8–5.2)
SODIUM SERPL-SCNC: 134 MMOL/L (ref 133–144)
SOURCE: NORMAL
SP GR UR STRIP: 1.02 (ref 1–1.03)
UROBILINOGEN UR STRIP-ACNC: 0.2 EU/DL (ref 0.2–1)
WBC # BLD AUTO: 9.1 10E9/L (ref 4–11)

## 2019-07-10 PROCEDURE — 85027 COMPLETE CBC AUTOMATED: CPT | Performed by: INTERNAL MEDICINE

## 2019-07-10 PROCEDURE — 80048 BASIC METABOLIC PNL TOTAL CA: CPT | Performed by: INTERNAL MEDICINE

## 2019-07-10 PROCEDURE — 93000 ELECTROCARDIOGRAM COMPLETE: CPT | Performed by: INTERNAL MEDICINE

## 2019-07-10 PROCEDURE — 36415 COLL VENOUS BLD VENIPUNCTURE: CPT | Performed by: INTERNAL MEDICINE

## 2019-07-10 PROCEDURE — 99215 OFFICE O/P EST HI 40 MIN: CPT | Performed by: INTERNAL MEDICINE

## 2019-07-10 PROCEDURE — 81003 URINALYSIS AUTO W/O SCOPE: CPT | Performed by: INTERNAL MEDICINE

## 2019-07-10 ASSESSMENT — PAIN SCALES - GENERAL: PAINLEVEL: NO PAIN (0)

## 2019-07-10 ASSESSMENT — MIFFLIN-ST. JEOR: SCORE: 1584.6

## 2019-07-10 NOTE — LETTER
July 26, 2019      Komal Miller  [address] [facility] [address]        Dear ,    We are writing to inform you of your test results.    The urine sample is normal.   The chemistry panel shows a slight decrease in kidney function over the last month.   The blood cell count is normal without evidence of anemia or leukemia.   Would recommend repeating a blood sample in the upcoming 2 weeks.     Feel free to contact me via the office or My Chart if you have any questions regarding the above.    Resulted Orders   Basic metabolic panel  (Ca, Cl, CO2, Creat, Gluc, K, Na, BUN)   Result Value Ref Range    Sodium 134 133 - 144 mmol/L    Potassium 3.7 3.4 - 5.3 mmol/L    Chloride 98 94 - 109 mmol/L    Carbon Dioxide 33 (H) 20 - 32 mmol/L    Anion Gap 3 3 - 14 mmol/L    Glucose 101 (H) 70 - 99 mg/dL    Urea Nitrogen 16 7 - 30 mg/dL    Creatinine 1.00 0.52 - 1.04 mg/dL    GFR Estimate 58 (L) >60 mL/min/[1.73_m2]      Comment:      Non  GFR Calc  Starting 12/18/2018, serum creatinine based estimated GFR (eGFR) will be   calculated using the Chronic Kidney Disease Epidemiology Collaboration   (CKD-EPI) equation.      GFR Estimate If Black 67 >60 mL/min/[1.73_m2]      Comment:       GFR Calc  Starting 12/18/2018, serum creatinine based estimated GFR (eGFR) will be   calculated using the Chronic Kidney Disease Epidemiology Collaboration   (CKD-EPI) equation.      Calcium 8.7 8.5 - 10.1 mg/dL   CBC with platelets   Result Value Ref Range    WBC 9.1 4.0 - 11.0 10e9/L    RBC Count 4.48 3.8 - 5.2 10e12/L    Hemoglobin 13.5 11.7 - 15.7 g/dL    Hematocrit 39.9 35.0 - 47.0 %    MCV 89 78 - 100 fl    MCH 30.1 26.5 - 33.0 pg    MCHC 33.8 31.5 - 36.5 g/dL    RDW 12.6 10.0 - 15.0 %    Platelet Count 321 150 - 450 10e9/L   *UA reflex to Microscopic and Culture ([facility] (except [place] and [place])   Result Value Ref Range    Color Urine Yellow     Appearance Urine Clear      Glucose Urine Negative NEG^Negative mg/dL    Bilirubin Urine Negative NEG^Negative    Ketones Urine Negative NEG^Negative mg/dL    Specific Gravity Urine 1.025 1.003 - 1.035    Blood Urine Negative NEG^Negative    pH Urine 6.5 5.0 - 7.0 pH    Protein Albumin Urine Negative NEG^Negative mg/dL    Urobilinogen Urine 0.2 0.2 - 1.0 EU/dL    Nitrite Urine Negative NEG^Negative    Leukocyte Esterase Urine Negative NEG^Negative    Source Midstream Urine        If you have any questions or concerns, please call the clinic at the number listed above.       Sincerely,        Wesly Lehman,

## 2019-07-10 NOTE — PROGRESS NOTES
[place] [address] [address] [facility] [id number] [phone]  Dept: [phone]    PRE-OP EVALUATION:  Today's date: 7/10/2019    Komal Miller (: 1950) presents for pre-operative evaluation assessment.    Fax number for surgical facility: [facility] [phone]  Primary Physician: Wesly Lehman  Type of Anesthesia Anticipated: General    Patient has a Health Care Directive or Living Will:  YES     Preop Questions 7/10/2019   Who is doing your surgery? Apolinar Bermudez   What are you having done? left knee replacement   Date of Surgery/Procedure: 19   Facility or Hospital where procedure/surgery will be performed: [facility]   1.  Do you have a history of Heart attack, stroke, stent, coronary bypass surgery, or other heart surgery? No   2.  Do you ever have any pain or discomfort in your chest? No   3.  Do you have a history of  Heart Failure? No   4.   Are you troubled by shortness of breath when:  walking on a level surface, or up a slight hill, or at night? No   5.  Do you currently have a cold, bronchitis or other respiratory infection? No   6.  Do you have a cough, shortness of breath, or wheezing? No   7.  Do you sometimes get pains in the calves of your legs when you walk? No   8. Do you or anyone in your family have previous history of blood clots? No   9.  Do you or does anyone in your family have a serious bleeding problem such as prolonged bleeding following surgeries or cuts? No   10. Have you ever had problems with anemia or been told to take iron pills? No   11. Have you had any abnormal blood loss such as black, tarry or bloody stools, or abnormal vaginal bleeding? No   12. Have you ever had a blood transfusion? YES - after childbirth   13. Have you or any of your relatives ever had problems with anesthesia? No   14. Do you have sleep apnea, excessive snoring or daytime drowsiness? UNKNOWN - snoring    15. Do you have any prosthetic heart valves?  No   16. Do you have prosthetic joints? No   17. Is there any chance that you may be pregnant? No         HPI:     HPI related to upcoming procedure: The patient has severe arthritis in bilateral knees and is anticipating a left total knee arthroplasty in the upcoming weeks.  She has substantially decreased mobility secondary to the pain and after failure of multiple conservative therapies has decided to pursue surgical intervention.  She has discussed the risks and benefits with her surgeon and they have been reiterated again today.      See problem list for active medical problems.  Problems all longstanding and stable, except as noted/documented.  See ROS for pertinent symptoms related to these conditions.      MEDICAL HISTORY:     Patient Active Problem List    Diagnosis Date Noted     Morbid obesity due to excess calories (H) 07/26/2017     Priority: Medium     Cluster headache, not intractable, unspecified chronicity pattern 05/08/2017     Priority: Medium     Gastroesophageal reflux disease with esophagitis 10/02/2015     Priority: Medium     Intestinal adhesions 05/06/2014     Priority: Medium     Cervical adenopathy 04/29/2014     Priority: Medium     Small bowel obstruction (H) 04/28/2014     Priority: Medium     DVT prophylaxis 04/28/2014     Priority: Medium     Advance Care Planning 05/07/2012     Priority: Medium     Discussed advance care planning with patient; information given to patient to review. 5/7/2012        Hypertension goal BP (blood pressure) < 140/90 11/15/2011     Priority: Medium     Hyperlipidemia LDL goal <130 04/26/2011     Priority: Medium     Recurring abdominal pain 12/21/2009     Priority: Medium     Esophageal reflux 04/18/2006     Priority: Medium     Absence of menstruation 11/04/2005     Priority: Medium      Past Medical History:   Diagnosis Date     Unspecified essential hypertension      Past Surgical History:   Procedure Laterality Date     C APPENDECTOMY       C LIGATE  FALLOPIAN TUBE       C TOTAL ABDOM HYSTERECTOMY      Hysterectomy, Total Abdominal     COLONOSCOPY  06/10/08    Internal hemorrhoids, otherwise normal.     COLONOSCOPY  01/20/10     HC DILATION/CURETTAGE DIAG/THER NON OB      D & C     HC LAP, SURG ENTEROLYSIS  05/07/10     HC REMOVAL OF OVARY/TUBE(S)      Salpingo-Oophorectomy, bilateral     HC UGI ENDOSCOPY, SIMPLE EXAM  01/20/10     LAPAROSCOPY DIAGNOSTIC (GENERAL)  2014    Procedure: LAPAROSCOPY DIAGNOSTIC (GENERAL);  Surgeon: Seth Matthews MD;  Location: [facility] OR     LAPAROTOMY, LYSIS ADHESIONS, COMBINED  2014    Procedure: COMBINED LAPAROTOMY, LYSIS ADHESIONS;  Surgeon: Seth Matthews MD;  Location: [facility] OR     VIDEO CAPSULE ENDOSCOPY  02/15/10    normal sm intestine     Current Outpatient Medications   Medication Sig Dispense Refill     fish oil-omega-3 fatty acids 1000 MG capsule Take 2 g by mouth daily       GARLIC PO        IRON PO Take by mouth 3 times daily       lisinopril-hydrochlorothiazide (PRINZIDE/ZESTORETIC) 20-25 MG tablet Take 1 tablet by mouth daily 90 tablet 3     omeprazole (PRILOSEC) 40 MG DR capsule TAKE ONE CAPSULE BY MOUTH EVERY DAY .TAKE 30 TO 60 MINUTES BEFORE A MEAL 90 capsule 3     propranolol (INDERAL) 40 MG tablet Take 1 tablet (40 mg) by mouth 2 times daily 180 tablet 3     traMADol (ULTRAM) 50 MG tablet Take 1 pill up to 3 times daily for pain for 7 days, then take 1 pill every twice daily as needed for pain for 7 days, then take 1 pill at bedtime as needed for 7 days, then discontinued the medication 42 tablet 0     OTC products: None, except as noted above    Allergies   Allergen Reactions     No Known Drug Allergies       Latex Allergy: none    Social History     Tobacco Use     Smoking status: Former Smoker     Packs/day: 0.50     Years: 20.00     Pack years: 10.00     Types: Cigarettes     Last attempt to quit: 1995     Years since quittin.5     Smokeless tobacco: Never Used   Substance Use Topics      "Alcohol use: Yes     Comment: very seldom     History   Drug Use No       REVIEW OF SYSTEMS:   CONSTITUTIONAL: NEGATIVE for fever, chills, change in weight  INTEGUMENTARY/SKIN: NEGATIVE for worrisome rashes, moles or lesions  EYES: NEGATIVE for vision changes or irritation  ENT/MOUTH: NEGATIVE for ear, mouth and throat problems  RESP: NEGATIVE for significant cough or SOB  CV: NEGATIVE for chest pain, palpitations or peripheral edema  GI: NEGATIVE for nausea, abdominal pain, heartburn, or change in bowel habits  : NEGATIVE for frequency, dysuria, or hematuria  MUSCULOSKELETAL: Severe pain in both knees with any ambulation is noted.  Grinding, apprehension is noted.  NEURO: NEGATIVE for weakness, dizziness or paresthesias  ENDOCRINE: NEGATIVE for temperature intolerance, skin/hair changes  HEME: NEGATIVE for bleeding problems  PSYCHIATRIC: NEGATIVE for changes in mood or affect    EXAM:   /76 (BP Location: Right arm, Patient Position: Sitting, Cuff Size: Adult Large)   Pulse 70   Temp 98.1  F (36.7  C) (Temporal)   Resp 16   Ht 1.671 m (5' 5.8\")   Wt 103.6 kg (228 lb 6.4 oz)   SpO2 97%   BMI 37.09 kg/m      GENERAL APPEARANCE: healthy, alert and no distress     EYES: EOMI, PERRL     HENT: ear canals and TM's normal and nose and mouth without ulcers or lesions     NECK: no adenopathy, no asymmetry, masses, or scars and thyroid normal to palpation     RESP: lungs clear to auscultation - no rales, rhonchi or wheezes     CV: regular rates and rhythm, normal S1 S2, no S3 or S4 and no murmur, click or rub     ABDOMEN:  soft, nontender, no HSM or masses and bowel sounds normal     MS: extremities normal- no gross deformities noted, no evidence of inflammation in joints, FROM in all extremities.     SKIN: no suspicious lesions or rashes     NEURO: Normal strength and tone, sensory exam grossly normal, mentation intact and speech normal     PSYCH: mentation appears normal. and affect normal/bright     " LYMPHATICS: No cervical adenopathy    DIAGNOSTICS:   EKG: appears normal, NSR, normal axis, normal intervals, no acute ST/T changes c/w ischemia, no LVH by voltage criteria, unchanged from previous tracings  Laboratory work is pending and will be available in the EMR.    IMPRESSION:   Reason for surgery/procedure: Severe degenerative joint disease involving both knees.  Diagnosis/reason for consult: Perioperative risk assessment in a pleasant 69-year-old female with:  1. Preop general physical exam  - CBC with platelets    2. Primary osteoarthritis of both knees    3. Hypertension goal BP (blood pressure) < 140/90  - EKG 12-lead complete w/read - Clinics  - Basic metabolic panel  (Ca, Cl, CO2, Creat, Gluc, K, Na, BUN)  - *UA reflex to Microscopic and Culture ([facility] (except [place] and [place])    4. Hyperlipidemia LDL goal <130    5. Morbid obesity due to excess calories (H)    6. Gastroesophageal reflux disease with esophagitis      The proposed surgical procedure is considered INTERMEDIATE risk.    REVISED CARDIAC RISK INDEX  The patient has the following serious cardiovascular risks for perioperative complications such as (MI, PE, VFib and 3  AV Block):  No serious cardiac risks  INTERPRETATION: 1 risks: Class II (low risk - 0.9% complication rate)    The patient has the following additional risks for perioperative complications:  No identified additional risks      ICD-10-CM    1. Preop general physical exam Z01.818        RECOMMENDATIONS:         --Patient is to take all scheduled medications on the day of surgery EXCEPT for the lisinopril which she will discontinue the night before surgery.      APPROVAL GIVEN to proceed with proposed procedure, without further diagnostic evaluation       Signed Electronically by: Wesly Lehman DO    Copy of this evaluation report is provided to requesting physician.    [facility] Preop Guidelines    Revised Cardiac Risk Index

## 2019-07-10 NOTE — PATIENT INSTRUCTIONS
Hold lisinopril the day of surgery      Before Your Surgery      Call your surgeon if there is any change in your health. This includes signs of a cold or flu (such as a sore throat, runny nose, cough, rash or fever).    Do not smoke, drink alcohol or take over the counter medicine (unless your surgeon or primary care doctor tells you to) for the 24 hours before and after surgery.    If you take prescribed drugs: Follow your doctor s orders about which medicines to take and which to stop until after surgery.    Eating and drinking prior to surgery: follow the instructions from your surgeon    Take a shower or bath the night before surgery. Use the soap your surgeon gave you to gently clean your skin. If you do not have soap from your surgeon, use your regular soap. Do not shave or scrub the surgery site.  Wear clean pajamas and have clean sheets on your bed.

## 2019-07-16 ENCOUNTER — TELEPHONE (OUTPATIENT)
Dept: FAMILY MEDICINE | Facility: OTHER | Age: 69
End: 2019-07-16

## 2019-07-16 NOTE — TELEPHONE ENCOUNTER
Reason for Call:  Preop notes needed     Detailed comments: [facility] calling and states they need preop notes from 7.10 visit with Dr Lehman as soon as possible. Pt has surgery tomorrow morning. Please fax to [phone]    Phone Number Patient can be reached at:     Best Time:     Can we leave a detailed message on this number? Not Applicable    Call taken on 7/16/2019 at 10:54 AM by Keke Ortiz

## 2019-07-16 NOTE — TELEPHONE ENCOUNTER
Called Kayy at number listed below, [phone] ext [phone]    Preop notes were faxed yesterday, to fax # [facility] [phone]    Kayy states that she also needs preop notes to put in chart for surgery, her fax number is [phone]  Preop notes faxed again today.    Stephanie Yoo XRO/

## 2019-07-17 ENCOUNTER — TRANSFERRED RECORDS (OUTPATIENT)
Dept: HEALTH INFORMATION MANAGEMENT | Facility: CLINIC | Age: 69
End: 2019-07-17

## 2019-07-26 NOTE — RESULT ENCOUNTER NOTE
Dear Komal, your recent test results are attached.  The urine sample is normal.  The chemistry panel shows a slight decrease in kidney function over the last month.  The blood cell count is normal without evidence of anemia or leukemia.  Would recommend repeating a blood sample in the upcoming 2 weeks.    Feel free to contact me via the office or My Chart if you have any questions regarding the above.

## 2019-07-29 ENCOUNTER — TELEPHONE (OUTPATIENT)
Dept: FAMILY MEDICINE | Facility: OTHER | Age: 69
End: 2019-07-29

## 2019-07-29 DIAGNOSIS — R11.0 NAUSEA: Primary | ICD-10-CM

## 2019-07-29 RX ORDER — ONDANSETRON 4 MG/1
4 TABLET, ORALLY DISINTEGRATING ORAL EVERY 8 HOURS PRN
Qty: 12 TABLET | Refills: 0 | Status: SHIPPED | OUTPATIENT
Start: 2019-07-29 | End: 2019-12-30

## 2019-07-29 NOTE — TELEPHONE ENCOUNTER
Reason for Call:  Medication or medication refill:    Do you use a [facility]?  Name of the pharmacy and phone number for the current request:  [facility] - [phone]    Name of the medication requested: Zofran     Other request: Patient stating she has weaned herself off the short term pain medication to 2x a day. Once an hour before physical therapy and once before bed. Patient requesting zofran now. Please advise     Can we leave a detailed message on this number? NO    Phone number patient can be reached at: Home number on file [phone] (home)    Best Time:      Call taken on 7/29/2019 at 12:36 PM by Sally Zuleta

## 2019-07-30 RX ORDER — ONDANSETRON 4 MG/1
TABLET, ORALLY DISINTEGRATING ORAL
Qty: 20 TABLET | OUTPATIENT
Start: 2019-07-30

## 2019-07-30 NOTE — TELEPHONE ENCOUNTER
"Requested Prescriptions   Pending Prescriptions Disp Refills     ondansetron (ZOFRAN-ODT) 4 MG ODT tab [Pharmacy Med Name: ONDANSETRON 4MG ODT] 20 tablet      Sig: DISSOLVE 1 TABLET BY MOUTH EVERY 6 HOURS AS NEEDED FOR NAUSEA   Last Written Prescription Date:  7/29/2019  Last Fill Quantity: 12,  # refills: 0   Last office visit: 7/10/2019 with prescribing provider:     Future Office Visit:         Antivertigo/Antiemetic Agents Failed - 7/29/2019 12:40 PM        Failed - Medication is active on med list        Passed - Recent (12 mo) or future (30 days) visit within the authorizing provider's specialty     Patient had office visit in the last 12 months or has a visit in the next 30 days with authorizing provider or within the authorizing provider's specialty.  See \"Patient Info\" tab in inbasket, or \"Choose Columns\" in Meds & Orders section of the refill encounter.              Passed - Patient is 18 years of age or older        Denied  Duplicate  Bharti Duckworth RN    "

## 2019-08-20 NOTE — PROGRESS NOTES
Sports Medicine Clinic Visit - Interim History August 20, 2019    Initial Visit Date 3/20/18    PCP: Wesly Lehman    Komal Miller is a 69 year old female who is seen in follow up for bilateral knee pain.  Since last visit on 5/21/2019, Emily has had a left TKA. She is 5 weeks post op.  She notes good relief with the steroid injection of both knees at her last visit. She notes she just began to have a return of pain. She rates the pain at a 3/10 currently.  Symptoms are relieved with ibuprofen, CSI, and tramadol.  Symptoms are worsened by  standing, stairs, activity and walking.  She would like a steroid injection of the right knee today.      Review of Systems  Musculoskeletal: as above  Remainder of review of systems is negative including constitutional, eyes, ENT, CV, pulmonary, GI, , endocrine, skin, hematologic, and neurologic except as noted in HPI or medical history.    History reviewed. No pertinent past surgical/medical/family/social history other than as mentioned in HPI.    Patient Active Problem List   Diagnosis     Absence of menstruation     Esophageal reflux     Recurring abdominal pain     Hyperlipidemia LDL goal <130     Hypertension goal BP (blood pressure) < 140/90     Advance Care Planning     Small bowel obstruction (H)     DVT prophylaxis     Cervical adenopathy     Intestinal adhesions     Gastroesophageal reflux disease with esophagitis     Cluster headache, not intractable, unspecified chronicity pattern     Morbid obesity due to excess calories (H)     Past Medical History:   Diagnosis Date     Unspecified essential hypertension      Past Surgical History:   Procedure Laterality Date     C APPENDECTOMY       C LIGATE FALLOPIAN TUBE       C TOTAL ABDOM HYSTERECTOMY      Hysterectomy, Total Abdominal     C TOTAL KNEE ARTHROPLASTY Left      COLONOSCOPY  06/10/08    Internal hemorrhoids, otherwise normal.     COLONOSCOPY  01/20/10     HC DILATION/CURETTAGE DIAG/THER NON OB       D & C     HC LAP, SURG ENTEROLYSIS  05/07/10     HC REMOVAL OF OVARY/TUBE(S)      Salpingo-Oophorectomy, bilateral     HC UGI ENDOSCOPY, SIMPLE EXAM  01/20/10     LAPAROSCOPY DIAGNOSTIC (GENERAL)  2014    Procedure: LAPAROSCOPY DIAGNOSTIC (GENERAL);  Surgeon: Seth Matthews MD;  Location: [facility]     LAPAROTOMY, LYSIS ADHESIONS, COMBINED  2014    Procedure: COMBINED LAPAROTOMY, LYSIS ADHESIONS;  Surgeon: Seth Matthews MD;  Location: [facility]     VIDEO CAPSULE ENDOSCOPY  02/15/10    normal sm intestine     Family History   Problem Relation Age of Onset     C.A.D. Mother         bi-pass     Diabetes Mother         late onset     Allergies Mother         xray dye     Arthritis Mother      Cancer Mother         ovary     Respiratory Mother      C.A.D. Father         bi-pass     Hypertension Brother      Breast Cancer Sister      Allergies Sister      Allergies Brother      Eye Disorder Maternal Aunt      Gastrointestinal Disease Brother      Gynecology Sister      Thyroid Disease Sister      Social History     Socioeconomic History     Marital status:      Spouse name: Waylon     Number of children: 4     Years of education: 13     Highest education level: Not on file   Occupational History     Occupation:  dispatcher/     Employer: [employer]     Comment: [employer]   Social Needs     Financial resource strain: Not on file     Food insecurity:     Worry: Not on file     Inability: Not on file     Transportation needs:     Medical: Not on file     Non-medical: Not on file   Tobacco Use     Smoking status: Former Smoker     Packs/day: 0.50     Years: 20.00     Pack years: 10.00     Types: Cigarettes     Last attempt to quit: 1995     Years since quittin.6     Smokeless tobacco: Never Used   Substance and Sexual Activity     Alcohol use: Yes     Comment: very seldom     Drug use: No     Sexual activity: Yes     Partners: Male   Lifestyle      Physical activity:     Days per week: Not on file     Minutes per session: Not on file     Stress: Not on file   Relationships     Social connections:     Talks on phone: Not on file     Gets together: Not on file     Attends Muslim service: Not on file     Active member of club or organization: Not on file     Attends meetings of clubs or organizations: Not on file     Relationship status: Not on file     Intimate partner violence:     Fear of current or ex partner: Not on file     Emotionally abused: Not on file     Physically abused: Not on file     Forced sexual activity: Not on file   Other Topics Concern      Service No     Blood Transfusions Yes     Comment: with children--several units     Caffeine Concern No     Comment: coffee  5 cups/day      occ diet pepsi     Occupational Exposure Yes     Comment: works at the [employer]  body fluids, comm. diseases     Hobby Hazards No     Sleep Concern Yes     Comment: sleeps more restless some nights     Stress Concern No     Weight Concern Yes     Comment: w'd like to weigh 50-60# less     Special Diet No     Back Care No     Exercise Yes     Comment: walking     Bike Helmet No     Seat Belt Yes     Self-Exams No     Parent/sibling w/ CABG, MI or angioplasty before 65F 55M? Not Asked   Social History Narrative     Not on file       She is retired. She helps at the school sometimes.     Current Outpatient Medications   Medication     fish oil-omega-3 fatty acids 1000 MG capsule     GARLIC PO     IRON PO     lisinopril-hydrochlorothiazide (PRINZIDE/ZESTORETIC) 20-25 MG tablet     omeprazole (PRILOSEC) 40 MG DR capsule     ondansetron (ZOFRAN-ODT) 4 MG ODT tab     propranolol (INDERAL) 40 MG tablet     traMADol (ULTRAM) 50 MG tablet     Current Facility-Administered Medications   Medication     hylan (SYNVISC ONE) injection 48 mg     hylan (SYNVISC ONE) injection 48 mg     lidocaine 1 % injection 2 mL     lidocaine 1 % injection 2 mL      "lidocaine 1 % injection 2 mL     lidocaine 1 % injection 2 mL     triamcinolone (KENALOG-40) injection 40 mg     triamcinolone (KENALOG-40) injection 40 mg     triamcinolone (KENALOG-40) injection 40 mg     triamcinolone (KENALOG-40) injection 40 mg     triamcinolone acetonide (KENALOG-40) injection 40 mg     triamcinolone acetonide (KENALOG-40) injection 40 mg     Allergies   Allergen Reactions     No Known Drug Allergies          Objective:  BP (!) 171/87   Pulse 136   Ht 1.671 m (5' 5.8\")   Wt 103.6 kg (228 lb 8 oz)   BMI 37.11 kg/m      General: Alert and in no distress    Head: Normocephalic, atraumatic  Eyes: no scleral icterus or conjunctival erythema   Oropharynx:  Mucous membranes moist  Skin: no erythema, petechiae, or jaundice  Resp: normal respiratory effort without conversational dyspnea   Psych: normal mood and affect    Gait: Non-antalgic, appropriate coordination and balance   Neuro: Motor strength and sensation as noted below    Musculoskeletal:  Left knee surgical scar  Diffusely tender over the right knee    Radiology:  Independent visualization of images performed      Large Joint Injection/Arthocentesis: R knee joint  Date/Time: 8/27/2019 10:03 AM  Performed by: Freda Bang MD  Authorized by: Freda Bang MD     Indications:  Pain  Needle Size:  25 G  Guidance: landmark guided    Approach:  Anterolateral  Location:  Knee      Medications:  40 mg triamcinolone 40 MG/ML  Medications comment:  4ml 0.5% bupivicaine  NDC:42002-092-80  Lot: [id number]  3/30/20    Outcome:  Tolerated well, no immediate complications  Procedure discussed: discussed risks, benefits, and alternatives    Consent Given by:  Patient          Assessment:  1. Bilateral primary osteoarthritis of knee    2. Chronic pain of both knees        Plan:  Discussed the assessment with the patient and developed a plan together:  -Steroid injection performed today.  Take it easy over the next few days. Keep " in mind that the steroid may take up to 3 days to start working and up to 2 weeks to reach maximal effect.  Ice 15-20 minutes as needed for soreness.  Patient's preferred over the counter medication as needed for pain as directed on packaging.    Follow Up: As needed if symptoms fail to improve or worsen. Please call with any questions or concerns.     -Emily to follow up with her primary care provider regarding elevated blood pressure.    Danita Bang MD, CAQ Sports Medicine  [facility]

## 2019-08-27 ENCOUNTER — OFFICE VISIT (OUTPATIENT)
Dept: ORTHOPEDICS | Facility: CLINIC | Age: 69
End: 2019-08-27
Payer: COMMERCIAL

## 2019-08-27 VITALS
WEIGHT: 228.5 LBS | SYSTOLIC BLOOD PRESSURE: 171 MMHG | HEART RATE: 136 BPM | BODY MASS INDEX: 36.72 KG/M2 | HEIGHT: 66 IN | DIASTOLIC BLOOD PRESSURE: 87 MMHG

## 2019-08-27 DIAGNOSIS — G89.29 CHRONIC PAIN OF BOTH KNEES: ICD-10-CM

## 2019-08-27 DIAGNOSIS — M25.561 CHRONIC PAIN OF BOTH KNEES: ICD-10-CM

## 2019-08-27 DIAGNOSIS — M25.562 CHRONIC PAIN OF BOTH KNEES: ICD-10-CM

## 2019-08-27 DIAGNOSIS — M17.0 BILATERAL PRIMARY OSTEOARTHRITIS OF KNEE: Primary | ICD-10-CM

## 2019-08-27 PROCEDURE — 99213 OFFICE O/P EST LOW 20 MIN: CPT | Mod: 25 | Performed by: PHYSICAL MEDICINE & REHABILITATION

## 2019-08-27 PROCEDURE — 20610 DRAIN/INJ JOINT/BURSA W/O US: CPT | Mod: RT | Performed by: PHYSICAL MEDICINE & REHABILITATION

## 2019-08-27 RX ORDER — TRIAMCINOLONE ACETONIDE 40 MG/ML
40 INJECTION, SUSPENSION INTRA-ARTICULAR; INTRAMUSCULAR
Status: DISCONTINUED | OUTPATIENT
Start: 2019-08-27 | End: 2019-12-30

## 2019-08-27 RX ADMIN — TRIAMCINOLONE ACETONIDE 40 MG: 40 INJECTION, SUSPENSION INTRA-ARTICULAR; INTRAMUSCULAR at 10:03

## 2019-08-27 ASSESSMENT — MIFFLIN-ST. JEOR: SCORE: 1585.05

## 2019-08-27 NOTE — PATIENT INSTRUCTIONS
Today's Plan of Care:  -Steroid injection performed today.  Take it easy over the next few days. Keep in mind that the steroid may take up to 3 days to start working and up to 2 weeks to reach maximal effect.  Ice 15-20 minutes as needed for soreness.  Patient's preferred over the counter medication as needed for pain as directed on packaging.    Follow Up: As needed if symptoms fail to improve or worsen. Please call with any questions or concerns.

## 2019-08-27 NOTE — LETTER
8/27/2019         RE: Komal Miller  [address]  [facility] [address]        Dear Colleague,    Thank you for referring your patient, Komal Miller, to the [facility]. Please see a copy of my visit note below.    Sports Medicine Clinic Visit - Interim History August 20, 2019    Initial Visit Date 3/20/18    PCP: Wesly Lehman    Komal Miller is a 69 year old female who is seen in follow up for bilateral knee pain.  Since last visit on 5/21/2019, Emily has had a left TKA. She is 5 weeks post op.  She notes good relief with the steroid injection of both knees at her last visit. She notes she just began to have a return of pain. She rates the pain at a 3/10 currently.  Symptoms are relieved with ibuprofen, CSI, and tramadol.  Symptoms are worsened by  standing, stairs, activity and walking.  She would like a steroid injection of the right knee today.      Review of Systems  Musculoskeletal: as above  Remainder of review of systems is negative including constitutional, eyes, ENT, CV, pulmonary, GI, , endocrine, skin, hematologic, and neurologic except as noted in HPI or medical history.    History reviewed. No pertinent past surgical/medical/family/social history other than as mentioned in HPI.    Patient Active Problem List   Diagnosis     Absence of menstruation     Esophageal reflux     Recurring abdominal pain     Hyperlipidemia LDL goal <130     Hypertension goal BP (blood pressure) < 140/90     Advance Care Planning     Small bowel obstruction (H)     DVT prophylaxis     Cervical adenopathy     Intestinal adhesions     Gastroesophageal reflux disease with esophagitis     Cluster headache, not intractable, unspecified chronicity pattern     Morbid obesity due to excess calories (H)     Past Medical History:   Diagnosis Date     Unspecified essential hypertension      Past Surgical History:   Procedure Laterality Date     C APPENDECTOMY       C LIGATE FALLOPIAN TUBE        C TOTAL ABDOM HYSTERECTOMY      Hysterectomy, Total Abdominal     C TOTAL KNEE ARTHROPLASTY Left      COLONOSCOPY  06/10/08    Internal hemorrhoids, otherwise normal.     COLONOSCOPY  01/20/10     HC DILATION/CURETTAGE DIAG/THER NON OB      D & C     HC LAP, SURG ENTEROLYSIS  05/07/10     HC REMOVAL OF OVARY/TUBE(S)      Salpingo-Oophorectomy, bilateral     HC UGI ENDOSCOPY, SIMPLE EXAM  01/20/10     LAPAROSCOPY DIAGNOSTIC (GENERAL)  5/6/2014    Procedure: LAPAROSCOPY DIAGNOSTIC (GENERAL);  Surgeon: Seth Matthews MD;  [facility]     LAPAROTOMY, LYSIS ADHESIONS, COMBINED  5/6/2014    Procedure: COMBINED LAPAROTOMY, LYSIS ADHESIONS;  Surgeon: Seth Matthews MD;  Location: [facility]     VIDEO CAPSULE ENDOSCOPY  02/15/10    normal sm intestine     Family History   Problem Relation Age of Onset     C.A.D. Mother         bi-pass     Diabetes Mother         late onset     Allergies Mother         xray dye     Arthritis Mother      Cancer Mother         ovary     Respiratory Mother      C.A.D. Father         bi-pass     Hypertension Brother      Breast Cancer Sister      Allergies Sister      Allergies Brother      Eye Disorder Maternal Aunt      Gastrointestinal Disease Brother      Gynecology Sister      Thyroid Disease Sister      Social History     Socioeconomic History     Marital status:      Spouse name: Waylon     Number of children: 4     Years of education: 13     Highest education level: Not on file   Occupational History     Occupation:  dispatcher/     Employer: [employer]     Comment: [employer]   Social Needs     Financial resource strain: Not on file     Food insecurity:     Worry: Not on file     Inability: Not on file     Transportation needs:     Medical: Not on file     Non-medical: Not on file   Tobacco Use     Smoking status: Former Smoker     Packs/day: 0.50     Years: 20.00     Pack years: 10.00     Types: Cigarettes     Last  attempt to quit: 1995     Years since quittin.6     Smokeless tobacco: Never Used   Substance and Sexual Activity     Alcohol use: Yes     Comment: very seldom     Drug use: No     Sexual activity: Yes     Partners: Male   Lifestyle     Physical activity:     Days per week: Not on file     Minutes per session: Not on file     Stress: Not on file   Relationships     Social connections:     Talks on phone: Not on file     Gets together: Not on file     Attends Jew service: Not on file     Active member of club or organization: Not on file     Attends meetings of clubs or organizations: Not on file     Relationship status: Not on file     Intimate partner violence:     Fear of current or ex partner: Not on file     Emotionally abused: Not on file     Physically abused: Not on file     Forced sexual activity: Not on file   Other Topics Concern      Service No     Blood Transfusions Yes     Comment: with children--several units     Caffeine Concern No     Comment: coffee  5 cups/day      occ diet pepsi     Occupational Exposure Yes     Comment: works at the [employer]  body fluids, comm. diseases     Hobby Hazards No     Sleep Concern Yes     Comment: sleeps more restless some nights     Stress Concern No     Weight Concern Yes     Comment: w'd like to weigh 50-60# less     Special Diet No     Back Care No     Exercise Yes     Comment: walking     Bike Helmet No     Seat Belt Yes     Self-Exams No     Parent/sibling w/ CABG, MI or angioplasty before 65F 55M? Not Asked   Social History Narrative     Not on file       She is retired. She helps at the school sometimes.     Current Outpatient Medications   Medication     fish oil-omega-3 fatty acids 1000 MG capsule     GARLIC PO     IRON PO     lisinopril-hydrochlorothiazide (PRINZIDE/ZESTORETIC) 20-25 MG tablet     omeprazole (PRILOSEC) 40 MG DR capsule     ondansetron (ZOFRAN-ODT) 4 MG ODT tab     propranolol (INDERAL) 40 MG tablet      "traMADol (ULTRAM) 50 MG tablet     Current Facility-Administered Medications   Medication     hylan (SYNVISC ONE) injection 48 mg     hylan (SYNVISC ONE) injection 48 mg     lidocaine 1 % injection 2 mL     lidocaine 1 % injection 2 mL     lidocaine 1 % injection 2 mL     lidocaine 1 % injection 2 mL     triamcinolone (KENALOG-40) injection 40 mg     triamcinolone (KENALOG-40) injection 40 mg     triamcinolone (KENALOG-40) injection 40 mg     triamcinolone (KENALOG-40) injection 40 mg     triamcinolone acetonide (KENALOG-40) injection 40 mg     triamcinolone acetonide (KENALOG-40) injection 40 mg     Allergies   Allergen Reactions     No Known Drug Allergies          Objective:  BP (!) 171/87   Pulse 136   Ht 1.671 m (5' 5.8\")   Wt 103.6 kg (228 lb 8 oz)   BMI 37.11 kg/m       General: Alert and in no distress    Head: Normocephalic, atraumatic  Eyes: no scleral icterus or conjunctival erythema   Oropharynx:  Mucous membranes moist  Skin: no erythema, petechiae, or jaundice  Resp: normal respiratory effort without conversational dyspnea   Psych: normal mood and affect    Gait: Non-antalgic, appropriate coordination and balance   Neuro: Motor strength and sensation as noted below    Musculoskeletal:  Left knee surgical scar  Diffusely tender over the right knee    Radiology:  Independent visualization of images performed      Large Joint Injection/Arthocentesis: R knee joint  Date/Time: 8/27/2019 10:03 AM  Performed by: Freda Bang MD  Authorized by: Freda Bang MD     Indications:  Pain  Needle Size:  25 G  Guidance: landmark guided    Approach:  Anterolateral  Location:  Knee      Medications:  40 mg triamcinolone 40 MG/ML  Medications comment:  4ml 0.5% bupivicaine  NDC:53020-360-44  Lot: [id number]  3/30/20    Outcome:  Tolerated well, no immediate complications  Procedure discussed: discussed risks, benefits, and alternatives    Consent Given by:  Patient          Assessment:  1. " Bilateral primary osteoarthritis of knee    2. Chronic pain of both knees        Plan:  Discussed the assessment with the patient and developed a plan together:  -Steroid injection performed today.  Take it easy over the next few days. Keep in mind that the steroid may take up to 3 days to start working and up to 2 weeks to reach maximal effect.  Ice 15-20 minutes as needed for soreness.  Patient's preferred over the counter medication as needed for pain as directed on packaging.    Follow Up: As needed if symptoms fail to improve or worsen. Please call with any questions or concerns.     -Emily to follow up with her primary care provider regarding elevated blood pressure.    Danita Bang MD, [facility] Sports Medicine  [facility]      Again, thank you for allowing me to participate in the care of your patient.        Sincerely,        Freda Bang MD

## 2019-09-18 ENCOUNTER — TELEPHONE (OUTPATIENT)
Dept: FAMILY MEDICINE | Facility: OTHER | Age: 69
End: 2019-09-18

## 2019-09-18 DIAGNOSIS — M17.10 ARTHRITIS OF KNEE: Primary | ICD-10-CM

## 2019-09-18 DIAGNOSIS — K66.0 INTESTINAL ADHESIONS: ICD-10-CM

## 2019-09-18 DIAGNOSIS — R10.9 RECURRING ABDOMINAL PAIN: ICD-10-CM

## 2019-09-18 RX ORDER — ETODOLAC 400 MG
400 TABLET ORAL 2 TIMES DAILY
Qty: 40 TABLET | Refills: 0 | Status: SHIPPED | OUTPATIENT
Start: 2019-09-18 | End: 2020-03-03

## 2019-09-18 RX ORDER — TRAMADOL HYDROCHLORIDE 50 MG/1
TABLET ORAL
Qty: 42 TABLET | Refills: 0 | Status: CANCELLED | OUTPATIENT
Start: 2019-09-18

## 2019-09-18 NOTE — TELEPHONE ENCOUNTER
Unfortunately, tramadol is no longer indicated for the treatment of chronic musculoskeletal pain.  I have taken the liberty of sending a prescription to [facility] for etodolac to the pharmacy.  This can be taken once daily and should get some pain relief.  On rare occasion, she may take a second one after 8 hours if absolutely necessary.  We will need to recheck her kidney tests in about a month.    Fallon

## 2019-09-18 NOTE — TELEPHONE ENCOUNTER
Reason for Call:  Medication or medication refill:    Do you use a [facility]?  Name of the pharmacy and phone number for the current request:  [facility]     Name of the medication requested: Tramadol    Other request: patient calling would like to get a new Rx for this, patient states she has had this in the past. Patient states it is for knee pain and arthritis. Patient will be having knee surgery in February. Patient states she would like this to get her through until her surgery. Please call to advise     Can we leave a detailed message on this number? YES    Phone number patient can be reached at: Cell number on file:    Telephone Information:   Mobile [phone]       Best Time: any    Call taken on 9/18/2019 at 1:10 PM by Yelena alvarado

## 2019-11-26 ENCOUNTER — TELEPHONE (OUTPATIENT)
Dept: FAMILY MEDICINE | Facility: OTHER | Age: 69
End: 2019-11-26

## 2019-11-26 NOTE — TELEPHONE ENCOUNTER
I would recommend taking a couple Tylenol and 2 cans of Coca-Cola at the onset of a headache.  Oftentimes the fluid/caffeine combination will help abort a migraine.  If this does not work, she should set up an appointment.     Fallon

## 2019-11-26 NOTE — TELEPHONE ENCOUNTER
Reason for call:  Patient reporting a symptom    Symptom or request: migraine     Duration (how long have symptoms been present): just starting     Have you been treated for this before? Yes    Additional comments: Komal would like to know if there is a medication she can take ahead of time to prevent or help her migraine from getting worse. She has taken Excedrin migraine and would like to know what else she can do. Please advise.     Phone Number patient can be reached at:  Home number on file [phone] (home)    Best Time:  Any     Can we leave a detailed message on this number:  YES    Call taken on 11/26/2019 at 3:55 PM by Liyah Zuleta

## 2019-12-27 ENCOUNTER — OFFICE VISIT (OUTPATIENT)
Dept: ORTHOPEDICS | Facility: CLINIC | Age: 69
End: 2019-12-27
Payer: COMMERCIAL

## 2019-12-27 VITALS
SYSTOLIC BLOOD PRESSURE: 128 MMHG | HEIGHT: 66 IN | WEIGHT: 231.5 LBS | BODY MASS INDEX: 37.21 KG/M2 | DIASTOLIC BLOOD PRESSURE: 86 MMHG

## 2019-12-27 DIAGNOSIS — M17.11 PRIMARY OSTEOARTHRITIS OF RIGHT KNEE: ICD-10-CM

## 2019-12-27 DIAGNOSIS — M25.561 CHRONIC PAIN OF RIGHT KNEE: Primary | ICD-10-CM

## 2019-12-27 DIAGNOSIS — G89.29 CHRONIC PAIN OF RIGHT KNEE: Primary | ICD-10-CM

## 2019-12-27 PROCEDURE — 99213 OFFICE O/P EST LOW 20 MIN: CPT | Mod: 25 | Performed by: PHYSICAL MEDICINE & REHABILITATION

## 2019-12-27 PROCEDURE — 20610 DRAIN/INJ JOINT/BURSA W/O US: CPT | Mod: RT | Performed by: PHYSICAL MEDICINE & REHABILITATION

## 2019-12-27 RX ORDER — TRIAMCINOLONE ACETONIDE 40 MG/ML
40 INJECTION, SUSPENSION INTRA-ARTICULAR; INTRAMUSCULAR
Status: DISCONTINUED | OUTPATIENT
Start: 2019-12-27 | End: 2019-12-30

## 2019-12-27 RX ADMIN — TRIAMCINOLONE ACETONIDE 40 MG: 40 INJECTION, SUSPENSION INTRA-ARTICULAR; INTRAMUSCULAR at 09:59

## 2019-12-27 ASSESSMENT — MIFFLIN-ST. JEOR: SCORE: 1598.66

## 2019-12-27 NOTE — LETTER
12/27/2019         RE: Komal Miller  [address]  [facility] [address]        Dear Colleague,    Thank you for referring your patient, Komal Miller, to the [facility]. Please see a copy of my visit note below.    Sports Medicine Clinic Visit - Interim History December 27, 2019      PCP: Wesly Lehman    Komal Miller is a 69 year old female who is seen in follow up for right knee pain.  Since last visit on 8/27/2019 patient has had trouble with the right knee. She notes the last week it has been pretty painful.  She rates the pain at a 7/10 currently. Symptoms are relieved with ibuprofen, CSI, and tramadol.  Symptoms are worsened by  standing, stairs, activity and walking.  She is going to [place] at the end of January.  She is planning to do a right knee replacement with Dr. Bermudez in July.  She would like a steroid injection of the right knee today.       Review of Systems  Musculoskeletal: as above  Remainder of review of systems is negative including constitutional, eyes, ENT, CV, pulmonary, GI, , endocrine, skin, hematologic, and neurologic except as noted in HPI or medical history.    History reviewed. No pertinent past surgical/medical/family/social history other than as mentioned in HPI.    Patient Active Problem List   Diagnosis     Absence of menstruation     Esophageal reflux     Recurring abdominal pain     Hyperlipidemia LDL goal <130     Hypertension goal BP (blood pressure) < 140/90     Advance Care Planning     Small bowel obstruction (H)     DVT prophylaxis     Cervical adenopathy     Intestinal adhesions     Gastroesophageal reflux disease with esophagitis     Cluster headache, not intractable, unspecified chronicity pattern     Morbid obesity due to excess calories (H)     Past Medical History:   Diagnosis Date     Unspecified essential hypertension      Past Surgical History:   Procedure Laterality Date     C APPENDECTOMY       C LIGATE FALLOPIAN  TUBE       C TOTAL ABDOM HYSTERECTOMY      Hysterectomy, Total Abdominal     C TOTAL KNEE ARTHROPLASTY Left      COLONOSCOPY  06/10/08    Internal hemorrhoids, otherwise normal.     COLONOSCOPY  01/20/10     HC DILATION/CURETTAGE DIAG/THER NON OB      D & C     HC LAP, SURG ENTEROLYSIS  05/07/10     HC REMOVAL OF OVARY/TUBE(S)      Salpingo-Oophorectomy, bilateral     HC UGI ENDOSCOPY, SIMPLE EXAM  01/20/10     LAPAROSCOPY DIAGNOSTIC (GENERAL)  5/6/2014    Procedure: LAPAROSCOPY DIAGNOSTIC (GENERAL);  Surgeon: Seth Matthews MD;  Location: [facility]     LAPAROTOMY, LYSIS ADHESIONS, COMBINED  5/6/2014    Procedure: COMBINED LAPAROTOMY, LYSIS ADHESIONS;  Surgeon: Seth Matthews MD;  Location: [facility]     VIDEO CAPSULE ENDOSCOPY  02/15/10    normal sm intestine     Family History   Problem Relation Age of Onset     C.A.D. Mother         bi-pass     Diabetes Mother         late onset     Allergies Mother         xray dye     Arthritis Mother      Cancer Mother         ovary     Respiratory Mother      C.A.D. Father         bi-pass     Hypertension Brother      Breast Cancer Sister      Allergies Sister      Allergies Brother      Eye Disorder Maternal Aunt      Gastrointestinal Disease Brother      Gynecology Sister      Thyroid Disease Sister      Social History     Socioeconomic History     Marital status:      Spouse name: Waylon     Number of children: 4     Years of education: 13     Highest education level: Not on file   Occupational History     Occupation:  dispatcher/     Employer: [employer]     Comment: [employer]   Social Needs     Financial resource strain: Not on file     Food insecurity:     Worry: Not on file     Inability: Not on file     Transportation needs:     Medical: Not on file     Non-medical: Not on file   Tobacco Use     Smoking status: Former Smoker     Packs/day: 0.50     Years: 20.00     Pack years: 10.00     Types: Cigarettes      Last attempt to quit: 1995     Years since quittin.0     Smokeless tobacco: Never Used   Substance and Sexual Activity     Alcohol use: Yes     Comment: very seldom     Drug use: No     Sexual activity: Yes     Partners: Male   Lifestyle     Physical activity:     Days per week: Not on file     Minutes per session: Not on file     Stress: Not on file   Relationships     Social connections:     Talks on phone: Not on file     Gets together: Not on file     Attends Restoration service: Not on file     Active member of club or organization: Not on file     Attends meetings of clubs or organizations: Not on file     Relationship status: Not on file     Intimate partner violence:     Fear of current or ex partner: Not on file     Emotionally abused: Not on file     Physically abused: Not on file     Forced sexual activity: Not on file   Other Topics Concern      Service No     Blood Transfusions Yes     Comment: with children--several units     Caffeine Concern No     Comment: coffee  5 cups/day      occ diet pepsi     Occupational Exposure Yes     Comment: works at the [employer]  body fluids, comm. diseases     Hobby Hazards No     Sleep Concern Yes     Comment: sleeps more restless some nights     Stress Concern No     Weight Concern Yes     Comment: w'd like to weigh 50-60# less     Special Diet No     Back Care No     Exercise Yes     Comment: walking     Bike Helmet No     Seat Belt Yes     Self-Exams No     Parent/sibling w/ CABG, MI or angioplasty before 65F 55M? Not Asked   Social History Narrative     Not on file       Current Outpatient Medications   Medication     etodolac (LODINE) 400 MG tablet     fish oil-omega-3 fatty acids 1000 MG capsule     GARLIC PO     IRON PO     lisinopril-hydrochlorothiazide (PRINZIDE/ZESTORETIC) 20-25 MG tablet     omeprazole (PRILOSEC) 40 MG DR capsule     ondansetron (ZOFRAN-ODT) 4 MG ODT tab     propranolol (INDERAL) 40 MG tablet     Current  "Facility-Administered Medications   Medication     hylan (SYNVISC ONE) injection 48 mg     hylan (SYNVISC ONE) injection 48 mg     lidocaine 1 % injection 2 mL     lidocaine 1 % injection 2 mL     lidocaine 1 % injection 2 mL     lidocaine 1 % injection 2 mL     triamcinolone (KENALOG-40) injection 40 mg     triamcinolone (KENALOG-40) injection 40 mg     triamcinolone (KENALOG-40) injection 40 mg     triamcinolone (KENALOG-40) injection 40 mg     triamcinolone (KENALOG-40) injection 40 mg     triamcinolone acetonide (KENALOG-40) injection 40 mg     triamcinolone acetonide (KENALOG-40) injection 40 mg     Allergies   Allergen Reactions     No Known Drug Allergies          Objective:  /86   Ht 1.671 m (5' 5.8\")   Wt 105 kg (231 lb 8 oz)   BMI 37.59 kg/m       General: Alert and in no distress    Head: Normocephalic, atraumatic  Eyes: no scleral icterus or conjunctival erythema   Oropharynx:  Mucous membranes moist  Skin: no erythema, petechiae, or jaundice  CV: regular rhythm by palpation, 2+ distal pulses  Resp: normal respiratory effort without conversational dyspnea   Psych: normal mood and affect    Gait: Slightly favoring right knee  Musculoskeletal:  -Left knee arthroplasty scar, well healed  -Tender over the right medial joint line and pes anserine bursa    Radiology:  Independent visualization of images performed  KNEE BILATERAL TWO VIEWS  3/20/2018 9:11 AM      HISTORY:  Medial bilateral knee pain.     COMPARISON: Weightbearing AP views of the bilateral knees dated  3/13/2018. Right knee x-rays dated 4/16/2012.     FINDINGS: Lateral and sunrise views of the knees demonstrate probable  tiny bilateral knee joint effusions, slightly larger on the right.  Mild enthesopathic changes of the quadriceps tendon insertions into  the bilateral patellae. There is also mild joint space loss of the  medial aspect of the left patellofemoral compartment of the knee.  There is moderate medial compartment joint space " loss of the bilateral  knees. This is better seen on the prior weightbearing AP view of the  knees.                                                                      IMPRESSION:  1. Small bilateral knee joint effusions.  2. Mild enthesopathic changes of the bilateral quadriceps tendon  insertions into the bilateral patellae.  3. Mild to moderate joint space loss of the medial aspect of the left  patellofemoral compartment of the knee.  4. Moderate medial compartment joint space loss of the bilateral  knees.      MELVIN FUNG MD      Large Joint Injection/Arthocentesis: R knee joint  Date/Time: 12/27/2019 9:59 AM  Performed by: Freda Bang MD  Authorized by: Freda Bang MD     Indications:  Pain  Needle Size:  25 G  Guidance: landmark guided    Approach:  Anterolateral  Location:  Knee      Medications:  40 mg triamcinolone 40 MG/ML  Medications comment:  4ml 0.5% bupivicaine  NDC:97462-457-94  Lot: [id number]  3/30/20    Outcome:  Tolerated well, no immediate complications  Procedure discussed: discussed risks, benefits, and alternatives    Consent Given by:  Patient          Assessment:  1. Chronic pain of right knee    2. Primary osteoarthritis of right knee        Plan:  Discussed the assessment with the patient and developed a plan together:  -Steroid injection performed today.  Take it easy over the next few days. Keep in mind that the steroid may take up to 3 days to start working and up to 2 weeks to reach maximal effect.  Ice 15-20 minutes as needed for soreness.  Patient's preferred over the counter medication as needed for pain as directed on packaging.    -Also discussed orthopedic surgery referral    Follow Up: As needed if symptoms fail to improve or worsen. Please call with any questions or concerns.       Danita Bang MD, [facility] Sports Medicine  [facility]      Again, thank you for allowing me to participate in the care of your patient.         Sincerely,        Freda Bang MD

## 2019-12-27 NOTE — PATIENT INSTRUCTIONS
Today's Plan of Care:  -Steroid injection performed today.  Take it easy over the next few days. Keep in mind that the steroid may take up to 3 days to start working and up to 2 weeks to reach maximal effect.  Ice 15-20 minutes as needed for soreness.  Patient's preferred over the counter medication as needed for pain as directed on packaging.    -Also discussed orthopedic surgery referral    Follow Up: As needed if symptoms fail to improve or worsen. Please call with any questions or concerns.

## 2019-12-27 NOTE — PROGRESS NOTES
Sports Medicine Clinic Visit - Interim History December 27, 2019      PCP: Wesly Lehman    Komal LIDIA Miller is a 69 year old female who is seen in follow up for right knee pain.  Since last visit on 8/27/2019 patient has had trouble with the right knee. She notes the last week it has been pretty painful.  She rates the pain at a 7/10 currently. Symptoms are relieved with ibuprofen, CSI, and tramadol.  Symptoms are worsened by  standing, stairs, activity and walking.  She is going to [place] at the end of January.  She is planning to do a right knee replacement with Dr. Bermudez in July.  She would like a steroid injection of the right knee today.       Review of Systems  Musculoskeletal: as above  Remainder of review of systems is negative including constitutional, eyes, ENT, CV, pulmonary, GI, , endocrine, skin, hematologic, and neurologic except as noted in HPI or medical history.    History reviewed. No pertinent past surgical/medical/family/social history other than as mentioned in HPI.    Patient Active Problem List   Diagnosis     Absence of menstruation     Esophageal reflux     Recurring abdominal pain     Hyperlipidemia LDL goal <130     Hypertension goal BP (blood pressure) < 140/90     Advance Care Planning     Small bowel obstruction (H)     DVT prophylaxis     Cervical adenopathy     Intestinal adhesions     Gastroesophageal reflux disease with esophagitis     Cluster headache, not intractable, unspecified chronicity pattern     Morbid obesity due to excess calories (H)     Past Medical History:   Diagnosis Date     Unspecified essential hypertension      Past Surgical History:   Procedure Laterality Date     C APPENDECTOMY       C LIGATE FALLOPIAN TUBE       C TOTAL ABDOM HYSTERECTOMY      Hysterectomy, Total Abdominal     C TOTAL KNEE ARTHROPLASTY Left      COLONOSCOPY  06/10/08    Internal hemorrhoids, otherwise normal.     COLONOSCOPY  01/20/10     HC DILATION/CURETTAGE DIAG/THER NON OB       D & C     HC LAP, SURG ENTEROLYSIS  05/07/10     HC REMOVAL OF OVARY/TUBE(S)      Salpingo-Oophorectomy, bilateral     HC UGI ENDOSCOPY, SIMPLE EXAM  01/20/10     LAPAROSCOPY DIAGNOSTIC (GENERAL)  2014    Procedure: LAPAROSCOPY DIAGNOSTIC (GENERAL);  Surgeon: Seth Matthews MD;  Location: [facility]     LAPAROTOMY, LYSIS ADHESIONS, COMBINED  2014    Procedure: COMBINED LAPAROTOMY, LYSIS ADHESIONS;  Surgeon: Seth Matthews MD;  Location: [facility]     VIDEO CAPSULE ENDOSCOPY  02/15/10    normal sm intestine     Family History   Problem Relation Age of Onset     C.A.D. Mother         bi-pass     Diabetes Mother         late onset     Allergies Mother         xray dye     Arthritis Mother      Cancer Mother         ovary     Respiratory Mother      C.A.D. Father         bi-pass     Hypertension Brother      Breast Cancer Sister      Allergies Sister      Allergies Brother      Eye Disorder Maternal Aunt      Gastrointestinal Disease Brother      Gynecology Sister      Thyroid Disease Sister      Social History     Socioeconomic History     Marital status:      Spouse name: Waylon     Number of children: 4     Years of education: 13     Highest education level: Not on file   Occupational History     Occupation:  dispatcher/     Employer: [employer]     Comment: [employer]   Social Needs     Financial resource strain: Not on file     Food insecurity:     Worry: Not on file     Inability: Not on file     Transportation needs:     Medical: Not on file     Non-medical: Not on file   Tobacco Use     Smoking status: Former Smoker     Packs/day: 0.50     Years: 20.00     Pack years: 10.00     Types: Cigarettes     Last attempt to quit: 1995     Years since quittin.0     Smokeless tobacco: Never Used   Substance and Sexual Activity     Alcohol use: Yes     Comment: very seldom     Drug use: No     Sexual activity: Yes     Partners: Male    Lifestyle     Physical activity:     Days per week: Not on file     Minutes per session: Not on file     Stress: Not on file   Relationships     Social connections:     Talks on phone: Not on file     Gets together: Not on file     Attends Yarsani service: Not on file     Active member of club or organization: Not on file     Attends meetings of clubs or organizations: Not on file     Relationship status: Not on file     Intimate partner violence:     Fear of current or ex partner: Not on file     Emotionally abused: Not on file     Physically abused: Not on file     Forced sexual activity: Not on file   Other Topics Concern      Service No     Blood Transfusions Yes     Comment: with children--several units     Caffeine Concern No     Comment: coffee  5 cups/day      occ diet pepsi     Occupational Exposure Yes     Comment: works at the [employer]  body fluids, comm. diseases     Hobby Hazards No     Sleep Concern Yes     Comment: sleeps more restless some nights     Stress Concern No     Weight Concern Yes     Comment: w'd like to weigh 50-60# less     Special Diet No     Back Care No     Exercise Yes     Comment: walking     Bike Helmet No     Seat Belt Yes     Self-Exams No     Parent/sibling w/ CABG, MI or angioplasty before 65F 55M? Not Asked   Social History Narrative     Not on file       Current Outpatient Medications   Medication     etodolac (LODINE) 400 MG tablet     fish oil-omega-3 fatty acids 1000 MG capsule     GARLIC PO     IRON PO     lisinopril-hydrochlorothiazide (PRINZIDE/ZESTORETIC) 20-25 MG tablet     omeprazole (PRILOSEC) 40 MG DR capsule     ondansetron (ZOFRAN-ODT) 4 MG ODT tab     propranolol (INDERAL) 40 MG tablet     Current Facility-Administered Medications   Medication     hylan (SYNVISC ONE) injection 48 mg     hylan (SYNVISC ONE) injection 48 mg     lidocaine 1 % injection 2 mL     lidocaine 1 % injection 2 mL     lidocaine 1 % injection 2 mL     lidocaine 1 %  "injection 2 mL     triamcinolone (KENALOG-40) injection 40 mg     triamcinolone (KENALOG-40) injection 40 mg     triamcinolone (KENALOG-40) injection 40 mg     triamcinolone (KENALOG-40) injection 40 mg     triamcinolone (KENALOG-40) injection 40 mg     triamcinolone acetonide (KENALOG-40) injection 40 mg     triamcinolone acetonide (KENALOG-40) injection 40 mg     Allergies   Allergen Reactions     No Known Drug Allergies          Objective:  /86   Ht 1.671 m (5' 5.8\")   Wt 105 kg (231 lb 8 oz)   BMI 37.59 kg/m      General: Alert and in no distress    Head: Normocephalic, atraumatic  Eyes: no scleral icterus or conjunctival erythema   Oropharynx:  Mucous membranes moist  Skin: no erythema, petechiae, or jaundice  CV: regular rhythm by palpation, 2+ distal pulses  Resp: normal respiratory effort without conversational dyspnea   Psych: normal mood and affect    Gait: Slightly favoring right knee  Musculoskeletal:  -Left knee arthroplasty scar, well healed  -Tender over the right medial joint line and pes anserine bursa    Radiology:  Independent visualization of images performed  KNEE BILATERAL TWO VIEWS  3/20/2018 9:11 AM      HISTORY:  Medial bilateral knee pain.     COMPARISON: Weightbearing AP views of the bilateral knees dated  3/13/2018. Right knee x-rays dated 4/16/2012.     FINDINGS: Lateral and sunrise views of the knees demonstrate probable  tiny bilateral knee joint effusions, slightly larger on the right.  Mild enthesopathic changes of the quadriceps tendon insertions into  the bilateral patellae. There is also mild joint space loss of the  medial aspect of the left patellofemoral compartment of the knee.  There is moderate medial compartment joint space loss of the bilateral  knees. This is better seen on the prior weightbearing AP view of the  knees.                                                                      IMPRESSION:  1. Small bilateral knee joint effusions.  2. Mild enthesopathic " changes of the bilateral quadriceps tendon  insertions into the bilateral patellae.  3. Mild to moderate joint space loss of the medial aspect of the left  patellofemoral compartment of the knee.  4. Moderate medial compartment joint space loss of the bilateral  knees.      MELVIN FUNG MD      Large Joint Injection/Arthocentesis: R knee joint  Date/Time: 12/27/2019 9:59 AM  Performed by: Freda Bang MD  Authorized by: Freda Bang MD     Indications:  Pain  Needle Size:  25 G  Guidance: landmark guided    Approach:  Anterolateral  Location:  Knee      Medications:  40 mg triamcinolone 40 MG/ML  Medications comment:  4ml 0.5% bupivicaine  NDC:01861-869-56  Lot: [id number]  3/30/20    Outcome:  Tolerated well, no immediate complications  Procedure discussed: discussed risks, benefits, and alternatives    Consent Given by:  Patient          Assessment:  1. Chronic pain of right knee    2. Primary osteoarthritis of right knee        Plan:  Discussed the assessment with the patient and developed a plan together:  -Steroid injection performed today.  Take it easy over the next few days. Keep in mind that the steroid may take up to 3 days to start working and up to 2 weeks to reach maximal effect.  Ice 15-20 minutes as needed for soreness.  Patient's preferred over the counter medication as needed for pain as directed on packaging.    -Also discussed orthopedic surgery referral    Follow Up: As needed if symptoms fail to improve or worsen. Please call with any questions or concerns.       Danita Bang MD, [facility] Sports Medicine  [facility]

## 2019-12-30 ENCOUNTER — OFFICE VISIT (OUTPATIENT)
Dept: FAMILY MEDICINE | Facility: OTHER | Age: 69
End: 2019-12-30
Payer: COMMERCIAL

## 2019-12-30 VITALS
BODY MASS INDEX: 37.12 KG/M2 | TEMPERATURE: 96.6 F | OXYGEN SATURATION: 97 % | HEART RATE: 76 BPM | SYSTOLIC BLOOD PRESSURE: 142 MMHG | RESPIRATION RATE: 12 BRPM | DIASTOLIC BLOOD PRESSURE: 80 MMHG | WEIGHT: 228.6 LBS

## 2019-12-30 DIAGNOSIS — B35.4 TINEA CORPORIS: Primary | ICD-10-CM

## 2019-12-30 LAB
KOH PREP SPEC: ABNORMAL
SPECIMEN SOURCE: ABNORMAL

## 2019-12-30 PROCEDURE — 99213 OFFICE O/P EST LOW 20 MIN: CPT | Performed by: PHYSICIAN ASSISTANT

## 2019-12-30 PROCEDURE — 87220 TISSUE EXAM FOR FUNGI: CPT | Performed by: PHYSICIAN ASSISTANT

## 2019-12-30 RX ORDER — CLOTRIMAZOLE 1 %
CREAM (GRAM) TOPICAL 2 TIMES DAILY
Qty: 60 G | Refills: 0 | Status: SHIPPED | OUTPATIENT
Start: 2019-12-30 | End: 2020-03-03

## 2019-12-30 ASSESSMENT — PAIN SCALES - GENERAL: PAINLEVEL: NO PAIN (0)

## 2019-12-30 NOTE — PATIENT INSTRUCTIONS
Patient Education     Fungal Skin Infection (Tinea)  A fungal infection occurs when too much fungus grows on or in the body. Fungus normally lives on the skin in small amounts and does not cause harm. But when too much grows on the skin, it causes an infection. This is also known as tinea. Fungal skin infections are common and not usually serious.  The infection often starts as a small red area the size of a pea. The skin may turn dry and flaky. The area may itch. As the fungus grows, it spreads out in a red [place]. Because of how it looks, fungal skin infection is often called ringworm, but it is not caused by a worm. Fungal skin infections can occur on many parts of the body. They can grow on the head, chest, arms, or legs. They can occur on the buttocks. On the feet, fungal infection is known as  athlete s foot.  It causes itchy, sometimes painful sores between the toes and the bottom or sides of the feet. In the groin, the rash is called  jock itch.   People with weak immune systems can get a fungal infection more easily. This includes people with diabetes or HIV, or who are being treated for cancer. In these cases, the fungal infection can spread and cause severe illness. Fungal infections are also more common in people who are overweight.  In most cases, treatment is done with antifungal cream or ointment. If the infection is on your scalp, you may take oral medicine. In some cases, a tiny piece of the skin (biopsy) may be taken. This is so it can be tested in a lab.  Common fungal infections are treated with creams on the skin or oral medicine.  Home care  Follow all instructions when using antifungal cream or ointment on your skin. Your healthcare provider may advise using cornstarch powder to keep your skin dry or petroleum jelly to provide a barrier.  General care:    If you were prescribed an oral medicine, read the patient information. Talk with your healthcare provider about the risks and side  effects.    Let your skin dry completely after bathing. Carefully dry your feet and between your toes.    Dress in loose cotton clothing.    Don t scratch the affected area. This can delay healing and may spread the infection. It can also cause a bacterial infection.    Keep your skin clean, but don t wash the skin too much. This can irritate your skin.    Keep in mind that it may take a week before the fungus starts to go away. It can take 2 to 4 weeks to fully clear. To prevent it from coming back, use the medicine until the rash is all gone.  Follow-up care  Follow up with your healthcare provider if the rash does not get better after 10 days of treatment. Also follow up if the rash spreads to other parts of your body.  When to seek medical advice  Call your healthcare provider right away if any of these occur:    Fever of 100.4 F (38 C) or higher    Redness or swelling that gets worse    Pain that gets worse    Foul-smelling fluid leaking from the skin  Date Last Reviewed: 11/1/2016 2000-2018 [employer]. [address] [place] [address], [place] [address]. All rights reserved. This information is not intended as a substitute for professional medical care. Always follow your healthcare professional's instructions.

## 2019-12-30 NOTE — PROGRESS NOTES
Subjective     Komal Miller is a 69 year old female who presents to clinic today for the following health issues:    HPI   Rash  Onset: 2 months    Description:   Location: right hand and left knee  Character: blotchy, raised, flakey, draining, red  Itching (Pruritis): YES    Progression of Symptoms:  worsening    Accompanying Signs & Symptoms:  Fever: no   Body aches or joint pain: no   Sore throat symptoms: no   Recent cold symptoms: no     History:   Previous similar rash: no     Precipitating factors:   Exposure to similar rash: no   New exposures: None   Recent travel: no     Alleviating factors:  nothing    Therapies Tried and outcome: benadryl cream, Lotrimin, poison ivy cream; no relief     Patient is a 69 year old female who presents with rash on her right hand and left knee. She says that the rash first appeared about 2 months ago and she has tried several different topical creams without much improvement. Notes that the area on her hand has wept clear fluid and been quite itchy. Denies new soaps/beauty products/gloves or other materials. Denies fever, recent illness, change in medication or exposure to individuals with similar illnesses.     Reviewed and updated as needed this visit by Provider         Review of Systems   ROS COMP: Constitutional, HEENT, cardiovascular, pulmonary, gi and gu systems are negative, except as otherwise noted.      Objective    BP (!) 142/80 (BP Location: Right arm, Patient Position: Chair, Cuff Size: Adult Large)   Pulse 76   Temp 96.6  F (35.9  C) (Oral)   Resp 12   Wt 103.7 kg (228 lb 9.6 oz)   SpO2 97%   BMI 37.12 kg/m    Body mass index is 37.12 kg/m .  Physical Exam   GENERAL: healthy, alert and no distress  RESP: lungs clear to auscultation - no rales, rhonchi or wheezes  CV: regular rate and rhythm, normal S1 S2, no S3 or S4, no murmur, click or rub, no peripheral edema and peripheral pulses strong  MS: no gross musculoskeletal defects noted, no edema  SKIN:  erythematous, excoriated annular lesion over the right 3rd/4th MCP joints and over left knee, vertical scar over left knee from past joint replacement   PSYCH: mentation appears normal, affect normal/bright    Diagnostic Test Results:  Results for orders placed or performed in visit on 12/30/19 (from the past 24 hour(s))   KOH prep (skin, hair or nails only)   Result Value Ref Range    Specimen Description Skin     KOH Skin Hair Nails Test Fungal elements seen (A)            Assessment & Plan       ICD-10-CM    1. Tinea corporis B35.4 KOH prep (skin, hair or nails only)     clotrimazole (LOTRIMIN) 1 % external cream        Patient will use topical antifungal twice daily for next 2-4 weeks. Educational information reviewed and sent home with patient. If not improving as expected she will call to update.       Return in about 6 months (around 6/30/2020) for Return for scheduled annual checkup with PCP.    Elliot Veliz PA-C  [facility]

## 2019-12-30 NOTE — NURSING NOTE
Health Maintenance Due   Topic Date Due     URINE DRUG SCREEN  1950     HEPATITIS C SCREENING  1950     DTAP/TDAP/TD IMMUNIZATION (1 - Tdap) 05/05/1963     ZOSTER IMMUNIZATION (2 of 3) 09/16/2015     MAMMO SCREENING  05/09/2019     INFLUENZA VACCINE (1) 09/01/2019     COLONOSCOPY  01/20/2020     Anu EDUARDO LPN

## 2020-02-06 ENCOUNTER — TELEPHONE (OUTPATIENT)
Dept: FAMILY MEDICINE | Facility: OTHER | Age: 70
End: 2020-02-06

## 2020-02-06 DIAGNOSIS — B35.4 TINEA CORPORIS: Primary | ICD-10-CM

## 2020-02-06 NOTE — TELEPHONE ENCOUNTER
Reason for Call:  Medication or medication refill:    Do you use a [facility]?  Name of the pharmacy and phone number for the current request:  [facility] - [phone]    Name of the medication requested: change of medication from clotrimazole (LOTRIMIN) 1 % external cream & try something new.    Other request: patient was prescribed above medication and this did not work.  She did try a couple others previously that did not work either.  It did get a tad bigger too.  Can he suggest another OTC or prescribe another different one???  Patient aware Elliot is not in today, wondering if Dr Lehman or someone else could look at this message?    Can we leave a detailed message on this number? YES    Phone number patient can be reached at: Home number on file [phone] (home)    Best Time:     Call taken on 2/6/2020 at 8:08 AM by Sarai Molina

## 2020-02-07 RX ORDER — PRENATAL VIT 91/IRON/FOLIC/DHA 28-975-200
COMBINATION PACKAGE (EA) ORAL 2 TIMES DAILY
Qty: 30 G | Refills: 0 | Status: SHIPPED | OUTPATIENT
Start: 2020-02-07 | End: 2020-03-03

## 2020-02-07 NOTE — TELEPHONE ENCOUNTER
Emily Calls to inquire about this request for something else for her itching.  She states she is getting very upset that she has not had a call back about this and states she is considering changing clinics.

## 2020-02-07 NOTE — TELEPHONE ENCOUNTER
Patient is following up on her message from yesterday.  She did call yesterday afternoon too.    She would like to hear back as soon as possible, so she can get something started right away.    Thank you,  Sarai EDUARDO

## 2020-02-07 NOTE — TELEPHONE ENCOUNTER
Please call patient to inform her that I have sent out terbinafine for her to use twice daily. I would like her to only apply a thin layer and wash gently off each day. In addition, she should avoid use of any scented lotions or other beauty products. Too much use of topical creams can lead to worsening rash and skin irritation. As this has been ongoing since Oct/Nov 2019 and has not improved I would recommend that she schedule to see dermatology now in the event this cream is not effective. Referral placed.     Elliot Veliz PA-C on 2/7/2020 at 3:45 PM

## 2020-02-10 NOTE — TELEPHONE ENCOUNTER
Called patient again on Friday at the end of the day, after 5pm. Left her a detailed message on voicemail that medication had been sent to pharmacy and that she should make an appointment with dermatology. Let her know that she could call if she needed any help with anything further.  Have not heard back from the patient, closing encounter.    Stephanie Yoo XRO/

## 2020-02-18 ENCOUNTER — HOSPITAL ENCOUNTER (EMERGENCY)
Facility: CLINIC | Age: 70
Discharge: HOME | End: 2020-02-18
Attending: EMERGENCY MEDICINE | Admitting: EMERGENCY MEDICINE
Payer: MEDICARE

## 2020-02-18 ENCOUNTER — NURSE TRIAGE (OUTPATIENT)
Dept: FAMILY MEDICINE | Facility: OTHER | Age: 70
End: 2020-02-18

## 2020-02-18 VITALS
BODY MASS INDEX: 36.54 KG/M2 | HEART RATE: 64 BPM | DIASTOLIC BLOOD PRESSURE: 96 MMHG | RESPIRATION RATE: 16 BRPM | SYSTOLIC BLOOD PRESSURE: 192 MMHG | WEIGHT: 225 LBS | OXYGEN SATURATION: 97 % | TEMPERATURE: 98.6 F

## 2020-02-18 DIAGNOSIS — G43.109 MIGRAINE WITH AURA AND WITHOUT STATUS MIGRAINOSUS, NOT INTRACTABLE: ICD-10-CM

## 2020-02-18 LAB
ANION GAP SERPL CALCULATED.3IONS-SCNC: 6 MMOL/L (ref 3–14)
BASOPHILS # BLD AUTO: 0.1 10E9/L (ref 0–0.2)
BASOPHILS NFR BLD AUTO: 0.9 %
BUN SERPL-MCNC: 10 MG/DL (ref 7–30)
CALCIUM SERPL-MCNC: 8.9 MG/DL (ref 8.5–10.1)
CHLORIDE SERPL-SCNC: 98 MMOL/L (ref 94–109)
CO2 SERPL-SCNC: 28 MMOL/L (ref 20–32)
CREAT SERPL-MCNC: 0.6 MG/DL (ref 0.52–1.04)
CRP SERPL-MCNC: 8.4 MG/L (ref 0–8)
DIFFERENTIAL METHOD BLD: ABNORMAL
EOSINOPHIL NFR BLD AUTO: 0.5 %
ERYTHROCYTE [DISTWIDTH] IN BLOOD BY AUTOMATED COUNT: 13.6 % (ref 10–15)
ERYTHROCYTE [SEDIMENTATION RATE] IN BLOOD BY WESTERGREN METHOD: 23 MM/H (ref 0–30)
GFR SERPL CREATININE-BSD FRML MDRD: >90 ML/MIN/{1.73_M2}
GLUCOSE SERPL-MCNC: 131 MG/DL (ref 70–99)
HCT VFR BLD AUTO: 41.5 % (ref 35–47)
HGB BLD-MCNC: 14.1 G/DL (ref 11.7–15.7)
IMM GRANULOCYTES # BLD: 0 10E9/L (ref 0–0.4)
IMM GRANULOCYTES NFR BLD: 0.2 %
LYMPHOCYTES # BLD AUTO: 0.7 10E9/L (ref 0.8–5.3)
LYMPHOCYTES NFR BLD AUTO: 7.7 %
MCH RBC QN AUTO: 28.7 PG (ref 26.5–33)
MCHC RBC AUTO-ENTMCNC: 34 G/DL (ref 31.5–36.5)
MCV RBC AUTO: 84 FL (ref 78–100)
MONOCYTES # BLD AUTO: 0.5 10E9/L (ref 0–1.3)
MONOCYTES NFR BLD AUTO: 5.2 %
NEUTROPHILS # BLD AUTO: 7.9 10E9/L (ref 1.6–8.3)
NEUTROPHILS NFR BLD AUTO: 85.5 %
NRBC # BLD AUTO: 0 10*3/UL
NRBC BLD AUTO-RTO: 0 /100
PLATELET # BLD AUTO: 315 10E9/L (ref 150–450)
POTASSIUM SERPL-SCNC: 3.7 MMOL/L (ref 3.4–5.3)
RBC # BLD AUTO: 4.92 10E12/L (ref 3.8–5.2)
SODIUM SERPL-SCNC: 132 MMOL/L (ref 133–144)
WBC # BLD AUTO: 9.2 10E9/L (ref 4–11)

## 2020-02-18 PROCEDURE — 80048 BASIC METABOLIC PNL TOTAL CA: CPT | Performed by: EMERGENCY MEDICINE

## 2020-02-18 PROCEDURE — 99284 EMERGENCY DEPT VISIT MOD MDM: CPT | Mod: Z6 | Performed by: EMERGENCY MEDICINE

## 2020-02-18 PROCEDURE — 96365 THER/PROPH/DIAG IV INF INIT: CPT | Performed by: EMERGENCY MEDICINE

## 2020-02-18 PROCEDURE — 99284 EMERGENCY DEPT VISIT MOD MDM: CPT | Mod: 25 | Performed by: EMERGENCY MEDICINE

## 2020-02-18 PROCEDURE — 96375 TX/PRO/DX INJ NEW DRUG ADDON: CPT | Performed by: EMERGENCY MEDICINE

## 2020-02-18 PROCEDURE — 25800030 ZZH RX IP 258 OP 636: Performed by: EMERGENCY MEDICINE

## 2020-02-18 PROCEDURE — 85025 COMPLETE CBC W/AUTO DIFF WBC: CPT | Performed by: EMERGENCY MEDICINE

## 2020-02-18 PROCEDURE — 85652 RBC SED RATE AUTOMATED: CPT | Performed by: EMERGENCY MEDICINE

## 2020-02-18 PROCEDURE — 86140 C-REACTIVE PROTEIN: CPT | Performed by: EMERGENCY MEDICINE

## 2020-02-18 PROCEDURE — 25000128 H RX IP 250 OP 636: Performed by: EMERGENCY MEDICINE

## 2020-02-18 RX ORDER — KETOROLAC TROMETHAMINE 15 MG/ML
15 INJECTION, SOLUTION INTRAMUSCULAR; INTRAVENOUS ONCE
Status: COMPLETED | OUTPATIENT
Start: 2020-02-18 | End: 2020-02-18

## 2020-02-18 RX ORDER — SODIUM CHLORIDE 9 MG/ML
1000 INJECTION, SOLUTION INTRAVENOUS CONTINUOUS
Status: DISCONTINUED | OUTPATIENT
Start: 2020-02-18 | End: 2020-02-18 | Stop reason: HOSPADM

## 2020-02-18 RX ORDER — MAGNESIUM SULFATE 1 G/100ML
1 INJECTION INTRAVENOUS ONCE
Status: COMPLETED | OUTPATIENT
Start: 2020-02-18 | End: 2020-02-18

## 2020-02-18 RX ORDER — DIPHENHYDRAMINE HYDROCHLORIDE 50 MG/ML
25 INJECTION INTRAMUSCULAR; INTRAVENOUS ONCE
Status: COMPLETED | OUTPATIENT
Start: 2020-02-18 | End: 2020-02-18

## 2020-02-18 RX ORDER — ONDANSETRON 2 MG/ML
4 INJECTION INTRAMUSCULAR; INTRAVENOUS EVERY 30 MIN PRN
Status: DISCONTINUED | OUTPATIENT
Start: 2020-02-18 | End: 2020-02-18 | Stop reason: HOSPADM

## 2020-02-18 RX ORDER — SUMATRIPTAN 25 MG/1
25 TABLET, FILM COATED ORAL
Qty: 4 TABLET | Refills: 0 | Status: SHIPPED | OUTPATIENT
Start: 2020-02-18 | End: 2021-04-01

## 2020-02-18 RX ADMIN — MAGNESIUM SULFATE IN DEXTROSE 1 G: 10 INJECTION, SOLUTION INTRAVENOUS at 16:15

## 2020-02-18 RX ADMIN — KETOROLAC TROMETHAMINE 15 MG: 15 INJECTION, SOLUTION INTRAMUSCULAR; INTRAVENOUS at 16:10

## 2020-02-18 RX ADMIN — ONDANSETRON 4 MG: 2 INJECTION INTRAMUSCULAR; INTRAVENOUS at 16:09

## 2020-02-18 RX ADMIN — SODIUM CHLORIDE 1000 ML: 9 INJECTION, SOLUTION INTRAVENOUS at 16:15

## 2020-02-18 RX ADMIN — DIPHENHYDRAMINE HYDROCHLORIDE 25 MG: 50 INJECTION, SOLUTION INTRAMUSCULAR; INTRAVENOUS at 16:11

## 2020-02-18 ASSESSMENT — ENCOUNTER SYMPTOMS
NAUSEA: 1
RHINORRHEA: 0
WEAKNESS: 0
VOMITING: 1
HEADACHES: 1
EYE PAIN: 0
DIZZINESS: 0
FEVER: 0
PHOTOPHOBIA: 1
CHILLS: 0
SHORTNESS OF BREATH: 0

## 2020-02-18 NOTE — TELEPHONE ENCOUNTER
"S-(situation):  Pt calling today with a bad Migraine Headache. She is asking if Dr. Lehman would order a shot for her to come and get at the clinic and a nausea medicaiton?    B-(background):  She has thrown up 3 times today. The headache started yesterday and it just keeps getting worse. She said she has HX of Migraines.  She has inderal, but will get a bad migraine once or twice a year.     A-(assessment):  Bad headache.     R-(recommendations): RN will forward message to Dr. Lehman to review and please call pt if you want her to come in for a shot.[phone] (home) RN did offer the ER, but she declined. She said her insurance frowns on that for a headache.   SEBAS Snow    Additional Information    SEVERE headache (e.g., excruciating) and has had severe headaches before    Answer Assessment - Initial Assessment Questions  1. LOCATION: \"Where does it hurt?\"       Hx of migraine headaches- mostly on the left side of her head  2. ONSET: \"When did the headache start?\" (Minutes, hours or days)        Started yesterday  3. PATTERN: \"Does the pain come and go, or has it been constant since it started?\"      I get an aura of a smell and vision changes. I feel so sick today, I have vomited twice a day due to this.   4. SEVERITY: \"How bad is the pain?\" and \"What does it keep you from doing?\"  (e.g., Scale 1-10; mild, moderate, or severe)    - MILD (1-3): doesn't interfere with normal activities     - MODERATE (4-7): interferes with normal activities or awakens from sleep     - SEVERE (8-10): excruciating pain, unable to do any normal activities         Gets Migraine headaches once or twice a year. At least an 8/10   5. RECURRENT SYMPTOM: \"Have you ever had headaches before?\" If so, ask: \"When was the last time?\" and \"What happened that time?\"        Yes, Hx of migraine. At least a few months ago.   6. CAUSE: \"What do you think is causing the headache?\"       Migraine.   7. MIGRAINE: \"Have you been diagnosed with " "migraine headaches?\" If so, ask: \"Is this headache similar?\"       Ywa.   8. HEAD INJURY: \"Has there been any recent injury to the head?\"       NO head injury. Nausea and vomiting.   9. OTHER SYMPTOMS: \"Do you have any other symptoms?\" (fever, stiff neck, eye pain, sore throat, cold symptoms)       NO  Other Sx.   10. PREGNANCY: \"Is there any chance you are pregnant?\" \"When was your last menstrual period?\"        NA    Protocols used: HEADACHE-A-OH    "

## 2020-02-18 NOTE — TELEPHONE ENCOUNTER
Reason for call:  Patient reporting a symptom    Symptom or request: Migraine     Duration (how long have symptoms been present): 2 days     Have you been treated for this before? Yes    Additional comments: Please call back ASAP-  Patient states she wants something called in for her.     Phone Number patient can be reached at:  Home number on file [phone] (home)    Best Time:  Any     Can we leave a detailed message on this number:  YES    Call taken on 2/18/2020 at 2:20 PM by Deann Preciado

## 2020-02-18 NOTE — TELEPHONE ENCOUNTER
Patient was notified and she stated doesn't he remember when I called in last time and was told to drink coke and take tylenol. I stated they would need to be more frequent. I explained that providers recommendations is to go to the ER she stated fine. And thank you then hung up.  Vero Flores MA 2/18/2020

## 2020-02-18 NOTE — ED AVS SNAPSHOT
[facility] Department  [address] [place] [address]  Phone:  [phone]  Fax:  [phone]                                    Komal Miller   MRN: [id number]    Department:  [facility] Emergency Department   Date of Visit:  2/18/2020           After Visit Summary Signature Page    I have received my discharge instructions, and my questions have been answered. I have discussed any challenges I see with this plan with the nurse or doctor.    ..........................................................................................................................................  Patient/Patient Representative Signature      ..........................................................................................................................................  Patient Representative Print Name and Relationship to Patient    ..................................................               ................................................  Date                                   Time    ..........................................................................................................................................  Reviewed by Signature/Title    ...................................................              ..............................................  Date                                               Time          22EPIC Rev 08/18

## 2020-02-18 NOTE — ED TRIAGE NOTES
Presents to ED with headache since last night. Patient states she will intermittently get migraines. Complains of continuous nausea and vomiting.

## 2020-02-18 NOTE — ED PROVIDER NOTES
"  History     Chief Complaint   Patient presents with     Headache     HPI  Komal Miller is a 69 year old female who sense with migraine.  She states that this started last night.  Is the same as previous migraines.  Pain is mostly on the left side of her head, but she feels like she has pain \"all over.  Associated with aura and blurred vision, as well as nausea and vomiting.  She states that she has a history of having migraines, and does get a couple of migraines every year.  She did try to get in with her primary doctor, but was told to come to the ER.  She states \"I should have come last night\".  She states that she is taken Imitrex in the past for her migraines which did help, but she is also come to the ER with migraine symptoms and received Zofran and Toradol and those medications relieved her symptoms at that time.  She states \"I know what this is, it is a migraine, I just need some medicine so I can feel better\".  Denies thunderclap headache, this is not the worst headache of her life, is similar to previous migraines.  Does not have any neck pain or stiffness, has not been running a fever.    Allergies:  Allergies   Allergen Reactions     No Known Drug Allergies        Problem List:    Patient Active Problem List    Diagnosis Date Noted     Morbid obesity due to excess calories (H) 07/26/2017     Priority: Medium     Cluster headache, not intractable, unspecified chronicity pattern 05/08/2017     Priority: Medium     Gastroesophageal reflux disease with esophagitis 10/02/2015     Priority: Medium     Intestinal adhesions 05/06/2014     Priority: Medium     Cervical adenopathy 04/29/2014     Priority: Medium     Small bowel obstruction (H) 04/28/2014     Priority: Medium     DVT prophylaxis 04/28/2014     Priority: Medium     Advance Care Planning 05/07/2012     Priority: Medium     Discussed advance care planning with patient; information given to patient to review. 5/7/2012        Hypertension goal BP " (blood pressure) < 140/90 11/15/2011     Priority: Medium     Hyperlipidemia LDL goal <130 04/26/2011     Priority: Medium     Recurring abdominal pain 12/21/2009     Priority: Medium     Esophageal reflux 04/18/2006     Priority: Medium     Absence of menstruation 11/04/2005     Priority: Medium        Past Medical History:    Past Medical History:   Diagnosis Date     Unspecified essential hypertension        Past Surgical History:    Past Surgical History:   Procedure Laterality Date     C APPENDECTOMY       C LIGATE FALLOPIAN TUBE       C TOTAL ABDOM HYSTERECTOMY      Hysterectomy, Total Abdominal     C TOTAL KNEE ARTHROPLASTY Left      COLONOSCOPY  06/10/08    Internal hemorrhoids, otherwise normal.     COLONOSCOPY  01/20/10     HC DILATION/CURETTAGE DIAG/THER NON OB      D & C     HC LAP, SURG ENTEROLYSIS  05/07/10     HC REMOVAL OF OVARY/TUBE(S)      Salpingo-Oophorectomy, bilateral     HC UGI ENDOSCOPY, SIMPLE EXAM  01/20/10     LAPAROSCOPY DIAGNOSTIC (GENERAL)  5/6/2014    Procedure: LAPAROSCOPY DIAGNOSTIC (GENERAL);  Surgeon: Seth Matthews MD;  Location: [facility] OR     LAPAROTOMY, LYSIS ADHESIONS, COMBINED  5/6/2014    Procedure: COMBINED LAPAROTOMY, LYSIS ADHESIONS;  Surgeon: Seth Matthews MD;  Location: [facility] OR     VIDEO CAPSULE ENDOSCOPY  02/15/10    normal sm intestine       Family History:    Family History   Problem Relation Age of Onset     C.A.D. Mother         bi-pass     Diabetes Mother         late onset     Allergies Mother         xray dye     Arthritis Mother      Cancer Mother         ovary     Respiratory Mother      C.A.D. Father         bi-pass     Hypertension Brother      Breast Cancer Sister      Allergies Sister      Allergies Brother      Eye Disorder Maternal Aunt      Gastrointestinal Disease Brother      Gynecology Sister      Thyroid Disease Sister        Social History:  Marital Status:   [2]  Social History     Tobacco Use     Smoking status: Former Smoker      Packs/day: 0.50     Years: 20.00     Pack years: 10.00     Types: Cigarettes     Last attempt to quit: 1995     Years since quittin.1     Smokeless tobacco: Never Used   Substance Use Topics     Alcohol use: Yes     Comment: very seldom     Drug use: No        Medications:    clotrimazole (LOTRIMIN) 1 % external cream  etodolac (LODINE) 400 MG tablet  fish oil-omega-3 fatty acids 1000 MG capsule  GARLIC PO  lisinopril-hydrochlorothiazide (PRINZIDE/ZESTORETIC) 20-25 MG tablet  omeprazole (PRILOSEC) 40 MG DR capsule  propranolol (INDERAL) 40 MG tablet  terbinafine (LAMISIL) 1 % external cream          Review of Systems   Constitutional: Negative for chills and fever.   HENT: Negative for rhinorrhea.    Eyes: Positive for photophobia. Negative for pain.   Respiratory: Negative for shortness of breath.    Cardiovascular: Negative for chest pain.   Gastrointestinal: Positive for nausea and vomiting.   Neurological: Positive for headaches. Negative for dizziness, syncope and weakness.   All other systems reviewed and are negative.      Physical Exam   BP: (!) 155/93  Pulse: 97  Temp: 98.6  F (37  C)  Resp: 20  Weight: 102.1 kg (225 lb)  SpO2: 97 %      Physical Exam  Vitals signs and nursing note reviewed.   Constitutional:       Appearance: She is obese.   HENT:      Head: Normocephalic and atraumatic.      Nose: Nose normal. No rhinorrhea.      Mouth/Throat:      Mouth: Mucous membranes are moist.   Eyes:      Extraocular Movements: Extraocular movements intact.      Pupils: Pupils are equal, round, and reactive to light.   Neck:      Musculoskeletal: Normal range of motion and neck supple.   Cardiovascular:      Rate and Rhythm: Normal rate and regular rhythm.   Pulmonary:      Effort: Pulmonary effort is normal.      Breath sounds: Normal breath sounds.   Musculoskeletal: Normal range of motion.   Skin:     General: Skin is warm and dry.   Neurological:      General: No focal deficit present.      Mental  Status: She is alert and oriented to person, place, and time.   Psychiatric:         Mood and Affect: Mood normal.         Behavior: Behavior normal.         ED Course        Procedures               Critical Care time:  none               Results for orders placed or performed during the hospital encounter of 02/18/20 (from the past 24 hour(s))   CBC with platelets differential   Result Value Ref Range    WBC 9.2 4.0 - 11.0 10e9/L    RBC Count 4.92 3.8 - 5.2 10e12/L    Hemoglobin 14.1 11.7 - 15.7 g/dL    Hematocrit 41.5 35.0 - 47.0 %    MCV 84 78 - 100 fl    MCH 28.7 26.5 - 33.0 pg    MCHC 34.0 31.5 - 36.5 g/dL    RDW 13.6 10.0 - 15.0 %    Platelet Count 315 150 - 450 10e9/L    Diff Method Automated Method     % Neutrophils 85.5 %    % Lymphocytes 7.7 %    % Monocytes 5.2 %    % Eosinophils 0.5 %    % Basophils 0.9 %    % Immature Granulocytes 0.2 %    Nucleated RBCs 0 0 /100    Absolute Neutrophil 7.9 1.6 - 8.3 10e9/L    Absolute Lymphocytes 0.7 (L) 0.8 - 5.3 10e9/L    Absolute Monocytes 0.5 0.0 - 1.3 10e9/L    Absolute Basophils 0.1 0.0 - 0.2 10e9/L    Abs Immature Granulocytes 0.0 0 - 0.4 10e9/L    Absolute Nucleated RBC 0.0    Erythrocyte sedimentation rate auto   Result Value Ref Range    Sed Rate 23 0 - 30 mm/h   CRP inflammation   Result Value Ref Range    CRP Inflammation 8.4 (H) 0.0 - 8.0 mg/L   Basic metabolic panel   Result Value Ref Range    Sodium 132 (L) 133 - 144 mmol/L    Potassium 3.7 3.4 - 5.3 mmol/L    Chloride 98 94 - 109 mmol/L    Carbon Dioxide 28 20 - 32 mmol/L    Anion Gap 6 3 - 14 mmol/L    Glucose 131 (H) 70 - 99 mg/dL    Urea Nitrogen 10 7 - 30 mg/dL    Creatinine 0.60 0.52 - 1.04 mg/dL    GFR Estimate >90 >60 mL/min/[1.73_m2]    GFR Estimate If Black >90 >60 mL/min/[1.73_m2]    Calcium 8.9 8.5 - 10.1 mg/dL       Medications   ondansetron (ZOFRAN) injection 4 mg (4 mg Intravenous Given 2/18/20 7807)   0.9% sodium chloride BOLUS (1,000 mLs Intravenous New Bag 2/18/20 1610)     Followed by    sodium chloride 0.9% infusion (has no administration in time range)   diphenhydrAMINE (BENADRYL) injection 25 mg (25 mg Intravenous Given 2/18/20 1611)   ketorolac (TORADOL) injection 15 mg (15 mg Intravenous Given 2/18/20 1610)   magnesium sulfate 1 gm in 100mL D5W intermittent infusion (1 g Intravenous New Bag 2/18/20 1615)       Assessments & Plan (with Medical Decision Making)  Emily is a 69-year-old female with past medical history of migraines who presents with a migraine today.  She states that since yesterday, she has had a left-sided headache, that is consistent with her typical migraines.  She gets bad headaches a few times a year, and does not have any medication at home that she currently takes for these headaches.  Dates that she got her typical aura, nausea, and vomiting associated with the headache pattern.  States that in the past Toradol and Zofran have helped with her symptoms, and she is also had Imitrex at home in the past that worked.  History and physical exam as above  Peripheral IV was started and patient was given IV fluid bolus, Toradol, Zofran, and magnesium.  Labs were obtained, results as above.  Her CRP is mildly elevated beyond normal range at 8.4, however the remainder of labs are within normal limits.  Patient feels much better after receiving migraine cocktail, and is already asking to go home.  She states that she thinks that this is just migraine and just needed some medication to feel better.  She is wondering if she is okay to take Tylenol at home, we discussed management of headache at home later today.  She also requested some Imitrex since this is worked for her in the past, gave prescription for 4 tablets, but did recommend that she follow-up with her primary provider if she needs more medication, or if her headaches become more frequent.  Discussed reasons to return to the ER, including worsening headache, changes in vision, slurred speech, weakness, persistent nausea and  vomiting, or any other concerns.  She understands and agrees, and is still asking for discharge at this time.  Is discharged in the ED in stable condition, no acute distress.       I have reviewed the nursing notes.    I have reviewed the findings, diagnosis, plan and need for follow up with the patient.       Discharge Medication List as of 2/18/2020  5:23 PM      START taking these medications    Details   SUMAtriptan 25 MG PO tablet Take 1 tablet (25 mg) by mouth at onset of headache for migraine May repeat in 2 hours. Max 8 tablets/24 hours., Disp-4 tablet, R-0, E-Prescribe             Final diagnoses:   Migraine with aura and without status migrainosus, not intractable       2/18/2020   [facility] EMERGENCY DEPARTMENT     Ekaterina Avitia DO  02/18/20 2005

## 2020-02-18 NOTE — DISCHARGE INSTRUCTIONS
You may take 1 tablet of Imitrex at onset of migraine.  May take a second tablet if no improvement in your headache after 2 hours.    You were given Toradol to help with your headache today.  This is a similar medication to ibuprofen and naproxen, do not take either of these medicines for at least 6 hours.  If you need something else for headache when you get home, you may take Tylenol    Drink plenty of fluids and get plenty of rest    Follow-up with Dr. Lehman as needed, you may need to discuss Imitrex prescription for migraines if you get them more frequently    Return immediately to the ER if you have any new or worsening symptoms, if you have worsening headache and problems with your vision, if you begin vomiting, if you have any slurred speech, any weakness, or any new or concerning symptoms

## 2020-02-18 NOTE — TELEPHONE ENCOUNTER
Looks like Emily has been notified she needs to go to the ER for her migraine. VINOD. Dr. Lehman/ Dedra/SEBAS Snow

## 2020-02-18 NOTE — TELEPHONE ENCOUNTER
"Due to the duration of her headache (overnight) and the fact that she has not had 1 of these in some time, I am hesitant to \"call over a shot\".  I think she should go to the emergency department and be evaluated for the headache as there are other causes for unilateral headache beyond migraine.    If the patient frequently had these symptoms, treating it \"sight unseen\" would be an option but at this time it is not.      [place]  "

## 2020-02-25 ENCOUNTER — OFFICE VISIT (OUTPATIENT)
Dept: DERMATOLOGY | Facility: CLINIC | Age: 70
End: 2020-02-25
Attending: PHYSICIAN ASSISTANT
Payer: COMMERCIAL

## 2020-02-25 DIAGNOSIS — L30.0 NUMMULAR ECZEMA: Primary | ICD-10-CM

## 2020-02-25 PROCEDURE — 99203 OFFICE O/P NEW LOW 30 MIN: CPT | Performed by: DERMATOLOGY

## 2020-02-25 RX ORDER — TRIAMCINOLONE ACETONIDE 1 MG/G
OINTMENT TOPICAL
Qty: 80 G | Refills: 0 | Status: SHIPPED | OUTPATIENT
Start: 2020-02-25 | End: 2020-03-03

## 2020-02-25 NOTE — PATIENT INSTRUCTIONS
For the body, start vanicream twice daily from the neck down.                  Dove sensitive skin soap is pictured below.

## 2020-02-25 NOTE — NURSING NOTE
Komal Miller's goals for this visit include:   Chief Complaint   Patient presents with     Derm Problem     spot on hand elbow and knee        She requests these members of her care team be copied on today's visit information: yes    PCP: Wesly Lehman    Referring Provider:  Elliot Veliz PA-C  [address] [place] [address]    There were no vitals taken for this visit.    Do you need any medication refills at today's visit? No     Amorrpao Carey CMA

## 2020-02-25 NOTE — LETTER
2/25/2020         RE: Komal Miller  [address]  [facility] [address]        Dear Colleague,    Thank you for referring your patient, Komal Miller, to the [facility]. Please see a copy of my visit note below.    [facility] Dermatology Note      Dermatology Problem List:  1.Nummular dermatitis  -triamcinolone ointment BID x 2 weeks    Encounter Date: Feb 25, 2020    CC:  Chief Complaint   Patient presents with     Derm Problem     spot on hand elbow and knee          History of Present Illness:  Ms. Komal Miller is a 69 year old female who presents as a referral from primary care. Has not been seen by dermatology before.     Today patient reports rash is on her dorsal hand and left knee, and left elbow. Reports they are very itchy and weep. Tried clotrimazole, lamasil AT. Reports it was diagnosed as tinea corporis.     Personal and family history of hay fever. Family history of eczema in father. No personal or family history of psoriasis. No personal history of skin cancer.     No other concerns.     Past Medical History:   Patient Active Problem List   Diagnosis     Absence of menstruation     Esophageal reflux     Recurring abdominal pain     Hyperlipidemia LDL goal <130     Hypertension goal BP (blood pressure) < 140/90     Advance Care Planning     Small bowel obstruction (H)     DVT prophylaxis     Cervical adenopathy     Intestinal adhesions     Gastroesophageal reflux disease with esophagitis     Cluster headache, not intractable, unspecified chronicity pattern     Morbid obesity due to excess calories (H)     Past Medical History:   Diagnosis Date     Unspecified essential hypertension      Past Surgical History:   Procedure Laterality Date     C APPENDECTOMY       C LIGATE FALLOPIAN TUBE       C TOTAL ABDOM HYSTERECTOMY      Hysterectomy, Total Abdominal     C TOTAL KNEE ARTHROPLASTY Left      COLONOSCOPY  06/10/08    Internal hemorrhoids, otherwise  normal.     COLONOSCOPY  01/20/10     HC DILATION/CURETTAGE DIAG/THER NON OB      D & C     HC LAP, SURG ENTEROLYSIS  05/07/10     HC REMOVAL OF OVARY/TUBE(S)      Salpingo-Oophorectomy, bilateral     HC UGI ENDOSCOPY, SIMPLE EXAM  01/20/10     LAPAROSCOPY DIAGNOSTIC (GENERAL)  5/6/2014    Procedure: LAPAROSCOPY DIAGNOSTIC (GENERAL);  Surgeon: Seth Matthews MD;  Location: [facility] OR     LAPAROTOMY, LYSIS ADHESIONS, COMBINED  5/6/2014    Procedure: COMBINED LAPAROTOMY, LYSIS ADHESIONS;  Surgeon: Seth Matthews MD;  Location: [facility] OR     VIDEO CAPSULE ENDOSCOPY  02/15/10    normal sm intestine       Social History:  Patient reports that she quit smoking about 25 years ago. Her smoking use included cigarettes. She has a 10.00 pack-year smoking history. She has never used smokeless tobacco. She reports current alcohol use. She reports that she does not use drugs.    Family History:  Family History   Problem Relation Age of Onset     C.A.D. Mother         bi-pass     Diabetes Mother         late onset     Allergies Mother         xray dye     Arthritis Mother      Cancer Mother         ovary     Respiratory Mother      C.A.D. Father         bi-pass     Hypertension Brother      Breast Cancer Sister      Allergies Sister      Allergies Brother      Eye Disorder Maternal Aunt      Gastrointestinal Disease Brother      Gynecology Sister      Thyroid Disease Sister        Medications:  Current Outpatient Medications   Medication Sig Dispense Refill     clotrimazole (LOTRIMIN) 1 % external cream Apply topically 2 times daily 60 g 0     etodolac (LODINE) 400 MG tablet Take 1 tablet (400 mg) by mouth 2 times daily 40 tablet 0     fish oil-omega-3 fatty acids 1000 MG capsule Take 2 g by mouth daily       GARLIC PO        lisinopril-hydrochlorothiazide (PRINZIDE/ZESTORETIC) 20-25 MG tablet Take 1 tablet by mouth daily 90 tablet 3     omeprazole (PRILOSEC) 40 MG DR capsule TAKE ONE CAPSULE BY MOUTH EVERY DAY .TAKE 30 TO 60  MINUTES BEFORE A MEAL 90 capsule 3     propranolol (INDERAL) 40 MG tablet Take 1 tablet (40 mg) by mouth 2 times daily 180 tablet 3     SUMAtriptan 25 MG PO tablet Take 1 tablet (25 mg) by mouth at onset of headache for migraine May repeat in 2 hours. Max 8 tablets/24 hours. 4 tablet 0     terbinafine (LAMISIL) 1 % external cream Apply topically 2 times daily 30 g 0       Allergies   Allergen Reactions     No Known Drug Allergies        Review of Systems:  -Constitutional: Otherwise feeling well today, in usual state of health.  ENT- no oral or genital sores  -Skin: As above in HPI. No additional skin concerns.    Physical exam:  Vitals: There were no vitals taken for this visit.  GEN: This is a well developed, well-nourished female in no acute distress, in a pleasant mood.    SKIN: Total skin excluding the undergarment areas was performed. The exam included the head/face, neck, both arms, chest, back, abdomen, both legs, digits and/or nails. Declines genital exam.   -There are pink scaly patches and plaques on the right dorsal hand, left knee.  -Scattered brown macules on the back.  -nails are painted  -No other lesions of concern on areas examined.       Impression/Plan:  1. nummlar dermatitis, R dorsal hand, L knee  -Start triamcinolone ointment BID for 2 weeks. Advised to keep away from face and that this product may cause skin thinning. Put vaseline on top  -Recommend Vanicream moisturizer body body  -dove sensitive skin soap    2. Solar lentigines, back  -No further intervention needed at this time. Patient to report changes.      CC Elliot Veliz PA-C  [address] [place] [phone] on close of this encounter.    Follow up in 3 months earlier for new or changing lesions.        Staff Involved:  Scribe/Staff      Scribe Disclosure  I, Carlos Newman, am serving as a scribe to document services personally performed by Dr. Alyssa Ann MD, based on data collection and the provider's statements to  me.    Provider Disclosure:   The documentation recorded by the scribe accurately reflects the services I personally performed and the decisions made by me.    Alyssa Ann MD    Department of Dermatology  [facility]: Phone: [phone], Fax:[phone]  [facility]: Phone: [phone], Fax: [phone]            Again, thank you for allowing me to participate in the care of your patient.        Sincerely,        Alyssa Ann MD

## 2020-02-25 NOTE — PROGRESS NOTES
[facility] Dermatology Note      Dermatology Problem List:  1.Nummular dermatitis  -triamcinolone ointment BID x 2 weeks    Encounter Date: Feb 25, 2020    CC:  Chief Complaint   Patient presents with     Derm Problem     spot on hand elbow and knee          History of Present Illness:  Ms. Komal Miller is a 69 year old female who presents as a referral from primary care. Has not been seen by dermatology before.     Today patient reports rash is on her dorsal hand and left knee, and left elbow. Reports they are very itchy and weep. Tried clotrimazole, lamasil AT. Reports it was diagnosed as tinea corporis.     Personal and family history of hay fever. Family history of eczema in father. No personal or family history of psoriasis. No personal history of skin cancer.     No other concerns.     Past Medical History:   Patient Active Problem List   Diagnosis     Absence of menstruation     Esophageal reflux     Recurring abdominal pain     Hyperlipidemia LDL goal <130     Hypertension goal BP (blood pressure) < 140/90     Advance Care Planning     Small bowel obstruction (H)     DVT prophylaxis     Cervical adenopathy     Intestinal adhesions     Gastroesophageal reflux disease with esophagitis     Cluster headache, not intractable, unspecified chronicity pattern     Morbid obesity due to excess calories (H)     Past Medical History:   Diagnosis Date     Unspecified essential hypertension      Past Surgical History:   Procedure Laterality Date     C APPENDECTOMY       C LIGATE FALLOPIAN TUBE       C TOTAL ABDOM HYSTERECTOMY      Hysterectomy, Total Abdominal     C TOTAL KNEE ARTHROPLASTY Left      COLONOSCOPY  06/10/08    Internal hemorrhoids, otherwise normal.     COLONOSCOPY  01/20/10     HC DILATION/CURETTAGE DIAG/THER NON OB      D & C     HC LAP, SURG ENTEROLYSIS  05/07/10     HC REMOVAL OF OVARY/TUBE(S)      Salpingo-Oophorectomy, bilateral     HC UGI ENDOSCOPY, SIMPLE EXAM  01/20/10      LAPAROSCOPY DIAGNOSTIC (GENERAL)  5/6/2014    Procedure: LAPAROSCOPY DIAGNOSTIC (GENERAL);  Surgeon: Seth Matthews MD;  Location: [facility]     LAPAROTOMY, LYSIS ADHESIONS, COMBINED  5/6/2014    Procedure: COMBINED LAPAROTOMY, LYSIS ADHESIONS;  Surgeon: Seth Matthews MD;  Location: [facility]     VIDEO CAPSULE ENDOSCOPY  02/15/10    normal sm intestine       Social History:  Patient reports that she quit smoking about 25 years ago. Her smoking use included cigarettes. She has a 10.00 pack-year smoking history. She has never used smokeless tobacco. She reports current alcohol use. She reports that she does not use drugs.    Family History:  Family History   Problem Relation Age of Onset     C.A.D. Mother         bi-pass     Diabetes Mother         late onset     Allergies Mother         xray dye     Arthritis Mother      Cancer Mother         ovary     Respiratory Mother      C.A.D. Father         bi-pass     Hypertension Brother      Breast Cancer Sister      Allergies Sister      Allergies Brother      Eye Disorder Maternal Aunt      Gastrointestinal Disease Brother      Gynecology Sister      Thyroid Disease Sister        Medications:  Current Outpatient Medications   Medication Sig Dispense Refill     clotrimazole (LOTRIMIN) 1 % external cream Apply topically 2 times daily 60 g 0     etodolac (LODINE) 400 MG tablet Take 1 tablet (400 mg) by mouth 2 times daily 40 tablet 0     fish oil-omega-3 fatty acids 1000 MG capsule Take 2 g by mouth daily       GARLIC PO        lisinopril-hydrochlorothiazide (PRINZIDE/ZESTORETIC) 20-25 MG tablet Take 1 tablet by mouth daily 90 tablet 3     omeprazole (PRILOSEC) 40 MG DR capsule TAKE ONE CAPSULE BY MOUTH EVERY DAY .TAKE 30 TO 60 MINUTES BEFORE A MEAL 90 capsule 3     propranolol (INDERAL) 40 MG tablet Take 1 tablet (40 mg) by mouth 2 times daily 180 tablet 3     SUMAtriptan 25 MG PO tablet Take 1 tablet (25 mg) by mouth at onset of headache for migraine May repeat in 2  hours. Max 8 tablets/24 hours. 4 tablet 0     terbinafine (LAMISIL) 1 % external cream Apply topically 2 times daily 30 g 0       Allergies   Allergen Reactions     No Known Drug Allergies        Review of Systems:  -Constitutional: Otherwise feeling well today, in usual state of health.  ENT- no oral or genital sores  -Skin: As above in HPI. No additional skin concerns.    Physical exam:  Vitals: There were no vitals taken for this visit.  GEN: This is a well developed, well-nourished female in no acute distress, in a pleasant mood.    SKIN: Total skin excluding the undergarment areas was performed. The exam included the head/face, neck, both arms, chest, back, abdomen, both legs, digits and/or nails. Declines genital exam.   -There are pink scaly patches and plaques on the right dorsal hand, left knee.  -Scattered brown macules on the back.  -nails are painted  -No other lesions of concern on areas examined.       Impression/Plan:  1. nummlar dermatitis, R dorsal hand, L knee  -Start triamcinolone ointment BID for 2 weeks. Advised to keep away from face and that this product may cause skin thinning. Put vaseline on top  -Recommend Vanicream moisturizer body body  -dove sensitive skin soap    2. Solar lentigines, back  -No further intervention needed at this time. Patient to report changes.      CC Elliot Veliz PA-C  [address] [place] [phone] on close of this encounter.    Follow up in 3 months earlier for new or changing lesions.        Staff Involved:  Scribe/Staff      Scribe Disclosure  I, Carlos Newman, am serving as a scribe to document services personally performed by Dr. lAyssa Ann MD, based on data collection and the provider's statements to me.    Provider Disclosure:   The documentation recorded by the scribe accurately reflects the services I personally performed and the decisions made by me.    Alyssa Ann MD    Department of Dermatology  [facility]  [facility]: Phone: [phone], Fax:[phone]  [facility]: Phone: [phone], Fax: [phone]

## 2020-03-03 ENCOUNTER — OFFICE VISIT (OUTPATIENT)
Dept: FAMILY MEDICINE | Facility: OTHER | Age: 70
End: 2020-03-03
Payer: COMMERCIAL

## 2020-03-03 VITALS
HEART RATE: 82 BPM | TEMPERATURE: 97.6 F | HEIGHT: 65 IN | OXYGEN SATURATION: 98 % | WEIGHT: 235.7 LBS | SYSTOLIC BLOOD PRESSURE: 138 MMHG | RESPIRATION RATE: 16 BRPM | BODY MASS INDEX: 39.27 KG/M2 | DIASTOLIC BLOOD PRESSURE: 84 MMHG

## 2020-03-03 DIAGNOSIS — Z12.11 ENCOUNTER FOR SCREENING COLONOSCOPY: ICD-10-CM

## 2020-03-03 DIAGNOSIS — Z12.31 ENCOUNTER FOR SCREENING MAMMOGRAM FOR BREAST CANCER: Primary | ICD-10-CM

## 2020-03-03 DIAGNOSIS — G43.109 MIGRAINE WITH AURA AND WITHOUT STATUS MIGRAINOSUS, NOT INTRACTABLE: ICD-10-CM

## 2020-03-03 DIAGNOSIS — I10 HYPERTENSION GOAL BP (BLOOD PRESSURE) < 140/90: ICD-10-CM

## 2020-03-03 PROCEDURE — 99213 OFFICE O/P EST LOW 20 MIN: CPT | Performed by: INTERNAL MEDICINE

## 2020-03-03 RX ORDER — KETOROLAC TROMETHAMINE 10 MG/1
10 TABLET, FILM COATED ORAL EVERY 6 HOURS PRN
Qty: 6 TABLET | Refills: 0 | Status: SHIPPED | OUTPATIENT
Start: 2020-03-03 | End: 2020-08-03

## 2020-03-03 RX ORDER — ONDANSETRON 4 MG/1
4 TABLET, FILM COATED ORAL EVERY 8 HOURS PRN
Qty: 6 TABLET | Refills: 0 | Status: SHIPPED | OUTPATIENT
Start: 2020-03-03 | End: 2021-04-01

## 2020-03-03 ASSESSMENT — PAIN SCALES - GENERAL: PAINLEVEL: NO PAIN (0)

## 2020-03-03 ASSESSMENT — MIFFLIN-ST. JEOR: SCORE: 1611.88

## 2020-03-03 NOTE — LETTER

## 2020-03-03 NOTE — LETTER

## 2020-03-03 NOTE — PROGRESS NOTES
"Subjective     Komal Miller is a 69 year old female who presents to clinic today for the following health issues:    HPI   ED/UC Followup:    Facility:  [facility]  Date of visit: 2/18/2020  Reason for visit: migraine   Current Status: gets them 2-3 times per year;  In the ED recommended pt get put on a medication for when she gets a migraine                     Chief Complaint         The patient is a 69-year-old female who presents today with a history of recent migraine cephalgia.  She does not have a headache at this time.  She wanted to take a little time to point out that it is \"ridiculous\" that she had to go to the emergency department with her migraine headache.  When she got to the emergency department, she was given IV fluids as well as IV medications.  Her headache did resolve after a bit.  I reminded the patient that we do not have the capabilities of doing intravenous administration in the clinic.  She was also somewhat taken back by that .  She did point out that she would prefer if there was always an available opening with her preferred physician when she called in.  She would also prefer if that opening was that same day.  I explained to her that that is not always possible .  After apologizing for some time we moved on with a visit.  She describes her migraine is occasionally having an aura, being unilateral, generally affecting the left side.  She has occasional blurred vision with this occurs as well as some nausea and vomiting.                         PAST, FAMILY,SOCIAL HISTORY:     Medical  History:   has a past medical history of Unspecified essential hypertension.     Surgical History:   has a past surgical history that includes TOTAL ABDOM HYSTERECTOMY; REMOVAL OF OVARY/TUBE(S); DILATION/CURETTAGE DIAG/THER NON OB; APPENDECTOMY; LIGATE FALLOPIAN TUBE; colonoscopy (06/10/08); colonoscopy (01/20/10); UPPER GI ENDOSCOPY,EXAM (01/20/10); video capsule endoscopy (02/15/10); LAP, " SURG ENTEROLYSIS (05/07/10); Laparoscopy diagnostic (general) (5/6/2014); Laparotomy, lysis adhesions, combined (5/6/2014); and TOTAL KNEE ARTHROPLASTY (Left).     Social History:   reports that she quit smoking about 25 years ago. Her smoking use included cigarettes. She has a 10.00 pack-year smoking history. She has never used smokeless tobacco. She reports current alcohol use. She reports that she does not use drugs.     Family History:  family history includes Allergies in her brother, mother, and sister; Arthritis in her mother; Breast Cancer in her sister; C.A.D. in her father and mother; Cancer in her mother; Diabetes in her mother; Eye Disorder in her maternal aunt; Gastrointestinal Disease in her brother; Gynecology in her sister; Hypertension in her brother; Respiratory in her mother; Thyroid Disease in her sister.            MEDICATIONS  Current Outpatient Medications   Medication Sig Dispense Refill     fish oil-omega-3 fatty acids 1000 MG capsule Take 2 g by mouth daily       GARLIC PO        ketorolac (TORADOL) 10 MG tablet Take 1 tablet (10 mg) by mouth every 6 hours as needed for moderate pain 6 tablet 0     lisinopril-hydrochlorothiazide (PRINZIDE/ZESTORETIC) 20-25 MG tablet Take 1 tablet by mouth daily 90 tablet 3     omeprazole (PRILOSEC) 40 MG DR capsule TAKE ONE CAPSULE BY MOUTH EVERY DAY .TAKE 30 TO 60 MINUTES BEFORE A MEAL 90 capsule 3     ondansetron (ZOFRAN) 4 MG tablet Take 1 tablet (4 mg) by mouth every 8 hours as needed for nausea 6 tablet 0     propranolol (INDERAL) 40 MG tablet Take 1 tablet (40 mg) by mouth 2 times daily 180 tablet 3     SUMAtriptan 25 MG PO tablet Take 1 tablet (25 mg) by mouth at onset of headache for migraine May repeat in 2 hours. Max 8 tablets/24 hours. (Patient not taking: Reported on 3/3/2020) 4 tablet 0         --------------------------------------------------------------------------------------------------------------------                              Review  "of Systems       LUNGS: Pt denies: cough, excess sputum, hemoptysis, or shortness of breath.   HEART: Pt denies: chest pain, arrhythmia, syncope, tachy or bradyarrhythmia.   GI: Pt denies: nausea, vomiting, diarrhea, constipation, melena, or hematochezia.   NEURO: Pt denies: seizures, strokes, diplopia, weakness, paraesthesias, or paralysis.   SKIN: Pt denies: itching, rashes, discoloration, or specific lesions of concern. Denies recent hair loss.   PSYCH: The patient denies significant depression, anxiety, mood imbalance. Specifically denies any suicidal ideation.        Examination    /84   Pulse 82   Temp 97.6  F (36.4  C) (Temporal)   Resp 16   Ht 1.662 m (5' 5.43\")   Wt 106.9 kg (235 lb 11.2 oz)   SpO2 98%   BMI 38.71 kg/m      Constitutional: The patient appears to be in no acute distress. The patient appears to be adequately hydrated. No acute respiratory or hemodynamic distress is noted at this time.   LUNGS: clear bilaterally, airflow is brisk, no intercostal retraction or stridor is noted. No coughing is noted during visit.   HEART:  regular without rubs, clicks, gallops, or murmurs. PMI is nondisplaced. Upstrokes are brisk. S1,S2 are heard.   GI: Abdomen is soft, without rebound, guarding or tenderness. Bowel sounds are appropriate. No renal bruits are heard.   NEURO: Pt is alert and appropriate. No neurologic lateralization is noted. Cranial nerves 2-12 are intact. Peripheral sensory and motor function are grossly normal.    SKIN:  warm and dry. No erythema, or rashes are noted. No specific lesions of concern are noted.    PSYCH: The patient appears grossly appropriate. Maintains good eye contact, does not have any jittery or atypical motion. Displays appropriate affect.         Decision Making       1. Migraine with aura and without status migrainosus, not intractable  Patient is given a very small prescription of Zofran and Toradol.  She has the Imitrex that was given to her in the " emergency department at home.  We have discussed prophylaxis and she will continue the propranolol.  We have also discussed the benefits of adequate hydration and caffeine.  She will take this all into consideration.    - ketorolac (TORADOL) 10 MG tablet; Take 1 tablet (10 mg) by mouth every 6 hours as needed for moderate pain  Dispense: 6 tablet; Refill: 0  - ondansetron (ZOFRAN) 4 MG tablet; Take 1 tablet (4 mg) by mouth every 8 hours as needed for nausea  Dispense: 6 tablet; Refill: 0    2. Hypertension goal BP (blood pressure) < 140/90  Continue current medication    3. Encounter for screening mammogram for breast cancer  Scheduled  - *MA Screening Digital Bilateral; Future    4. Encounter for screening colonoscopy  Also scheduled  - GASTROENTEROLOGY ADULT REF PROCEDURE ONLY [facility] [phone]; No Provider Preferred                              FOLLOW UP   I have asked the patient to make an appointment for followup with me in 4 months for follow-up of her blood pressure.             I have carefully explained the diagnosis and treatment options to the patient.  The patient has displayed an understanding of the above, and all subsequent questions were answered.      DO PATRICE Arellano    Portions of this note were produced using [employer]  Although every attempt at real-time proof reading has been made, occasional grammar/syntax errors may have been missed.               Addendum: The patient also took the opportunity while she was here to complain that she was previously seen by another provider for a rash.  The rash did not get better with the antifungal cream and no one would call in another cream without seeing her.  Subsequent to this, she had to go to a dermatologist.  Fortunately, the dermatologist diagnosed her is having nummular eczema, gave her some steroid-based cream, and she is now better.  I once again apologized for this on behalf of the other provider, the [facility], and basically the [facility] system in its entirety.

## 2020-03-05 ENCOUNTER — TELEPHONE (OUTPATIENT)
Dept: FAMILY MEDICINE | Facility: OTHER | Age: 70
End: 2020-03-05

## 2020-03-05 NOTE — LETTER

## 2020-03-05 NOTE — LETTER
[facility]  [address] [facility] [address] [phone]        March 6, 2020    Komal Miller  [address]  [facility] [address]

## 2020-03-05 NOTE — TELEPHONE ENCOUNTER
Left message for patient to return call to schedule colonoscopy or EGD. If Steffanie or Sharita are unavailable, please transfer to the surgery center.

## 2020-03-06 NOTE — TELEPHONE ENCOUNTER
Date of colonoscopy/EGD: 3/24  Surgeon: Dr. Sánchez  Prep:Miralax  Packet:Colonoscopy/EGD instructions mailed to patient's home address.   Date: 3/6/2020      Surgery Scheduler

## 2020-03-06 NOTE — TELEPHONE ENCOUNTER
Left message for patient to return call to schedule EGD/colonoscopy. If Sharita or Steffanie are not available, please transfer to same day surgery

## 2020-03-09 ENCOUNTER — HOSPITAL ENCOUNTER (OUTPATIENT)
Facility: CLINIC | Age: 70
End: 2020-03-09
Attending: SURGERY | Admitting: SURGERY
Payer: MEDICARE

## 2020-03-10 ENCOUNTER — ANCILLARY PROCEDURE (OUTPATIENT)
Dept: MAMMOGRAPHY | Facility: OTHER | Age: 70
End: 2020-03-10
Payer: COMMERCIAL

## 2020-03-10 DIAGNOSIS — Z12.31 ENCOUNTER FOR SCREENING MAMMOGRAM FOR BREAST CANCER: ICD-10-CM

## 2020-03-10 PROCEDURE — 77067 SCR MAMMO BI INCL CAD: CPT | Mod: TC

## 2020-03-13 NOTE — TELEPHONE ENCOUNTER
Patient called to reschedule to 4/28. She heard on the news that if you don't have to be at a healthcare facility right now due to Coronovirus, then you should post danis.

## 2020-03-21 DIAGNOSIS — I10 HYPERTENSION GOAL BP (BLOOD PRESSURE) < 140/90: ICD-10-CM

## 2020-03-21 DIAGNOSIS — G44.009 CLUSTER HEADACHE, NOT INTRACTABLE, UNSPECIFIED CHRONICITY PATTERN: ICD-10-CM

## 2020-03-21 DIAGNOSIS — K21.00 GASTROESOPHAGEAL REFLUX DISEASE WITH ESOPHAGITIS: ICD-10-CM

## 2020-03-23 RX ORDER — LISINOPRIL AND HYDROCHLOROTHIAZIDE 20; 25 MG/1; MG/1
TABLET ORAL
Qty: 90 TABLET | Refills: 1 | Status: SHIPPED | OUTPATIENT
Start: 2020-03-23 | End: 2020-08-24

## 2020-03-23 RX ORDER — OMEPRAZOLE 40 MG/1
CAPSULE, DELAYED RELEASE ORAL
Qty: 90 CAPSULE | Refills: 1 | Status: SHIPPED | OUTPATIENT
Start: 2020-03-23 | End: 2020-08-24

## 2020-03-23 RX ORDER — PROPRANOLOL HYDROCHLORIDE 40 MG/1
TABLET ORAL
Qty: 180 TABLET | Refills: 1 | Status: SHIPPED | OUTPATIENT
Start: 2020-03-23 | End: 2020-08-24

## 2020-03-23 NOTE — TELEPHONE ENCOUNTER
"Requested Prescriptions   Pending Prescriptions Disp Refills     lisinopril-hydrochlorothiazide (ZESTORETIC) 20-25 MG tablet [Pharmacy Med Name: LISINOPRIL/HYDROCHLOROTHIAZIDE 20-25 MG Tablet] 90 tablet 3     Sig: TAKE 1 TABLET EVERY DAY   Last Written Prescription Date:  05/31/2019  Last Fill Quantity: 90,  # refills: 3   Last office visit: 3/3/2020 with prescribing provider:  03/03/2020   Future Office Visit:   Next 5 appointments (look out 90 days)    May 26, 2020  1:00 PM CDT  [facility] [id number] [place] [id number] [phone]             Diuretics (Including Combos) Protocol Failed - 3/21/2020  3:09 AM        Failed - Normal serum sodium on file in past 12 months     Recent Labs   Lab Test 02/18/20  1618   *              Passed - Blood pressure under 140/90 in past 12 months     BP Readings from Last 3 Encounters:   03/03/20 138/84   02/18/20 (!) 192/96   12/30/19 (!) (P) 144/80                 Passed - Recent (12 mo) or future (30 days) visit within the authorizing provider's specialty     Patient has had an office visit with the authorizing provider or a provider within the authorizing providers department within the previous 12 mos or has a future within next 30 days. See \"Patient Info\" tab in inbasket, or \"Choose Columns\" in Meds & Orders section of the refill encounter.              Passed - Medication is active on med list        Passed - Patient is age 18 or older        Passed - No active pregancy on record        Passed - Normal serum creatinine on file in past 12 months     Recent Labs   Lab Test 02/18/20  1618   CR 0.60              Passed - Normal serum potassium on file in past 12 months     Recent Labs   Lab Test 02/18/20  1618   POTASSIUM 3.7                    Passed - No positive pregnancy test in past 12 months       ACE Inhibitors (Including Combos) Protocol Passed - 3/21/2020  3:09 " "AM        Passed - Blood pressure under 140/90 in past 12 months     BP Readings from Last 3 Encounters:   03/03/20 138/84   02/18/20 (!) 192/96   12/30/19 (!) (P) 144/80                 Passed - Recent (12 mo) or future (30 days) visit within the authorizing provider's specialty     Patient has had an office visit with the authorizing provider or a provider within the authorizing providers department within the previous 12 mos or has a future within next 30 days. See \"Patient Info\" tab in inbasket, or \"Choose Columns\" in Meds & Orders section of the refill encounter.              Passed - Medication is active on med list        Passed - Patient is age 18 or older        Passed - No active pregnancy on record        Passed - Normal serum creatinine on file in past 12 months     Recent Labs   Lab Test 02/18/20  1618   CR 0.60       Ok to refill medication if creatinine is low          Passed - Normal serum potassium on file in past 12 months     Recent Labs   Lab Test 02/18/20  1618   POTASSIUM 3.7             Passed - No positive pregnancy test within past 12 months           omeprazole (PRILOSEC) 40 MG DR capsule [Pharmacy Med Name: OMEPRAZOLE 40 MG Capsule Delayed Release] 90 capsule 3     Sig: TAKE 1 CAPSULE EVERY DAY (TAKE 30 TO 60 MINUTES BEFORE A MEAL)   Last Written Prescription Date:  05/31/2019  Last Fill Quantity: 90,  # refills: 3   Last office visit: 3/3/2020 with prescribing provider:  03/03/2020   Future Office Visit:   Next 5 appointments (look out 90 days)    May 26, 2020  1:00 PM CDT  Return Visit with Alyssa Ann MD  [facility]) [address] [address] [place] [id number] [phone]             PPI Protocol Passed - 3/21/2020  3:09 AM        Passed - Not on Clopidogrel (unless Pantoprazole ordered)        Passed - No diagnosis of osteoporosis on record        Passed - Recent (12 mo) or future (30 days) visit within the authorizing provider's " "specialty     Patient has had an office visit with the authorizing provider or a provider within the authorizing providers department within the previous 12 mos or has a future within next 30 days. See \"Patient Info\" tab in inbasket, or \"Choose Columns\" in Meds & Orders section of the refill encounter.              Passed - Medication is active on med list        Passed - Patient is age 18 or older        Passed - No active pregnacy on record        Passed - No positive pregnancy test in past 12 months           propranolol (INDERAL) 40 MG tablet [Pharmacy Med Name: PROPRANOLOL HCL 40 MG Tablet] 180 tablet 3     Sig: TAKE 1 TABLET TWICE DAILY   Last Written Prescription Date:  05/31/2019  Last Fill Quantity: 180,  # refills: 3   Last office visit: 3/3/2020 with prescribing provider:  03/03/2020   Future Office Visit:   Next 5 appointments (look out 90 days)    May 26, 2020  1:00 PM CDT  [facility] [address] [place] [id number] [phone]             Beta-Blockers Protocol Passed - 3/21/2020  3:09 AM        Passed - Blood pressure under 140/90 in past 12 months     BP Readings from Last 3 Encounters:   03/03/20 138/84   02/18/20 (!) 192/96   12/30/19 (!) (P) 144/80                 Passed - Patient is age 6 or older        Passed - Recent (12 mo) or future (30 days) visit within the authorizing provider's specialty     Patient has had an office visit with the authorizing provider or a provider within the authorizing providers department within the previous 12 mos or has a future within next 30 days. See \"Patient Info\" tab in inbasket, or \"Choose Columns\" in Meds & Orders section of the refill encounter.              Passed - Medication is active on med list             "

## 2020-03-23 NOTE — TELEPHONE ENCOUNTER
Routing refill request to provider for review/approval because:  Labs out of range:  Sodium    RAZIA CarN, RN  [facility]

## 2020-04-13 ENCOUNTER — TELEPHONE (OUTPATIENT)
Dept: ORTHOPEDICS | Facility: OTHER | Age: 70
End: 2020-04-13

## 2020-04-13 NOTE — TELEPHONE ENCOUNTER
Reason for Call:  Other appointment    Detailed comments: Pt would like to get in for another injection in her right knee.  She is having a lot of pain with it.    Phone Number Patient can be reached at: Cell number on file:    Telephone Information:   Mobile [phone]       Best Time: any    Can we leave a detailed message on this number? YES    Call taken on 4/13/2020 at 9:37 AM by Johnna Gillette

## 2020-04-13 NOTE — TELEPHONE ENCOUNTER
She notes pain has returned over the past week. She is trying to do as little as possible however still having a lot of pain.     She would like to be seen sooner than beginning of May. She is still hopeful that she will be able to do her TKA in July of this year.     Would like Dr. Bang to consider injection in the near future.     Anny Adam M.Ed., LAT, ATC  Clinic Coordinator for Dr. Danita Bang

## 2020-04-20 NOTE — TELEPHONE ENCOUNTER
Spoke with patient. She did speak with her surgeon at [facility] and they would just wait 3 months post CSI. She would like to proceed with injection on Thursday.     Patient scheduled.     Anny Adam M.Ed., LAT, ATC

## 2020-04-20 NOTE — TELEPHONE ENCOUNTER
Yes, she could come in this week for a steroid injection (Thursday), however, having a steroid injection could delay a possible knee replacement surgery.    Would recommend she contact her orthopedic surgeon's office to determine how soon after a steroid injection he would theoretically perform a knee replacement surgery.  This is often ~ 3 months but varies surgeon to surgeon.

## 2020-04-21 NOTE — PROGRESS NOTES
Sports Medicine Clinic Visit - Interim History April 21, 2020      PCP: Wesly Lehman    Komal LIDIA Miller is a 69 year old female who is seen in follow up for right knee pain.  Since last visit on 12/27/2019 patient has had worsened knee pain over the past month or so.  Symptoms are relieved with steroid injections.  Symptoms are worsened by standing, stairs, activity and walking. She is here today for a steroid injection.       Review of Systems  Musculoskeletal: as above  Remainder of review of systems is negative including constitutional, eyes, ENT, CV, pulmonary, GI, , endocrine, skin, hematologic, and neurologic except as noted in HPI or medical history.    History reviewed. No pertinent past surgical/medical/family/social history other than as mentioned in HPI.    Patient Active Problem List   Diagnosis     Absence of menstruation     Esophageal reflux     Recurring abdominal pain     Hyperlipidemia LDL goal <130     Hypertension goal BP (blood pressure) < 140/90     Advance Care Planning     Small bowel obstruction (H)     DVT prophylaxis     Cervical adenopathy     Intestinal adhesions     Gastroesophageal reflux disease with esophagitis     Cluster headache, not intractable, unspecified chronicity pattern     Morbid obesity due to excess calories (H)     Past Medical History:   Diagnosis Date     Unspecified essential hypertension      Past Surgical History:   Procedure Laterality Date     C APPENDECTOMY       C LIGATE FALLOPIAN TUBE       C TOTAL ABDOM HYSTERECTOMY      Hysterectomy, Total Abdominal     C TOTAL KNEE ARTHROPLASTY Left      COLONOSCOPY  06/10/08    Internal hemorrhoids, otherwise normal.     COLONOSCOPY  01/20/10     HC DILATION/CURETTAGE DIAG/THER NON OB      D & C     HC LAP, SURG ENTEROLYSIS  05/07/10     HC REMOVAL OF OVARY/TUBE(S)      Salpingo-Oophorectomy, bilateral     HC UGI ENDOSCOPY, SIMPLE EXAM  01/20/10     LAPAROSCOPY DIAGNOSTIC (GENERAL)  5/6/2014    Procedure:  LAPAROSCOPY DIAGNOSTIC (GENERAL);  Surgeon: Seth Matthews MD;  Location: [facility] OR     LAPAROTOMY, LYSIS ADHESIONS, COMBINED  2014    Procedure: COMBINED LAPAROTOMY, LYSIS ADHESIONS;  Surgeon: Seth Matthews MD;  Location: [facility] OR     VIDEO CAPSULE ENDOSCOPY  02/15/10    normal sm intestine     Family History   Problem Relation Age of Onset     C.A.D. Mother         bi-pass     Diabetes Mother         late onset     Allergies Mother         xray dye     Arthritis Mother      Cancer Mother         ovary     Respiratory Mother      C.A.D. Father         bi-pass     Hypertension Brother      Breast Cancer Sister      Allergies Sister      Allergies Brother      Eye Disorder Maternal Aunt      Gastrointestinal Disease Brother      Gynecology Sister      Thyroid Disease Sister      Social History     Socioeconomic History     Marital status:      Spouse name: Waylon     Number of children: 4     Years of education: 13     Highest education level: Not on file   Occupational History     Occupation:  dispatcher/     Employer: [employer]     Comment[employer]   Social Needs     Financial resource strain: Not on file     Food insecurity     Worry: Not on file     Inability: Not on file     Transportation needs     Medical: Not on file     Non-medical: Not on file   Tobacco Use     Smoking status: Former Smoker     Packs/day: 0.50     Years: 20.00     Pack years: 10.00     Types: Cigarettes     Last attempt to quit: 1995     Years since quittin.3     Smokeless tobacco: Never Used   Substance and Sexual Activity     Alcohol use: Yes     Comment: very seldom     Drug use: No     Sexual activity: Yes     Partners: Male   Lifestyle     Physical activity     Days per week: Not on file     Minutes per session: Not on file     Stress: Not on file   Relationships     Social connections     Talks on phone: Not on file     Gets together: Not on file      "Attends Congregation service: Not on file     Active member of club or organization: Not on file     Attends meetings of clubs or organizations: Not on file     Relationship status: Not on file     Intimate partner violence     Fear of current or ex partner: Not on file     Emotionally abused: Not on file     Physically abused: Not on file     Forced sexual activity: Not on file   Other Topics Concern      Service No     Blood Transfusions Yes     Comment: with children--several units     Caffeine Concern No     Comment: coffee  5 cups/day      occ diet pepsi     Occupational Exposure Yes     Comment: works at the [employer] ('s dept)  body fluids, comm. diseases     Hobby Hazards No     Sleep Concern Yes     Comment: sleeps more restless some nights     Stress Concern No     Weight Concern Yes     Comment: w'd like to weigh 50-60# less     Special Diet No     Back Care No     Exercise Yes     Comment: walking     Bike Helmet No     Seat Belt Yes     Self-Exams No     Parent/sibling w/ CABG, MI or angioplasty before 65F 55M? Not Asked   Social History Narrative     Not on file         Current Outpatient Medications   Medication     fish oil-omega-3 fatty acids 1000 MG capsule     GARLIC PO     ketorolac (TORADOL) 10 MG tablet     lisinopril-hydrochlorothiazide (ZESTORETIC) 20-25 MG tablet     omeprazole (PRILOSEC) 40 MG DR capsule     ondansetron (ZOFRAN) 4 MG tablet     propranolol (INDERAL) 40 MG tablet     SUMAtriptan 25 MG PO tablet     No current facility-administered medications for this visit.      Allergies   Allergen Reactions     No Known Drug Allergies          Objective:  BP (!) 153/94   Ht 1.662 m (5' 5.43\")   Wt 109.1 kg (240 lb 8 oz)   BMI 39.50 kg/m      General: Alert and in no distress    Head: Normocephalic, atraumatic  Eyes: no scleral icterus or conjunctival erythema   Oropharynx:  Mucous membranes moist  Skin: no erythema, petechiae, or jaundice  Resp: normal respiratory effort without " conversational dyspnea   Psych: normal mood and affect        Radiology:  Independent visualization of images performed  KNEE BILATERAL TWO VIEWS  3/20/2018 9:11 AM      HISTORY:  Medial bilateral knee pain.     COMPARISON: Weightbearing AP views of the bilateral knees dated  3/13/2018. Right knee x-rays dated 4/16/2012.     FINDINGS: Lateral and sunrise views of the knees demonstrate probable  tiny bilateral knee joint effusions, slightly larger on the right.  Mild enthesopathic changes of the quadriceps tendon insertions into  the bilateral patellae. There is also mild joint space loss of the  medial aspect of the left patellofemoral compartment of the knee.  There is moderate medial compartment joint space loss of the bilateral  knees. This is better seen on the prior weightbearing AP view of the  knees.                                                                      IMPRESSION:  1. Small bilateral knee joint effusions.  2. Mild enthesopathic changes of the bilateral quadriceps tendon  insertions into the bilateral patellae.  3. Mild to moderate joint space loss of the medial aspect of the left  patellofemoral compartment of the knee.  4. Moderate medial compartment joint space loss of the bilateral  knees.      MELVIN FUNG MD      KNEE AP STANDING BILATERAL  3/13/2018 10:47 AM      HISTORY:  Pain in both knees, unspecified chronicity. Pes anserinus  bursitis of both knees.      COMPARISON: None.                                                                      IMPRESSION: There is moderate to severe narrowing of both medial  compartment joint spaces. The lateral joint spaces are maintained.      JOHNNY LINDER MD    Assessment:  1. Primary osteoarthritis of right knee    2. Chronic pain of right knee        Large Joint Injection/Arthocentesis: R knee joint    Date/Time: 4/23/2020 8:32 AM  Performed by: Freda Bang MD  Authorized by: Freda Bang MD     Indications:   Pain  Needle Size:  25 G  Guidance: landmark guided    Approach:  Anterolateral  Location:  Knee      Medications:  40 mg triamcinolone 40 MG/ML  Medications comment:  4ml 0.5% bupivicaine  NDC:30167-152-81  Lot: [id number]  3/30/20    Outcome:  Tolerated well, no immediate complications  Procedure discussed: discussed risks, benefits, and alternatives    Consent Given by:  Patient          Plan:  Discussed the assessment with the patient and developed a plan together:  -Steroid injection performed today.  Take it easy over the next few days. Keep in mind that the steroid may take up to 3 days to start working and up to 2 weeks to reach maximal effect.  Ice 15-20 minutes as needed for soreness.  Patient's preferred over the counter medication as needed for pain as directed on packaging.    -Emily to follow up with Primary Care provider regarding elevated blood pressure.    -Follow up as needed.  Please call with questions or concerns.      Danita Bang MD, CAQ Sports Medicine  [facility]

## 2020-04-23 ENCOUNTER — OFFICE VISIT (OUTPATIENT)
Dept: ORTHOPEDICS | Facility: CLINIC | Age: 70
End: 2020-04-23
Payer: COMMERCIAL

## 2020-04-23 VITALS
DIASTOLIC BLOOD PRESSURE: 94 MMHG | BODY MASS INDEX: 40.07 KG/M2 | SYSTOLIC BLOOD PRESSURE: 153 MMHG | HEIGHT: 65 IN | WEIGHT: 240.5 LBS

## 2020-04-23 DIAGNOSIS — M25.561 CHRONIC PAIN OF RIGHT KNEE: ICD-10-CM

## 2020-04-23 DIAGNOSIS — M17.11 PRIMARY OSTEOARTHRITIS OF RIGHT KNEE: Primary | ICD-10-CM

## 2020-04-23 DIAGNOSIS — G89.29 CHRONIC PAIN OF RIGHT KNEE: ICD-10-CM

## 2020-04-23 PROCEDURE — 20610 DRAIN/INJ JOINT/BURSA W/O US: CPT | Mod: RT | Performed by: PHYSICAL MEDICINE & REHABILITATION

## 2020-04-23 PROCEDURE — 99213 OFFICE O/P EST LOW 20 MIN: CPT | Mod: 25 | Performed by: PHYSICAL MEDICINE & REHABILITATION

## 2020-04-23 RX ORDER — TRIAMCINOLONE ACETONIDE 40 MG/ML
40 INJECTION, SUSPENSION INTRA-ARTICULAR; INTRAMUSCULAR
Status: DISCONTINUED | OUTPATIENT
Start: 2020-04-23 | End: 2023-06-15

## 2020-04-23 RX ADMIN — TRIAMCINOLONE ACETONIDE 40 MG: 40 INJECTION, SUSPENSION INTRA-ARTICULAR; INTRAMUSCULAR at 08:32

## 2020-04-23 ASSESSMENT — MIFFLIN-ST. JEOR: SCORE: 1633.6

## 2020-04-23 NOTE — PATIENT INSTRUCTIONS
-Steroid injection performed today.  Take it easy over the next few days. Keep in mind that the steroid may take up to 3 days to start working and up to 2 weeks to reach maximal effect.  Ice 15-20 minutes as needed for soreness.  Patient's preferred over the counter medication as needed for pain as directed on packaging.      -Emily to follow up with Primary Care provider regarding elevated blood pressure.

## 2020-04-23 NOTE — LETTER
4/23/2020         RE: Komal Miller  [address]  [facility] [address]        Dear Colleague,    Thank you for referring your patient, Komal Miller, to the [facility]. Please see a copy of my visit note below.    Sports Medicine Clinic Visit - Interim History April 21, 2020      PCP: Wesly Lehman    Komal Miller is a 69 year old female who is seen in follow up for right knee pain.  Since last visit on 12/27/2019 patient has had worsened knee pain over the past month or so.  Symptoms are relieved with steroid injections.  Symptoms are worsened by standing, stairs, activity and walking. She is here today for a steroid injection.       Review of Systems  Musculoskeletal: as above  Remainder of review of systems is negative including constitutional, eyes, ENT, CV, pulmonary, GI, , endocrine, skin, hematologic, and neurologic except as noted in HPI or medical history.    History reviewed. No pertinent past surgical/medical/family/social history other than as mentioned in HPI.    Patient Active Problem List   Diagnosis     Absence of menstruation     Esophageal reflux     Recurring abdominal pain     Hyperlipidemia LDL goal <130     Hypertension goal BP (blood pressure) < 140/90     Advance Care Planning     Small bowel obstruction (H)     DVT prophylaxis     Cervical adenopathy     Intestinal adhesions     Gastroesophageal reflux disease with esophagitis     Cluster headache, not intractable, unspecified chronicity pattern     Morbid obesity due to excess calories (H)     Past Medical History:   Diagnosis Date     Unspecified essential hypertension      Past Surgical History:   Procedure Laterality Date     C APPENDECTOMY       C LIGATE FALLOPIAN TUBE       C TOTAL ABDOM HYSTERECTOMY      Hysterectomy, Total Abdominal     C TOTAL KNEE ARTHROPLASTY Left      COLONOSCOPY  06/10/08    Internal hemorrhoids, otherwise normal.     COLONOSCOPY  01/20/10     HC DILATION/CURETTAGE  DIAG/THER NON OB      D & C     HC LAP, SURG ENTEROLYSIS  05/07/10     HC REMOVAL OF OVARY/TUBE(S)      Salpingo-Oophorectomy, bilateral     HC UGI ENDOSCOPY, SIMPLE EXAM  01/20/10     LAPAROSCOPY DIAGNOSTIC (GENERAL)  2014    Procedure: LAPAROSCOPY DIAGNOSTIC (GENERAL);  Surgeon: Seth Matthews MD;  Location: [facility]     LAPAROTOMY, LYSIS ADHESIONS, COMBINED  2014    Procedure: COMBINED LAPAROTOMY, LYSIS ADHESIONS;  Surgeon: Seth Matthews MD;  Location: [facility]     VIDEO CAPSULE ENDOSCOPY  02/15/10    normal sm intestine     Family History   Problem Relation Age of Onset     C.A.D. Mother         bi-pass     Diabetes Mother         late onset     Allergies Mother         xray dye     Arthritis Mother      Cancer Mother         ovary     Respiratory Mother      C.A.D. Father         bi-pass     Hypertension Brother      Breast Cancer Sister      Allergies Sister      Allergies Brother      Eye Disorder Maternal Aunt      Gastrointestinal Disease Brother      Gynecology Sister      Thyroid Disease Sister      Social History     Socioeconomic History     Marital status:      Spouse name: Waylon     Number of children: 4     Years of education: 13     Highest education level: Not on file   Occupational History     Occupation:  dispatcher/     Employer: [employer]     Comment: [employer]   Social Needs     Financial resource strain: Not on file     Food insecurity     Worry: Not on file     Inability: Not on file     Transportation needs     Medical: Not on file     Non-medical: Not on file   Tobacco Use     Smoking status: Former Smoker     Packs/day: 0.50     Years: 20.00     Pack years: 10.00     Types: Cigarettes     Last attempt to quit: 1995     Years since quittin.3     Smokeless tobacco: Never Used   Substance and Sexual Activity     Alcohol use: Yes     Comment: very seldom     Drug use: No     Sexual activity: Yes     Partners:  "Male   Lifestyle     Physical activity     Days per week: Not on file     Minutes per session: Not on file     Stress: Not on file   Relationships     Social connections     Talks on phone: Not on file     Gets together: Not on file     Attends [employer] service: Not on file     Active member of club or organization: Not on file     Attends meetings of clubs or organizations: Not on file     Relationship status: Not on file     Intimate partner violence     Fear of current or ex partner: Not on file     Emotionally abused: Not on file     Physically abused: Not on file     Forced sexual activity: Not on file   Other Topics Concern      Service No     Blood Transfusions Yes     Comment: with children--several units     Caffeine Concern No     Comment: coffee  5 cups/day      occ diet pepsi     Occupational Exposure Yes     Comment: works at the [employer]  body fluids, comm. diseases     Hobby Hazards No     Sleep Concern Yes     Comment: sleeps more restless some nights     Stress Concern No     Weight Concern Yes     Comment: w'd like to weigh 50-60# less     Special Diet No     Back Care No     Exercise Yes     Comment: walking     Bike Helmet No     Seat Belt Yes     Self-Exams No     Parent/sibling w/ CABG, MI or angioplasty before 65F 55M? Not Asked   Social History Narrative     Not on file         Current Outpatient Medications   Medication     fish oil-omega-3 fatty acids 1000 MG capsule     GARLIC PO     ketorolac (TORADOL) 10 MG tablet     lisinopril-hydrochlorothiazide (ZESTORETIC) 20-25 MG tablet     omeprazole (PRILOSEC) 40 MG DR capsule     ondansetron (ZOFRAN) 4 MG tablet     propranolol (INDERAL) 40 MG tablet     SUMAtriptan 25 MG PO tablet     No current facility-administered medications for this visit.      Allergies   Allergen Reactions     No Known Drug Allergies          Objective:  BP (!) 153/94   Ht 1.662 m (5' 5.43\")   Wt 109.1 kg (240 lb 8 oz)   BMI 39.50 kg/m  "     General: Alert and in no distress    Head: Normocephalic, atraumatic  Eyes: no scleral icterus or conjunctival erythema   Oropharynx:  Mucous membranes moist  Skin: no erythema, petechiae, or jaundice  Resp: normal respiratory effort without conversational dyspnea   Psych: normal mood and affect        Radiology:  Independent visualization of images performed  KNEE BILATERAL TWO VIEWS  3/20/2018 9:11 AM      HISTORY:  Medial bilateral knee pain.     COMPARISON: Weightbearing AP views of the bilateral knees dated  3/13/2018. Right knee x-rays dated 4/16/2012.     FINDINGS: Lateral and sunrise views of the knees demonstrate probable  tiny bilateral knee joint effusions, slightly larger on the right.  Mild enthesopathic changes of the quadriceps tendon insertions into  the bilateral patellae. There is also mild joint space loss of the  medial aspect of the left patellofemoral compartment of the knee.  There is moderate medial compartment joint space loss of the bilateral  knees. This is better seen on the prior weightbearing AP view of the  knees.                                                                      IMPRESSION:  1. Small bilateral knee joint effusions.  2. Mild enthesopathic changes of the bilateral quadriceps tendon  insertions into the bilateral patellae.  3. Mild to moderate joint space loss of the medial aspect of the left  patellofemoral compartment of the knee.  4. Moderate medial compartment joint space loss of the bilateral  knees.      MELVIN FUNG MD      KNEE AP STANDING BILATERAL  3/13/2018 10:47 AM      HISTORY:  Pain in both knees, unspecified chronicity. Pes anserinus  bursitis of both knees.      COMPARISON: None.                                                                      IMPRESSION: There is moderate to severe narrowing of both medial  compartment joint spaces. The lateral joint spaces are maintained.      JOHNNY LINDER MD    Assessment:  1. Primary osteoarthritis of  right knee    2. Chronic pain of right knee        Large Joint Injection/Arthocentesis: R knee joint    Date/Time: 4/23/2020 8:32 AM  Performed by: Freda Bang MD  Authorized by: Freda Bang MD     Indications:  Pain  Needle Size:  25 G  Guidance: landmark guided    Approach:  Anterolateral  Location:  Knee      Medications:  40 mg triamcinolone 40 MG/ML  Medications comment:  4ml 0.5% bupivicaine  NDC:33937-456-86  Lot: [id number]  3/30/20    Outcome:  Tolerated well, no immediate complications  Procedure discussed: discussed risks, benefits, and alternatives    Consent Given by:  Patient          Plan:  Discussed the assessment with the patient and developed a plan together:  -Steroid injection performed today.  Take it easy over the next few days. Keep in mind that the steroid may take up to 3 days to start working and up to 2 weeks to reach maximal effect.  Ice 15-20 minutes as needed for soreness.  Patient's preferred over the counter medication as needed for pain as directed on packaging.    -Emily to follow up with Primary Care provider regarding elevated blood pressure.    -Follow up as needed.  Please call with questions or concerns.      Danita Bang MD, [facility] Sports Medicine  [facility]      Again, thank you for allowing me to participate in the care of your patient.        Sincerely,        Freda Bang MD

## 2020-06-05 ENCOUNTER — TELEPHONE (OUTPATIENT)
Dept: FAMILY MEDICINE | Facility: OTHER | Age: 70
End: 2020-06-05

## 2020-06-05 NOTE — TELEPHONE ENCOUNTER
Patient calls, she is having problems with seasonal allergies and wants Dr Lehman's advise on a good over the counter allergy medication to take.     Please advise and I'll call her back.  Irene Luna MA     6/5/2020

## 2020-06-05 NOTE — TELEPHONE ENCOUNTER
I would recommend that the patient starts off with over-the-counter Claritin, or Zyrtec, or Allegra.  These can all be obtained a generic and over-the-counter for her cost saving convenience.    [place]

## 2020-06-09 ENCOUNTER — TELEPHONE (OUTPATIENT)
Dept: FAMILY MEDICINE | Facility: OTHER | Age: 70
End: 2020-06-09

## 2020-06-09 NOTE — TELEPHONE ENCOUNTER
Called to schedule colonoscopy. Patient declined to schedule as she is having her knee replaced in July. She will call to schedule as soon as she has recovered from surgery.

## 2020-08-03 ENCOUNTER — OFFICE VISIT (OUTPATIENT)
Dept: FAMILY MEDICINE | Facility: CLINIC | Age: 70
End: 2020-08-03
Payer: COMMERCIAL

## 2020-08-03 VITALS
RESPIRATION RATE: 16 BRPM | BODY MASS INDEX: 40.07 KG/M2 | SYSTOLIC BLOOD PRESSURE: 132 MMHG | HEART RATE: 72 BPM | WEIGHT: 244 LBS | OXYGEN SATURATION: 97 % | DIASTOLIC BLOOD PRESSURE: 80 MMHG | TEMPERATURE: 97.3 F

## 2020-08-03 DIAGNOSIS — M17.10 ARTHRITIS OF KNEE: ICD-10-CM

## 2020-08-03 DIAGNOSIS — I10 HYPERTENSION GOAL BP (BLOOD PRESSURE) < 140/90: ICD-10-CM

## 2020-08-03 DIAGNOSIS — E78.5 HYPERLIPIDEMIA LDL GOAL <130: ICD-10-CM

## 2020-08-03 DIAGNOSIS — Z01.818 PREOP GENERAL PHYSICAL EXAM: Primary | ICD-10-CM

## 2020-08-03 DIAGNOSIS — T14.8XXA BRUISING: ICD-10-CM

## 2020-08-03 LAB
ALBUMIN UR-MCNC: NEGATIVE MG/DL
ANION GAP SERPL CALCULATED.3IONS-SCNC: 2 MMOL/L (ref 3–14)
APPEARANCE UR: CLEAR
BASOPHILS # BLD AUTO: 0.1 10E9/L (ref 0–0.2)
BASOPHILS NFR BLD AUTO: 1.1 %
BILIRUB UR QL STRIP: NEGATIVE
BUN SERPL-MCNC: 14 MG/DL (ref 7–30)
CALCIUM SERPL-MCNC: 8.9 MG/DL (ref 8.5–10.1)
CHLORIDE SERPL-SCNC: 100 MMOL/L (ref 94–109)
CO2 SERPL-SCNC: 32 MMOL/L (ref 20–32)
COLOR UR AUTO: YELLOW
CREAT SERPL-MCNC: 0.68 MG/DL (ref 0.52–1.04)
DIFFERENTIAL METHOD BLD: NORMAL
EOSINOPHIL NFR BLD AUTO: 1.8 %
ERYTHROCYTE [DISTWIDTH] IN BLOOD BY AUTOMATED COUNT: 12.6 % (ref 10–15)
GFR SERPL CREATININE-BSD FRML MDRD: 90 ML/MIN/{1.73_M2}
GLUCOSE SERPL-MCNC: 89 MG/DL (ref 70–99)
GLUCOSE UR STRIP-MCNC: NEGATIVE MG/DL
HCT VFR BLD AUTO: 39.4 % (ref 35–47)
HGB BLD-MCNC: 13.5 G/DL (ref 11.7–15.7)
HGB UR QL STRIP: NEGATIVE
IMM GRANULOCYTES # BLD: 0 10E9/L (ref 0–0.4)
IMM GRANULOCYTES NFR BLD: 0.3 %
INR PPP: 1.02 (ref 0.86–1.14)
KETONES UR STRIP-MCNC: NEGATIVE MG/DL
LEUKOCYTE ESTERASE UR QL STRIP: NEGATIVE
LYMPHOCYTES # BLD AUTO: 1 10E9/L (ref 0.8–5.3)
LYMPHOCYTES NFR BLD AUTO: 12.9 %
MCH RBC QN AUTO: 29.6 PG (ref 26.5–33)
MCHC RBC AUTO-ENTMCNC: 34.3 G/DL (ref 31.5–36.5)
MCV RBC AUTO: 86 FL (ref 78–100)
MONOCYTES # BLD AUTO: 0.6 10E9/L (ref 0–1.3)
MONOCYTES NFR BLD AUTO: 7.8 %
NEUTROPHILS # BLD AUTO: 5.8 10E9/L (ref 1.6–8.3)
NEUTROPHILS NFR BLD AUTO: 76.1 %
NITRATE UR QL: NEGATIVE
NRBC # BLD AUTO: 0 10*3/UL
NRBC BLD AUTO-RTO: 0 /100
PH UR STRIP: 7 PH (ref 5–7)
PLATELET # BLD AUTO: 290 10E9/L (ref 150–450)
POTASSIUM SERPL-SCNC: 3.9 MMOL/L (ref 3.4–5.3)
RBC # BLD AUTO: 4.56 10E12/L (ref 3.8–5.2)
SODIUM SERPL-SCNC: 134 MMOL/L (ref 133–144)
SOURCE: NORMAL
SP GR UR STRIP: 1.01 (ref 1–1.03)
UROBILINOGEN UR STRIP-MCNC: 0 MG/DL (ref 0–2)
WBC # BLD AUTO: 7.6 10E9/L (ref 4–11)

## 2020-08-03 PROCEDURE — 85025 COMPLETE CBC W/AUTO DIFF WBC: CPT | Performed by: INTERNAL MEDICINE

## 2020-08-03 PROCEDURE — 36415 COLL VENOUS BLD VENIPUNCTURE: CPT | Performed by: INTERNAL MEDICINE

## 2020-08-03 PROCEDURE — 80048 BASIC METABOLIC PNL TOTAL CA: CPT | Performed by: INTERNAL MEDICINE

## 2020-08-03 PROCEDURE — 99214 OFFICE O/P EST MOD 30 MIN: CPT | Performed by: INTERNAL MEDICINE

## 2020-08-03 PROCEDURE — 85610 PROTHROMBIN TIME: CPT | Performed by: INTERNAL MEDICINE

## 2020-08-03 PROCEDURE — 81003 URINALYSIS AUTO W/O SCOPE: CPT | Performed by: INTERNAL MEDICINE

## 2020-08-03 PROCEDURE — 93000 ELECTROCARDIOGRAM COMPLETE: CPT | Performed by: INTERNAL MEDICINE

## 2020-08-03 ASSESSMENT — PAIN SCALES - GENERAL: PAINLEVEL: MILD PAIN (2)

## 2020-08-03 NOTE — PROGRESS NOTES
[address] [address]  [phone]  Dept: [phone]    PRE-OP EVALUATION:  Today's date: 8/3/2020    Komal Miller (: 1950) presents for pre-operative evaluation assessment as requested by Dr. Bermudez.  She requires evaluation and anesthesia risk assessment prior to undergoing surgery/procedure for treatment of right knee .    Proposed Surgery/ Procedure: right total knee arthroplasty  Date of Surgery/ Procedure: 8/10/2020  Time of Surgery/ Procedure: [facility]  Hospital/Surgical Facility: [facility]   Surgery Fax Number: [phone]   Primary Physician: Wesly Lehman  Type of Anesthesia Anticipated: General    Preoperative Questionnaire:   No - Have you ever had a heart attack or stroke?  No - Have you ever had surgery on your heart or blood vessels, such as a stent, coronary (heart) bypass, or surgery on an artery in the head, neck, heart, or legs?  No - Do you have chest pain when you are physically active?  No - Do you have a history of heart failure?  No - Do you currently have a cold, bronchitis, or symptoms of other respiratory (head and chest) infections?  No - Do you have a cough, shortness of breath, or wheezing?  No - Do you or anyone in your family have a history of blood clots?  No - Do you or anyone in your family have a serious bleeding problem, such as long-lasting bleeding after surgeries or cuts?  YES taking iron - Have you ever had anemia or been told to take iron pills?  No - Have you had any abnormal blood loss such as black, tarry or bloody stools, or abnormal vaginal bleeding?  YES - Have you ever had a blood transfusion?  Yes - Are you willing to have a blood transfusion if it is medically needed before, during, or after your surgery?  No - Have you or anyone in your family ever had problems with anesthesia (sedation for surgery)?  No - Do you have sleep apnea, excessive snoring, or daytime drowsiness?   No -  Do you have any artifical heart valves or other implanted medical devices, such as a pacemaker, defibrillator, or continuous glucose monitor?  YES left knee - Do you have any artifical joints?  No - Are you allergic to latex?  No - Is there any chance that you may be pregnant?    Patient has a Health Care Directive or Living Will:  YES     HPI:     HPI related to upcoming procedure: Patient has severe arthritis involving the right knee.  Is anticipating total knee arthroplasty.  About a year ago she had the left knee done and is quite happy with this.  She notes that her quality of life is currently diminished due to her inability to maintain her normal activities secondary to knee pain.          See problem list for active medical problems.  Problems all longstanding and stable, except as noted/documented.  See ROS for pertinent symptoms related to these conditions.      MEDICAL HISTORY:     Patient Active Problem List    Diagnosis Date Noted     Morbid obesity due to excess calories (H) 07/26/2017     Priority: Medium     Cluster headache, not intractable, unspecified chronicity pattern 05/08/2017     Priority: Medium     Gastroesophageal reflux disease with esophagitis 10/02/2015     Priority: Medium     Intestinal adhesions 05/06/2014     Priority: Medium     Cervical adenopathy 04/29/2014     Priority: Medium     Small bowel obstruction (H) 04/28/2014     Priority: Medium     DVT prophylaxis 04/28/2014     Priority: Medium     Advance Care Planning 05/07/2012     Priority: Medium     Discussed advance care planning with patient; information given to patient to review. 5/7/2012        Hypertension goal BP (blood pressure) < 140/90 11/15/2011     Priority: Medium     Hyperlipidemia LDL goal <130 04/26/2011     Priority: Medium     Recurring abdominal pain 12/21/2009     Priority: Medium     Esophageal reflux 04/18/2006     Priority: Medium     Absence of menstruation 11/04/2005     Priority: Medium      Past  Medical History:   Diagnosis Date     Unspecified essential hypertension      Past Surgical History:   Procedure Laterality Date     C APPENDECTOMY       C LIGATE FALLOPIAN TUBE       C TOTAL ABDOM HYSTERECTOMY      Hysterectomy, Total Abdominal     C TOTAL KNEE ARTHROPLASTY Left      COLONOSCOPY  06/10/08    Internal hemorrhoids, otherwise normal.     COLONOSCOPY  01/20/10     HC DILATION/CURETTAGE DIAG/THER NON OB      D & C     HC LAP, SURG ENTEROLYSIS  05/07/10     HC REMOVAL OF OVARY/TUBE(S)      Salpingo-Oophorectomy, bilateral     HC UGI ENDOSCOPY, SIMPLE EXAM  01/20/10     LAPAROSCOPY DIAGNOSTIC (GENERAL)  5/6/2014    Procedure: LAPAROSCOPY DIAGNOSTIC (GENERAL);  Surgeon: Seth Matthews MD;  [facility]     LAPAROTOMY, LYSIS ADHESIONS, COMBINED  5/6/2014    Procedure: COMBINED LAPAROTOMY, LYSIS ADHESIONS;  Surgeon: Seth Matthews MD;  Location: [facility]     VIDEO CAPSULE ENDOSCOPY  02/15/10    normal sm intestine     Current Outpatient Medications   Medication Sig Dispense Refill     fish oil-omega-3 fatty acids 1000 MG capsule Take 2 g by mouth daily       GARLIC PO        lisinopril-hydrochlorothiazide (ZESTORETIC) 20-25 MG tablet TAKE 1 TABLET EVERY DAY 90 tablet 1     omeprazole (PRILOSEC) 40 MG DR capsule TAKE 1 CAPSULE EVERY DAY (TAKE 30 TO 60 MINUTES BEFORE A MEAL) 90 capsule 1     propranolol (INDERAL) 40 MG tablet TAKE 1 TABLET TWICE DAILY 180 tablet 1     ketorolac (TORADOL) 10 MG tablet Take 1 tablet (10 mg) by mouth every 6 hours as needed for moderate pain (Patient not taking: Reported on 8/3/2020) 6 tablet 0     ondansetron (ZOFRAN) 4 MG tablet Take 1 tablet (4 mg) by mouth every 8 hours as needed for nausea (Patient not taking: Reported on 8/3/2020) 6 tablet 0     SUMAtriptan 25 MG PO tablet Take 1 tablet (25 mg) by mouth at onset of headache for migraine May repeat in 2 hours. Max 8 tablets/24 hours. (Patient not taking: Reported on 3/3/2020) 4 tablet 0     OTC products: None,  "except as noted above    Allergies   Allergen Reactions     No Known Drug Allergies       Latex Allergy: NO    Social History     Tobacco Use     Smoking status: Former Smoker     Packs/day: 0.50     Years: 20.00     Pack years: 10.00     Types: Cigarettes     Last attempt to quit: 1995     Years since quittin.6     Smokeless tobacco: Never Used   Substance Use Topics     Alcohol use: Yes     Comment: very seldom     History   Drug Use No       REVIEW OF SYSTEMS:   CONSTITUTIONAL: NEGATIVE for fever, chills, change in weight  INTEGUMENTARY/SKIN: Does have easy bruising with any mild trauma to the skin.  EYES: NEGATIVE for vision changes or irritation  ENT/MOUTH: NEGATIVE for ear, mouth and throat problems  RESP: NEGATIVE for significant cough or SOB  CV: NEGATIVE for chest pain, palpitations or peripheral edema  GI: NEGATIVE for nausea, abdominal pain, heartburn, or change in bowel habits  : NEGATIVE for frequency, dysuria, or hematuria  MUSCULOSKELETAL: Ongoing right knee pain with occasional grinding and \"locking\".  NEURO: NEGATIVE for weakness, dizziness or paresthesias  ENDOCRINE: NEGATIVE for temperature intolerance, skin/hair changes  HEME: NEGATIVE for bleeding problems  PSYCHIATRIC: NEGATIVE for changes in mood or affect    EXAM:   /80 (BP Location: Left arm, Patient Position: Sitting, Cuff Size: Adult Large)   Pulse 72   Temp 97.3  F (36.3  C) (Temporal)   Resp 16   Wt 110.7 kg (244 lb)   SpO2 97%   BMI 40.07 kg/m      GENERAL APPEARANCE: healthy, alert and no distress     EYES: EOMI, PERRL     HENT: ear canals and TM's normal and nose and mouth without ulcers or lesions     NECK: no adenopathy, no asymmetry, masses, or scars and thyroid normal to palpation     RESP: lungs clear to auscultation - no rales, rhonchi or wheezes     CV: regular rates and rhythm, normal S1 S2, no S3 or S4 and no murmur, click or rub     ABDOMEN:  soft, nontender, no HSM or masses and bowel sounds normal    "  MS: Decreased range of motion of the right knee is noted.  Coarse grinding is noted with range of motion.  No signs of overlying infections or acute inflammation noted.     SKIN: no suspicious lesions or rashes     NEURO: Normal strength and tone, sensory exam grossly normal, mentation intact and speech normal     PSYCH: mentation appears normal. and affect normal/bright     LYMPHATICS: No cervical adenopathy    DIAGNOSTICS:   EKG:  EKG demonstrates normal sinus rhythm with some nonspecific T wave changes noted anteriorly and inferiorly.              IMPRESSION:   Reason for surgery/procedure: Severe arthritis involving the right knee requiring total joint arthroplasty to maintain normal level of activity.  Diagnosis/reason for consult: Perioperative risk stratification in a pleasant 70-year-old female with:  1. Preop general physical exam    2. Arthritis of knee    3. Bruising  - INR  - CBC with platelets and differential    4. Hypertension goal BP (blood pressure) < 140/90  - Basic metabolic panel  (Ca, Cl, CO2, Creat, Gluc, K, Na, BUN)  - *UA reflex to Microscopic and Culture ([place]; [facility]; [facility]; [facility]; [facility]; [facility]; [place]; [place])    5. Hyperlipidemia LDL goal <130    The proposed surgical procedure is considered INTERMEDIATE risk.    REVISED CARDIAC RISK INDEX  The patient has the following serious cardiovascular risks for perioperative complications such as (MI, PE, VFib and 3  AV Block):  No serious cardiac risks  INTERPRETATION: 1 risks: Class II (low risk - 0.9% complication rate)    The patient has the following additional risks for perioperative complications:  No identified additional risks      ICD-10-CM    1. Preop general physical exam  Z01.818        RECOMMENDATIONS:         --Patient is to take all scheduled medications on the day of surgery EXCEPT   Patient will not take lisinopril the morning of her surgery.      APPROVAL GIVEN to proceed with  proposed procedure, without further diagnostic evaluation       Signed Electronically by: Wesly Lehman DO    Copy of this evaluation report is provided to requesting physician.    [place] Preop Guidelines    Revised Cardiac Risk Index

## 2020-08-03 NOTE — LETTER
August 6, 2020      Komal Miller  [address] [facility] [address]        Dear ,    We are writing to inform you of your test results.    Urinary analysis is normal.   Chemistry panel is normal including the blood sugar and kidney function.   INR is 1.02 suggesting no reason for bleeding or bruising.   The blood cell count is normal without evidence of anemia, leukemia, or bleeding disorder.   You will be contacted with any outstanding results as they are available.   Feel free to contact me via the office or My Chart if you have any questions regarding the above.     Resulted Orders   INR   Result Value Ref [place]    INR 1.02 0.86 - 1.14   CBC with platelets and differential   Result Value Ref [place]    WBC 7.6 4.0 - 11.0 10e9/L    RBC Count 4.56 3.8 - 5.2 10e12/L    Hemoglobin 13.5 11.7 - 15.7 g/dL    Hematocrit 39.4 35.0 - 47.0 %    MCV 86 78 - 100 fl    MCH 29.6 26.5 - 33.0 pg    MCHC 34.3 31.5 - 36.5 g/dL    RDW 12.6 10.0 - 15.0 %    Platelet Count 290 150 - 450 10e9/L    Diff Method Automated Method     % Neutrophils 76.1 %    % Lymphocytes 12.9 %    % Monocytes 7.8 %    % Eosinophils 1.8 %    % Basophils 1.1 %    % Immature Granulocytes 0.3 %    Nucleated RBCs 0 0 /100    Absolute Neutrophil 5.8 1.6 - 8.3 10e9/L    Absolute Lymphocytes 1.0 0.8 - 5.3 10e9/L    Absolute Monocytes 0.6 0.0 - 1.3 10e9/L    Absolute Basophils 0.1 0.0 - 0.2 10e9/L    Abs Immature Granulocytes 0.0 0 - 0.4 10e9/L    Absolute Nucleated RBC 0.0    Basic metabolic panel  (Ca, Cl, CO2, Creat, Gluc, K, Na, BUN)   Result Value Ref [place]    Sodium 134 133 - 144 mmol/L    Potassium 3.9 3.4 - 5.3 mmol/L    Chloride 100 94 - 109 mmol/L    Carbon Dioxide 32 20 - 32 mmol/L    Anion Gap 2 (L) 3 - 14 mmol/L    Glucose 89 70 - 99 mg/dL    Urea Nitrogen 14 7 - 30 mg/dL    Creatinine 0.68 0.52 - 1.04 mg/dL    GFR Estimate 90 >60 mL/min/[1.73_m2]      Comment:      Non  GFR Calc  Starting 12/18/2018, serum creatinine  based estimated GFR (eGFR) will be   calculated using the Chronic Kidney Disease Epidemiology Collaboration   (CKD-EPI) equation.      GFR Estimate If Black >90 >60 mL/min/[1.73_m2]      Comment:       GFR Calc  Starting 12/18/2018, serum creatinine based estimated GFR (eGFR) will be   calculated using the Chronic Kidney Disease Epidemiology Collaboration   (CKD-EPI) equation.      Calcium 8.9 8.5 - 10.1 mg/dL   *UA reflex to Microscopic and Culture ([place]; [facility]; [facility]; [facility]; [facility]; [facility]; [place]; [place])   Result Value Ref [place]    Color Urine Yellow     Appearance Urine Clear     Glucose Urine Negative NEG^Negative mg/dL    Bilirubin Urine Negative NEG^Negative    Ketones Urine Negative NEG^Negative mg/dL    Specific Gravity Urine 1.013 1.003 - 1.035    Blood Urine Negative NEG^Negative    pH Urine 7.0 5.0 - 7.0 pH    Protein Albumin Urine Negative NEG^Negative mg/dL    Urobilinogen mg/dL 0.0 0.0 - 2.0 mg/dL    Nitrite Urine Negative NEG^Negative    Leukocyte Esterase Urine Negative NEG^Negative    Source Unspecified Urine        If you have any questions or concerns, please call the clinic at the number listed above.       Sincerely,        Wesly Lehman,

## 2020-08-06 NOTE — RESULT ENCOUNTER NOTE
Dear Komal, your recent test results are attached.  Urinary analysis is normal.  Chemistry panel is normal including the blood sugar and kidney function.  INR is 1.02 suggesting no reason for bleeding or bruising.  The blood cell count is normal without evidence of anemia, leukemia, or bleeding disorder.  You will be contacted with any outstanding results as they are available.  Feel free to contact me via the office or My Chart if you have any questions regarding the above.

## 2020-08-07 ENCOUNTER — TELEPHONE (OUTPATIENT)
Dept: FAMILY MEDICINE | Facility: OTHER | Age: 70
End: 2020-08-07

## 2020-08-07 NOTE — TELEPHONE ENCOUNTER
Reason for Call:  Preop Notes needed from 8.3 OV    Detailed comments: Justina griffin [facility] calling and requesting preop notes from 8.3 visit. Please fax to [phone].     Phone Number Patient can be reached at:    Best Time:     Can we leave a detailed message on this number? YES    Call taken on 8/7/2020 at 1:35 PM by Keke Ortiz

## 2020-08-10 ENCOUNTER — TRANSFERRED RECORDS (OUTPATIENT)
Dept: HEALTH INFORMATION MANAGEMENT | Facility: CLINIC | Age: 70
End: 2020-08-10

## 2020-08-21 DIAGNOSIS — I10 HYPERTENSION GOAL BP (BLOOD PRESSURE) < 140/90: ICD-10-CM

## 2020-08-21 DIAGNOSIS — K21.00 GASTROESOPHAGEAL REFLUX DISEASE WITH ESOPHAGITIS: ICD-10-CM

## 2020-08-21 DIAGNOSIS — G44.009 CLUSTER HEADACHE, NOT INTRACTABLE, UNSPECIFIED CHRONICITY PATTERN: ICD-10-CM

## 2020-08-24 RX ORDER — OMEPRAZOLE 40 MG/1
CAPSULE, DELAYED RELEASE ORAL
Qty: 90 CAPSULE | Refills: 1 | Status: SHIPPED | OUTPATIENT
Start: 2020-08-24 | End: 2021-01-14

## 2020-08-24 RX ORDER — PROPRANOLOL HYDROCHLORIDE 40 MG/1
TABLET ORAL
Qty: 180 TABLET | Refills: 1 | Status: SHIPPED | OUTPATIENT
Start: 2020-08-24 | End: 2021-01-14

## 2020-08-24 RX ORDER — LISINOPRIL AND HYDROCHLOROTHIAZIDE 20; 25 MG/1; MG/1
TABLET ORAL
Qty: 90 TABLET | Refills: 1 | Status: SHIPPED | OUTPATIENT
Start: 2020-08-24 | End: 2021-01-14

## 2020-08-24 NOTE — TELEPHONE ENCOUNTER
"Requested Prescriptions   Pending Prescriptions Disp Refills     propranolol (INDERAL) 40 MG tablet [Pharmacy Med Name: PROPRANOLOL HCL 40 MG Tablet] 180 tablet 1     Sig: TAKE 1 TABLET TWICE DAILY   Last Written Prescription Date:  3/23/2020  Last Fill Quantity: 180,  # refills: 1   Last office visit: 8/3/2020with prescribing provider:     Future Office Visit:        Beta-Blockers Protocol Passed - 8/21/2020  8:06 PM        Passed - Blood pressure under 140/90 in past 12 months     BP Readings from Last 3 Encounters:   08/03/20 132/80   04/23/20 (!) 153/94   03/03/20 138/84             Passed - Patient is age 6 or older        Passed - Recent (12 mo) or future (30 days) visit within the authorizing provider's specialty     Patient has had an office visit with the authorizing provider or a provider within the authorizing providers department within the previous 12 mos or has a future within next 30 days. See \"Patient Info\" tab in inbasket, or \"Choose Columns\" in Meds & Orders section of the refill encounter.            Passed - Medication is active on med list      Prescription approved per [facility] Refill Protocol.       omeprazole (PRILOSEC) 40 MG DR capsule [Pharmacy Med Name: OMEPRAZOLE 40 MG Capsule Delayed Release] 90 capsule 1     Sig: TAKE 1 CAPSULE EVERY DAY (TAKE 30 TO 60 MINUTES BEFORE A MEAL)   Last Written Prescription Date:  3/23/2020  Last Fill Quantity: 90,  # refills: 1   Last office visit: 8/3/2020with prescribing provider:     Future Office Visit:        PPI Protocol Passed - 8/21/2020  8:06 PM        Passed - Not on Clopidogrel (unless Pantoprazole ordered)        Passed - No diagnosis of osteoporosis on record        Passed - Recent (12 mo) or future (30 days) visit within the authorizing provider's specialty     Patient has had an office visit with the authorizing provider or a provider within the authorizing providers department within the previous 12 mos or has a future within next 30 days. See " "\"Patient Info\" tab in inbasket, or \"Choose Columns\" in Meds & Orders section of the refill encounter.            Passed - Medication is active on med list        Passed - Patient is age 18 or older        Passed - No active pregnacy on record        Passed - No positive pregnancy test in past 12 months      Prescription approved per [facility] Refill Protocol.       lisinopril-hydrochlorothiazide (ZESTORETIC) 20-25 MG tablet [Pharmacy Med Name: LISINOPRIL/HYDROCHLOROTHIAZIDE 20-25 MG Tablet] 90 tablet 1     Sig: TAKE 1 TABLET EVERY DAY   Last Written Prescription Date:  3/23/2020  Last Fill Quantity: 90,  # refills: 1   Last office visit: 8/3/2020 with prescribing provider:    Future Office Visit:        Diuretics (Including Combos) Protocol Passed - 8/21/2020  8:06 PM        Passed - Blood pressure under 140/90 in past 12 months     BP Readings from Last 3 Encounters:   08/03/20 132/80   04/23/20 (!) 153/94   03/03/20 138/84           Passed - Recent (12 mo) or future (30 days) visit within the authorizing provider's specialty     Patient has had an office visit with the authorizing provider or a provider within the authorizing providers department within the previous 12 mos or has a future within next 30 days. See \"Patient Info\" tab in inElastifileet, or \"Choose Columns\" in Meds & Orders section of the refill encounter.            Passed - Medication is active on med list        Passed - Patient is age 18 or older        Passed - No active pregancy on record        Passed - Normal serum creatinine on file in past 12 months     Recent Labs   Lab Test 08/03/20  1043   CR 0.68            Passed - Normal serum potassium on file in past 12 months     Recent Labs   Lab Test 08/03/20  1043   POTASSIUM 3.9              Passed - Normal serum sodium on file in past 12 months     Recent Labs   Lab Test 08/03/20  1043               Passed - No positive pregnancy test in past 12 months       ACE Inhibitors (Including Combos) Protocol " "Passed - 8/21/2020  8:06 PM        Passed - Blood pressure under 140/90 in past 12 months     BP Readings from Last 3 Encounters:   08/03/20 132/80   04/23/20 (!) 153/94   03/03/20 138/84           Passed - Recent (12 mo) or future (30 days) visit within the authorizing provider's specialty     Patient has had an office visit with the authorizing provider or a provider within the authorizing providers department within the previous 12 mos or has a future within next 30 days. See \"Patient Info\" tab in inbasket, or \"Choose Columns\" in Meds & Orders section of the refill encounter.              Passed - Medication is active on med list        Passed - Patient is age 18 or older        Passed - No active pregnancy on record        Passed - Normal serum creatinine on file in past 12 months     Recent Labs   Lab Test 08/03/20  1043   CR 0.68       Ok to refill medication if creatinine is low          Passed - Normal serum potassium on file in past 12 months     Recent Labs   Lab Test 08/03/20  1043   POTASSIUM 3.9             Passed - No positive pregnancy test within past 12 months         Prescription approved per [facility] Refill Protocol.  Bharti Duckworth RN      "

## 2020-10-20 NOTE — TELEPHONE ENCOUNTER
PA Initiation    Medication: TRAMADOL 50MG  Insurance Company: [employer] - Phone [phone] Fax [phone]  Pharmacy Filling the Rx: [facility] - [place] [address] [address]  Filling Pharmacy Phone: [phone]  Filling Pharmacy Fax:    Start Date: 8/2/2018       done

## 2020-11-23 ENCOUNTER — TELEPHONE (OUTPATIENT)
Dept: FAMILY MEDICINE | Facility: OTHER | Age: 70
End: 2020-11-23

## 2020-11-23 ENCOUNTER — DOCUMENTATION ONLY (OUTPATIENT)
Dept: FAMILY MEDICINE | Facility: OTHER | Age: 70
End: 2020-11-23

## 2020-11-23 NOTE — TELEPHONE ENCOUNTER
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  They have very strict regulations on everything and she is looking to get a note from Dr. Lehman stating that she can't wear a mask.  She states she can't breathe while wearing one.  She states her anxiety is horrible when wearing it.  She asks that the letter be faxed to [facility] Fax #[phone]    Please let the patient know either way.    Phone Number Patient can be reached at: Home number on file [phone] (home)    Best Time: any    Can we leave a detailed message on this number? YES    Call taken on 11/23/2020 at 2:48 PM by Deemtrio Soriano

## 2020-11-23 NOTE — TELEPHONE ENCOUNTER
This is not about her.  This is about the safety of the people around her.  If she cannot wear a mask, she should not be working there.  I will not write the note!!    Fallon      Thoughtless!

## 2021-01-14 DIAGNOSIS — K21.00 GASTROESOPHAGEAL REFLUX DISEASE WITH ESOPHAGITIS: ICD-10-CM

## 2021-01-14 DIAGNOSIS — I10 HYPERTENSION GOAL BP (BLOOD PRESSURE) < 140/90: ICD-10-CM

## 2021-01-14 DIAGNOSIS — G44.009 CLUSTER HEADACHE, NOT INTRACTABLE, UNSPECIFIED CHRONICITY PATTERN: ICD-10-CM

## 2021-01-14 RX ORDER — PROPRANOLOL HYDROCHLORIDE 40 MG/1
TABLET ORAL
Qty: 180 TABLET | Refills: 1 | Status: SHIPPED | OUTPATIENT
Start: 2021-01-14 | End: 2021-11-22

## 2021-01-14 RX ORDER — OMEPRAZOLE 40 MG/1
CAPSULE, DELAYED RELEASE ORAL
Qty: 90 CAPSULE | Refills: 1 | Status: SHIPPED | OUTPATIENT
Start: 2021-01-14 | End: 2021-11-22

## 2021-01-14 RX ORDER — LISINOPRIL AND HYDROCHLOROTHIAZIDE 20; 25 MG/1; MG/1
TABLET ORAL
Qty: 90 TABLET | Refills: 1 | Status: SHIPPED | OUTPATIENT
Start: 2021-01-14 | End: 2021-11-26

## 2021-03-22 ENCOUNTER — ALLIED HEALTH/NURSE VISIT (OUTPATIENT)
Dept: FAMILY MEDICINE | Facility: CLINIC | Age: 71
End: 2021-03-22
Payer: COMMERCIAL

## 2021-03-22 DIAGNOSIS — Z23 ENCOUNTER FOR IMMUNIZATION: Primary | ICD-10-CM

## 2021-03-22 PROCEDURE — 99207 PR NO CHARGE NURSE ONLY: CPT

## 2021-03-22 PROCEDURE — 90471 IMMUNIZATION ADMIN: CPT

## 2021-03-22 PROCEDURE — 90750 HZV VACC RECOMBINANT IM: CPT

## 2021-04-01 ENCOUNTER — OFFICE VISIT (OUTPATIENT)
Dept: FAMILY MEDICINE | Facility: CLINIC | Age: 71
End: 2021-04-01
Payer: MEDICARE

## 2021-04-01 VITALS
WEIGHT: 252.7 LBS | HEART RATE: 66 BPM | DIASTOLIC BLOOD PRESSURE: 84 MMHG | RESPIRATION RATE: 18 BRPM | BODY MASS INDEX: 41.5 KG/M2 | SYSTOLIC BLOOD PRESSURE: 132 MMHG | TEMPERATURE: 97 F | OXYGEN SATURATION: 97 %

## 2021-04-01 DIAGNOSIS — S50.819A: Primary | ICD-10-CM

## 2021-04-01 PROCEDURE — 99212 OFFICE O/P EST SF 10 MIN: CPT | Performed by: PHYSICIAN ASSISTANT

## 2021-04-01 ASSESSMENT — PAIN SCALES - GENERAL: PAINLEVEL: NO PAIN (0)

## 2021-04-01 NOTE — PROGRESS NOTES
Assessment & Plan     Abrasion, forearm without infection  There is no indication that this is infected today.  May apply topical bacitracin to help with healing as he notes that scab is cracking and painful.  Discussed signs of infection.  Stop peroxide at this time as this may slow healing.  Follow-up if symptoms worsen or fail to improve.    15 minutes spent on the date of the encounter doing chart review, patient visit and documentation     No follow-ups on file.    [facility] is a 70 year old who presents for the following health issues     HPI     Concern - infected wound  Onset: Started Sunday   Description: possible infected wound on left arm below elbow  Progression of Symptoms:  Worsening, redness is going up arm, oozing     Therapies tried and outcome: sepideh Diaz presents to clinic today for evaluation of an abrasion to her left anterior forearm.  She states she was shopping at [employer] on Sunday when she scraped her arm on an open freezer door when she passed by.  It bled quite a bit.  She is been cleaning it with peroxide.  She went to buy some topical antibiotic ointment today and notes the pharmacist advised that she follow-up to be seen because there was redness surrounding the abrasion.    Review of Systems   ROS negative except as stated above.        Objective    /84 (BP Location: Right arm, Patient Position: Sitting, Cuff Size: Adult Large)   Pulse 66   Temp 97  F (36.1  C) (Temporal)   Resp 18   Wt 114.6 kg (252 lb 11.2 oz)   SpO2 97%   BMI 41.50 kg/m    Body mass index is 41.5 kg/m .  Physical Exam   GENERAL: healthy, alert and no distress  SKIN: abrasion to left forearm, surrounding erythema, no excess warmth, swelling, induration, discharge. See photo below.

## 2021-09-09 ENCOUNTER — TELEPHONE (OUTPATIENT)
Dept: DERMATOLOGY | Facility: CLINIC | Age: 71
End: 2021-09-09

## 2021-09-09 ENCOUNTER — VIRTUAL VISIT (OUTPATIENT)
Dept: DERMATOLOGY | Facility: CLINIC | Age: 71
End: 2021-09-09
Payer: COMMERCIAL

## 2021-09-09 DIAGNOSIS — L30.9 DERMATITIS: Primary | ICD-10-CM

## 2021-09-09 DIAGNOSIS — R21 RASH: Primary | ICD-10-CM

## 2021-09-09 PROCEDURE — 99214 OFFICE O/P EST MOD 30 MIN: CPT | Mod: 95 | Performed by: DERMATOLOGY

## 2021-09-09 RX ORDER — FLUOCINONIDE 0.5 MG/G
CREAM TOPICAL
Qty: 60 G | Refills: 2 | Status: SHIPPED | OUTPATIENT
Start: 2021-09-09 | End: 2021-09-10

## 2021-09-09 RX ORDER — TRIAMCINOLONE ACETONIDE 1 MG/G
OINTMENT TOPICAL 2 TIMES DAILY
COMMUNITY
End: 2021-09-09

## 2021-09-09 NOTE — PROGRESS NOTES
[facility] Dermatology Note  Encounter Date: Sep 9, 2021  Telephone ([phone]). Location of teledermatologist: [facility]. Start time: 2:21pm PM. End time: 2:17pm.    Dermatology Problem List:  1. Rash most consistent Nummular Dermatitis   - Triamcinolone ointment BID x 2 weeks    ____________________________________________    Assessment & Plan:     1. Rash consistent with Dermatitis- one photo on extremity but patient reports also on lower back and buttocks. She reports the non pictured spots are little.   -Start lidex for 2 weeks. Use vaseline, not on face, take a break from use 1-2 weeks off.   -Recommend Vanicream moisturizer body body  -dove sensitive skin soap  -vaseline on flaring areas         Procedures Performed:    None    Follow-up: this fall in person. She may see [facility] which is closer.     Staff and Medical Student:     Shiraz Haywood, MS3    Staff Physician:  I was present with the medical student who participated in the service and in the documentation of the note. I have verified the history and personally performed the physical exam and medical decision making. I agree with the assessment and plan of care as documented in the note.          Alyssa Ann MD    Department of Dermatology  [facility]: Phone: [phone], Fax:[phone]  [facility]: Phone: [phone], Fax: [phone]  9/9/2021      ____________________________________________    CC: Derm Problem (Follow up rash on left knee. )    HPI:  Ms. Komal Miller is a(n) 71 year old female who presents today for follow-up  for Nummular Dermatitis.     The patient was seen in the clinic in February 2020 for an eczema flare-up. She states there is a history of eczema dating back to her childhood but she had not experienced flare-ups until 1 year  ago. Today, the patient has two rashes on her right buttocks, one rash on her lower back, and another rash on her left knee. The rashes are mostly dry and sometimes itch but are without pain. She has been applying Triamcinolone cream daily and Vanicream daily. The topicals help with the dryness initially but it returns . The patient would like to try another topical.       Patient is otherwise feeling well, without additional skin concerns.    Labs Reviewed:  N/A    Physical Exam:  Vitals: There were no vitals taken for this visit.  SKIN: Teledermatology photos were reviewed; image quality and interpretability:  acceptable. Image date: 9/07/2021.  - erythematous papules and patch on the extremity, few areas with hemorrhagic crust- No other lesions of concern on areas examined.     Medications:  Current Outpatient Medications   Medication     fish oil-omega-3 fatty acids 1000 MG capsule     GARLIC PO     lisinopril-hydrochlorothiazide (ZESTORETIC) 20-25 MG tablet     omeprazole (PRILOSEC) 40 MG DR capsule     propranolol (INDERAL) 40 MG tablet     Current Facility-Administered Medications   Medication     triamcinolone (KENALOG-40) injection 40 mg      Past Medical/Surgical History:   Patient Active Problem List   Diagnosis     Absence of menstruation     Esophageal reflux     Recurring abdominal pain     Hyperlipidemia LDL goal <130     Hypertension goal BP (blood pressure) < 140/90     Advance Care Planning     Small bowel obstruction (H)     DVT prophylaxis     Cervical adenopathy     Intestinal adhesions     Gastroesophageal reflux disease with esophagitis     Cluster headache, not intractable, unspecified chronicity pattern     Morbid obesity due to excess calories (H)     Past Medical History:   Diagnosis Date     Unspecified essential hypertension      Teledermatology Nurse Call Patients:     Are you in the [facility] at the time of the encounter? yes    Today's visit will be billed to you and your  insurance.    A teledermatology visit is not as thorough as an in-person visit and the quality of the photograph sent may not be of the same quality as that taken by the dermatology clinic.  CC Referred Self, MD  No address on file on close of this encounter.    Nayeli Zafar on 9/9/2021 at 1:43 PM

## 2021-09-09 NOTE — NURSING NOTE
Komal Miller's goals for this visit include:   Chief Complaint   Patient presents with     Derm Problem     Follow up rash on left knee. Area is not improving, getting bigger. Using Triamcinolone bid and Vanicream       She requests these members of her care team be copied on today's visit information:     PCP: Wesly Lehman    Referring Provider:  Referred Self, MD  No address on file    There were no vitals taken for this visit.    Do you need any medication refills at today's visit? Possibly Triamcinolone if continuing          Nayeli Zafar on 9/9/2021 at 1:44 PM

## 2021-09-09 NOTE — PATIENT INSTRUCTIONS
[facility] Dermatology Visit    Thank you for allowing us to participate in your care. Your findings, instructions and follow-up plan are as follows:         When should I call my doctor?    If you are worsening or not improving, please, contact us or seek urgent care as noted below.     Who should I call with questions (adults)?    [facility] (adult and pediatric): [phone]    [facility] (adult): [phone]    For urgent needs outside of business hours call the [facility] at [phone] and ask for the dermatology resident on call    If this is a medical emergency and you are unable to reach an ER, Call 911    Who should I call with questions (pediatric)?  [facility]- Pediatric Dermatology  Dr. Angela Mays, Dr. Eun Millard, Dr. Mary Carmen Can, Anuradha Schwartz, PA  Dr. Chrissy Mcfadden, Dr. Lashon Medina & Dr. Richi Handy  Non Urgent  Nurse Triage Line; [phone]- Rosaura and Katie MULLEN Care Coordinators   [place] (/Complex ) [phone]    If you need a prescription refill, please contact your pharmacy. Refills are approved or denied by our physicians during normal business hours, Monday through Fridays  Per office policy, refills will not be granted if you have not been seen within the past year (or sooner depending on your child's condition).    Scheduling Information:  Pediatric Appointment Scheduling and Call Center [phone]  Radiology Scheduling- [phone]  Sedation Unit Scheduling- [phone]  [place] Scheduling- General [phone]; Pediatric Dermatology [phone]  Main  Services: [phone]  Arabic: [phone]  Samoan: [phone]  Hmong/Thai/Danish: [phone]  Preadmission Nursing Department Fax Number: [phone] (fax all pre-operative paperwork to this number)    For urgent matters arising during evenings,  weekends, or holidays that cannot wait for normal business hours please call [phone] and ask for the dermatology resident on call to be paged.

## 2021-09-09 NOTE — LETTER
9/9/2021         RE: Komal Miller  [address]  [place] [address]        Dear Colleague,    Thank you for referring your patient, Komal Miller, to the [facility]. Please see a copy of my visit note below.    [facility] Dermatology Note  Encounter Date: Sep 9, 2021  Telephone ([phone]). Location of teledermatologist: [facility]. Start time: 2:21pm PM. End time: 2:17pm.    Dermatology Problem List:  1. Rash most consistent Nummular Dermatitis   - Triamcinolone ointment BID x 2 weeks    ____________________________________________    Assessment & Plan:     1. Rash consistent with Dermatitis- one photo on extremity but patient reports also on lower back and buttocks. She reports the non pictured spots are little.   -Start lidex for 2 weeks. Use vaseline, not on face, take a break from use 1-2 weeks off.   -Recommend Vanicream moisturizer body body  -dove sensitive skin soap  -vaseline on flaring areas         Procedures Performed:    None    Follow-up: this fall in person. She may see [facility] which is closer.     Staff and Medical Student:     Shiraz Haywood, MS3    Staff Physician:  I was present with the medical student who participated in the service and in the documentation of the note. I have verified the history and personally performed the physical exam and medical decision making. I agree with the assessment and plan of care as documented in the note.          Alyssa Ann MD    Department of Dermatology  [facility]: Phone: [phone], Fax:[phone]  [facility]: Phone: [phone], Fax: [phone]  9/9/2021      ____________________________________________    CC: Derm Problem (Follow up rash on left knee. )    HPI:  Ms. Komal Goodwinramezmarilyn is a(n) 71 year old female who  presents today for follow-up  for Nummular Dermatitis.     The patient was seen in the clinic in February 2020 for an eczema flare-up. She states there is a history of eczema dating back to her childhood but she had not experienced flare-ups until 1 year ago. Today, the patient has two rashes on her right buttocks, one rash on her lower back, and another rash on her left knee. The rashes are mostly dry and sometimes itch but are without pain. She has been applying Triamcinolone cream daily and Vanicream daily. The topicals help with the dryness initially but it returns . The patient would like to try another topical.       Patient is otherwise feeling well, without additional skin concerns.    Labs Reviewed:  N/A    Physical Exam:  Vitals: There were no vitals taken for this visit.  SKIN: Teledermatology photos were reviewed; image quality and interpretability:  acceptable. Image date: 9/07/2021.  - erythematous papules and patch on the extremity, few areas with hemorrhagic crust- No other lesions of concern on areas examined.     Medications:  Current Outpatient Medications   Medication     fish oil-omega-3 fatty acids 1000 MG capsule     GARLIC PO     lisinopril-hydrochlorothiazide (ZESTORETIC) 20-25 MG tablet     omeprazole (PRILOSEC) 40 MG DR capsule     propranolol (INDERAL) 40 MG tablet     Current Facility-Administered Medications   Medication     triamcinolone (KENALOG-40) injection 40 mg      Past Medical/Surgical History:   Patient Active Problem List   Diagnosis     Absence of menstruation     Esophageal reflux     Recurring abdominal pain     Hyperlipidemia LDL goal <130     Hypertension goal BP (blood pressure) < 140/90     Advance Care Planning     Small bowel obstruction (H)     DVT prophylaxis     Cervical adenopathy     Intestinal adhesions     Gastroesophageal reflux disease with esophagitis     Cluster headache, not intractable, unspecified chronicity pattern     Morbid obesity due to excess  calories (H)     Past Medical History:   Diagnosis Date     Unspecified essential hypertension      Teledermatology Nurse Call Patients:     Are you in the [facility] at the time of the encounter? yes    Today's visit will be billed to you and your insurance.    A teledermatology visit is not as thorough as an in-person visit and the quality of the photograph sent may not be of the same quality as that taken by the dermatology clinic.  CC Referred Self, MD  No address on file on close of this encounter.    Nayeli Zafar on 9/9/2021 at 1:43 PM        Again, thank you for allowing me to participate in the care of your patient.        Sincerely,        Alyssa Ann MD

## 2021-09-09 NOTE — TELEPHONE ENCOUNTER
Fluocinonide 0.05% cream not covered by insurance.  Covered alternatives are: mometasone, triamcinolone, betamethasone diproprionate, augmented betamethasone and fluocinolone.    Edita Urena RN

## 2021-09-10 ENCOUNTER — TELEPHONE (OUTPATIENT)
Dept: DERMATOLOGY | Facility: CLINIC | Age: 71
End: 2021-09-10

## 2021-09-10 RX ORDER — MOMETASONE FUROATE 1 MG/G
CREAM TOPICAL
Qty: 50 G | Refills: 1 | Status: SHIPPED | OUTPATIENT
Start: 2021-09-10 | End: 2024-01-11

## 2021-09-10 NOTE — TELEPHONE ENCOUNTER
Called patient and spoke to.  Patient is due for a return in person visit with Dr Seth cortez around 1/9/21.  Patient declined to schedule and states she will be transferring her care to [facility].        Deisi Granados  Surgical Specialties Procedure   [employer] [place]  9/10/2021 11:27 AM

## 2021-09-14 DIAGNOSIS — G44.009 CLUSTER HEADACHE, NOT INTRACTABLE, UNSPECIFIED CHRONICITY PATTERN: Primary | ICD-10-CM

## 2021-09-14 RX ORDER — SUMATRIPTAN 25 MG/1
25 TABLET, FILM COATED ORAL
Qty: 4 TABLET | Refills: 1 | Status: SHIPPED | OUTPATIENT
Start: 2021-09-14 | End: 2024-01-11

## 2021-09-14 NOTE — TELEPHONE ENCOUNTER
Patient is calling and is requesting a refill on sumatriptan that she had discontinued on 4/1/2021 due to decrease in headaches.  She stated she has had some increase in headaches and with the severity she would like to request the prescription she was using before for this concern.  Patient is requesting a call to know if this is refilled.  She stated she is feeling a migraine approach and only has one pill left from previous written prescription.    Will forward to PCP for review.    Bharti Duckworth RN

## 2021-10-20 ENCOUNTER — OFFICE VISIT (OUTPATIENT)
Dept: INTERNAL MEDICINE | Facility: CLINIC | Age: 71
End: 2021-10-20
Payer: COMMERCIAL

## 2021-10-20 VITALS
DIASTOLIC BLOOD PRESSURE: 88 MMHG | TEMPERATURE: 96.9 F | RESPIRATION RATE: 18 BRPM | BODY MASS INDEX: 40.53 KG/M2 | SYSTOLIC BLOOD PRESSURE: 126 MMHG | WEIGHT: 252.2 LBS | OXYGEN SATURATION: 97 % | HEIGHT: 66 IN | HEART RATE: 76 BPM

## 2021-10-20 DIAGNOSIS — E66.01 MORBID OBESITY DUE TO EXCESS CALORIES (H): ICD-10-CM

## 2021-10-20 DIAGNOSIS — Z00.00 MEDICARE ANNUAL WELLNESS VISIT, SUBSEQUENT: ICD-10-CM

## 2021-10-20 DIAGNOSIS — E78.5 HYPERLIPIDEMIA LDL GOAL <130: ICD-10-CM

## 2021-10-20 DIAGNOSIS — M19.90 ARTHRITIS: ICD-10-CM

## 2021-10-20 DIAGNOSIS — I10 BENIGN ESSENTIAL HYPERTENSION: ICD-10-CM

## 2021-10-20 DIAGNOSIS — G43.009 MIGRAINE WITHOUT AURA AND WITHOUT STATUS MIGRAINOSUS, NOT INTRACTABLE: Primary | ICD-10-CM

## 2021-10-20 DIAGNOSIS — Z12.31 ENCOUNTER FOR SCREENING MAMMOGRAM FOR BREAST CANCER: ICD-10-CM

## 2021-10-20 LAB
ALBUMIN SERPL-MCNC: 3.4 G/DL (ref 3.4–5)
ALBUMIN UR-MCNC: NEGATIVE MG/DL
ALP SERPL-CCNC: 63 U/L (ref 40–150)
ALT SERPL W P-5'-P-CCNC: 21 U/L (ref 0–50)
ANION GAP SERPL CALCULATED.3IONS-SCNC: 1 MMOL/L (ref 3–14)
APPEARANCE UR: CLEAR
AST SERPL W P-5'-P-CCNC: 8 U/L (ref 0–45)
BASOPHILS # BLD AUTO: 0.1 10E3/UL (ref 0–0.2)
BASOPHILS NFR BLD AUTO: 1 %
BILIRUB DIRECT SERPL-MCNC: 0.1 MG/DL (ref 0–0.2)
BILIRUB SERPL-MCNC: 0.4 MG/DL (ref 0.2–1.3)
BILIRUB UR QL STRIP: NEGATIVE
BUN SERPL-MCNC: 12 MG/DL (ref 7–30)
CALCIUM SERPL-MCNC: 8.6 MG/DL (ref 8.5–10.1)
CHLORIDE BLD-SCNC: 101 MMOL/L (ref 94–109)
CHOLEST SERPL-MCNC: 255 MG/DL
CO2 SERPL-SCNC: 33 MMOL/L (ref 20–32)
COLOR UR AUTO: YELLOW
CREAT SERPL-MCNC: 0.71 MG/DL (ref 0.52–1.04)
EOSINOPHIL # BLD AUTO: 0.3 10E3/UL (ref 0–0.7)
EOSINOPHIL NFR BLD AUTO: 4 %
ERYTHROCYTE [DISTWIDTH] IN BLOOD BY AUTOMATED COUNT: 12.9 % (ref 10–15)
FASTING STATUS PATIENT QL REPORTED: YES
GFR SERPL CREATININE-BSD FRML MDRD: 87 ML/MIN/1.73M2
GLUCOSE BLD-MCNC: 105 MG/DL (ref 70–99)
GLUCOSE UR STRIP-MCNC: NEGATIVE MG/DL
HCT VFR BLD AUTO: 40.3 % (ref 35–47)
HDLC SERPL-MCNC: 60 MG/DL
HGB BLD-MCNC: 13.7 G/DL (ref 11.7–15.7)
HGB UR QL STRIP: NEGATIVE
IMM GRANULOCYTES # BLD: 0 10E3/UL
IMM GRANULOCYTES NFR BLD: 0 %
KETONES UR STRIP-MCNC: NEGATIVE MG/DL
LDLC SERPL CALC-MCNC: 178 MG/DL
LEUKOCYTE ESTERASE UR QL STRIP: NEGATIVE
LYMPHOCYTES # BLD AUTO: 1 10E3/UL (ref 0.8–5.3)
LYMPHOCYTES NFR BLD AUTO: 12 %
MCH RBC QN AUTO: 29.1 PG (ref 26.5–33)
MCHC RBC AUTO-ENTMCNC: 34 G/DL (ref 31.5–36.5)
MCV RBC AUTO: 86 FL (ref 78–100)
MONOCYTES # BLD AUTO: 0.6 10E3/UL (ref 0–1.3)
MONOCYTES NFR BLD AUTO: 7 %
NEUTROPHILS # BLD AUTO: 6 10E3/UL (ref 1.6–8.3)
NEUTROPHILS NFR BLD AUTO: 76 %
NITRATE UR QL: NEGATIVE
NONHDLC SERPL-MCNC: 195 MG/DL
NRBC # BLD AUTO: 0 10E3/UL
NRBC BLD AUTO-RTO: 0 /100
PH UR STRIP: 7 [PH] (ref 5–7)
PLATELET # BLD AUTO: 279 10E3/UL (ref 150–450)
POTASSIUM BLD-SCNC: 3.6 MMOL/L (ref 3.4–5.3)
PROT SERPL-MCNC: 7.3 G/DL (ref 6.8–8.8)
RBC # BLD AUTO: 4.71 10E6/UL (ref 3.8–5.2)
SODIUM SERPL-SCNC: 135 MMOL/L (ref 133–144)
SP GR UR STRIP: 1.01 (ref 1–1.03)
TRIGL SERPL-MCNC: 84 MG/DL
UROBILINOGEN UR STRIP-MCNC: NORMAL MG/DL
WBC # BLD AUTO: 8 10E3/UL (ref 4–11)

## 2021-10-20 PROCEDURE — 81003 URINALYSIS AUTO W/O SCOPE: CPT | Performed by: INTERNAL MEDICINE

## 2021-10-20 PROCEDURE — 85025 COMPLETE CBC W/AUTO DIFF WBC: CPT | Performed by: INTERNAL MEDICINE

## 2021-10-20 PROCEDURE — 80061 LIPID PANEL: CPT | Performed by: INTERNAL MEDICINE

## 2021-10-20 PROCEDURE — 99397 PER PM REEVAL EST PAT 65+ YR: CPT | Performed by: INTERNAL MEDICINE

## 2021-10-20 PROCEDURE — 36415 COLL VENOUS BLD VENIPUNCTURE: CPT | Performed by: INTERNAL MEDICINE

## 2021-10-20 PROCEDURE — 82248 BILIRUBIN DIRECT: CPT | Performed by: INTERNAL MEDICINE

## 2021-10-20 PROCEDURE — 80053 COMPREHEN METABOLIC PANEL: CPT | Performed by: INTERNAL MEDICINE

## 2021-10-20 RX ORDER — IBUPROFEN 600 MG/1
600 TABLET, FILM COATED ORAL EVERY 8 HOURS PRN
Qty: 60 TABLET | Refills: 0 | Status: SHIPPED | OUTPATIENT
Start: 2021-10-20 | End: 2022-04-01

## 2021-10-20 ASSESSMENT — ACTIVITIES OF DAILY LIVING (ADL): CURRENT_FUNCTION: NO ASSISTANCE NEEDED

## 2021-10-20 ASSESSMENT — MIFFLIN-ST. JEOR: SCORE: 1682.55

## 2021-10-20 ASSESSMENT — PAIN SCALES - GENERAL: PAINLEVEL: NO PAIN (0)

## 2021-10-20 NOTE — PROGRESS NOTES
"SUBJECTIVE:   Komal Miller is a 71 year old female who presents for Preventive Visit.    Patient has been advised of split billing requirements and indicates understanding: Yes   Are you in the first 12 months of your Medicare coverage?  No    Healthy Habits:     In general, how would you rate your overall health?  Fair    Frequency of exercise:  None    Do you usually eat at least 4 servings of fruit and vegetables a day, include whole grains    & fiber and avoid regularly eating high fat or \"junk\" foods?  No    Taking medications regularly:  Yes    Medication side effects:  None    Ability to successfully perform activities of daily living:  No assistance needed    Home Safety:  No safety concerns identified    Hearing Impairment:  No hearing concerns    In the past 6 months, have you been bothered by leaking of urine?  No    In general, how would you rate your overall mental or emotional health?  Excellent      PHQ-2 Total Score: 0    Additional concerns today:  No    Do you feel safe in your environment? Yes    Have you ever done Advance Care Planning? (For example, a Health Directive, POLST, or a discussion with a medical provider or your loved ones about your wishes): Yes, advance care planning is on file.       Fall risk  Fallen 2 or more times in the past year?: No  Any fall with injury in the past year?: No  click delete button to remove this line now  Cognitive Screening   1) Repeat 3 items (Leader, Season, Table)    2) Clock draw: NORMAL  3) 3 item recall: Recalls 3 objects  Results: 3 items recalled: COGNITIVE IMPAIRMENT LESS LIKELY    Mini-CogTM Copyright ALYCE Telles. Licensed by the author for use in [facility]; reprinted with permission ([email or web address]@.[facility]). All rights reserved.          Reviewed and updated as needed this visit by clinical staff  Tobacco  Allergies  Meds   Med Hx  Surg Hx  Fam Hx  Soc Hx        Reviewed and updated as needed this visit by Provider                Social " History     Tobacco Use     Smoking status: Former Smoker     Packs/day: 0.50     Years: 20.00     Pack years: 10.00     Types: Cigarettes     Quit date: 1995     Years since quittin.8     Smokeless tobacco: Never Used   Substance Use Topics     Alcohol use: Yes     Comment: very seldom     If you drink alcohol do you typically have >3 drinks per day or >7 drinks per week? No    Alcohol Use 10/20/2021   Prescreen: >3 drinks/day or >7 drinks/week? No   Prescreen: >3 drinks/day or >7 drinks/week? -   No flowsheet data found.        -------------------------------------    Current providers sharing in care for this patient include:   Patient Care Team:  Wesly Lehman DO as PCP - General (Internal Medicine)  Freda Bang MD as Assigned Musculoskeletal Provider  Wesly Lehman DO as Assigned PCP  Alyssa Ann MD as Assigned Surgical Provider    The following health maintenance items are reviewed in Epic and correct as of today:  Health Maintenance Due   Topic Date Due     URINE DRUG SCREEN  Never done     ANNUAL REVIEW OF HM ORDERS  Never done     COVID-19 Vaccine (1) Never done     HEPATITIS C SCREENING  Never done     DTAP/TDAP/TD IMMUNIZATION (1 - Tdap) Never done     COLORECTAL CANCER SCREENING  2020     MEDICARE ANNUAL WELLNESS VISIT  2020     LIPID  2020     Pneumococcal Vaccine: 65+ Years (2 of 2 - PPSV23) 2020     FALL RISK ASSESSMENT  2021     INFLUENZA VACCINE (1) 2021     ZOSTER IMMUNIZATION (3 of 3) 2021     Lab work is in process  BP Readings from Last 3 Encounters:   10/20/21 126/88   21 132/84   20 132/80    Wt Readings from Last 3 Encounters:   10/20/21 114.4 kg (252 lb 3.2 oz)   21 114.6 kg (252 lb 11.2 oz)   20 110.7 kg (244 lb)                  Patient Active Problem List   Diagnosis     Absence of menstruation     Esophageal reflux     Recurring abdominal pain     Hyperlipidemia LDL  goal <130     Hypertension goal BP (blood pressure) < 140/90     Advance Care Planning     Small bowel obstruction (H)     DVT prophylaxis     Cervical adenopathy     Intestinal adhesions     Gastroesophageal reflux disease with esophagitis     Cluster headache, not intractable, unspecified chronicity pattern     Morbid obesity due to excess calories (H)     Past Surgical History:   Procedure Laterality Date     C APPENDECTOMY       C LIGATE FALLOPIAN TUBE       C TOTAL ABDOM HYSTERECTOMY      Hysterectomy, Total Abdominal     C TOTAL KNEE ARTHROPLASTY Left      COLONOSCOPY  06/10/08    Internal hemorrhoids, otherwise normal.     COLONOSCOPY  01/20/10     HC DILATION/CURETTAGE DIAG/THER NON OB      D & C     HC LAP, SURG ENTEROLYSIS  05/07/10     HC REMOVAL OF OVARY/TUBE(S)      Salpingo-Oophorectomy, bilateral     LAPAROSCOPY DIAGNOSTIC (GENERAL)  2014    Procedure: LAPAROSCOPY DIAGNOSTIC (GENERAL);  Surgeon: Seth Matthews MD;  Location: [facility] OR     LAPAROTOMY, LYSIS ADHESIONS, COMBINED  2014    Procedure: COMBINED LAPAROTOMY, LYSIS ADHESIONS;  Surgeon: Seth Matthews MD;  Location: [facility] OR     VIDEO CAPSULE ENDOSCOPY  02/15/10    normal sm intestine     ZZHC UGI ENDOSCOPY, SIMPLE EXAM  01/20/10       Social History     Tobacco Use     Smoking status: Former Smoker     Packs/day: 0.50     Years: 20.00     Pack years: 10.00     Types: Cigarettes     Quit date: 1995     Years since quittin.8     Smokeless tobacco: Never Used   Substance Use Topics     Alcohol use: Yes     Comment: very seldom     Family History   Problem Relation Age of Onset     C.A.D. Mother         bi-pass     Diabetes Mother         late onset     Allergies Mother         xray dye     Arthritis Mother      Cancer Mother         ovary     Respiratory Mother      C.A.D. Father         bi-pass     Hypertension Brother      Breast Cancer Sister      Allergies Sister      Allergies Brother      Eye Disorder Maternal Aunt       Gastrointestinal Disease Brother      Gynecology Sister      Thyroid Disease Sister          Current Outpatient Medications   Medication Sig Dispense Refill     fish oil-omega-3 fatty acids 1000 MG capsule Take 2 g by mouth daily       GARLIC PO        ibuprofen (ADVIL/MOTRIN) 600 MG tablet Take 1 tablet (600 mg) by mouth every 8 hours as needed for moderate pain 60 tablet 0     lisinopril-hydrochlorothiazide (ZESTORETIC) 20-25 MG tablet TAKE 1 TABLET EVERY DAY 90 tablet 1     mometasone (ELOCON) 0.1 % external cream Apply twice daily for 2 weeks. Not face, take 1-2 weeks off 50 g 1     omeprazole (PRILOSEC) 40 MG DR capsule TAKE 1 CAPSULE EVERY DAY (TAKE 30 TO 60 MINUTES BEFORE A MEAL) 90 capsule 1     propranolol (INDERAL) 40 MG tablet TAKE 1 TABLET TWICE DAILY 180 tablet 1     SUMAtriptan (IMITREX) 25 MG tablet Take 1 tablet (25 mg) by mouth at onset of headache for migraine May repeat in 2 hours. Max 8 tablets/24 hours. 4 tablet 1     Allergies   Allergen Reactions     No Known Drug Allergies      Mammogram Screening: Mammogram Screening: Recommended mammography every 1-2 years with patient discussion and risk factor consideration  Last 3 Pap and HPV Results:      FHS-7: No flowsheet data found.    .  Pertinent mammograms are reviewed under the imaging tab.    Review of Systems  CONSTITUTIONAL: NEGATIVE for fever, chills, change in weight  INTEGUMENTARY/SKIN: NEGATIVE for worrisome rashes, moles or lesions  EYES: NEGATIVE for vision changes or irritation  ENT/MOUTH: NEGATIVE for ear, mouth and throat problems  RESP: NEGATIVE for significant cough or SOB  BREAST: NEGATIVE for masses, tenderness or discharge  CV: NEGATIVE for chest pain, palpitations or peripheral edema  GI: NEGATIVE for nausea, abdominal pain, heartburn, or change in bowel habits  : NEGATIVE for frequency, dysuria, or hematuria  MUSCULOSKELETAL: Patient planes of mild arthritis in the hands.  She does take some ibuprofen just on rare occasion  "it works quite well.  NEURO: NEGATIVE for weakness, dizziness or paresthesias  ENDOCRINE: NEGATIVE for temperature intolerance, skin/hair changes  HEME: NEGATIVE for bleeding problems  PSYCHIATRIC: NEGATIVE for changes in mood or affect    OBJECTIVE:   /88 (BP Location: Right arm, Patient Position: Sitting, Cuff Size: Adult Large)   Pulse 76   Temp 96.9  F (36.1  C) (Temporal)   Resp 18   Ht 1.671 m (5' 5.8\")   Wt 114.4 kg (252 lb 3.2 oz)   SpO2 97%   BMI 40.95 kg/m   Estimated body mass index is 40.95 kg/m  as calculated from the following:    Height as of this encounter: 1.671 m (5' 5.8\").    Weight as of this encounter: 114.4 kg (252 lb 3.2 oz).  Physical Exam  GENERAL APPEARANCE: healthy, alert and no distress  EYES: Eyes grossly normal to inspection, PERRL and conjunctivae and sclerae normal  HENT: ear canals and TM's normal, nose and mouth without ulcers or lesions, oropharynx clear and oral mucous membranes moist  NECK: no adenopathy, no asymmetry, masses, or scars and thyroid normal to palpation  RESP: lungs clear to auscultation - no rales, rhonchi or wheezes  BREAST: normal without masses, tenderness or nipple discharge and no palpable axillary masses or adenopathy  CV: regular rate and rhythm, normal S1 S2, no S3 or S4, no murmur, click or rub, no peripheral edema and peripheral pulses strong  ABDOMEN: soft, nontender, no hepatosplenomegaly, no masses and bowel sounds normal.  Abdomen is obese.  MS: no musculoskeletal defects are noted and gait is age appropriate without ataxia  SKIN: no suspicious lesions or rashes  NEURO: Normal strength and tone, sensory exam grossly normal, mentation intact and speech normal  PSYCH: mentation appears normal and affect normal/bright    Diagnostic Test Results:  No results found for this or any previous visit (from the past 24 hour(s)).    ASSESSMENT / PLAN:     1. Medicare annual wellness visit, subsequent    2. Migraine without aura and without status " "migrainosus, not intractable    3. Arthritis  - ibuprofen (ADVIL/MOTRIN) 600 MG tablet; Take 1 tablet (600 mg) by mouth every 8 hours as needed for moderate pain  Dispense: 60 tablet; Refill: 0    4. Benign essential hypertension  - CBC with platelets and differential; Future  - Basic metabolic panel  (Ca, Cl, CO2, Creat, Gluc, K, Na, BUN); Future  - UA Macro with Reflex to Micro and Culture - lab collect; Future  - Hepatic panel (Albumin, ALT, AST, Bili, Alk Phos, TP); Future    5. Hyperlipidemia LDL goal <130  - Lipid panel reflex to direct LDL Fasting; Future    6. Encounter for screening mammogram for breast cancer  - *MA Screening Digital Bilateral; Future    7. Morbid obesity due to excess calories (H)  Recommend caloric restriction and exercise as tolerated    Patient has been advised of split billing requirements and indicates understanding: Yes  COUNSELING:  Reviewed preventive health counseling, as reflected in patient instructions       Regular exercise       Healthy diet/nutrition       Vision screening       Hearing screening       Dental care    Estimated body mass index is 40.95 kg/m  as calculated from the following:    Height as of this encounter: 1.671 m (5' 5.8\").    Weight as of this encounter: 114.4 kg (252 lb 3.2 oz).    Weight management plan: Discussed healthy diet and exercise guidelines    She reports that she quit smoking about 26 years ago. Her smoking use included cigarettes. She has a 10.00 pack-year smoking history. She has never used smokeless tobacco.      Appropriate preventive services were discussed with this patient, including applicable screening as appropriate for cardiovascular disease, diabetes, osteopenia/osteoporosis, and glaucoma.  As appropriate for age/gender, discussed screening for colorectal cancer, prostate cancer, breast cancer, and cervical cancer. Checklist reviewing preventive services available has been given to the patient.    Reviewed patients plan of care and " provided an AVS. The Basic Care Plan (routine screening as documented in Health Maintenance) for Komal meets the Care Plan requirement. This Care Plan has been established and reviewed with the Patient.    Counseling Resources:  ATP IV Guidelines  Pooled Cohorts Equation Calculator  Breast Cancer Risk Calculator  Breast Cancer: Medication to Reduce Risk  FRAX Risk Assessment  ICSI Preventive Guidelines  Dietary Guidelines for Americans, 2010  USDA's MyPlate  ASA Prophylaxis  Lung CA Screening    Wesly Lehman DO  [facility]    Identified Health Risks:

## 2021-11-08 ENCOUNTER — TELEPHONE (OUTPATIENT)
Dept: INTERNAL MEDICINE | Facility: CLINIC | Age: 71
End: 2021-11-08
Payer: COMMERCIAL

## 2021-11-08 DIAGNOSIS — E78.5 HYPERLIPIDEMIA LDL GOAL <130: Primary | ICD-10-CM

## 2021-11-08 NOTE — TELEPHONE ENCOUNTER
If the patient would like to have a discussion regarding her medications and indications for those medications, I would be happy to set up a virtual visit.    Please help her set up a virtual visit for the time of her convenience and my availability.      Fallon

## 2021-11-08 NOTE — TELEPHONE ENCOUNTER
Reason for Call:  Other call back and prescription    Detailed comments: Patient called back and she is ok with starting Lipitor depending on the side effects. She would like to talk to Dr. Lehman on what they are because she states a long time ago she was on a cholesterol lowering medication and the side effects were terrible and she had to quit taking it.     Phone Number Patient can be reached at: Home number on file [phone] (home) or Cell number on file:    Telephone Information:   Mobile [phone]       Best Time: Any    Can we leave a detailed message on this number? YES    Call taken on 11/8/2021 at 8:27 AM by Sofie Nunez

## 2021-11-08 NOTE — TELEPHONE ENCOUNTER
Patients states to just go ahead and start her on it and send it through [facility].    Vero Flores MA 11/8/2021

## 2021-11-18 RX ORDER — ATORVASTATIN CALCIUM 20 MG/1
20 TABLET, FILM COATED ORAL DAILY
Qty: 30 TABLET | Refills: 0 | Status: SHIPPED | OUTPATIENT
Start: 2021-11-18 | End: 2021-12-10

## 2021-11-18 NOTE — TELEPHONE ENCOUNTER
As it appears the patient has decided to take the statin, I have sent it to her pharmacy.    Fallon

## 2021-11-18 NOTE — TELEPHONE ENCOUNTER
Patient called again and is just wondering if Dr. Lehman still wants her to take lipitor or not. Nothing has been sent to her [facility] pharmacy so she is just wondering what the plan is.

## 2021-11-24 DIAGNOSIS — I10 HYPERTENSION GOAL BP (BLOOD PRESSURE) < 140/90: ICD-10-CM

## 2021-11-26 RX ORDER — LISINOPRIL AND HYDROCHLOROTHIAZIDE 20; 25 MG/1; MG/1
TABLET ORAL
Qty: 90 TABLET | Refills: 1 | Status: SHIPPED | OUTPATIENT
Start: 2021-11-26 | End: 2022-04-26

## 2021-11-26 NOTE — TELEPHONE ENCOUNTER
Zestoretic  Routing refill request to provider for review/approval because:  A break in medication    Bharti Duckworth RN

## 2021-11-29 DIAGNOSIS — E78.5 HYPERLIPIDEMIA LDL GOAL <130: ICD-10-CM

## 2021-11-29 RX ORDER — ATORVASTATIN CALCIUM 20 MG/1
20 TABLET, FILM COATED ORAL DAILY
Qty: 30 TABLET | Refills: 0 | Status: CANCELLED | OUTPATIENT
Start: 2021-11-29

## 2021-12-08 DIAGNOSIS — E78.5 HYPERLIPIDEMIA LDL GOAL <130: ICD-10-CM

## 2021-12-10 RX ORDER — ATORVASTATIN CALCIUM 20 MG/1
TABLET, FILM COATED ORAL
Qty: 30 TABLET | Refills: 0 | Status: SHIPPED | OUTPATIENT
Start: 2021-12-10 | End: 2021-12-21

## 2021-12-10 NOTE — TELEPHONE ENCOUNTER
Routing refill request to provider for review/approval because:    Pt given sohail refill on 11/18/2021  No follow up appt has been made.   Routing to provider.     Mary Zamora, RN, BSN

## 2021-12-16 ENCOUNTER — TELEPHONE (OUTPATIENT)
Dept: INTERNAL MEDICINE | Facility: CLINIC | Age: 71
End: 2021-12-16
Payer: COMMERCIAL

## 2021-12-16 NOTE — TELEPHONE ENCOUNTER
Tried calling and number just rang unable to leave a message. If patient calls back please schedule per Dr. Lehman's note below.     Danita Ordonez MA 12/16/2021  2:15 PM

## 2021-12-16 NOTE — TELEPHONE ENCOUNTER
Reason for Call:  Other appointment    Detailed comments: Looking for an appointment with her PCP from a follow up fo a her hospital discharge for stroke symptoms, is not able to be seen until Abigail, looking to be seen within the next week    Phone Number Patient can be reached at: Home number on file [phone] (home)    Best Time: Anytime    Can we leave a detailed message on this number? YES    Call taken on 12/16/2021 at 12:17 PM by Sloane Bermudez

## 2021-12-21 ENCOUNTER — OFFICE VISIT (OUTPATIENT)
Dept: INTERNAL MEDICINE | Facility: CLINIC | Age: 71
End: 2021-12-21
Payer: COMMERCIAL

## 2021-12-21 VITALS
HEART RATE: 70 BPM | TEMPERATURE: 97.5 F | SYSTOLIC BLOOD PRESSURE: 144 MMHG | DIASTOLIC BLOOD PRESSURE: 96 MMHG | RESPIRATION RATE: 18 BRPM | OXYGEN SATURATION: 96 % | BODY MASS INDEX: 41.33 KG/M2 | WEIGHT: 254.5 LBS

## 2021-12-21 DIAGNOSIS — G45.9 TIA (TRANSIENT ISCHEMIC ATTACK): ICD-10-CM

## 2021-12-21 DIAGNOSIS — I10 HYPERTENSION GOAL BP (BLOOD PRESSURE) < 140/90: ICD-10-CM

## 2021-12-21 DIAGNOSIS — E66.01 MORBID OBESITY DUE TO EXCESS CALORIES (H): ICD-10-CM

## 2021-12-21 DIAGNOSIS — E78.5 HYPERLIPIDEMIA LDL GOAL <130: Primary | ICD-10-CM

## 2021-12-21 PROCEDURE — 99495 TRANSJ CARE MGMT MOD F2F 14D: CPT | Performed by: INTERNAL MEDICINE

## 2021-12-21 RX ORDER — ATORVASTATIN CALCIUM 40 MG/1
40 TABLET, FILM COATED ORAL DAILY
Qty: 90 TABLET | Refills: 3 | Status: SHIPPED | OUTPATIENT
Start: 2021-12-21 | End: 2022-12-13

## 2021-12-21 RX ORDER — ANTIOX #8/OM3/DHA/EPA/LUT/ZEAX 250-2.5 MG
1 CAPSULE ORAL
COMMUNITY
End: 2021-12-21

## 2021-12-21 RX ORDER — ASPIRIN 325 MG
325 TABLET ORAL DAILY
COMMUNITY
Start: 2021-12-17 | End: 2022-04-01

## 2021-12-21 RX ORDER — ATORVASTATIN CALCIUM 40 MG/1
40 TABLET, FILM COATED ORAL DAILY
COMMUNITY
Start: 2021-12-16 | End: 2021-12-21

## 2021-12-21 RX ORDER — CLOPIDOGREL BISULFATE 75 MG/1
75 TABLET ORAL DAILY
COMMUNITY
Start: 2021-12-16 | End: 2022-04-01 | Stop reason: ALTCHOICE

## 2021-12-21 ASSESSMENT — PAIN SCALES - GENERAL: PAINLEVEL: NO PAIN (0)

## 2021-12-21 NOTE — PROGRESS NOTES
Assessment & Plan     TIA (transient ischemic attack)  - Echocardiogram Complete; Future    Hypertension goal BP (blood pressure) < 140/90    Hyperlipidemia LDL goal <130  - atorvastatin (LIPITOR) 40 MG tablet; Take 1 tablet (40 mg) by mouth daily    Morbid obesity due to excess calories (H)      Review of prior external note(s) from - CareEverywhere information from [facility] reviewed                                  FOLLOW UP   I have asked the patient to make an appointment for followup with me in 1 month      Wesly Lehman DO  [facility] GENA REYNA is a 71 year old who presents for the following health issues     HPI     TIA (transient ischemic attack)  Primary hypertension  Hyperlipidemia  Prediabetes  CVA (cerebral vascular accident) (HCC)        Hospital Follow-up Visit:    Hospital/Nursing Home/ Rehab Facility: [facility]  Date of Admission: 12/15/21  Date of Discharge: 12/16/21  Reason(s) for Admission: TIA      Was your hospitalization related to COVID-19? No   Problems taking medications regularly:  None  Medication changes since discharge: None  Problems adhering to non-medication therapy:  None    Summary of hospitalization:  CareEverywhere information obtained and reviewed  Diagnostic Tests/Treatments reviewed.  Follow up needed: none  Other Healthcare Providers Involved in Patient s Care:         None  Update since discharge: improved. Post Discharge Medication Reconciliation: discharge medications reconciled, continue medications without change.  Plan of care communicated with patient and family              Review of Systems   CONSTITUTIONAL: NEGATIVE for fever, chills, change in weight  INTEGUMENTARY/SKIN: NEGATIVE for worrisome rashes, moles or lesions  EYES: NEGATIVE for vision changes or irritation  ENT/MOUTH: NEGATIVE for ear, mouth and throat problems  RESP: NEGATIVE for significant cough or SOB  BREAST: NEGATIVE for masses, tenderness or  discharge  CV: NEGATIVE for chest pain, palpitations or peripheral edema  GI: NEGATIVE for nausea, abdominal pain, heartburn, or change in bowel habits  : NEGATIVE for frequency, dysuria, or hematuria  MUSCULOSKELETAL: NEGATIVE for significant arthralgias or myalgia  NEURO: NEGATIVE for weakness, dizziness or paresthesias  ENDOCRINE: NEGATIVE for temperature intolerance, skin/hair changes  HEME: NEGATIVE for bleeding problems  PSYCHIATRIC: NEGATIVE for changes in mood or affect      Objective    BP (!) 144/96 (BP Location: Right arm, Patient Position: Sitting, Cuff Size: Adult Large)   Pulse 70   Temp 97.5  F (36.4  C) (Temporal)   Resp 18   Wt 115.4 kg (254 lb 8 oz)   SpO2 96%   BMI 41.33 kg/m    Body mass index is 41.33 kg/m .  Physical Exam   GENERAL: healthy, alert and no distress  EYES: Eyes grossly normal to inspection, PERRL and conjunctivae and sclerae normal  HENT: ear canals and TM's normal, nose and mouth without ulcers or lesions  NECK: no adenopathy, no asymmetry, masses, or scars and thyroid normal to palpation  RESP: lungs clear to auscultation - no rales, rhonchi or wheezes  BREAST: normal without masses, tenderness or nipple discharge and no palpable axillary masses or adenopathy  CV: regular rate and rhythm, normal S1 S2, no S3 or S4, no murmur, click or rub, no peripheral edema and peripheral pulses strong  ABDOMEN: soft, nontender, no hepatosplenomegaly, no masses and bowel sounds normal  MS: no gross musculoskeletal defects noted, no edema  SKIN: no suspicious lesions or rashes  NEURO: Normal strength and tone, mentation intact and speech normal  PSYCH: mentation appears normal, affect normal/bright    Lab and x-ray results from the hospitalization are discussed with the patient and family.

## 2021-12-28 ENCOUNTER — HOSPITAL ENCOUNTER (OUTPATIENT)
Dept: CARDIOLOGY | Facility: CLINIC | Age: 71
Discharge: HOME | End: 2021-12-28
Attending: INTERNAL MEDICINE | Admitting: INTERNAL MEDICINE
Payer: MEDICARE

## 2021-12-28 DIAGNOSIS — G45.9 TIA (TRANSIENT ISCHEMIC ATTACK): ICD-10-CM

## 2021-12-28 PROCEDURE — 93306 TTE W/DOPPLER COMPLETE: CPT | Mod: 26 | Performed by: INTERNAL MEDICINE

## 2021-12-28 PROCEDURE — 93306 TTE W/DOPPLER COMPLETE: CPT

## 2021-12-30 ENCOUNTER — TELEPHONE (OUTPATIENT)
Dept: INTERNAL MEDICINE | Facility: CLINIC | Age: 71
End: 2021-12-30
Payer: COMMERCIAL

## 2021-12-30 NOTE — TELEPHONE ENCOUNTER
Reason for Call:  Other call back    Detailed comments: Emily called wanting to know her results from her Echo. She said she was told someone would call her that day or the next and she has not heard anything yet.     Phone Number Patient can be reached at: Home number on file [phone] (home)    Best Time: Any    Can we leave a detailed message on this number? YES    Call taken on 12/30/2021 at 10:31 AM by Sofie Nunez

## 2021-12-30 NOTE — TELEPHONE ENCOUNTER
----- Message from Wesly Lehman, DO sent at 12/30/2021  2:07 PM CST -----  Please call patient.          Dear Komal, your recent test results are attached.    Echocardiogram is normal.    You will be contacted with any outstanding results as they are available.  Feel free to contact me via the office or My Chart if you have any questions regarding the above.

## 2022-01-06 DIAGNOSIS — E78.5 HYPERLIPIDEMIA LDL GOAL <130: ICD-10-CM

## 2022-01-10 RX ORDER — ATORVASTATIN CALCIUM 20 MG/1
TABLET, FILM COATED ORAL
Qty: 90 TABLET | Refills: 2 | Status: SHIPPED | OUTPATIENT
Start: 2022-01-10 | End: 2022-04-01 | Stop reason: DRUGHIGH

## 2022-01-10 NOTE — TELEPHONE ENCOUNTER
Lipitor  Prescription approved per [facility] Refill Protocol.    Bharti Duckworth RN

## 2022-02-02 ENCOUNTER — TELEPHONE (OUTPATIENT)
Dept: INTERNAL MEDICINE | Facility: CLINIC | Age: 72
End: 2022-02-02
Payer: COMMERCIAL

## 2022-02-02 NOTE — TELEPHONE ENCOUNTER
Patient is calling and requesting to reschedule an office visit she has set for 2/3/2022 in [place].  Patient is requesting Friday 2/4/2022 in [place] if possible.  Rescheduled patient.  Patient stated understanding.    Bharti Duckworth RN

## 2022-03-29 ENCOUNTER — TELEPHONE (OUTPATIENT)
Dept: INTERNAL MEDICINE | Facility: CLINIC | Age: 72
End: 2022-03-29
Payer: COMMERCIAL

## 2022-03-29 NOTE — TELEPHONE ENCOUNTER
Reason for Call:  Other FYI    Detailed comments: Corina,  from [facility] called stating the patient had discharged before she was able to set her up with outpatient rehab therapies and so she would like them discussed with her at her post hospital follow up appointment. If you have questions you can call Corina at [phone] ext. [phone]    Phone Number Patient can be reached at: Other phone number:  [phone]    Best Time: any    Can we leave a detailed message on this number? YES    Call taken on 3/29/2022 at 1:32 PM by Sofie Nunez

## 2022-04-01 ENCOUNTER — OFFICE VISIT (OUTPATIENT)
Dept: INTERNAL MEDICINE | Facility: CLINIC | Age: 72
End: 2022-04-01
Payer: COMMERCIAL

## 2022-04-01 VITALS
BODY MASS INDEX: 40.98 KG/M2 | HEIGHT: 66 IN | TEMPERATURE: 97.7 F | OXYGEN SATURATION: 97 % | SYSTOLIC BLOOD PRESSURE: 144 MMHG | WEIGHT: 255 LBS | RESPIRATION RATE: 16 BRPM | HEART RATE: 76 BPM | DIASTOLIC BLOOD PRESSURE: 92 MMHG

## 2022-04-01 DIAGNOSIS — I10 HYPERTENSION GOAL BP (BLOOD PRESSURE) < 140/90: ICD-10-CM

## 2022-04-01 DIAGNOSIS — E78.5 HYPERLIPIDEMIA LDL GOAL <130: ICD-10-CM

## 2022-04-01 DIAGNOSIS — Z86.73 STATUS POST CVA: Primary | ICD-10-CM

## 2022-04-01 DIAGNOSIS — E66.01 MORBID OBESITY DUE TO EXCESS CALORIES (H): ICD-10-CM

## 2022-04-01 PROCEDURE — 99495 TRANSJ CARE MGMT MOD F2F 14D: CPT | Performed by: INTERNAL MEDICINE

## 2022-04-01 RX ORDER — RIVAROXABAN 20 MG/1
20 TABLET, FILM COATED ORAL DAILY
COMMUNITY
Start: 2022-03-29 | End: 2022-10-25

## 2022-04-01 ASSESSMENT — PAIN SCALES - GENERAL: PAINLEVEL: NO PAIN (0)

## 2022-04-01 NOTE — PROGRESS NOTES
Assessment & Plan     Status post CVA    Morbid obesity due to excess calories (H)    Hypertension goal BP (blood pressure) < 140/90    Hyperlipidemia LDL goal <130      Review of prior external note(s) from - Care[facility]where information from [facility] reviewed                                  FOLLOW UP   I have asked the patient to make an appointment for followup with me in 1 month        Wesly Lehman DO  [facility] REYNA is a 71 year old who presents for the following health issues  accompanied by her Self.    [facility]       Hospital Follow-up Visit:    Hospital/Nursing Home/ Rehab Facility: [facility]  Date of Admission: 3/28/22  Date of Discharge: 3/29/22  Reason(s) for Admission: CVA      Was your hospitalization related to COVID-19? No   Problems taking medications regularly:  None  Medication changes since discharge: None  Problems adhering to non-medication therapy:  None    Summary of hospitalization:  CareEverywhere information obtained and reviewed  Diagnostic Tests/Treatments reviewed.  Follow up needed: none  Other Healthcare Providers Involved in Patient s Care:         None  Update since discharge: improved. Post Discharge Medication Reconciliation: discharge medications reconciled, continue medications without change.  Plan of care communicated with patient          How many servings of fruits and vegetables do you eat daily?  2-3    On average, how many sweetened beverages do you drink each day (Examples: soda, juice, sweet tea, etc.  Do NOT count diet or artificially sweetened beverages)?   0    How many days per week do you exercise enough to make your heart beat faster? 4    How many minutes a day do you exercise enough to make your heart beat faster? 20 - 29    How many days per week do you miss taking your medication? 0      Review of Systems   CONSTITUTIONAL: NEGATIVE for fever, chills, change in weight  INTEGUMENTARY/SKIN: NEGATIVE  "for worrisome rashes, moles or lesions  EYES: NEGATIVE for vision changes or irritation  ENT/MOUTH: NEGATIVE for ear, mouth and throat problems  RESP: NEGATIVE for significant cough or SOB  BREAST: NEGATIVE for masses, tenderness or discharge  CV: NEGATIVE for chest pain, palpitations or peripheral edema  GI: NEGATIVE for nausea, abdominal pain, heartburn, or change in bowel habits  : NEGATIVE for frequency, dysuria, or hematuria  MUSCULOSKELETAL: NEGATIVE for significant arthralgias or myalgia  NEURO: Left-sided weakness is slowly improved since her recent CVA  ENDOCRINE: NEGATIVE for temperature intolerance, skin/hair changes  HEME: NEGATIVE for bleeding problems  PSYCHIATRIC: NEGATIVE for changes in mood or affect      Objective    BP (!) 144/92   Pulse 76   Temp 97.7  F (36.5  C) (Temporal)   Resp 16   Ht 1.664 m (5' 5.5\")   Wt 115.7 kg (255 lb)   SpO2 97%   BMI 41.79 kg/m    Body mass index is 41.79 kg/m .  Physical Exam   GENERAL: healthy, alert and no distress  EYES: Eyes grossly normal to inspection, PERRL and conjunctivae and sclerae normal  HENT: ear canals and TM's normal, nose and mouth without ulcers or lesions  NECK: no adenopathy, no asymmetry, masses, or scars and thyroid normal to palpation  RESP: lungs clear to auscultation - no rales, rhonchi or wheezes  CV: regular rate and rhythm, normal S1 S2, no S3 or S4, no murmur, click or rub, no peripheral edema and peripheral pulses strong  ABDOMEN: soft, nontender, no hepatosplenomegaly, no masses and bowel sounds normal  MS: no gross musculoskeletal defects noted, no edema  SKIN: no suspicious lesions or rashes  NEURO: sensory exam grossly normal, mentation intact and mild left-sided weakness in the upper and lower extremity.  No facial droop noted.  PSYCH: mentation appears normal, affect normal/bright    Lab work and radiographs performed during her hospitalization are discussed at this time.            "

## 2022-04-23 DIAGNOSIS — I10 HYPERTENSION GOAL BP (BLOOD PRESSURE) < 140/90: ICD-10-CM

## 2022-04-26 RX ORDER — LISINOPRIL AND HYDROCHLOROTHIAZIDE 20; 25 MG/1; MG/1
TABLET ORAL
Qty: 90 TABLET | Refills: 1 | Status: SHIPPED | OUTPATIENT
Start: 2022-04-26 | End: 2022-06-28 | Stop reason: SINTOL

## 2022-04-26 NOTE — TELEPHONE ENCOUNTER
Pending Prescriptions:                       Disp   Refills    lisinopril-hydrochlorothiazide (ZESTORETIC*90 tab*1        Sig: TAKE 1 TABLET EVERY DAY    Routing refill request to provider for review/approval because:  BP failed RN protocol    Bharti Duckworth RN

## 2022-05-02 ENCOUNTER — NURSE TRIAGE (OUTPATIENT)
Dept: INTERNAL MEDICINE | Facility: CLINIC | Age: 72
End: 2022-05-02
Payer: COMMERCIAL

## 2022-05-02 NOTE — TELEPHONE ENCOUNTER
"Patient calling. She has been seen a few different times at the [facility] for strokes. Patient said she had a few medication adjustments while in the hospital.     Patient has been taking her BP at home. She feels it is too high. She said it is not too far off from her normal but just feels like it is too high.     154/87  176/100  182/90  178/94  161/69    Patient has no symptoms. She has a hospital follow up next week. She is thinking she needs an adjustment on her BP medications.     Per protocol, patient should be seen this week. She is scheduled to see PCP tomorrow morning. RN advised patient to continue to monitor her BP and call back if it goes any higher or any symptoms develop.     NURA Car, RN  [facility]      Additional Information    Negative: Sounds like a life-threatening emergency to the triager    Negative: Pregnant > 20 weeks or postpartum (< 6 weeks after delivery) and new hand or face swelling    Negative: Pregnant > 20 weeks and BP > 140/90    Negative: Systolic BP >= 160 OR Diastolic >= 100, and any cardiac or neurologic symptoms (e.g., chest pain, difficulty breathing, unsteady gait, blurred vision)    Negative: Patient sounds very sick or weak to the triager    Negative: BP Systolic BP >= 140 OR Diastolic >= 90 and postpartum (from 0 to 6 weeks after delivery)    Negative: Systolic BP >= 180 OR Diastolic >= 110, and missed most recent dose of blood pressure medication    Negative: Systolic BP >= 180 OR Diastolic >= 110    Negative: Patient wants to be seen    Negative: Ran out of BP medications    Negative: Taking BP medications and feels is having side effects (e.g., impotence, cough, dizziness)    Answer Assessment - Initial Assessment Questions  1. BLOOD PRESSURE: \"What is the blood pressure?\" \"Did you take at least two measurements 5 minutes apart?\"      *No Answer*  2. ONSET: \"When did you take your blood pressure?\"      *No Answer*  3. HOW: " "\"How did you obtain the blood pressure?\" (e.g., visiting nurse, automatic home BP monitor)      *No Answer*  4. HISTORY: \"Do you have a history of high blood pressure?\"      *No Answer*  5. MEDICATIONS: \"Are you taking any medications for blood pressure?\" \"Have you missed any doses recently?\"      *No Answer*  6. OTHER SYMPTOMS: \"Do you have any symptoms?\" (e.g., headache, chest pain, blurred vision, difficulty breathing, weakness)      *No Answer*  7. PREGNANCY: \"Is there any chance you are pregnant?\" \"When was your last menstrual period?\"      *No Answer*    Protocols used: HIGH BLOOD PRESSURE-A-OH    "

## 2022-05-03 ENCOUNTER — OFFICE VISIT (OUTPATIENT)
Dept: INTERNAL MEDICINE | Facility: CLINIC | Age: 72
End: 2022-05-03
Payer: COMMERCIAL

## 2022-05-03 VITALS
OXYGEN SATURATION: 96 % | BODY MASS INDEX: 40.97 KG/M2 | HEART RATE: 72 BPM | RESPIRATION RATE: 20 BRPM | DIASTOLIC BLOOD PRESSURE: 88 MMHG | SYSTOLIC BLOOD PRESSURE: 150 MMHG | TEMPERATURE: 97.2 F | WEIGHT: 250 LBS

## 2022-05-03 DIAGNOSIS — E87.6 HYPOKALEMIA: ICD-10-CM

## 2022-05-03 DIAGNOSIS — Z11.59 NEED FOR HEPATITIS C SCREENING TEST: ICD-10-CM

## 2022-05-03 DIAGNOSIS — E83.42 HYPOMAGNESEMIA: ICD-10-CM

## 2022-05-03 DIAGNOSIS — Z12.11 SCREEN FOR COLON CANCER: ICD-10-CM

## 2022-05-03 DIAGNOSIS — I10 HYPERTENSION GOAL BP (BLOOD PRESSURE) < 140/90: ICD-10-CM

## 2022-05-03 DIAGNOSIS — I69.359 CVA, OLD, HEMIPARESIS (H): Primary | ICD-10-CM

## 2022-05-03 PROCEDURE — 99214 OFFICE O/P EST MOD 30 MIN: CPT | Mod: 25 | Performed by: INTERNAL MEDICINE

## 2022-05-03 PROCEDURE — 90714 TD VACC NO PRESV 7 YRS+ IM: CPT | Performed by: INTERNAL MEDICINE

## 2022-05-03 PROCEDURE — 90471 IMMUNIZATION ADMIN: CPT | Performed by: INTERNAL MEDICINE

## 2022-05-03 RX ORDER — AMLODIPINE BESYLATE 5 MG/1
5 TABLET ORAL DAILY
Qty: 14 TABLET | Refills: 0 | Status: SHIPPED | OUTPATIENT
Start: 2022-05-03 | End: 2022-05-06

## 2022-05-03 ASSESSMENT — PAIN SCALES - GENERAL: PAINLEVEL: NO PAIN (0)

## 2022-05-03 NOTE — NURSING NOTE
Prior to immunization administration, verified patients identity using patient s name and date of birth. Please see Immunization Activity for additional information.     Screening Questionnaire for Adult Immunization    Are you sick today?   No   Do you have allergies to medications, food, a vaccine component or latex?   No   Have you ever had a serious reaction after receiving a vaccination?   No   Do you have a long-term health problem with heart, lung, kidney, or metabolic disease (e.g., diabetes), asthma, a blood disorder, no spleen, complement component deficiency, a cochlear implant, or a spinal fluid leak?  Are you on long-term aspirin therapy?   No   Do you have cancer, leukemia, HIV/AIDS, or any other immune system problem?   No   Do you have a parent, brother, or sister with an immune system problem?   No   In the past 3 months, have you taken medications that affect  your immune system, such as prednisone, other steroids, or anticancer drugs; drugs for the treatment of rheumatoid arthritis, Crohn s disease, or psoriasis; or have you had radiation treatments?   No   Have you had a seizure, or a brain or other nervous system problem?   No   During the past year, have you received a transfusion of blood or blood    products, or been given immune (gamma) globulin or antiviral drug?   No   For women: Are you pregnant or is there a chance you could become       pregnant during the next month?   No   Have you received any vaccinations in the past 4 weeks?   No     Immunization questionnaire answers were all negative.        Per orders of Dr. Lehman, injection of Td given by Irene Luna CMA. Patient instructed to remain in clinic for 15 minutes afterwards, and to report any adverse reaction to me immediately.       Screening performed by Irene Luna CMA on 5/3/2022 at 8:55 AM.

## 2022-05-03 NOTE — PROGRESS NOTES
Khloe REYNA is a 71 year old who presents for the following health issues     History of Present Illness       Hypertension: She presents for follow up of hypertension.  She does not check blood pressure  regularly outside of the clinic. Outside blood pressures have been over 140/90. She follows a low salt diet.     She eats 0-1 servings of fruits and vegetables daily.She consumes 0 sweetened beverage(s) daily.She exercises with enough effort to increase her heart rate 9 or less minutes per day.  She exercises with enough effort to increase her heart rate 3 or less days per week.   She is taking medications regularly.       ED/UC Followup:    Facility: [facility]  Date of visit: 4/23/22  Reason for visit: Near syncope   Current Status: improved after ER visit. BP is still high         EMR reviewed including:             Complaint, History of Chief Complaint, Corresponding Review of Systems, and Complaint Specific Physical Examination.    #1   Follow-up on hypertension  Blood pressures elevated.  Currently 150/88.  Recently seen in the emergency department elsewhere, blood pressure noted to be elevated with systolic approaching 200.  Denies headache, chest pain, neurologic symptoms.        Exam:   LUNGS: clear bilaterally, airflow is brisk, no intercostal retraction or stridor is noted. No coughing is noted during visit.   HEART:  regular without rubs, clicks, gallops, or murmurs. PMI is nondisplaced. Upstrokes are brisk. S1,S2 are heard.   NEURO: Pt is alert and appropriate. No neurologic lateralization is noted. Cranial nerves 2-12 are intact. Peripheral sensory and motor function are grossly normal.    GI: Abdomen is soft, without rebound, guarding or tenderness. Bowel sounds are appropriate. No renal bruits are heard.      #2   Recent CVA.  ER notes from March appreciated.  No residual sequelae.  Speech clear, no lateralizing findings.  Continues anticoagulation.  Recently underwent transesophageal  "echo which was \"Normal\".  No evidence of septal hole.      #3   Follow-up on hypokalemia and hypomagnesemia  Is doing better now.  Required IV supplementation  Denies muscle pain, cramps, lightheadedness, arrhythmia.        Exam:   MS: Minimal crepitance is noted in the extremities. No deformity is present. Muscle strength is appropriate and equal bilaterally. No acute joint erythema or swelling is present.        Patient has been interviewed, applicable history and applied review of systems have been performed.    Vital Signs:   BP (!) 150/88 (BP Location: Right arm, Patient Position: Sitting, Cuff Size: Adult Large)   Pulse 72   Temp 97.2  F (36.2  C) (Temporal)   Resp 20   Wt 113.4 kg (250 lb)   SpO2 96%   BMI 40.97 kg/m        Decision Making    Problem and Complexity     1. Need for hepatitis C screening test  Yep  - Hepatitis C Screen Reflex to HCV RNA Quant and Genotype; Future    2. Screen for colon cancer  Screening requested  - Adult Gastro Ref - Procedure Only; Future    3. CVA, old, hemiparesis (H)  Doing quite well without sequelae    4. Hypertension goal BP (blood pressure) < 140/90  Add amlodipine to her current beta-blocker and ACE/hydrochlorothiazide combination  - amLODIPine (NORVASC) 5 MG tablet; Take 1 tablet (5 mg) by mouth daily  Dispense: 14 tablet; Refill: 0    5. Hypokalemia  Check potassium and titrate as needed  - Basic metabolic panel  (Ca, Cl, CO2, Creat, Gluc, K, Na, BUN); Future    6. Hypomagnesemia  Check magnesium  - Magnesium; Future                                FOLLOW UP   I have asked the patient to make an appointment for followup with me in 2 weeks with blood pressure results        I have carefully explained the diagnosis and treatment options to the patient.  The patient has displayed an understanding of the above, and all subsequent questions were answered.      DO PATRICE Arellano    Portions of this note were produced using [employer]  Although every " attempt at real-time proof reading has been made, occasional grammar/syntax errors may have been missed.

## 2022-05-06 ENCOUNTER — TELEPHONE (OUTPATIENT)
Dept: INTERNAL MEDICINE | Facility: CLINIC | Age: 72
End: 2022-05-06
Payer: COMMERCIAL

## 2022-05-06 DIAGNOSIS — I10 HYPERTENSION GOAL BP (BLOOD PRESSURE) < 140/90: ICD-10-CM

## 2022-05-06 RX ORDER — AMLODIPINE BESYLATE 10 MG/1
10 TABLET ORAL DAILY
Qty: 90 TABLET | COMMUNITY
Start: 2022-05-06 | End: 2022-05-10

## 2022-05-06 NOTE — TELEPHONE ENCOUNTER
Patient calling. She said she saw Dr. Lehman for high BP and he wanted patient to call with BP readings. Amlodipine was added. She takes all medication right away in the morning. All of these readings are after medication.     Wednesday   /90  /86    Thursday  /86  /89  /86    Friday   /90    If provider wants to continue with Amlodipine, she would like a 90 day supply sent to [facility] pharmacy.     Will route to PCP to review.     RAZIA CarN, RN  [facility]

## 2022-05-06 NOTE — TELEPHONE ENCOUNTER
Recommend patient increase his amlodipine to 2 pills (10 mg) daily.    He should report response sometime next week.        [place]

## 2022-05-10 DIAGNOSIS — I10 HYPERTENSION GOAL BP (BLOOD PRESSURE) < 140/90: ICD-10-CM

## 2022-05-10 RX ORDER — AMLODIPINE BESYLATE 10 MG/1
10 TABLET ORAL DAILY
Qty: 30 TABLET | Refills: 0 | Status: SHIPPED | OUTPATIENT
Start: 2022-05-10 | End: 2022-05-17

## 2022-05-10 RX ORDER — AMLODIPINE BESYLATE 10 MG/1
10 TABLET ORAL DAILY
Qty: 90 TABLET | Refills: 0 | Status: SHIPPED | OUTPATIENT
Start: 2022-05-10 | End: 2022-06-08

## 2022-05-10 NOTE — TELEPHONE ENCOUNTER
Patient is called and informed this was sent.  Closing this encounter.  Barbara Bishop, BSN, RN

## 2022-05-10 NOTE — TELEPHONE ENCOUNTER
Saturday: 136/83am 161/86pm  : 165/88 am 148/78 pm  Monday: 143/86 130/79pm  Tue mornin/84      Has been on 10 mg of Amlodipine - started 3 days ago.    Patient states she called [facility] and they do not have the prescription of 10 mg of Amlodipine.  It looks as though this was signed as a Historical medication and not actually sent.  #90 is pended to go to [facility] and #30 is pended to go to [facility] until she gets the prescription from [facility].    Patient would like a call to let her know when this has been sent as the previous script did not get sent.  Barbara Bishop, RAZIAN, RN

## 2022-05-10 NOTE — TELEPHONE ENCOUNTER
Patient is completely out and is very concerned that she is out and may not get this before the pharmacy closes today.

## 2022-05-17 ENCOUNTER — ANCILLARY PROCEDURE (OUTPATIENT)
Dept: GENERAL RADIOLOGY | Facility: CLINIC | Age: 72
End: 2022-05-17
Attending: PHYSICIAN ASSISTANT
Payer: COMMERCIAL

## 2022-05-17 ENCOUNTER — OFFICE VISIT (OUTPATIENT)
Dept: ORTHOPEDICS | Facility: CLINIC | Age: 72
End: 2022-05-17
Payer: COMMERCIAL

## 2022-05-17 VITALS
WEIGHT: 250 LBS | DIASTOLIC BLOOD PRESSURE: 80 MMHG | BODY MASS INDEX: 40.18 KG/M2 | HEIGHT: 66 IN | SYSTOLIC BLOOD PRESSURE: 138 MMHG

## 2022-05-17 DIAGNOSIS — M25.551 RIGHT HIP PAIN: ICD-10-CM

## 2022-05-17 DIAGNOSIS — M70.61 TROCHANTERIC BURSITIS OF RIGHT HIP: Primary | ICD-10-CM

## 2022-05-17 PROCEDURE — 20610 DRAIN/INJ JOINT/BURSA W/O US: CPT | Mod: RT | Performed by: PHYSICIAN ASSISTANT

## 2022-05-17 PROCEDURE — 99203 OFFICE O/P NEW LOW 30 MIN: CPT | Mod: 25 | Performed by: PHYSICIAN ASSISTANT

## 2022-05-17 PROCEDURE — 73502 X-RAY EXAM HIP UNI 2-3 VIEWS: CPT | Mod: TC | Performed by: RADIOLOGY

## 2022-05-17 RX ORDER — TRIAMCINOLONE ACETONIDE 40 MG/ML
80 INJECTION, SUSPENSION INTRA-ARTICULAR; INTRAMUSCULAR
Status: DISCONTINUED | OUTPATIENT
Start: 2022-05-17 | End: 2023-06-15

## 2022-05-17 RX ORDER — LIDOCAINE HYDROCHLORIDE 10 MG/ML
5 INJECTION, SOLUTION INFILTRATION; PERINEURAL
Status: DISCONTINUED | OUTPATIENT
Start: 2022-05-17 | End: 2023-06-15

## 2022-05-17 RX ORDER — BUPIVACAINE HYDROCHLORIDE 5 MG/ML
3 INJECTION, SOLUTION PERINEURAL
Status: DISCONTINUED | OUTPATIENT
Start: 2022-05-17 | End: 2023-06-15

## 2022-05-17 RX ADMIN — TRIAMCINOLONE ACETONIDE 80 MG: 40 INJECTION, SUSPENSION INTRA-ARTICULAR; INTRAMUSCULAR at 17:43

## 2022-05-17 RX ADMIN — BUPIVACAINE HYDROCHLORIDE 3 ML: 5 INJECTION, SOLUTION PERINEURAL at 17:43

## 2022-05-17 RX ADMIN — LIDOCAINE HYDROCHLORIDE 5 ML: 10 INJECTION, SOLUTION INFILTRATION; PERINEURAL at 17:43

## 2022-05-17 ASSESSMENT — PAIN SCALES - GENERAL: PAINLEVEL: NO PAIN (0)

## 2022-05-17 NOTE — PROGRESS NOTES
"ORTHOPEDIC CLINIC CONSULT      Chief Complaint: Komal Miller is a 72 year old female who is being seen for   Chief Complaint   Patient presents with     Musculoskeletal Problem     Right hip pain     Consult     Referred by:  self    Here for Orthopedic consultation./second opinion    History of Present Illness:     Patient reports she has been developing right hip pain gradually over the last couple months on the lateral side of her right hip.  She states about 1 to 2 years ago she got new knees.  She states it still feels like she is on stilts.  She feels like she is walking differently because of these knee replacements.  She has tried various shoe changes.  She even tried the product \"good feet\".  She states she has better arch support with her most latest shoes that she got from [employer].  She was told by another provider that she is likely walking differently and hence the hip pain.  Patient is seeking further advice on what she should do.  Patient is now on Xarelto for recent stroke.    Patient's past medical, surgical, social and family histories reviewed.       Past Medical History:   Diagnosis Date     Unspecified essential hypertension    CVA 2022    Past Surgical History:   Procedure Laterality Date     COLONOSCOPY  06/10/08    Internal hemorrhoids, otherwise normal.     COLONOSCOPY  01/20/10     HC DILATION/CURETTAGE DIAG/THER NON OB      D & C     HC LAP, SURG ENTEROLYSIS  05/07/10     HC REMOVAL OF OVARY/TUBE(S)      Salpingo-Oophorectomy, bilateral     LAPAROSCOPY DIAGNOSTIC (GENERAL)  5/6/2014    Procedure: LAPAROSCOPY DIAGNOSTIC (GENERAL);  Surgeon: Seth Matthews MD;  Location: [facility] OR     LAPAROTOMY, LYSIS ADHESIONS, COMBINED  5/6/2014    Procedure: COMBINED LAPAROTOMY, LYSIS ADHESIONS;  Surgeon: Seth Matthews MD;  Location: [facility] OR     VIDEO CAPSULE ENDOSCOPY  02/15/10    normal sm intestine     ZZC APPENDECTOMY       ZZC LIGATE FALLOPIAN TUBE       ZZC TOTAL ABDOM HYSTERECTOMY      " Hysterectomy, Total Abdominal     ZZC TOTAL KNEE ARTHROPLASTY Left      ZZHC UGI ENDOSCOPY, SIMPLE EXAM  01/20/10   Total knee arthroplasty July 10 and 2020    Home Medications:  Prior to Admission medications    Medication Sig Start Date End Date Taking? Authorizing Provider   amLODIPine (NORVASC) 10 MG tablet Take 1 tablet (10 mg) by mouth daily 5/10/22  Yes Wesly Lehman DO   atorvastatin (LIPITOR) 40 MG tablet Take 1 tablet (40 mg) by mouth daily 21  Yes Wesly Lehman DO   lisinopril-hydrochlorothiazide (ZESTORETIC) 20-25 MG tablet TAKE 1 TABLET EVERY DAY 22  Yes Wesly Lehman DO   omeprazole (PRILOSEC) 40 MG DR capsule TAKE 1 CAPSULE EVERY DAY (TAKE 30 TO 60 MINUTES BEFORE A MEAL) 21  Yes Wesly Lehman DO   propranolol (INDERAL) 40 MG tablet TAKE 1 TABLET TWICE DAILY 21  Yes Wesly Lehman DO   XARELTO ANTICOAGULANT 20 MG TABS tablet Take 20 mg by mouth daily 3/29/22  Yes Reported, Patient   mometasone (ELOCON) 0.1 % external cream Apply twice daily for 2 weeks. Not face, take 1-2 weeks off 9/10/21   Alyssa Ann MD   SUMAtriptan (IMITREX) 25 MG tablet Take 1 tablet (25 mg) by mouth at onset of headache for migraine May repeat in 2 hours. Max 8 tablets/24 hours. 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 Mother         bi-pass     Diabetes Mother         late onset     Allergies Mother         xray dye     Arthritis Mother      Cancer Mother         ovary     Respiratory Mother      C.A.D. Father         bi-pass     Hypertension Brother      Breast Cancer Sister      Allergies Sister      Allergies Brother      Eye Disorder Maternal Aunt      Gastrointestinal Disease Brother      Gynecology Sister      Thyroid Disease Sister        REVIEW OF SYSTEMS    General: Negative for fever or fatigue    Psych:  negative anxiety or depression     Ophthalmic:  Corrective lenses?  YES    ENT:  Hearing difficulty? No    CV: Positive for recent CVA spring 2022    Endocrine:  negative diabetes     Urology:  negative kidney disease    Resp:  Normal respiratory effort, no history of pulmonary disease or asthma    Skin: negative for cuts/sores or redness    Musculoskeletal: as above    Neurologic:negative for numbness/tingling    Hematologic: negative for bleeding disorder, patient is on Xarelto      Physical Exam:    Vitals: /80   Ht 1.664 m (5' 5.5\")   Wt 113.4 kg (250 lb)   BMI 40.97 kg/m    BMI= Body mass index is 40.97 kg/m .    GENERAL APPEARANCE: Healthy, alert, no distress    SKIN:  negative for suspicious lesions or rashes    NEURO: Normal strength and tone, mentation intact and speech normal    PSYCH:   Mentation appears Normal and affect normal/bright    RESPIRATORY: negative for respiratory distress.    EYES: negative for Conjunctivitis    Cardiovascular: No vascular discoloration of lower extremities.    JOINT/EXTREMITIES:  right Hip Exam: Palpation: Tender:   right greater trochanter  Range of Motion:  Full and equal ROM, both hips; patient flexion is approximately 110 degrees, abduction is approximately 45 degrees; internal rotation approximately 20 degrees.  Patient does have some discomfort at the lateral side of her hip " with flexion and external rotation.  Abduction is also mildly uncomfortable  Strength: Patient demonstrates good strength in flexion as well as abduction.  She does express some discomfort with abduction  Special tests:  negative Thiago's, no IT band tightness    GAIT: Mildly antalgic    Radiographs:   Recent Results (from the past 744 hour(s))   XR Pelvis and Hip Right 2 Views    Narrative    PELVIS AND HIP RIGHT TWO VIEWS   5/17/2022 2:28 PM     HISTORY: Right hip pain.    COMPARISON: None.      Impression    IMPRESSION: Mild narrowing and osteophytosis in the right hip  consistent with osteoarthrosis. There is no evidence of fracture or  osteonecrosis. Mild degenerative changes in the left hip and both SI  joints are also present.    MARTIN ANDRADE MD         SYSTEM ID:  [id number]     Right hip x-ray findings: Some narrowing seen on abducted views between the acetabulum and the femoral head.  There is no reshaping of the femoral head.  There are no significant osteophytes.  Independent visualization of the films was made.     Impression:      ICD-10-CM    1. Trochanteric bursitis of right hip  M70.61 XR Pelvis and Hip Right 2 Views     Patient with trochanteric bursitis of right hip.  No significant IT band tightness is present.    Plan:  All of the above pertinent physical exam and imaging modalities findings was reviewed with Komal.  Patient is advised of the above diagnosis.  Patient is unable to take anti-inflammatories due to Xarelto use.  Tylenol has been ineffective as well as changing out shoewear.  Patient is given information sheet regarding trochanteric bursitis  Patient is given exercises to perform to help her with current trochanteric bursitis  To manage the swelling, cortisone injection is advised    Large Joint Injection/Arthocentesis: R greater trochanteric bursa    Date/Time: 5/17/2022 5:43 PM  Performed by: Angy Sharpe PA-C  Authorized by: Angy Sharpe PA-C     Indications:   Pain  Needle Size:  18 G  Guidance: landmark guided    Approach:  Lateral  Location:  Hip      Site:  R greater trochanteric bursa  Medications:  80 mg triamcinolone 40 MG/ML; 3 mL bupivacaine 0.5 %; 5 mL lidocaine 1 %  Outcome:  Tolerated well, no immediate complications  Procedure discussed: discussed risks, benefits, and alternatives    Consent Given by:  Patient  Timeout: timeout called immediately prior to procedure    Prep: patient was prepped and draped in usual sterile fashion     After risks and benefits were explained and questions answered, the patient agreed to undergo the recommended procedure .   Using aseptic technique the skin was prepped with betadine swabs, then sprayed with ethyl chloride for topical anesthesia.  The right  Hip  trochanteric bursa: joint space was then infiltrated with 5ml of 1% Lidocaine without epinephrine and 5ml of Marcaine 0.5% in subcutaneous tissues leading down to the trochanter. Then injected was 2ml of Kenalog.  There were no complications and the patient tolerated the procedure well.  Patient does express some relief of pain post procedure.  She leaves clinic alert and well and with nonantalgic gait          BP Readings from Last 1 Encounters:   05/17/22 138/80       BP noted to be well controlled today in office.     Angy Sharpe PA-C   5/17/2022  5:30 PM

## 2022-05-17 NOTE — LETTER
"    5/17/2022         RE: Komal Miller  [address]  [facility] [address]        Dear Colleague,    Thank you for referring your patient, Komal Miller, to the [facility]. Please see a copy of my visit note below.    ORTHOPEDIC CLINIC CONSULT      Chief Complaint: Komal Miller is a 72 year old female who is being seen for   Chief Complaint   Patient presents with     Musculoskeletal Problem     Right hip pain     Consult     Referred by:  self    Here for Orthopedic consultation./second opinion    History of Present Illness:     Patient reports she has been developing right hip pain gradually over the last couple months on the lateral side of her right hip.  She states about 1 to 2 years ago she got new knees.  She states it still feels like she is on stilts.  She feels like she is walking differently because of these knee replacements.  She has tried various shoe changes.  She even tried the product \"good feet\".  She states she has better arch support with her most latest shoes that she got from [employer].  She was told by another provider that she is likely walking differently and hence the hip pain.  Patient is seeking further advice on what she should do.  Patient is now on Xarelto for recent stroke.    Patient's past medical, surgical, social and family histories reviewed.       Past Medical History:   Diagnosis Date     Unspecified essential hypertension    CVA 2022    Past Surgical History:   Procedure Laterality Date     COLONOSCOPY  06/10/08    Internal hemorrhoids, otherwise normal.     COLONOSCOPY  01/20/10     HC DILATION/CURETTAGE DIAG/THER NON OB      D & C     HC LAP, SURG ENTEROLYSIS  05/07/10     HC REMOVAL OF OVARY/TUBE(S)      Salpingo-Oophorectomy, bilateral     LAPAROSCOPY DIAGNOSTIC (GENERAL)  5/6/2014    Procedure: LAPAROSCOPY DIAGNOSTIC (GENERAL);  Surgeon: Seth Matthews MD;  Location: [facility] OR     LAPAROTOMY, LYSIS ADHESIONS, COMBINED  5/6/2014    " Procedure: COMBINED LAPAROTOMY, LYSIS ADHESIONS;  Surgeon: Seth Matthews MD;  Location: [facility] OR     VIDEO CAPSULE ENDOSCOPY  02/15/10    normal sm intestine     ZZC APPENDECTOMY       ZZC LIGATE FALLOPIAN TUBE       ZZC TOTAL ABDOM HYSTERECTOMY      Hysterectomy, Total Abdominal     ZZC TOTAL KNEE ARTHROPLASTY Left      ZZHC UGI ENDOSCOPY, SIMPLE EXAM  01/20/10   Total knee arthroplasty July 10 and August 11, 2020    Home Medications:  Prior to Admission medications    Medication Sig Start Date End Date Taking? Authorizing Provider   amLODIPine (NORVASC) 10 MG tablet Take 1 tablet (10 mg) by mouth daily 5/10/22  Yes Wesly Lehman DO   atorvastatin (LIPITOR) 40 MG tablet Take 1 tablet (40 mg) by mouth daily 12/21/21  Yes Wesly Lehman DO   lisinopril-hydrochlorothiazide (ZESTORETIC) 20-25 MG tablet TAKE 1 TABLET EVERY DAY 4/26/22  Yes Wesly Lehman DO   omeprazole (PRILOSEC) 40 MG DR capsule TAKE 1 CAPSULE EVERY DAY (TAKE 30 TO 60 MINUTES BEFORE A MEAL) 11/22/21  Yes Wesly Lehman DO   propranolol (INDERAL) 40 MG tablet TAKE 1 TABLET TWICE DAILY 11/22/21  Yes Wesly Lehman DO   XARELTO ANTICOAGULANT 20 MG TABS tablet Take 20 mg by mouth daily 3/29/22  Yes Reported, Patient   mometasone (ELOCON) 0.1 % external cream Apply twice daily for 2 weeks. Not face, take 1-2 weeks off 9/10/21   Alyssa Ann MD   SUMAtriptan (IMITREX) 25 MG tablet Take 1 tablet (25 mg) by mouth at onset of headache for migraine May repeat in 2 hours. Max 8 tablets/24 hours. 9/14/21   Wesly Lehman DO       Allergies   Allergen Reactions     No Known Drug Allergies        Social History     Occupational History     Occupation:  dispatcher/     Employer: [employer]     Comment: [employer]   Tobacco Use     Smoking status: Former Smoker     Packs/day: 0.50     Years: 20.00     Pack years: 10.00     Types:  "Cigarettes     Quit date: 1995     Years since quittin.3     Smokeless tobacco: Never Used   Vaping Use     Vaping Use: Never used   Substance and Sexual Activity     Alcohol use: Yes     Comment: very seldom     Drug use: No     Sexual activity: Yes     Partners: Male       Family History   Problem Relation Age of Onset     C.A.D. Mother         bi-pass     Diabetes Mother         late onset     Allergies Mother         xray dye     Arthritis Mother      Cancer Mother         ovary     Respiratory Mother      C.A.D. Father         bi-pass     Hypertension Brother      Breast Cancer Sister      Allergies Sister      Allergies Brother      Eye Disorder Maternal Aunt      Gastrointestinal Disease Brother      Gynecology Sister      Thyroid Disease Sister        REVIEW OF SYSTEMS    General: Negative for fever or fatigue    Psych:  negative anxiety or depression     Ophthalmic:  Corrective lenses?  YES    ENT:  Hearing difficulty? No    CV: Positive for recent CVA spring 2022    Endocrine:  negative diabetes     Urology:  negative kidney disease    Resp:  Normal respiratory effort, no history of pulmonary disease or asthma    Skin: negative for cuts/sores or redness    Musculoskeletal: as above    Neurologic:negative for numbness/tingling    Hematologic: negative for bleeding disorder, patient is on Xarelto      Physical Exam:    Vitals: /80   Ht 1.664 m (5' 5.5\")   Wt 113.4 kg (250 lb)   BMI 40.97 kg/m    BMI= Body mass index is 40.97 kg/m .    GENERAL APPEARANCE: Healthy, alert, no distress    SKIN:  negative for suspicious lesions or rashes    NEURO: Normal strength and tone, mentation intact and speech normal    PSYCH:   Mentation appears Normal and affect normal/bright    RESPIRATORY: negative for respiratory distress.    EYES: negative for Conjunctivitis    Cardiovascular: No vascular discoloration of lower extremities.    JOINT/EXTREMITIES:  right Hip Exam: Palpation: Tender:   right greater " trochanter  Range of Motion:  Full and equal ROM, both hips; patient flexion is approximately 110 degrees, abduction is approximately 45 degrees; internal rotation approximately 20 degrees.  Patient does have some discomfort at the lateral side of her hip with flexion and external rotation.  Abduction is also mildly uncomfortable  Strength: Patient demonstrates good strength in flexion as well as abduction.  She does express some discomfort with abduction  Special tests:  negative Thiago's, no IT band tightness    GAIT: Mildly antalgic    Radiographs:   Recent Results (from the past 744 hour(s))   XR Pelvis and Hip Right 2 Views    Narrative    PELVIS AND HIP RIGHT TWO VIEWS   5/17/2022 2:28 PM     HISTORY: Right hip pain.    COMPARISON: None.      Impression    IMPRESSION: Mild narrowing and osteophytosis in the right hip  consistent with osteoarthrosis. There is no evidence of fracture or  osteonecrosis. Mild degenerative changes in the left hip and both SI  joints are also present.    MARTIN ANDRADE MD         SYSTEM ID:  [id number]     Right hip x-ray findings: Some narrowing seen on abducted views between the acetabulum and the femoral head.  There is no reshaping of the femoral head.  There are no significant osteophytes.  Independent visualization of the films was made.     Impression:      ICD-10-CM    1. Trochanteric bursitis of right hip  M70.61 XR Pelvis and Hip Right 2 Views     Patient with trochanteric bursitis of right hip.  No significant IT band tightness is present.    Plan:  All of the above pertinent physical exam and imaging modalities findings was reviewed with Komal.  Patient is advised of the above diagnosis.  Patient is unable to take anti-inflammatories due to Xarelto use.  Tylenol has been ineffective as well as changing out shoewear.  Patient is given information sheet regarding trochanteric bursitis  Patient is given exercises to perform to help her with current trochanteric  bursitis  To manage the swelling, cortisone injection is advised    Large Joint Injection/Arthocentesis: R greater trochanteric bursa    Date/Time: 5/17/2022 5:43 PM  Performed by: Angy Sharpe PA-C  Authorized by: Angy Sharpe PA-C     Indications:  Pain  Needle Size:  18 G  Guidance: landmark guided    Approach:  Lateral  Location:  Hip      Site:  R greater trochanteric bursa  Medications:  80 mg triamcinolone 40 MG/ML; 3 mL bupivacaine 0.5 %; 5 mL lidocaine 1 %  Outcome:  Tolerated well, no immediate complications  Procedure discussed: discussed risks, benefits, and alternatives    Consent Given by:  Patient  Timeout: timeout called immediately prior to procedure    Prep: patient was prepped and draped in usual sterile fashion     After risks and benefits were explained and questions answered, the patient agreed to undergo the recommended procedure .   Using aseptic technique the skin was prepped with betadine swabs, then sprayed with ethyl chloride for topical anesthesia.  The right  Hip  trochanteric bursa: joint space was then infiltrated with 5ml of 1% Lidocaine without epinephrine and 5ml of Marcaine 0.5% in subcutaneous tissues leading down to the trochanter. Then injected was 2ml of Kenalog.  There were no complications and the patient tolerated the procedure well.  Patient does express some relief of pain post procedure.  She leaves clinic alert and well and with nonantalgic gait          BP Readings from Last 1 Encounters:   05/17/22 138/80       BP noted to be well controlled today in office.     Angy Sharpe PA-C   5/17/2022  5:30 PM          Again, thank you for allowing me to participate in the care of your patient.        Sincerely,        Angy Sharpe PA-C

## 2022-06-07 DIAGNOSIS — I10 HYPERTENSION GOAL BP (BLOOD PRESSURE) < 140/90: ICD-10-CM

## 2022-06-08 RX ORDER — AMLODIPINE BESYLATE 10 MG/1
TABLET ORAL
Qty: 90 TABLET | Refills: 0 | Status: SHIPPED | OUTPATIENT
Start: 2022-06-08 | End: 2022-12-12

## 2022-06-08 NOTE — TELEPHONE ENCOUNTER
Prescription approved per [facility] Refill Protocol.    RAZIA CarN, RN  [facility]

## 2022-06-23 ENCOUNTER — OFFICE VISIT (OUTPATIENT)
Dept: ORTHOPEDICS | Facility: CLINIC | Age: 72
End: 2022-06-23
Payer: COMMERCIAL

## 2022-06-23 VITALS — WEIGHT: 245 LBS | HEIGHT: 67 IN | BODY MASS INDEX: 38.45 KG/M2

## 2022-06-23 DIAGNOSIS — M70.61 TROCHANTERIC BURSITIS OF RIGHT HIP: Primary | ICD-10-CM

## 2022-06-23 DIAGNOSIS — M16.11 PRIMARY OSTEOARTHRITIS OF RIGHT HIP: ICD-10-CM

## 2022-06-23 PROCEDURE — 99213 OFFICE O/P EST LOW 20 MIN: CPT | Performed by: NURSE PRACTITIONER

## 2022-06-23 ASSESSMENT — PAIN SCALES - GENERAL: PAINLEVEL: EXTREME PAIN (9)

## 2022-06-23 NOTE — NURSING NOTE
Chief Complaint   Patient presents with     RECHECK     Trochanteric bursitis of right hip. Had injection on 5/17/2022 and no relief.

## 2022-06-23 NOTE — LETTER
"    6/23/2022         RE: Komal Miller  [address]  [facility] [address]        Dear Colleague,    Thank you for referring your patient, Komal Miller, to the [facility]. Please see a copy of my visit note below.    Office Visit-Follow up    Chief Complaint: Komal Miller is a 72 year old female who is being seen for   Chief Complaint   Patient presents with     RECHECK     Trochanteric bursitis of right hip. Had injection on 5/17/2022 and no relief.        History of Present Illness:   Was recently seen by Angy Sharpe PA-C for right hip.   Today's visit   Returns to clinic.  At the last visit received a greater trochanteric bursa injection by Angy Sharpe. She reports took about 1 week to start then lasted about 1 week then the pain returned. It continues same as previous lateral sided hip pain.  Denies radiating. Denies groin pain, denies deep \"c\" shape pain.   Worst with activity. Has started to use a cane.  Denies any injection site issues, or concerns of infection. Pain is about at the same level as it was prior to the injection.        5/17/22 visit    Here for Orthopedic consultation./second opinion    History of Present Illness:      Patient reports she has been developing right hip pain gradually over the last couple months on the lateral side of her right hip.  She states about 1 to 2 years ago she got new knees.  She states it still feels like she is on stilts.  She feels like she is walking differently because of these knee replacements.  She has tried various shoe changes.  She even tried the product \"good feet\".  She states she has better arch support with her most latest shoes that she got from [employer].  She was told by another provider that she is likely walking differently and hence the hip pain.  Patient is seeking further advice on what she should do.  Patient is now on Xarelto for recent stroke.        Social History     Occupational History     Occupation:  " "dispatcher/     Employer: [employer]     Comment: [employer]   Tobacco Use     Smoking status: Former Smoker     Packs/day: 0.50     Years: 20.00     Pack years: 10.00     Types: Cigarettes     Quit date: 1995     Years since quittin.4     Smokeless tobacco: Never Used   Vaping Use     Vaping Use: Never used   Substance and Sexual Activity     Alcohol use: Yes     Comment: very seldom     Drug use: No     Sexual activity: Yes     Partners: Male       REVIEW OF SYSTEMS  General: negative for, night sweats, dizziness, fatigue  Resp: No shortness of breath and no cough  CV: negative for chest pain, syncope or near-syncope  GI: negative for nausea, vomiting and diarrhea  : negative for dysuria and hematuria  Musculoskeletal: as above  Neurologic: negative for syncope   Hematologic: negative for bleeding disorder    Physical Exam:  Vitals: Ht 1.689 m (5' 6.5\")   Wt 111.1 kg (245 lb)   BMI 38.95 kg/m    BMI= Body mass index is 38.95 kg/m .  Constitutional: healthy, alert and no acute distress   Psychiatric: mentation appears normal and affect normal/bright  NEURO: no focal deficits  RESP: Normal with easy respirations and no use of accessory muscles to breathe, no audible wheezing or retractions  SKIN: No erythema, rashes, excoriation, or breakdown. No evidence of infection. Previous injection site not visible.  No atrophy of skin, necrosis, etc.   JOINT/EXTREMITIES:right hip: no evidence of infection. Exquisite tenderness to the greater trochanteric bursa.  No pain with internal and external rotation. No pain with hip flexion/. 5/5 hip flexion strength. Calf soft non-tender.   GAIT: not tested       Diagnostic Modalities:  None today.  Previous imaging reviewed.  Independent visualization of the images was performed.      Impression: right hip osteoarthritis  Right hip greater trochanteric bursitis  Altered gait    Plan:  All of the above pertinent physical exam " "and imaging modalities findings was reviewed with Komal and her spouse.  She did get relief with the injection but unfortunately it was short lasting. No evidence of complication, reaction, or issue from the injection.  She has underlying OA and notes a limp and cane use.  Likely the limp, altered mechanics due to the underlying OA is contributing to the bursitis. It has only been about 5-6 weeks since the injection and with only 1 week of relief would not recommend repeating this.  Discussed physical therapy to work on the underlying symptoms. She has no intra-articular type symptoms, no pain with internal rotation, no deep, groin or other type symptoms so would not recommend an intra-articular injection at this time. With the amount of joint space loss to the hip this may happen in the future.      She has had a series of \"mini strokes\" recently and is on Xarelto but reports this is soon ending then going to ASA.  However she was told the cause of her strokes was HTN.  With this in mind I do not recommend NSAIDs as it can cause HTN.  Discussed tylenol dosing of 1000mg TID to QID.   Referral placed.       Return to clinic 4-6 weeks PRN, or sooner as needed for changes.  Re-x-ray on return: No    MARISOL Regalado, CNP  Orthopedic Surgery          Again, thank you for allowing me to participate in the care of your patient.        Sincerely,        MARISOL Acosta CNP    "

## 2022-06-23 NOTE — PROGRESS NOTES
"Office Visit-Follow up    Chief Complaint: Komal Miller is a 72 year old female who is being seen for   Chief Complaint   Patient presents with     RECHECK     Trochanteric bursitis of right hip. Had injection on 5/17/2022 and no relief.        History of Present Illness:   Was recently seen by Angy Sharpe PA-C for right hip.   Today's visit   Returns to clinic.  At the last visit received a greater trochanteric bursa injection by Angy Sharpe. She reports took about 1 week to start then lasted about 1 week then the pain returned. It continues same as previous lateral sided hip pain.  Denies radiating. Denies groin pain, denies deep \"c\" shape pain.   Worst with activity. Has started to use a cane.  Denies any injection site issues, or concerns of infection. Pain is about at the same level as it was prior to the injection.        5/17/22 visit    Here for Orthopedic consultation./second opinion    History of Present Illness:      Patient reports she has been developing right hip pain gradually over the last couple months on the lateral side of her right hip.  She states about 1 to 2 years ago she got new knees.  She states it still feels like she is on stilts.  She feels like she is walking differently because of these knee replacements.  She has tried various shoe changes.  She even tried the product \"good feet\".  She states she has better arch support with her most latest shoes that she got from [employer].  She was told by another provider that she is likely walking differently and hence the hip pain.  Patient is seeking further advice on what she should do.  Patient is now on Xarelto for recent stroke.        Social History     Occupational History     Occupation:  dispatcher/     Employer: [employer]     Comment: [employer]   Tobacco Use     Smoking status: Former Smoker     Packs/day: 0.50     Years: 20.00     Pack years: 10.00     Types: Cigarettes     Quit date: " "1995     Years since quittin.4     Smokeless tobacco: Never Used   Vaping Use     Vaping Use: Never used   Substance and Sexual Activity     Alcohol use: Yes     Comment: very seldom     Drug use: No     Sexual activity: Yes     Partners: Male       REVIEW OF SYSTEMS  General: negative for, night sweats, dizziness, fatigue  Resp: No shortness of breath and no cough  CV: negative for chest pain, syncope or near-syncope  GI: negative for nausea, vomiting and diarrhea  : negative for dysuria and hematuria  Musculoskeletal: as above  Neurologic: negative for syncope   Hematologic: negative for bleeding disorder    Physical Exam:  Vitals: Ht 1.689 m (5' 6.5\")   Wt 111.1 kg (245 lb)   BMI 38.95 kg/m    BMI= Body mass index is 38.95 kg/m .  Constitutional: healthy, alert and no acute distress   Psychiatric: mentation appears normal and affect normal/bright  NEURO: no focal deficits  RESP: Normal with easy respirations and no use of accessory muscles to breathe, no audible wheezing or retractions  SKIN: No erythema, rashes, excoriation, or breakdown. No evidence of infection. Previous injection site not visible.  No atrophy of skin, necrosis, etc.   JOINT/EXTREMITIES:right hip: no evidence of infection. Exquisite tenderness to the greater trochanteric bursa.  No pain with internal and external rotation. No pain with hip flexion/. 5/5 hip flexion strength. Calf soft non-tender.   GAIT: not tested       Diagnostic Modalities:  None today.  Previous imaging reviewed.  Independent visualization of the images was performed.      Impression: right hip osteoarthritis  Right hip greater trochanteric bursitis  Altered gait    Plan:  All of the above pertinent physical exam and imaging modalities findings was reviewed with Komal and her spouse.  She did get relief with the injection but unfortunately it was short lasting. No evidence of complication, reaction, or issue from the injection.  She has underlying OA and " "notes a limp and cane use.  Likely the limp, altered mechanics due to the underlying OA is contributing to the bursitis. It has only been about 5-6 weeks since the injection and with only 1 week of relief would not recommend repeating this.  Discussed physical therapy to work on the underlying symptoms. She has no intra-articular type symptoms, no pain with internal rotation, no deep, groin or other type symptoms so would not recommend an intra-articular injection at this time. With the amount of joint space loss to the hip this may happen in the future.      She has had a series of \"mini strokes\" recently and is on Xarelto but reports this is soon ending then going to ASA.  However she was told the cause of her strokes was HTN.  With this in mind I do not recommend NSAIDs as it can cause HTN.  Discussed tylenol dosing of 1000mg TID to QID.   Referral placed.       Return to clinic 4-6 weeks PRN, or sooner as needed for changes.  Re-x-ray on return: No    MARISOL Regalado, CNP  Orthopedic Surgery      "

## 2022-06-28 ENCOUNTER — MYC MEDICAL ADVICE (OUTPATIENT)
Dept: INTERNAL MEDICINE | Facility: CLINIC | Age: 72
End: 2022-06-28

## 2022-06-28 DIAGNOSIS — I10 HYPERTENSION GOAL BP (BLOOD PRESSURE) < 140/90: Primary | ICD-10-CM

## 2022-06-28 RX ORDER — LOSARTAN POTASSIUM AND HYDROCHLOROTHIAZIDE 12.5; 5 MG/1; MG/1
1 TABLET ORAL DAILY
Qty: 30 TABLET | Refills: 0 | Status: SHIPPED | OUTPATIENT
Start: 2022-06-28 | End: 2022-07-08

## 2022-06-28 NOTE — TELEPHONE ENCOUNTER
Patient thinks she has a dry cough from amlodipine.  She states that the amlodipine is working to lower her BP however is worried about her constant dry cough.

## 2022-07-08 ENCOUNTER — MYC MEDICAL ADVICE (OUTPATIENT)
Dept: INTERNAL MEDICINE | Facility: CLINIC | Age: 72
End: 2022-07-08

## 2022-07-08 DIAGNOSIS — I10 HYPERTENSION GOAL BP (BLOOD PRESSURE) < 140/90: ICD-10-CM

## 2022-07-08 RX ORDER — LOSARTAN POTASSIUM AND HYDROCHLOROTHIAZIDE 12.5; 5 MG/1; MG/1
1 TABLET ORAL DAILY
Qty: 90 TABLET | Refills: 0 | Status: SHIPPED | OUTPATIENT
Start: 2022-07-08 | End: 2022-07-26

## 2022-07-08 NOTE — TELEPHONE ENCOUNTER
Please see [employer] message.    Patient listed 10 days worth of blood sugars and feels that the losartan-hydrochlorothiazide is working well for her BPs and she does not have a cough.    Patient is requesting a 90 day supply be sent to [facility].  Prescription pended for signature if appropriate.

## 2022-07-30 ENCOUNTER — HEALTH MAINTENANCE LETTER (OUTPATIENT)
Age: 72
End: 2022-07-30

## 2022-08-02 ENCOUNTER — MYC MEDICAL ADVICE (OUTPATIENT)
Dept: ORTHOPEDICS | Facility: CLINIC | Age: 72
End: 2022-08-02

## 2022-08-02 NOTE — TELEPHONE ENCOUNTER
Sent Pt. A [employer] message stating they should have someone contact them once they get the physical therapy order.     Carlene Conway on 8/2/2022 at 8:23 AM

## 2022-08-03 ENCOUNTER — TRANSCRIBE ORDERS (OUTPATIENT)
Dept: OTHER | Age: 72
End: 2022-08-03

## 2022-08-03 DIAGNOSIS — M70.61 TROCHANTERIC BURSITIS OF RIGHT HIP: Primary | ICD-10-CM

## 2022-08-04 ENCOUNTER — MYC REFILL (OUTPATIENT)
Dept: INTERNAL MEDICINE | Facility: CLINIC | Age: 72
End: 2022-08-04

## 2022-08-04 DIAGNOSIS — I10 HYPERTENSION GOAL BP (BLOOD PRESSURE) < 140/90: ICD-10-CM

## 2022-08-04 RX ORDER — LOSARTAN POTASSIUM AND HYDROCHLOROTHIAZIDE 12.5; 5 MG/1; MG/1
1 TABLET ORAL DAILY
Qty: 90 TABLET | Refills: 0 | Status: SHIPPED | OUTPATIENT
Start: 2022-08-04 | End: 2023-01-06

## 2022-08-16 ENCOUNTER — HOSPITAL ENCOUNTER (OUTPATIENT)
Dept: PHYSICAL THERAPY | Facility: CLINIC | Age: 72
Setting detail: THERAPIES SERIES
Discharge: HOME | End: 2022-08-16
Attending: PHYSICIAN ASSISTANT
Payer: MEDICARE

## 2022-08-16 DIAGNOSIS — M16.11 PRIMARY OSTEOARTHRITIS OF RIGHT HIP: ICD-10-CM

## 2022-08-16 DIAGNOSIS — M70.61 TROCHANTERIC BURSITIS OF RIGHT HIP: ICD-10-CM

## 2022-08-16 PROCEDURE — 97161 PT EVAL LOW COMPLEX 20 MIN: CPT | Mod: GP | Performed by: PHYSICAL THERAPIST

## 2022-08-16 PROCEDURE — 97110 THERAPEUTIC EXERCISES: CPT | Mod: GP | Performed by: PHYSICAL THERAPIST

## 2022-08-16 NOTE — PROGRESS NOTES
[facility]    OUTPATIENT PHYSICAL THERAPY ORTHOPEDIC EVALUATION  PLAN OF TREATMENT FOR OUTPATIENT REHABILITATION  (COMPLETE FOR INITIAL CLAIMS ONLY)  Patient's Last Name, First Name, M.I.  YOB: 1950  Komal Miller    Provider s Name:  [facility]   Medical Record No.  [id number]   Start of Care Date:  08/16/22   Onset Date:  05/01/22   Type:     _X__PT   ___OT   ___SLP Medical Diagnosis:  Trochanteric bursitis of right hip (M70.61)     PT Diagnosis:  R hip pain   Visits from SOC:  1      _________________________________________________________________________________  Plan of Treatment/Functional Goals:  manual therapy, strengthening, stretching        Modalities as needed to address pain  Goals  Goal Identifier: HEP  Goal Description: Pt will demo independence in performance and progression of stretching and strengthening HEP in order to self manage her sx.  Target Date: 09/13/22    Goal Identifier: LEFS  Goal Description: Pt will demo improved leg and knee pain ratings and function as shown by increased LEFS score of at least 9 points in order to show significant improvement and greater to previous function.  Target Date: 09/13/22                                                                      Therapy Frequency:  1 time/week  Predicted Duration of Therapy Intervention:  4 weeks    Bassam Thompson, PT                 I CERTIFY THE NEED FOR THESE SERVICES FURNISHED UNDER        THIS PLAN OF TREATMENT AND WHILE UNDER MY CARE     (Physician co-signature of this document indicates review and certification of the therapy plan).                     Certification Date From:  08/16/22   Certification Date To:  09/13/22    Referring Provider:  MARISOL Regalado CNP    Initial Assessment        See Epic Evaluation Start of Care Date: 08/16/22

## 2022-08-16 NOTE — PROGRESS NOTES
08/16/22 1530   General Information   Type of Visit Initial OP Ortho PT Evaluation   Start of Care Date 08/16/22   Referring Physician MARISOL Regalado CNP   Orders Evaluate and Treat   Date of Order 06/23/22   Certification Required? Yes   Medical Diagnosis Trochanteric bursitis of right hip (M70.61)   Body Part(s)   Body Part(s) Hip   Presentation and Etiology   Pertinent history of current problem (include personal factors and/or comorbidities that impact the POC) Pt is a 73 yo female who presents to PT with c/o pain in R hip. Pt reports her pain and discomfort began following her TKA in B knees. Pt has received cortisone shots x2 in R hip, one in May and one in July of this year.   Symptom Location R hip   Onset date of current episode/exacerbation 05/01/22   Pain rating (0-10 point scale) Best (/10);Worst (/10)   Best (/10) 0/10   Worst (/10) 9/10   Pain quality A. Sharp   Frequency of pain/symptoms C. With activity   Pain/symptoms exacerbated by B. Walking  (Walking on uneven ground)   Prior Level of Function   Prior Level of Function-Mobility Independent without AD   Current Level of Function   Current Community Support Family/friend caregiver   Living environment [place]   Fall Risk Screen   Fall screen completed by PT   Have you fallen 2 or more times in the past year? No   Have you fallen and had an injury in the past year? No   Abuse Screen (yes response referral indicated)   Feels Unsafe at Home or Work/School no   Feels Threatened by Someone no   Does Anyone Try to Keep You From Having Contact with Others or Doing Things Outside Your Home? no   Physical Signs of Abuse Present no   Patient needs abuse support services and resources No   Hip Objective Findings   Side (if bilateral, select both right and left) Right   Lumbar ROM WNL, no pain provocation   Hip/Knee Strength Comments Pt shows 3/5 R hip abd, 4-/5 L hip abd   Observation Thiago test shows slight tightness in R ITB   Right Hip  Abduction Strength 3/5   Planned Therapy Interventions   Planned Therapy Interventions manual therapy;strengthening;stretching   Planned Modality Interventions   Planned Modality Interventions Comments Modalities as needed to address pain   Clinical Impression   Criteria for Skilled Therapeutic Interventions Met yes, treatment indicated   PT Diagnosis R hip pain   Influenced by the following impairments R hip weakness   Functional limitations due to impairments Pt with decreased walking tolerance   Clinical Presentation Stable/Uncomplicated   Clinical Presentation Rationale Pt with limited pain reports, mild weakness   Clinical Decision Making (Complexity) Low complexity   Therapy Frequency 1 time/week   Predicted Duration of Therapy Intervention (days/wks) 4 weeks   Risk & Benefits of therapy have been explained Yes   Patient, Family & other staff in agreement with plan of care Yes   Clinical Impression Comments Pt presents to PT with c/o mild pain in R hip starting in May. Pt received cortisone injections in May and July and has accepted PT interventions to address her weakness, pain in order to increase her function with pain reduction. Pt shows mild gait deficits associated with mild lateral hip pain as well as OA pain. Pt shows weakness in R hip, struggling to hold leg up against gravity in sidelying. Pt would benefit from skilled PT interventions in order to address her mild pain and R hip weakness in order to improve her function and comfort.   Education Assessment   Preferred Learning Style Listening;Demonstration;Pictures/video   Barriers to Learning No barriers   ORTHO GOALS   PT Ortho Eval Goals 1;2   Ortho Goal 1   Goal Identifier HEP   Goal Description Pt will demo independence in performance and progression of stretching and strengthening HEP in order to self manage her sx.   Goal Progress Issued LE strength and stretch HEP   Target Date 09/13/22   Ortho Goal 2   Goal Identifier LEFS   Goal Description  Pt will demo improved leg and knee pain ratings and function as shown by increased LEFS score of at least 9 points in order to show significant improvement and greater to previous function.   Goal Progress 48/80   Target Date 09/13/22   Total Evaluation Time   PT Eval, Low Complexity Minutes (41525) 15   Therapy Certification   Certification date from 08/16/22   Certification date to 09/13/22   Medical Diagnosis Trochanteric bursitis of right hip (M70.61)

## 2022-08-23 ENCOUNTER — HOSPITAL ENCOUNTER (OUTPATIENT)
Dept: PHYSICAL THERAPY | Facility: CLINIC | Age: 72
Setting detail: THERAPIES SERIES
Discharge: HOME | End: 2022-08-23
Attending: PHYSICIAN ASSISTANT
Payer: MEDICARE

## 2022-08-23 PROCEDURE — 97112 NEUROMUSCULAR REEDUCATION: CPT | Mod: GP | Performed by: PHYSICAL THERAPIST

## 2022-08-23 PROCEDURE — 97110 THERAPEUTIC EXERCISES: CPT | Mod: GP | Performed by: PHYSICAL THERAPIST

## 2022-08-30 ENCOUNTER — HOSPITAL ENCOUNTER (OUTPATIENT)
Dept: PHYSICAL THERAPY | Facility: CLINIC | Age: 72
Setting detail: THERAPIES SERIES
Discharge: HOME | End: 2022-08-30
Attending: PHYSICIAN ASSISTANT
Payer: MEDICARE

## 2022-08-30 PROCEDURE — 97112 NEUROMUSCULAR REEDUCATION: CPT | Mod: GP | Performed by: PHYSICAL THERAPIST

## 2022-08-30 PROCEDURE — 97035 APP MDLTY 1+ULTRASOUND EA 15: CPT | Mod: GP | Performed by: PHYSICAL THERAPIST

## 2022-08-30 PROCEDURE — 97610 LOW FREQUENCY NON-THERMAL US: CPT | Mod: GP | Performed by: PHYSICAL THERAPIST

## 2022-08-30 PROCEDURE — 97110 THERAPEUTIC EXERCISES: CPT | Mod: GP | Performed by: PHYSICAL THERAPIST

## 2022-09-07 DIAGNOSIS — I10 HYPERTENSION GOAL BP (BLOOD PRESSURE) < 140/90: ICD-10-CM

## 2022-09-09 RX ORDER — LOSARTAN POTASSIUM AND HYDROCHLOROTHIAZIDE 12.5; 5 MG/1; MG/1
TABLET ORAL
Qty: 90 TABLET | Refills: 0 | OUTPATIENT
Start: 2022-09-09

## 2022-09-11 DIAGNOSIS — G44.009 CLUSTER HEADACHE, NOT INTRACTABLE, UNSPECIFIED CHRONICITY PATTERN: ICD-10-CM

## 2022-09-11 DIAGNOSIS — K21.00 GASTROESOPHAGEAL REFLUX DISEASE WITH ESOPHAGITIS: ICD-10-CM

## 2022-09-11 DIAGNOSIS — I10 HYPERTENSION GOAL BP (BLOOD PRESSURE) < 140/90: ICD-10-CM

## 2022-09-13 ENCOUNTER — HOSPITAL ENCOUNTER (OUTPATIENT)
Dept: PHYSICAL THERAPY | Facility: CLINIC | Age: 72
Setting detail: THERAPIES SERIES
Discharge: HOME | End: 2022-09-13
Attending: PHYSICIAN ASSISTANT
Payer: MEDICARE

## 2022-09-13 PROCEDURE — 97112 NEUROMUSCULAR REEDUCATION: CPT | Mod: GP | Performed by: PHYSICAL THERAPIST

## 2022-09-13 PROCEDURE — 97110 THERAPEUTIC EXERCISES: CPT | Mod: GP | Performed by: PHYSICAL THERAPIST

## 2022-09-13 RX ORDER — OMEPRAZOLE 40 MG/1
CAPSULE, DELAYED RELEASE ORAL
Qty: 90 CAPSULE | Refills: 3 | Status: SHIPPED | OUTPATIENT
Start: 2022-09-13 | End: 2023-09-11

## 2022-09-13 RX ORDER — PROPRANOLOL HYDROCHLORIDE 40 MG/1
TABLET ORAL
Qty: 180 TABLET | Refills: 3 | Status: SHIPPED | OUTPATIENT
Start: 2022-09-13 | End: 2023-09-11

## 2022-09-15 NOTE — PROGRESS NOTES
[facility]    OUTPATIENT PHYSICAL THERAPY  PLAN OF TREATMENT FOR OUTPATIENT REHABILITATION AND PROGRESS NOTE           Patient's Last Name, First Name, Komal Ogden Date of Birth  1950   Provider's Name  [facility] Medical Record No.  [id number]    Onset Date  5/1/22 Start of Care Date  8/16/22   Type:     _X_PT   ___OT   ___SLP Medical Diagnosis  Trochanteric bursitis of right hip (M70.61)   PT Diagnosis  Trochanteric bursitis R hip Plan of Treatment  Frequency/Duration: 1x/week for 4 weeks  Certification date from 9/13/22 to 10/11/22     Goals:  Goal Identifier HEP   Goal Description Pt will demo independence in performance and progression of stretching and strengthening HEP in order to self manage her sx.   Target Date 09/13/22   Date Met   09/13/22   Progress (detail required for progress note): Pt has demonstrated independence in her HEP with the ability to manage her sx, advance exercises as able.     Goal Identifier LEFS   Goal Description Pt will demo improved leg and knee pain ratings and function as shown by increased LEFS score of at least 9 points in order to show significant improvement and greater to previous function.   Target Date 09/13/22   Date Met      Progress (detail required for progress note): 48/80           Beginning/End Dates of Progress Note Reporting Period:  8/16/22 to 9/13/22    Progress Toward Goals:   Progress this reporting period: Pt has shown ability to independently manage her HEP, has shown improvement in her home and community mobility with greater confidence, however, continues to report pain in R hip with walking, though. Pt has been educated on walking with cane for additional support and offloading, she acknowledges doing this occ, but is not always compliant and recognizes that her pain is increased when not  using cane. Overall, pt is making gains, but would benefit from continued skilled interventions in order to address her pain and mobility deficits.    Client Self (Subjective) Report for Progress Note Reporting Period: Pt reports that she went camping over Labor Day weekend, did more walking over uneven ground, moving camping equipment, overall increased her pain. Pt understanding of how she irritated her hip, resumed exercises this week with hip not experiencing any relief post camping.    Outcome Measures (Most Recent Score):        Objective Measurements:            I CERTIFY THE NEED FOR THESE SERVICES FURNISHED UNDER        THIS PLAN OF TREATMENT AND WHILE UNDER MY CARE     (Physician co-signature of this document indicates review and certification of the therapy plan).                Referring Provider: MARISOL Regalado CNP, PT

## 2022-10-09 ENCOUNTER — HEALTH MAINTENANCE LETTER (OUTPATIENT)
Age: 72
End: 2022-10-09

## 2022-10-11 ENCOUNTER — MYC MEDICAL ADVICE (OUTPATIENT)
Dept: INTERNAL MEDICINE | Facility: CLINIC | Age: 72
End: 2022-10-11

## 2022-10-25 ENCOUNTER — OFFICE VISIT (OUTPATIENT)
Dept: INTERNAL MEDICINE | Facility: CLINIC | Age: 72
End: 2022-10-25
Payer: COMMERCIAL

## 2022-10-25 VITALS
SYSTOLIC BLOOD PRESSURE: 124 MMHG | DIASTOLIC BLOOD PRESSURE: 80 MMHG | WEIGHT: 255 LBS | HEART RATE: 68 BPM | HEIGHT: 65 IN | TEMPERATURE: 97 F | RESPIRATION RATE: 18 BRPM | OXYGEN SATURATION: 94 % | BODY MASS INDEX: 42.49 KG/M2

## 2022-10-25 DIAGNOSIS — Z12.31 VISIT FOR SCREENING MAMMOGRAM: ICD-10-CM

## 2022-10-25 DIAGNOSIS — I69.359 CVA, OLD, HEMIPARESIS (H): ICD-10-CM

## 2022-10-25 DIAGNOSIS — E78.5 HYPERLIPIDEMIA LDL GOAL <130: ICD-10-CM

## 2022-10-25 DIAGNOSIS — I10 BENIGN ESSENTIAL HYPERTENSION: ICD-10-CM

## 2022-10-25 DIAGNOSIS — Z12.31 ENCOUNTER FOR SCREENING MAMMOGRAM FOR BREAST CANCER: ICD-10-CM

## 2022-10-25 DIAGNOSIS — E66.01 MORBID OBESITY DUE TO EXCESS CALORIES (H): ICD-10-CM

## 2022-10-25 DIAGNOSIS — Z12.11 SCREEN FOR COLON CANCER: ICD-10-CM

## 2022-10-25 DIAGNOSIS — Z00.00 MEDICARE ANNUAL WELLNESS VISIT, SUBSEQUENT: Primary | ICD-10-CM

## 2022-10-25 LAB
ANION GAP SERPL CALCULATED.3IONS-SCNC: 3 MMOL/L (ref 3–14)
BUN SERPL-MCNC: 18 MG/DL (ref 7–30)
CALCIUM SERPL-MCNC: 8.9 MG/DL (ref 8.5–10.1)
CHLORIDE BLD-SCNC: 104 MMOL/L (ref 94–109)
CHOLEST SERPL-MCNC: 167 MG/DL
CO2 SERPL-SCNC: 30 MMOL/L (ref 20–32)
CREAT SERPL-MCNC: 0.68 MG/DL (ref 0.52–1.04)
ERYTHROCYTE [DISTWIDTH] IN BLOOD BY AUTOMATED COUNT: 12.1 % (ref 10–15)
FASTING STATUS PATIENT QL REPORTED: NO
GFR SERPL CREATININE-BSD FRML MDRD: >90 ML/MIN/1.73M2
GLUCOSE BLD-MCNC: 118 MG/DL (ref 70–99)
HCT VFR BLD AUTO: 40.3 % (ref 35–47)
HDLC SERPL-MCNC: 65 MG/DL
HGB BLD-MCNC: 13.8 G/DL (ref 11.7–15.7)
LDLC SERPL CALC-MCNC: 82 MG/DL
MAGNESIUM SERPL-MCNC: 1.8 MG/DL (ref 1.6–2.3)
MCH RBC QN AUTO: 30.7 PG (ref 26.5–33)
MCHC RBC AUTO-ENTMCNC: 34.2 G/DL (ref 31.5–36.5)
MCV RBC AUTO: 90 FL (ref 78–100)
NONHDLC SERPL-MCNC: 102 MG/DL
PLATELET # BLD AUTO: 300 10E3/UL (ref 150–450)
POTASSIUM BLD-SCNC: 3.6 MMOL/L (ref 3.4–5.3)
RBC # BLD AUTO: 4.5 10E6/UL (ref 3.8–5.2)
SODIUM SERPL-SCNC: 137 MMOL/L (ref 133–144)
TRIGL SERPL-MCNC: 102 MG/DL
WBC # BLD AUTO: 9.2 10E3/UL (ref 4–11)

## 2022-10-25 PROCEDURE — G0439 PPPS, SUBSEQ VISIT: HCPCS | Performed by: INTERNAL MEDICINE

## 2022-10-25 PROCEDURE — 85027 COMPLETE CBC AUTOMATED: CPT | Performed by: INTERNAL MEDICINE

## 2022-10-25 PROCEDURE — 83735 ASSAY OF MAGNESIUM: CPT | Performed by: INTERNAL MEDICINE

## 2022-10-25 PROCEDURE — 36415 COLL VENOUS BLD VENIPUNCTURE: CPT | Performed by: INTERNAL MEDICINE

## 2022-10-25 PROCEDURE — 80061 LIPID PANEL: CPT | Performed by: INTERNAL MEDICINE

## 2022-10-25 PROCEDURE — 80048 BASIC METABOLIC PNL TOTAL CA: CPT | Performed by: INTERNAL MEDICINE

## 2022-10-25 ASSESSMENT — ENCOUNTER SYMPTOMS
FEVER: 0
COUGH: 0
PARESTHESIAS: 0
SHORTNESS OF BREATH: 0
DIZZINESS: 0
ARTHRALGIAS: 0
SORE THROAT: 0
CHILLS: 0
DYSURIA: 0
JOINT SWELLING: 0
DIARRHEA: 0
HEARTBURN: 0
EYE PAIN: 0
HEMATURIA: 0
PALPITATIONS: 0
NAUSEA: 0
FREQUENCY: 0
BREAST MASS: 0
NERVOUS/ANXIOUS: 0
ABDOMINAL PAIN: 0
HEMATOCHEZIA: 0
MYALGIAS: 0
HEADACHES: 0
CONSTIPATION: 0

## 2022-10-25 ASSESSMENT — PAIN SCALES - GENERAL: PAINLEVEL: NO PAIN (0)

## 2022-10-25 ASSESSMENT — ACTIVITIES OF DAILY LIVING (ADL): CURRENT_FUNCTION: NO ASSISTANCE NEEDED

## 2022-10-25 NOTE — PROGRESS NOTES
"SUBJECTIVE:   AXEL is a 72 year old who presents for Preventive Visit.      Patient has been advised of split billing requirements and indicates understanding: Yes  Are you in the first 12 months of your Medicare coverage?  No    Healthy Habits:     In general, how would you rate your overall health?  Fair    Frequency of exercise:  1 day/week    Duration of exercise:  Less than 15 minutes    Do you usually eat at least 4 servings of fruit and vegetables a day, include whole grains    & fiber and avoid regularly eating high fat or \"junk\" foods?  No    Taking medications regularly:  Yes    Ability to successfully perform activities of daily living:  No assistance needed    Home Safety:  No safety concerns identified    Hearing Impairment:  No hearing concerns    In the past 6 months, have you been bothered by leaking of urine?  No    In general, how would you rate your overall mental or emotional health?  Good      PHQ-2 Total Score: 0    Additional concerns today:  Yes      Do you feel safe in your environment? Yes    Have you ever done Advance Care Planning? (For example, a Health Directive, POLST, or a discussion with a medical provider or your loved ones about your wishes): Yes, advance care planning is on file.       Fall risk  Fallen 2 or more times in the past year?: No  Any fall with injury in the past year?: No    Cognitive Screening   1) Repeat 3 items (Leader, Season, Table)    2) Clock draw: NORMAL  3) 3 item recall: Recalls 3 objects  Results: 3 items recalled: COGNITIVE IMPAIRMENT LESS LIKELY    Mini-CogTM Copyright ALYCE Telles. Licensed by the author for use in [facility]; reprinted with permission ([email or web address]@.[facility]). All rights reserved.      Do you have sleep apnea, excessive snoring or daytime drowsiness?: no    Reviewed and updated as needed this visit by clinical staff   Tobacco  Allergies  Meds   Med Hx  Surg Hx  Fam Hx  Soc Hx        Reviewed and updated as needed this visit by " Provider                 Social History     Tobacco Use     Smoking status: Former     Packs/day: 0.50     Years: 20.00     Pack years: 10.00     Types: Cigarettes     Quit date: 1995     Years since quittin.8     Smokeless tobacco: Never   Substance Use Topics     Alcohol use: Yes     Comment: very seldom     If you drink alcohol do you typically have >3 drinks per day or >7 drinks per week? No    Alcohol Use 10/25/2022   Prescreen: >3 drinks/day or >7 drinks/week? No   Prescreen: >3 drinks/day or >7 drinks/week? -               Current providers sharing in care for this patient include:   Patient Care Team:  Wesly Lehman DO as PCP - General (Internal Medicine)  Alyssa Ann MD as Assigned Surgical Provider  Wesly Lehman DO as Assigned PCP  Bam Vasquez APRN CNP as Assigned Musculoskeletal Provider    The following health maintenance items are reviewed in Epic and correct as of today:  Health Maintenance   Topic Date Due     HEPATITIS B IMMUNIZATION (1 of 3 - 3-dose series) Never done     COVID-19 Vaccine (1) Never done     HEPATITIS C SCREENING  Never done     COLORECTAL CANCER SCREENING  2020     Pneumococcal Vaccine: 65+ Years (2 - PPSV23 if available, else PCV20) 2020     ZOSTER IMMUNIZATION (3 of 3) 2021     MAMMO SCREENING  03/10/2022     INFLUENZA VACCINE (1) 2022     LIPID  10/20/2022     MEDICARE ANNUAL WELLNESS VISIT  10/20/2022     ANNUAL REVIEW OF HM ORDERS  2023     FALL RISK ASSESSMENT  10/25/2023     ADVANCE CARE PLANNING  10/20/2026     DTAP/TDAP/TD IMMUNIZATION (2 - Td or Tdap) 2032     DEXA  2032     PHQ-2 (once per calendar year)  Completed     IPV IMMUNIZATION  Aged Out     MENINGITIS IMMUNIZATION  Aged Out     URINE DRUG SCREEN  Discontinued     Lab work is in process  BP Readings from Last 3 Encounters:   10/25/22 124/80   22 138/80   22 (!) 150/88    Wt Readings from Last 3  Encounters:   10/25/22 115.7 kg (255 lb)   22 111.1 kg (245 lb)   22 113.4 kg (250 lb)                  Patient Active Problem List   Diagnosis     Absence of menstruation     Esophageal reflux     Recurring abdominal pain     Hyperlipidemia LDL goal <130     Hypertension goal BP (blood pressure) < 140/90     Advance Care Planning     Small bowel obstruction (H)     DVT prophylaxis     Cervical adenopathy     Intestinal adhesions     Gastroesophageal reflux disease with esophagitis     Cluster headache, not intractable, unspecified chronicity pattern     Morbid obesity due to excess calories (H)     CVA, old, hemiparesis (H)     Trochanteric bursitis of right hip     Primary osteoarthritis of right hip     Past Surgical History:   Procedure Laterality Date     COLONOSCOPY  06/10/08    Internal hemorrhoids, otherwise normal.     COLONOSCOPY  01/20/10     HC DILATION/CURETTAGE DIAG/THER NON OB      D & C     HC LAP, SURG ENTEROLYSIS  05/07/10     HC REMOVAL OF OVARY/TUBE(S)      Salpingo-Oophorectomy, bilateral     LAPAROSCOPY DIAGNOSTIC (GENERAL)  2014    Procedure: LAPAROSCOPY DIAGNOSTIC (GENERAL);  Surgeon: Seth Matthews MD;  Location: [facility] OR     LAPAROTOMY, LYSIS ADHESIONS, COMBINED  2014    Procedure: COMBINED LAPAROTOMY, LYSIS ADHESIONS;  Surgeon: Seth Matthews MD;  Location: [facility] OR     VIDEO CAPSULE ENDOSCOPY  02/15/10    normal sm intestine     ZZC APPENDECTOMY       ZZC LIGATE FALLOPIAN TUBE       ZZC TOTAL ABDOM HYSTERECTOMY      Hysterectomy, Total Abdominal     ZZC TOTAL KNEE ARTHROPLASTY Left      ZZHC UGI ENDOSCOPY, SIMPLE EXAM  01/20/10       Social History     Tobacco Use     Smoking status: Former     Packs/day: 0.50     Years: 20.00     Pack years: 10.00     Types: Cigarettes     Quit date: 1995     Years since quittin.8     Smokeless tobacco: Never   Substance Use Topics     Alcohol use: Yes     Comment: very seldom     Family History   Problem Relation Age of  Onset     C.A.D. Mother         bi-pass     Diabetes Mother         late onset     Allergies Mother         xray dye     Arthritis Mother      Cancer Mother         ovary     Respiratory Mother      C.A.D. Father         bi-pass     Hypertension Brother      Breast Cancer Sister      Allergies Sister      Allergies Brother      Eye Disorder Maternal Aunt      Gastrointestinal Disease Brother      Gynecology Sister      Thyroid Disease Sister          Current Outpatient Medications   Medication Sig Dispense Refill     amLODIPine (NORVASC) 10 MG tablet TAKE 1 TABLET EVERY DAY 90 tablet 0     aspirin (ASA) 325 MG EC tablet Take 325 mg by mouth 1 tablet daily       atorvastatin (LIPITOR) 40 MG tablet Take 1 tablet (40 mg) by mouth daily 90 tablet 3     losartan-hydrochlorothiazide (HYZAAR) 50-12.5 MG tablet Take 1 tablet by mouth daily Appointment needed for additional refills. 90 tablet 0     omeprazole (PRILOSEC) 40 MG DR capsule TAKE 1 CAPSULE EVERY DAY (TAKE 30 TO 60 MINUTES BEFORE A MEAL) 90 capsule 3     propranolol (INDERAL) 40 MG tablet TAKE 1 TABLET TWICE DAILY 180 tablet 3     mometasone (ELOCON) 0.1 % external cream Apply twice daily for 2 weeks. Not face, take 1-2 weeks off (Patient not taking: Reported on 10/25/2022) 50 g 1     SUMAtriptan (IMITREX) 25 MG tablet Take 1 tablet (25 mg) by mouth at onset of headache for migraine May repeat in 2 hours. Max 8 tablets/24 hours. (Patient not taking: Reported on 10/25/2022) 4 tablet 1     Allergies   Allergen Reactions     No Known Drug Allergies      Mammogram Screening: Mammogram Screening: Recommended mammography every 1-2 years with patient discussion and risk factor consideration        Review of Systems   Constitutional: Negative for chills and fever.   HENT: Negative for congestion, ear pain, hearing loss and sore throat.    Eyes: Negative for pain and visual disturbance.   Respiratory: Negative for cough and shortness of breath.    Cardiovascular: Negative  "for chest pain, palpitations and peripheral edema.   Gastrointestinal: Negative for abdominal pain, constipation, diarrhea, heartburn, hematochezia and nausea.   Breasts:  Negative for tenderness, breast mass and discharge.   Genitourinary: Negative for dysuria, frequency, genital sores, hematuria, pelvic pain, urgency, vaginal bleeding and vaginal discharge.   Musculoskeletal: Negative for arthralgias, joint swelling and myalgias.   Skin: Negative for rash.   Neurological: Negative for dizziness, headaches and paresthesias.   Psychiatric/Behavioral: Negative for mood changes. The patient is not nervous/anxious.          OBJECTIVE:   /80 (BP Location: Right arm, Patient Position: Chair, Cuff Size: Adult Large)   Pulse 68   Temp 97  F (36.1  C) (Temporal)   Resp 18   Ht 1.651 m (5' 5\")   Wt 115.7 kg (255 lb)   SpO2 94%   BMI 42.43 kg/m   Estimated body mass index is 42.43 kg/m  as calculated from the following:    Height as of this encounter: 1.651 m (5' 5\").    Weight as of this encounter: 115.7 kg (255 lb).  Physical Exam  GENERAL APPEARANCE: healthy, alert and no distress  EYES: Eyes grossly normal to inspection, PERRL and conjunctivae and sclerae normal  HENT: ear canals and TM's normal, nose and mouth without ulcers or lesions, oropharynx clear and oral mucous membranes moist  NECK: no adenopathy, no asymmetry, masses, or scars and thyroid normal to palpation  RESP: lungs clear to auscultation - no rales, rhonchi or wheezes  CV: regular rate and rhythm, normal S1 S2, no S3 or S4, no murmur, click or rub, no peripheral edema and peripheral pulses strong  ABDOMEN: soft, nontender, no hepatosplenomegaly, no masses and bowel sounds normal  MS: no musculoskeletal defects are noted and gait is age appropriate without ataxia  SKIN: no suspicious lesions or rashes  NEURO: Normal strength and tone, sensory exam grossly normal, mentation intact and speech normal  PSYCH: mentation appears normal and affect " "normal/bright    Diagnostic Test Results:  No results found for this or any previous visit (from the past 24 hour(s)).    ASSESSMENT / PLAN:       ICD-10-CM    1. Medicare annual wellness visit, subsequent  Z00.00       2. Benign essential hypertension  I10 Basic metabolic panel  (Ca, Cl, CO2, Creat, Gluc, K, Na, BUN)     Magnesium     CBC with platelets      3. Hyperlipidemia LDL goal <130  E78.5 Lipid panel reflex to direct LDL Non-fasting      4. CVA, old, hemiparesis (H)  I69.359       5. Visit for screening mammogram  Z12.31       6. Screen for colon cancer  Z12.11 Colonoscopy Screening  Referral      7. Morbid obesity due to excess calories (H)  E66.01       8. Encounter for screening mammogram for breast cancer  Z12.31 *MA Screening Digital Bilateral          Patient has been advised of split billing requirements and indicates understanding: Yes      COUNSELING:  Reviewed preventive health counseling, as reflected in patient instructions       Regular exercise       Healthy diet/nutrition       Vision screening       Hearing screening       Dental care       Colon cancer screening    Estimated body mass index is 42.43 kg/m  as calculated from the following:    Height as of this encounter: 1.651 m (5' 5\").    Weight as of this encounter: 115.7 kg (255 lb).    Weight management plan: Discussed healthy diet and exercise guidelines    She reports that she quit smoking about 27 years ago. Her smoking use included cigarettes. She has a 10.00 pack-year smoking history. She has never used smokeless tobacco.      Appropriate preventive services were discussed with this patient, including applicable screening as appropriate for cardiovascular disease, diabetes, osteopenia/osteoporosis, and glaucoma.  As appropriate for age/gender, discussed screening for colorectal cancer, prostate cancer, breast cancer, and cervical cancer. Checklist reviewing preventive services available has been given to the " patient.    Reviewed patients plan of care and provided an AVS. The Basic Care Plan (routine screening as documented in Health Maintenance) for Komal meets the Care Plan requirement. This Care Plan has been established and reviewed with the Patient.        Wesly Lehman [facility]

## 2022-10-26 ENCOUNTER — TELEPHONE (OUTPATIENT)
Dept: GASTROENTEROLOGY | Facility: CLINIC | Age: 72
End: 2022-10-26

## 2022-10-26 ENCOUNTER — HOSPITAL ENCOUNTER (OUTPATIENT)
Dept: MAMMOGRAPHY | Facility: CLINIC | Age: 72
Discharge: HOME | End: 2022-10-26
Attending: INTERNAL MEDICINE | Admitting: INTERNAL MEDICINE
Payer: MEDICARE

## 2022-10-26 ENCOUNTER — HOSPITAL ENCOUNTER (OUTPATIENT)
Facility: CLINIC | Age: 72
End: 2022-10-26
Attending: SPECIALIST | Admitting: SPECIALIST
Payer: MEDICARE

## 2022-10-26 DIAGNOSIS — Z12.31 ENCOUNTER FOR SCREENING MAMMOGRAM FOR BREAST CANCER: ICD-10-CM

## 2022-10-26 PROCEDURE — 77067 SCR MAMMO BI INCL CAD: CPT

## 2022-10-26 NOTE — TELEPHONE ENCOUNTER
Screening Questions  BLUE  KIND OF PREP RED  LOCATION [review exclusion criteria] GREEN  SEDATION TYPE    Y Are you active on mychart?   Wesly Lehman, DO  Ordering/Referring Provider?    BCBS/MEDICARE  What type of coverage do you have?  N Have you had a positive covid test in the last 90 days?     37.6  1. BMI  [BMI 40+ - review exclusion criteria]    SELF   2. Are you able to give consent for your medical care? [IF NO,RN REVIEW]        N  3. Are you taking any prescription pain medications on a routine schedule?        N 3a. EXTENDED PREP What kind of prescription?     N 4. Do you have any chemical dependencies such as alcohol, street drugs, or methadone?    N 5. Do you have any history of post-traumatic stress syndrome, severe anxiety or history of psychosis?      **If yes 3- 5 , please schedule with MAC sedation.**          IF YES TO ANY 6 - 10 - HOSPITAL SETTING ONLY.     N 6.   Do you need assistance transferring?     N 7.   Have you had a heart or lung transplant?    N 8.   Are you currently on dialysis?   N 9.   Do you use daily home oxygen?   N 10. Do you take nitroglycerin?   10a. N If yes, how often?     11. [FEMALES]   Are you currently pregnant?     11a.  If yes, how many weeks? [ Greater than 12 weeks, OR NEEDED]    N 12. Do you have Pulmonary Hypertension?   *NEED PAC APPT AT [facility]*     N - IMPLANTABLE MONITOR  13. [review exclusion criteria]  Do you have any implantable devices in your body (pacemaker, defib, LVAD)?    N 14. In the past 6 months, have you had any heart related issues including cardiomyopathy or heart attack?     N 14a. If yes, did it require cardiac stenting if so when?     Y - PT COULDN'T GIVE EXACT TIMELINE   15. Have you had a stroke or Transient ischemic attack (TIA - aka  mini stroke ) within 6 months?      N 16. Do you have mod to severe Obstructive Sleep Apnea?  [Hospital only - Ok at [place]]    N 17. Do you have SEVERE AND UNCONTROLLED asthma?   *NEED PAC  "APPT AT [facility]*     N 18. Are you currently taking any blood thinners?     18a. If yes, inform patient to \"follow up w/ ordering provider for bridging instructions.\"    N 19. Do you take the medication Phentermine?    19a. If yes, \"Hold for 7 days before procedure.  Please consult your prescribing provider if you have questions about holding this medication.\"     N  20. Do you have chronic kidney disease?      N  21. Do you have a diagnosis of diabetes?     N  22. On a regular basis do you go 3-5 days between bowel movements?     23. Preferred LOCAL Pharmacy for Pre Prescription    [ LIST ONLY ONE PHARMACY]        [id number] - [address]        - CLOSING REMINDERS -    Informed patient they will need an adult    Cannot take any type of public or medical transportation alone    Conscious Sedation- Needs  for 6 hours after the procedure       MAC/General-Needs  for 24 hours after procedure    Pre-Procedure Covid test to be completed [[facility] PCR Testing Required]    Confirmed Nurse will call to complete assessment       - SCHEDULING DETAILS -    NOAM   Surgeon   01/02/2023   Date of Procedure  Type of Procedure Scheduled  Lower Endoscopy [Colonoscopy]    [facility]  Location  Which Colonoscopy Prep was Sent?     [facility] PREP-If you answer yes to questions #8, #20, #21   MAC  Sedation Type     N PAC / Pre-op Required         Additional comments:  N      "

## 2022-12-08 DIAGNOSIS — I10 HYPERTENSION GOAL BP (BLOOD PRESSURE) < 140/90: ICD-10-CM

## 2022-12-11 DIAGNOSIS — E78.5 HYPERLIPIDEMIA LDL GOAL <130: ICD-10-CM

## 2022-12-11 NOTE — TELEPHONE ENCOUNTER
Routing refill request to provider for review/approval because:  A break in medication      Melany Osorio,RAZIAN, RN

## 2022-12-12 RX ORDER — AMLODIPINE BESYLATE 10 MG/1
TABLET ORAL
Qty: 90 TABLET | Refills: 0 | Status: SHIPPED | OUTPATIENT
Start: 2022-12-12 | End: 2023-05-17

## 2022-12-13 RX ORDER — ATORVASTATIN CALCIUM 40 MG/1
40 TABLET, FILM COATED ORAL DAILY
Qty: 90 TABLET | Refills: 1 | Status: SHIPPED | OUTPATIENT
Start: 2022-12-13 | End: 2023-07-24

## 2022-12-15 NOTE — PROGRESS NOTES
[facility]    Outpatient Physical Therapy Discharge Note  Patient: Komal Miller  : 1950    Beginning/End Dates of Reporting Period:  22 to 22    Referring Provider: MARISOL Regalado CNP    Therapy Diagnosis: R hip pain     Client Self Report: Pt reports that she went camping over Labor Day weekend, did more walking over uneven ground, moving camping equipment, overall increased her pain. Pt understanding of how she irritated her hip, resumed exercises this week with hip not experiencing any relief post camping.    Objective Measurements:      Outcome Measures (most recent score):      Goals:  Goal Identifier HEP   Goal Description Pt will demo independence in performance and progression of stretching and strengthening HEP in order to self manage her sx.   Target Date 22   Date Met      Progress (detail required for progress note): Pt has been able to perform her HEP, has failed to schedule further appointments, unable to finalize goal.     Goal Identifier LEFS   Goal Description Pt will demo improved leg and knee pain ratings and function as shown by increased LEFS score of at least 9 points in order to show significant improvement and greater to previous function.   Target Date 22   Date Met      Progress (detail required for progress note): Pt has failed to schedule further appointments, unable to reassess.       Plan:  Discharge from therapy.    Discharge:    Reason for Discharge: Patient has failed to schedule further appointments.    Equipment Issued:     Discharge Plan: Patient to continue home program.

## 2022-12-21 ENCOUNTER — MYC MEDICAL ADVICE (OUTPATIENT)
Dept: INTERNAL MEDICINE | Facility: CLINIC | Age: 72
End: 2022-12-21

## 2022-12-22 ENCOUNTER — TELEPHONE (OUTPATIENT)
Dept: GASTROENTEROLOGY | Facility: CLINIC | Age: 72
End: 2022-12-22

## 2022-12-22 NOTE — TELEPHONE ENCOUNTER
Caller: Komal Miller  Reason for Reschedule/Cancellation (please be detailed, any staff messages or encounters to note?): patient request/weather      Prior to reschedule please review:    Ordering Provider: Fallon    Sedation per order: Moderate    Does patient have any ASC Exclusions, please identify?: no      Notes on Cancelled Procedure:    Procedure:Lower Endoscopy [Colonoscopy]     Date: 01/02/23    Location:[facility]; [address] [place] [address] , [place] [address]    Surgeon: Jamal        Rescheduled: No

## 2022-12-30 ENCOUNTER — MYC MEDICAL ADVICE (OUTPATIENT)
Dept: INTERNAL MEDICINE | Facility: CLINIC | Age: 72
End: 2022-12-30

## 2022-12-30 ENCOUNTER — MYC MEDICAL ADVICE (OUTPATIENT)
Dept: ORTHOPEDICS | Facility: CLINIC | Age: 72
End: 2022-12-30

## 2023-01-03 DIAGNOSIS — I10 HYPERTENSION GOAL BP (BLOOD PRESSURE) < 140/90: ICD-10-CM

## 2023-01-06 RX ORDER — LOSARTAN POTASSIUM AND HYDROCHLOROTHIAZIDE 12.5; 5 MG/1; MG/1
TABLET ORAL
Qty: 90 TABLET | Refills: 0 | Status: SHIPPED | OUTPATIENT
Start: 2023-01-06 | End: 2023-05-17

## 2023-01-06 NOTE — TELEPHONE ENCOUNTER
Pending Prescriptions:                       Disp   Refills    losartan-hydrochlorothiazide (HYZAAR) 50-1*90 tab*0        Sig: TAKE 1 TABLET EVERY DAY    Routing refill request to provider for review/approval because:  A break in medication  Not listed on last office visit for current medications.

## 2023-01-11 NOTE — PROGRESS NOTES
Komal Miller  :  1950  DOS: 2023  MRN: [id number]  PCP: Wesly Lehman    Sports Medicine Clinic Visit    HPI  Komal Miller is a 72 year old female who is seen as a self referral presenting with right hip pain.     She reports chronic lateral right hip pain that has responded well to multiple greater trochanteric bursa injections in the past.  Most recent injection in July provided 6 months of relief.  Her pain has come back over the past few weeks, and she is interested in another injection today.  Denies numbness, tingling, radicular shooting pain, weakness.  Denies groin pain and back pain.  She has been doing physical therapy and using heat, which provides moderate relief.  She avoids NSAIDs due to hypertension, and is on aspirin after a treatment course of Xarelto for history of TIAs.  No other significant questions or concerns today.      Review of Systems  Musculoskeletal: as above  Remainder of review of systems is negative including constitutional, CV, pulmonary, GI, Skin and Neurologic except as noted in HPI or medical history.    Past Medical History:   Diagnosis Date     Unspecified essential hypertension      Past Surgical History:   Procedure Laterality Date     COLONOSCOPY  06/10/08    Internal hemorrhoids, otherwise normal.     COLONOSCOPY  01/20/10     HC DILATION/CURETTAGE DIAG/THER NON OB      D & C     HC LAP, SURG ENTEROLYSIS  05/07/10     HC REMOVAL OF OVARY/TUBE(S)      Salpingo-Oophorectomy, bilateral     LAPAROSCOPY DIAGNOSTIC (GENERAL)  2014    Procedure: LAPAROSCOPY DIAGNOSTIC (GENERAL);  Surgeon: Seth Matthews MD;  Location: [facility]     LAPAROTOMY, LYSIS ADHESIONS, COMBINED  2014    Procedure: COMBINED LAPAROTOMY, LYSIS ADHESIONS;  Surgeon: Seth Matthews MD;  Location: [facility] OR     VIDEO CAPSULE ENDOSCOPY  02/15/10    normal sm intestine     ZZC APPENDECTOMY       ZZC LIGATE FALLOPIAN TUBE       ZZC TOTAL ABDOM HYSTERECTOMY      Hysterectomy,  Total Abdominal     ZZC TOTAL KNEE ARTHROPLASTY Left      ZZHC UGI ENDOSCOPY, SIMPLE EXAM  01/20/10     Family History   Problem Relation Age of Onset     C.A.D. Mother         bi-pass     Diabetes Mother         late onset     Allergies Mother         xray dye     Arthritis Mother      Cancer Mother         ovary     Respiratory Mother      C.A.D. Father         bi-pass     Hypertension Brother      Breast Cancer Sister      Allergies Sister      Allergies Brother      Eye Disorder Maternal Aunt      Gastrointestinal Disease Brother      Gynecology Sister      Thyroid Disease Sister        Objective  /84   Wt 115.7 kg (255 lb)   BMI 42.43 kg/m        General: healthy, alert and in no acute distress      HEENT: no scleral icterus or conjunctival erythema     Skin: no suspicious lesions or rash. No jaundice.     CV: regular rhythm by palpation, 2+ distal pulses, no pedal edema      Resp: normal respiratory effort without conversational dyspnea     Psych: normal mood and affect      Gait: nonantalgic, appropriate coordination and balance     Neuro:        - Sensation to light touch:  Intact throughout the BLE including all peripheral nerve distributions.        - MSR:  2+ in bilateral patella, achilles.        - Special tests:   - Slump/SLR:  Neg bilaterally    MSK - Hip:       - Inspection:    - No significant asymmetry, erythema, warmth, ecchymosis, lesion.   - Alignment without trendelenburg gait.        - ROM:    - Full and painless AROM/PROM at bilateral hips.       - Palpation:    - TTP at the right greater trochanter.  - NTTP elsewhere including the ASIS, PSIS/SI joint, iliac crest, distal iliotibial band.       - Strength:    - 5/5 in BLE including HF, HAb, HAdd, KF, KE, DF, PF, EHL, Inv, Ev.        - Special tests:   - Log roll:  Neg   - Resisted SLR:  Neg   - FADIR:  Neg   - Scour:  Neg   - MAGGIE:  Pos for lateral hip pain on the right   - Thiago:  Neg    Radiology  I independently reviewed the  available relevant imaging, with the following interpretation:   - XR pelvis and right hip mild degenerative changes of the right femoroacetabular joint, no fractures or dislocations, mild degenerative changes in bilateral SI joints and left femoroacetabular joint.      PROCEDURE  Trochanteric Hip Injection - Right  The patient was informed of the risks and the benefits of the procedure and a written consent was signed. The patient s lateral hip was prepped with chlorhexidine in sterile fashion. 1 mL of Kenalog (40 mg/mL) suspension was drawn up into a 5 mL syringe with 1 mL of 1% lidocaine and 1 mL of 0.5% bupivacaine. Injection was performed using sterile technique. Using landmark guidance as well as correlation with patient's region of greatest tenderness on palpation at the trochanter, a 3.5-inch 22-gauge needle was used to enter the mariama-trochanteric bursal tissue.  There were no complications. The patient tolerated the procedure well. There was negligible bleeding.       Large Joint Injection/Arthocentesis: R greater trochanteric bursa    Date/Time: 1/16/2023 11:15 AM  Performed by: Elliot Mujica DO  Authorized by: Elliot Mujica DO     Indications:  Pain  Needle Size:  22 G  Guidance: landmark guided    Approach:  Lateral  Location:  Hip      Site:  R greater trochanteric bursa  Medications:  40 mg triamcinolone 40 MG/ML; 2 mL lidocaine 1 %; 2 mL bupivacaine (PF) 0.5 %  Outcome:  Tolerated well, no immediate complications  Procedure discussed: discussed risks, benefits, and alternatives    Consent Given by:  Patient  Prep: patient was prepped and draped in usual sterile fashion          Assessment  1. Greater trochanteric pain syndrome of right lower extremity        Plan  Komal Miller is a 72 year old female that presents with lateral right hip pain. History and physical exam appear most consistent with greater trochanteric pain syndrome. Discussed the nature of the condition and treatment options and  mutually agreed upon the following plan:    - Imaging:         - Reviewed relevant imaging in the chart. Results above. Reviewed imaging with patient.   - Medications:         - Discussed pharmacologic options for pain relief. May use NSAIDs as needed for pain control. May also use topical creams such as IcyHot, BioFreeze, or Voltaren gel PRN.   - Injections:         - Performed a corticosteroid injection of the right greater trochanteric bursa today in clinic. Patient tolerated well without complications. See procedure note above for details.   - Therapy:         -Continue home exercise program as instructed by previous physical therapist.  - Modalities:         - May use ice, heat, massage or other modalities PRN.   - Activity:         - Encouraged to remain active and participate in regular activities as symptoms allow.  - Follow up:         - In 3 months for re-evaluation and update to treatment plan. Patient has clinic contact information for questions or concerns.       Elliot Mujica DO  [facility] - Sports Medicine  [facility] - Department of Orthopedic Surgery

## 2023-01-12 ENCOUNTER — MYC MEDICAL ADVICE (OUTPATIENT)
Dept: INTERNAL MEDICINE | Facility: CLINIC | Age: 73
End: 2023-01-12
Payer: COMMERCIAL

## 2023-01-16 ENCOUNTER — OFFICE VISIT (OUTPATIENT)
Dept: ORTHOPEDICS | Facility: CLINIC | Age: 73
End: 2023-01-16
Payer: COMMERCIAL

## 2023-01-16 VITALS — BODY MASS INDEX: 42.43 KG/M2 | WEIGHT: 255 LBS | DIASTOLIC BLOOD PRESSURE: 84 MMHG | SYSTOLIC BLOOD PRESSURE: 129 MMHG

## 2023-01-16 DIAGNOSIS — M25.551 GREATER TROCHANTERIC PAIN SYNDROME OF RIGHT LOWER EXTREMITY: Primary | ICD-10-CM

## 2023-01-16 PROCEDURE — 20610 DRAIN/INJ JOINT/BURSA W/O US: CPT | Mod: RT | Performed by: STUDENT IN AN ORGANIZED HEALTH CARE EDUCATION/TRAINING PROGRAM

## 2023-01-16 PROCEDURE — 99203 OFFICE O/P NEW LOW 30 MIN: CPT | Mod: 25 | Performed by: STUDENT IN AN ORGANIZED HEALTH CARE EDUCATION/TRAINING PROGRAM

## 2023-01-16 RX ADMIN — BUPIVACAINE HYDROCHLORIDE 2 ML: 5 INJECTION, SOLUTION EPIDURAL; INTRACAUDAL at 11:15

## 2023-01-16 RX ADMIN — LIDOCAINE HYDROCHLORIDE 2 ML: 10 INJECTION, SOLUTION INFILTRATION; PERINEURAL at 11:15

## 2023-01-16 RX ADMIN — TRIAMCINOLONE ACETONIDE 40 MG: 40 INJECTION, SUSPENSION INTRA-ARTICULAR; INTRAMUSCULAR at 11:15

## 2023-01-16 ASSESSMENT — PAIN SCALES - GENERAL: PAINLEVEL: MODERATE PAIN (5)

## 2023-01-16 NOTE — LETTER
2023         RE: Komal Miller  [address]  [facility] [address]        Dear Colleague,    Thank you for referring your patient, Komal Miller, to the [facility] [place]. Please see a copy of my visit note below.    Komal Miller  :  1950  DOS: 2023  MRN: [id number]  PCP: Wesly Lehman    Sports Medicine Clinic Visit    HPI  Komal Miller is a 72 year old female who is seen as a self referral presenting with right hip pain.     She reports chronic lateral right hip pain that has responded well to multiple greater trochanteric bursa injections in the past.  Most recent injection in July provided 6 months of relief.  Her pain has come back over the past few weeks, and she is interested in another injection today.  Denies numbness, tingling, radicular shooting pain, weakness.  Denies groin pain and back pain.  She has been doing physical therapy and using heat, which provides moderate relief.  She avoids NSAIDs due to hypertension, and is on aspirin after a treatment course of Xarelto for history of TIAs.  No other significant questions or concerns today.      Review of Systems  Musculoskeletal: as above  Remainder of review of systems is negative including constitutional, CV, pulmonary, GI, Skin and Neurologic except as noted in HPI or medical history.    Past Medical History:   Diagnosis Date     Unspecified essential hypertension      Past Surgical History:   Procedure Laterality Date     COLONOSCOPY  06/10/08    Internal hemorrhoids, otherwise normal.     COLONOSCOPY  01/20/10     HC DILATION/CURETTAGE DIAG/THER NON OB      D & C     HC LAP, SURG ENTEROLYSIS  05/07/10     HC REMOVAL OF OVARY/TUBE(S)      Salpingo-Oophorectomy, bilateral     LAPAROSCOPY DIAGNOSTIC (GENERAL)  2014    Procedure: LAPAROSCOPY DIAGNOSTIC (GENERAL);  Surgeon: Seth Matthews MD;  [facility]     LAPAROTOMY, LYSIS ADHESIONS, COMBINED  2014     Procedure: COMBINED LAPAROTOMY, LYSIS ADHESIONS;  Surgeon: Seth Matthews MD;  Location[facility]     VIDEO CAPSULE ENDOSCOPY  02/15/10    normal sm intestine     ZZC APPENDECTOMY       ZZC LIGATE FALLOPIAN TUBE       ZZC TOTAL ABDOM HYSTERECTOMY      Hysterectomy, Total Abdominal     ZZC TOTAL KNEE ARTHROPLASTY Left      ZZHC UGI ENDOSCOPY, SIMPLE EXAM  01/20/10     Family History   Problem Relation Age of Onset     C.A.D. Mother         bi-pass     Diabetes Mother         late onset     Allergies Mother         xray dye     Arthritis Mother      Cancer Mother         ovary     Respiratory Mother      C.A.D. Father         bi-pass     Hypertension Brother      Breast Cancer Sister      Allergies Sister      Allergies Brother      Eye Disorder Maternal Aunt      Gastrointestinal Disease Brother      Gynecology Sister      Thyroid Disease Sister        Objective  /84   Wt 115.7 kg (255 lb)   BMI 42.43 kg/m        General: healthy, alert and in no acute distress      HEENT: no scleral icterus or conjunctival erythema     Skin: no suspicious lesions or rash. No jaundice.     CV: regular rhythm by palpation, 2+ distal pulses, no pedal edema      Resp: normal respiratory effort without conversational dyspnea     Psych: normal mood and affect      Gait: nonantalgic, appropriate coordination and balance     Neuro:        - Sensation to light touch:  Intact throughout the BLE including all peripheral nerve distributions.        - MSR:  2+ in bilateral patella, achilles.        - Special tests:   - Slump/SLR:  Neg bilaterally    MSK - Hip:       - Inspection:    - No significant asymmetry, erythema, warmth, ecchymosis, lesion.   - Alignment without trendelenburg gait.        - ROM:    - Full and painless AROM/PROM at bilateral hips.       - Palpation:    - TTP at the right greater trochanter.  - NTTP elsewhere including the ASIS, PSIS/SI joint, iliac crest, distal iliotibial band.       - Strength:    - 5/5 in BLE  including HF, HAb, HAdd, KF, KE, DF, PF, EHL, Inv, Ev.        - Special tests:   - Log roll:  Neg   - Resisted SLR:  Neg   - FADIR:  Neg   - Scour:  Neg   - MAGGIE:  Pos for lateral hip pain on the right   - Thiago:  Neg    Radiology  I independently reviewed the available relevant imaging, with the following interpretation:   - XR pelvis and right hip mild degenerative changes of the right femoroacetabular joint, no fractures or dislocations, mild degenerative changes in bilateral SI joints and left femoroacetabular joint.      PROCEDURE  Trochanteric Hip Injection - Right  The patient was informed of the risks and the benefits of the procedure and a written consent was signed. The patient s lateral hip was prepped with chlorhexidine in sterile fashion. 1 mL of Kenalog (40 mg/mL) suspension was drawn up into a 5 mL syringe with 1 mL of 1% lidocaine and 1 mL of 0.5% bupivacaine. Injection was performed using sterile technique. Using landmark guidance as well as correlation with patient's region of greatest tenderness on palpation at the trochanter, a 3.5-inch 22-gauge needle was used to enter the mariama-trochanteric bursal tissue.  There were no complications. The patient tolerated the procedure well. There was negligible bleeding.       Large Joint Injection/Arthocentesis: R greater trochanteric bursa    Date/Time: 1/16/2023 11:15 AM  Performed by: Elliot Mujica DO  Authorized by: Elliot Mujica DO     Indications:  Pain  Needle Size:  22 G  Guidance: landmark guided    Approach:  Lateral  Location:  Hip      Site:  R greater trochanteric bursa  Medications:  40 mg triamcinolone 40 MG/ML; 2 mL lidocaine 1 %; 2 mL bupivacaine (PF) 0.5 %  Outcome:  Tolerated well, no immediate complications  Procedure discussed: discussed risks, benefits, and alternatives    Consent Given by:  Patient  Prep: patient was prepped and draped in usual sterile fashion          Assessment  1. Greater trochanteric pain syndrome of right lower  extremity        Plan  Komal Miller is a 72 year old female that presents with lateral right hip pain. History and physical exam appear most consistent with greater trochanteric pain syndrome. Discussed the nature of the condition and treatment options and mutually agreed upon the following plan:    - Imaging:         - Reviewed relevant imaging in the chart. Results above. Reviewed imaging with patient.   - Medications:         - Discussed pharmacologic options for pain relief. May use NSAIDs as needed for pain control. May also use topical creams such as IcyHot, BioFreeze, or Voltaren gel PRN.   - Injections:         - Performed a corticosteroid injection of the right greater trochanteric bursa today in clinic. Patient tolerated well without complications. See procedure note above for details.   - Therapy:         -Continue home exercise program as instructed by previous physical therapist.  - Modalities:         - May use ice, heat, massage or other modalities PRN.   - Activity:         - Encouraged to remain active and participate in regular activities as symptoms allow.  - Follow up:         - In 3 months for re-evaluation and update to treatment plan. Patient has clinic contact information for questions or concerns.       Elliot Mujica DO  [facility] - Sports Medicine  [facility] - Department of Orthopedic Surgery         Again, thank you for allowing me to participate in the care of your patient.        Sincerely,        Elliot Mujica DO

## 2023-01-16 NOTE — PATIENT INSTRUCTIONS
Klever Miller ,     You were seen today for your lateral right hip pain secondary to greater trochanteric pain syndrome.   You may use non-steroidal anti-inflammatory (NSAID) medications as needed for pain relief.   You may try topical medications such as IcyHot, BioFreeze, Bengay, or Voltaren gel as well, which may help your symptoms.   Continue home exercise program as instructed by previous physical therapist.  We performed a corticosteroid injection of the right greater trochanteric bursa today in clinic. It may take 2-3 days for the medication to start to provide significant relief. Please keep the injection site clean and dry, do not submerge the site (no baths, pools) for 24 hours. Rest the area for the next 24-48 hours, then you may resume normal activities.   Follow up in 3 months for re-evaluation and update to treatment plan.   Please call the clinic for any questions or concerns.    It was a pleasure seeing you today. Thank you for choosing [facility] for your care.     Sincerely,   Elliot Mujica DO  [facility] - Sports Medicine  [facility] - Department of Orthopedic Surgery

## 2023-01-18 ENCOUNTER — MYC MEDICAL ADVICE (OUTPATIENT)
Dept: INTERNAL MEDICINE | Facility: CLINIC | Age: 73
End: 2023-01-18
Payer: COMMERCIAL

## 2023-01-18 RX ORDER — TRIAMCINOLONE ACETONIDE 40 MG/ML
40 INJECTION, SUSPENSION INTRA-ARTICULAR; INTRAMUSCULAR
Status: DISCONTINUED | OUTPATIENT
Start: 2023-01-16 | End: 2023-06-15

## 2023-01-18 RX ORDER — BUPIVACAINE HYDROCHLORIDE 5 MG/ML
2 INJECTION, SOLUTION EPIDURAL; INTRACAUDAL
Status: DISCONTINUED | OUTPATIENT
Start: 2023-01-16 | End: 2023-06-15

## 2023-01-18 RX ORDER — LIDOCAINE HYDROCHLORIDE 10 MG/ML
2 INJECTION, SOLUTION INFILTRATION; PERINEURAL
Status: DISCONTINUED | OUTPATIENT
Start: 2023-01-16 | End: 2023-06-15

## 2023-01-18 NOTE — TELEPHONE ENCOUNTER
Calling regarding status of form    Patient would like form MAILED to her when completed.    Stephanie Yoo XRO/

## 2023-01-18 NOTE — TELEPHONE ENCOUNTER
Spoke with patient, Dr Lehman has form, patient would like it mailed to her when completed.    Stephanie Yoo XRO/

## 2023-01-25 ENCOUNTER — MYC MEDICAL ADVICE (OUTPATIENT)
Dept: INTERNAL MEDICINE | Facility: CLINIC | Age: 73
End: 2023-01-25
Payer: COMMERCIAL

## 2023-01-25 NOTE — LETTER
[address] [address]  [phone]      January 30, 2023    Komal Miller  [address] [facility] [address]

## 2023-03-01 NOTE — TELEPHONE ENCOUNTER
Would be okay with doing steroid injections early.  Another option would be to do another Synvisc One injection - would have to get insurance approval again - (likely would have to wait until the beginning of February which would be 6 months from the previous one).  It may also be worth talking to ortho surgery since the injections are not lasting very long.     X Size Of Lesion In Cm (Optional): 0 Introduction Text (Please End With A Colon): The following are to be performed next visit: Instructions (Optional): Treating 7+ lesions to the face, due to lesions spreading, worsening, recurring, being itchy and raised. Detail Level: Detailed Procedure To Be Performed At Next Visit: Cryotherapy Other Procedure: ED&C, Cautery

## 2023-05-05 NOTE — PROGRESS NOTES
Komal Miller  :  1950  DOS: 5/10/2023  MRN: [id number]  PCP: Wesly Lehman    Sports Medicine Clinic Visit    Interim History - May 10, 2023  Since last visit on 2023 patient has noticed return of her right lateral hip pain. Walking causes the most pain. Right greater trochanteric bursa injection completed on 2023 provided 3.5 months of great relief.  She is using Tylenol, Voltaren and heat pack at this time with mild relief in pain.  Denies numbness and tingling, radicular shooting pain, neurogenic weakness. Denies groin pain. No new injury in the interim. She is interested in a repeat GTB injection today in hopes of similar relief.       Initial Visit: 2023  HPI  Komal Miller is a 72 year old female who is seen as a self referral presenting with right hip pain.     She reports chronic lateral right hip pain that has responded well to multiple greater trochanteric bursa injections in the past.  Most recent injection in July provided 6 months of relief.  Her pain has come back over the past few weeks, and she is interested in another injection today.  Denies numbness, tingling, radicular shooting pain, weakness.  Denies groin pain and back pain.  She has been doing physical therapy and using heat, which provides moderate relief.  She avoids NSAIDs due to hypertension, and is on aspirin after a treatment course of Xarelto for history of TIAs.  No other significant questions or concerns today.      Review of Systems  Musculoskeletal: as above  Remainder of review of systems is negative including constitutional, CV, pulmonary, GI, Skin and Neurologic except as noted in HPI or medical history.    Past Medical History:   Diagnosis Date     Unspecified essential hypertension      Past Surgical History:   Procedure Laterality Date     COLONOSCOPY  06/10/08    Internal hemorrhoids, otherwise normal.     COLONOSCOPY  01/20/10     HC DILATION/CURETTAGE DIAG/THER NON OB      D  & C     HC LAP, SURG ENTEROLYSIS  05/07/10     HC REMOVAL OF OVARY/TUBE(S)      Salpingo-Oophorectomy, bilateral     LAPAROSCOPY DIAGNOSTIC (GENERAL)  5/6/2014    Procedure: LAPAROSCOPY DIAGNOSTIC (GENERAL);  Surgeon: Seth Matthews MD;  Location: [facility]     LAPAROTOMY, LYSIS ADHESIONS, COMBINED  5/6/2014    Procedure: COMBINED LAPAROTOMY, LYSIS ADHESIONS;  Surgeon: Seth Matthews MD;  Location: [facility]     VIDEO CAPSULE ENDOSCOPY  02/15/10    normal sm intestine     ZZC APPENDECTOMY       ZZC LIGATE FALLOPIAN TUBE       ZZC TOTAL ABDOM HYSTERECTOMY      Hysterectomy, Total Abdominal     ZZC TOTAL KNEE ARTHROPLASTY Left      ZZHC UGI ENDOSCOPY, SIMPLE EXAM  01/20/10     Family History   Problem Relation Age of Onset     C.A.D. Mother         bi-pass     Diabetes Mother         late onset     Allergies Mother         xray dye     Arthritis Mother      Cancer Mother         ovary     Respiratory Mother      C.A.D. Father         bi-pass     Hypertension Brother      Breast Cancer Sister      Allergies Sister      Allergies Brother      Eye Disorder Maternal Aunt      Gastrointestinal Disease Brother      Gynecology Sister      Thyroid Disease Sister        Objective  /84   Wt 115.7 kg (255 lb)   BMI 42.43 kg/m        General: healthy, alert and in no acute distress      HEENT: no scleral icterus or conjunctival erythema     Skin: no suspicious lesions or rash. No jaundice.     CV: regular rhythm by palpation, 2+ distal pulses, no pedal edema      Resp: normal respiratory effort without conversational dyspnea     Psych: normal mood and affect      Gait: Slightly antalgic favoring the right leg, mild Trendelenburg gait, appropriate coordination and balance     Neuro:        - Sensation to light touch:  Intact throughout the BLE including all peripheral nerve distributions.        - MSR:  2+ in bilateral patella, achilles.        - Special tests:   - Slump/SLR:  Neg bilaterally    MSK - Hip:       -  Inspection:    - No significant asymmetry, erythema, warmth, ecchymosis, lesion.        - ROM:    - Full and painless AROM/PROM at bilateral hips.       - Palpation:    - TTP at the right greater trochanter.  - NTTP elsewhere including the ASIS, PSIS/SI joint, iliac crest, distal iliotibial band.       - Strength:    - 5/5 in BLE including HF, HAb, HAdd, KF, KE, DF, PF, EHL, Inv, Ev.        - Special tests:   - Log roll:  Neg   - Resisted SLR:  Neg   - FADIR:  Neg   - Scour:  Neg   - MAGGIE:  Pos for lateral hip pain on the right   - Thiago:  Neg    Radiology  I previously independently reviewed the available relevant imaging, with the following interpretation:   - XR pelvis and right hip 5/17/2022 shows mild degenerative changes of the right femoroacetabular joint, no fractures or dislocations, mild degenerative changes in bilateral SI joints and left femoroacetabular joint.      PROCEDURE  Trochanteric Hip Injection - Right  The patient was informed of the risks and the benefits of the procedure and a written consent was signed. The patient s lateral hip was prepped with chlorhexidine in sterile fashion. 1 mL of Kenalog (40 mg/mL) suspension was drawn up into a 5 mL syringe with 1 mL of 1% lidocaine and 1 mL of 0.5% bupivacaine. Injection was performed using sterile technique. Using landmark guidance as well as correlation with patient's region of greatest tenderness on palpation at the trochanter, a 3.5-inch 25-gauge needle was used to enter the mariama-trochanteric bursal tissue.  There were no complications. The patient tolerated the procedure well. There was negligible bleeding.       Large Joint Injection/Arthocentesis: R greater trochanteric bursa    Date/Time: 5/10/2023 8:27 AM    Performed by: Elliot Mujica DO  Authorized by: Elliot Mujica DO    Indications:  Pain  Needle Size:  25 G  Guidance: landmark guided    Approach:  Lateral  Location:  Hip      Site:  R greater trochanteric bursa  Medications:  40 mg  triamcinolone 40 MG/ML; 1 mL bupivacaine 0.5 %; 1 mL lidocaine 1 %  Outcome:  Tolerated well, no immediate complications  Procedure discussed: discussed risks, benefits, and alternatives    Consent Given by:  Patient  Prep: patient was prepped and draped in usual sterile fashion          Assessment  1. Greater trochanteric pain syndrome of right lower extremity        Plan  Komal Miller is a 72 year old female that presents with lateral right hip pain. History and physical exam appear most consistent with greater trochanteric pain syndrome. Discussed the nature of the condition and treatment options and mutually agreed upon the following plan:    - Medications:         - Discussed pharmacologic options for pain relief. May use NSAIDs as needed for pain control. May also use topical creams such as IcyHot, BioFreeze, or Voltaren gel PRN.  Encouraged to use Voltaren gel up to 4 times per day over the painful bursa pain control.  - Injections:         - Performed a corticosteroid injection of the right greater trochanteric bursa today in clinic. Patient tolerated well without complications. See procedure note above for details.   - Therapy:         - Continue home exercise program as instructed by previous physical therapist.  - Modalities:         - May use ice, heat, massage or other modalities PRN.   - Activity:         - Encouraged to remain active and participate in regular activities as symptoms allow.  - Follow up:         -As needed in the future for re-evaluation and update to treatment plan. Patient has clinic contact information for questions or concerns.       Elliot Mujica DO, DERRELL  [facility] - Sports Medicine  [facility] - Department of Orthopedic Surgery

## 2023-05-10 ENCOUNTER — OFFICE VISIT (OUTPATIENT)
Dept: ORTHOPEDICS | Facility: CLINIC | Age: 73
End: 2023-05-10
Payer: COMMERCIAL

## 2023-05-10 VITALS — DIASTOLIC BLOOD PRESSURE: 84 MMHG | SYSTOLIC BLOOD PRESSURE: 129 MMHG | WEIGHT: 255 LBS | BODY MASS INDEX: 42.43 KG/M2

## 2023-05-10 DIAGNOSIS — M25.551 GREATER TROCHANTERIC PAIN SYNDROME OF RIGHT LOWER EXTREMITY: Primary | ICD-10-CM

## 2023-05-10 PROCEDURE — 20610 DRAIN/INJ JOINT/BURSA W/O US: CPT | Mod: RT | Performed by: STUDENT IN AN ORGANIZED HEALTH CARE EDUCATION/TRAINING PROGRAM

## 2023-05-10 PROCEDURE — 99213 OFFICE O/P EST LOW 20 MIN: CPT | Mod: 25 | Performed by: STUDENT IN AN ORGANIZED HEALTH CARE EDUCATION/TRAINING PROGRAM

## 2023-05-10 RX ORDER — LIDOCAINE HYDROCHLORIDE 10 MG/ML
1 INJECTION, SOLUTION INFILTRATION; PERINEURAL
Status: DISCONTINUED | OUTPATIENT
Start: 2023-05-10 | End: 2023-06-15

## 2023-05-10 RX ORDER — BUPIVACAINE HYDROCHLORIDE 5 MG/ML
1 INJECTION, SOLUTION PERINEURAL
Status: DISCONTINUED | OUTPATIENT
Start: 2023-05-10 | End: 2023-06-15

## 2023-05-10 RX ORDER — TRIAMCINOLONE ACETONIDE 40 MG/ML
40 INJECTION, SUSPENSION INTRA-ARTICULAR; INTRAMUSCULAR
Status: DISCONTINUED | OUTPATIENT
Start: 2023-05-10 | End: 2023-06-15

## 2023-05-10 RX ADMIN — BUPIVACAINE HYDROCHLORIDE 1 ML: 5 INJECTION, SOLUTION PERINEURAL at 08:27

## 2023-05-10 RX ADMIN — TRIAMCINOLONE ACETONIDE 40 MG: 40 INJECTION, SUSPENSION INTRA-ARTICULAR; INTRAMUSCULAR at 08:27

## 2023-05-10 RX ADMIN — LIDOCAINE HYDROCHLORIDE 1 ML: 10 INJECTION, SOLUTION INFILTRATION; PERINEURAL at 08:27

## 2023-05-10 ASSESSMENT — PAIN SCALES - GENERAL: PAINLEVEL: NO PAIN (1)

## 2023-05-10 NOTE — LETTER
5/10/2023         RE: Komal Miller  [address]  [facility] [address]        Dear Colleague,    Thank you for referring your patient, Komal Miller, to the [facility] [place]. Please see a copy of my visit note below.    Komal Miller  :  1950  DOS: 5/10/2023  MRN: [id number]  PCP: Wesly Lehman    Sports Medicine Clinic Visit    Interim History - May 10, 2023  Since last visit on 2023 patient has noticed return of her right lateral hip pain. Walking causes the most pain. Right greater trochanteric bursa injection completed on 2023 provided 3.5 months of great relief.  She is using Tylenol, Voltaren and heat pack at this time with mild relief in pain.  Denies numbness and tingling, radicular shooting pain, neurogenic weakness. Denies groin pain. No new injury in the interim. She is interested in a repeat GTB injection today in hopes of similar relief.       Initial Visit: 2023  HPI  Komal Miller is a 72 year old female who is seen as a self referral presenting with right hip pain.     She reports chronic lateral right hip pain that has responded well to multiple greater trochanteric bursa injections in the past.  Most recent injection in July provided 6 months of relief.  Her pain has come back over the past few weeks, and she is interested in another injection today.  Denies numbness, tingling, radicular shooting pain, weakness.  Denies groin pain and back pain.  She has been doing physical therapy and using heat, which provides moderate relief.  She avoids NSAIDs due to hypertension, and is on aspirin after a treatment course of Xarelto for history of TIAs.  No other significant questions or concerns today.      Review of Systems  Musculoskeletal: as above  Remainder of review of systems is negative including constitutional, CV, pulmonary, GI, Skin and Neurologic except as noted in HPI or medical history.    Past Medical  History:   Diagnosis Date     Unspecified essential hypertension      Past Surgical History:   Procedure Laterality Date     COLONOSCOPY  06/10/08    Internal hemorrhoids, otherwise normal.     COLONOSCOPY  01/20/10     HC DILATION/CURETTAGE DIAG/THER NON OB      D & C     HC LAP, SURG ENTEROLYSIS  05/07/10     HC REMOVAL OF OVARY/TUBE(S)      Salpingo-Oophorectomy, bilateral     LAPAROSCOPY DIAGNOSTIC (GENERAL)  5/6/2014    Procedure: LAPAROSCOPY DIAGNOSTIC (GENERAL);  Surgeon: Seth Matthews MD;  [facility]     LAPAROTOMY, LYSIS ADHESIONS, COMBINED  5/6/2014    Procedure: COMBINED LAPAROTOMY, LYSIS ADHESIONS;  Surgeon: Seth Matthews MD;  Location: [facility]     VIDEO CAPSULE ENDOSCOPY  02/15/10    normal sm intestine     ZZC APPENDECTOMY       ZZC LIGATE FALLOPIAN TUBE       ZZC TOTAL ABDOM HYSTERECTOMY      Hysterectomy, Total Abdominal     ZZC TOTAL KNEE ARTHROPLASTY Left      ZZHC UGI ENDOSCOPY, SIMPLE EXAM  01/20/10     Family History   Problem Relation Age of Onset     C.A.D. Mother         bi-pass     Diabetes Mother         late onset     Allergies Mother         xray dye     Arthritis Mother      Cancer Mother         ovary     Respiratory Mother      C.A.D. Father         bi-pass     Hypertension Brother      Breast Cancer Sister      Allergies Sister      Allergies Brother      Eye Disorder Maternal Aunt      Gastrointestinal Disease Brother      Gynecology Sister      Thyroid Disease Sister        Objective  /84   Wt 115.7 kg (255 lb)   BMI 42.43 kg/m        General: healthy, alert and in no acute distress      HEENT: no scleral icterus or conjunctival erythema     Skin: no suspicious lesions or rash. No jaundice.     CV: regular rhythm by palpation, 2+ distal pulses, no pedal edema      Resp: normal respiratory effort without conversational dyspnea     Psych: normal mood and affect      Gait: Slightly antalgic favoring the right leg, mild Trendelenburg gait, appropriate coordination  and balance     Neuro:        - Sensation to light touch:  Intact throughout the BLE including all peripheral nerve distributions.        - MSR:  2+ in bilateral patella, achilles.        - Special tests:   - Slump/SLR:  Neg bilaterally    MSK - Hip:       - Inspection:    - No significant asymmetry, erythema, warmth, ecchymosis, lesion.        - ROM:    - Full and painless AROM/PROM at bilateral hips.       - Palpation:    - TTP at the right greater trochanter.  - NTTP elsewhere including the ASIS, PSIS/SI joint, iliac crest, distal iliotibial band.       - Strength:    - 5/5 in BLE including HF, HAb, HAdd, KF, KE, DF, PF, EHL, Inv, Ev.        - Special tests:   - Log roll:  Neg   - Resisted SLR:  Neg   - FADIR:  Neg   - Scour:  Neg   - MAGGIE:  Pos for lateral hip pain on the right   - Thiago:  Neg    Radiology  I previously independently reviewed the available relevant imaging, with the following interpretation:   - XR pelvis and right hip 5/17/2022 shows mild degenerative changes of the right femoroacetabular joint, no fractures or dislocations, mild degenerative changes in bilateral SI joints and left femoroacetabular joint.      PROCEDURE  Trochanteric Hip Injection - Right  The patient was informed of the risks and the benefits of the procedure and a written consent was signed. The patient s lateral hip was prepped with chlorhexidine in sterile fashion. 1 mL of Kenalog (40 mg/mL) suspension was drawn up into a 5 mL syringe with 1 mL of 1% lidocaine and 1 mL of 0.5% bupivacaine. Injection was performed using sterile technique. Using landmark guidance as well as correlation with patient's region of greatest tenderness on palpation at the trochanter, a 3.5-inch 25-gauge needle was used to enter the mariama-trochanteric bursal tissue.  There were no complications. The patient tolerated the procedure well. There was negligible bleeding.       Large Joint Injection/Arthocentesis: R greater trochanteric  bursa    Date/Time: 5/10/2023 8:27 AM    Performed by: Elliot Mujica DO  Authorized by: Elliot Mujica DO    Indications:  Pain  Needle Size:  25 G  Guidance: landmark guided    Approach:  Lateral  Location:  Hip      Site:  R greater trochanteric bursa  Medications:  40 mg triamcinolone 40 MG/ML; 1 mL bupivacaine 0.5 %; 1 mL lidocaine 1 %  Outcome:  Tolerated well, no immediate complications  Procedure discussed: discussed risks, benefits, and alternatives    Consent Given by:  Patient  Prep: patient was prepped and draped in usual sterile fashion          Assessment  1. Greater trochanteric pain syndrome of right lower extremity        Plan  Komal Miller is a 72 year old female that presents with lateral right hip pain. History and physical exam appear most consistent with greater trochanteric pain syndrome. Discussed the nature of the condition and treatment options and mutually agreed upon the following plan:    - Medications:         - Discussed pharmacologic options for pain relief. May use NSAIDs as needed for pain control. May also use topical creams such as IcyHot, BioFreeze, or Voltaren gel PRN.  Encouraged to use Voltaren gel up to 4 times per day over the painful bursa pain control.  - Injections:         - Performed a corticosteroid injection of the right greater trochanteric bursa today in clinic. Patient tolerated well without complications. See procedure note above for details.   - Therapy:         - Continue home exercise program as instructed by previous physical therapist.  - Modalities:         - May use ice, heat, massage or other modalities PRN.   - Activity:         - Encouraged to remain active and participate in regular activities as symptoms allow.  - Follow up:         -As needed in the future for re-evaluation and update to treatment plan. Patient has clinic contact information for questions or concerns.       Elliot Mujica DO, [facility] - Sports  Medicine  [facility] - Department of Orthopedic Surgery         Again, thank you for allowing me to participate in the care of your patient.        Sincerely,        Elliot Mujica, DO

## 2023-05-15 DIAGNOSIS — I10 HYPERTENSION GOAL BP (BLOOD PRESSURE) < 140/90: ICD-10-CM

## 2023-05-17 RX ORDER — LOSARTAN POTASSIUM AND HYDROCHLOROTHIAZIDE 12.5; 5 MG/1; MG/1
TABLET ORAL
Qty: 90 TABLET | Refills: 1 | Status: SHIPPED | OUTPATIENT
Start: 2023-05-17 | End: 2024-01-05

## 2023-05-17 RX ORDER — AMLODIPINE BESYLATE 10 MG/1
TABLET ORAL
Qty: 90 TABLET | Refills: 1 | Status: SHIPPED | OUTPATIENT
Start: 2023-05-17 | End: 2023-12-18

## 2023-05-17 NOTE — TELEPHONE ENCOUNTER
Prescription approved per [facility] Refill Protocol.  Charley Pineda RN on 5/17/2023 at 10:31 AM

## 2023-06-01 ENCOUNTER — TELEPHONE (OUTPATIENT)
Dept: ORTHOPEDICS | Facility: CLINIC | Age: 73
End: 2023-06-01

## 2023-06-01 NOTE — TELEPHONE ENCOUNTER
"Talked with Emily and advised for follow-up visit with Dr. Mujica. She had questions in regards to leg length, and is now experiencing pain along posterior hip which \"affects her sciatica\". She is no longer having as much benefit from Home PT, and not as much relief from ice/heat/massage.    Eitan Powers, LAT, ATC    "

## 2023-06-01 NOTE — TELEPHONE ENCOUNTER
Reason for Call:  Other Questions regarding hip pain    Detailed comments: Emily had a cortisone injection on 5/10/23, she said the injection didn't help the pain and she would like to know what the next step for treatment should be. Please call to advise.    Phone Number Patient can be reached at: Home number on file [phone] (home)    Best Time:     Can we leave a detailed message on this number? YES    Call taken on 6/1/2023 at 9:36 AM by Sally Moody

## 2023-06-12 NOTE — PROGRESS NOTES
Komal Miller  :  1950  DOS: 6/15/2023  MRN: [id number]  PCP: Wesly Lehman    Sports Medicine Clinic Visit    Interim History - Kavita 15, 2023  Since last visit on 5/10/2023 patient had noticed no relief in pain since the last visit.  Right greater trochanteric bursa injection completed on May 10, 2023 provided no significant relief. Notes that her pain is much more focused on the anterior hip/groin and exacerbated by hip internal rotation activities.  There is associated posterior hip pain and radiation down the posterior right leg.  Takes Tylenol on occasion. No new injury in the interim.      Interim History - May 10, 2023  Since last visit on 2023 patient has noticed return of her right lateral hip pain. Walking causes the most pain. Right greater trochanteric bursa injection completed on 2023 provided 3.5 months of great relief.  She is using Tylenol, Voltaren and heat pack at this time with mild relief in pain.  Denies numbness and tingling, radicular shooting pain, neurogenic weakness. Denies groin pain. No new injury in the interim. She is interested in a repeat GTB injection today in hopes of similar relief.     Initial Visit: 2023  HPI  Komal Miller is a 72 year old female who is seen as a self referral presenting with right hip pain.     She reports chronic lateral right hip pain that has responded well to multiple greater trochanteric bursa injections in the past.  Most recent injection in July provided 6 months of relief.  Her pain has come back over the past few weeks, and she is interested in another injection today.  Denies numbness, tingling, radicular shooting pain, weakness.  Denies groin pain and back pain.  She has been doing physical therapy and using heat, which provides moderate relief.  She avoids NSAIDs due to hypertension, and is on aspirin after a treatment course of Xarelto for history of TIAs.  No other significant questions or concerns  today.      Review of Systems  Musculoskeletal: as above  Remainder of review of systems is negative including constitutional, CV, pulmonary, GI, Skin and Neurologic except as noted in HPI or medical history.    Past Medical History:   Diagnosis Date     Unspecified essential hypertension      Past Surgical History:   Procedure Laterality Date     COLONOSCOPY  06/10/08    Internal hemorrhoids, otherwise normal.     COLONOSCOPY  01/20/10     HC DILATION/CURETTAGE DIAG/THER NON OB      D & C     HC LAP, SURG ENTEROLYSIS  05/07/10     HC REMOVAL OF OVARY/TUBE(S)      Salpingo-Oophorectomy, bilateral     LAPAROSCOPY DIAGNOSTIC (GENERAL)  5/6/2014    Procedure: LAPAROSCOPY DIAGNOSTIC (GENERAL);  Surgeon: Seth Matthews MD;  Location: [facility]     LAPAROTOMY, LYSIS ADHESIONS, COMBINED  5/6/2014    Procedure: COMBINED LAPAROTOMY, LYSIS ADHESIONS;  Surgeon: Seth Matthews MD;  Location: [facility]     VIDEO CAPSULE ENDOSCOPY  02/15/10    normal sm intestine     ZZC APPENDECTOMY       ZZC LIGATE FALLOPIAN TUBE       ZZC TOTAL ABDOM HYSTERECTOMY      Hysterectomy, Total Abdominal     ZZC TOTAL KNEE ARTHROPLASTY Left      ZZHC UGI ENDOSCOPY, SIMPLE EXAM  01/20/10     Family History   Problem Relation Age of Onset     C.A.D. Mother         bi-pass     Diabetes Mother         late onset     Allergies Mother         xray dye     Arthritis Mother      Cancer Mother         ovary     Respiratory Mother      C.A.D. Father         bi-pass     Hypertension Brother      Breast Cancer Sister      Allergies Sister      Allergies Brother      Eye Disorder Maternal Aunt      Gastrointestinal Disease Brother      Gynecology Sister      Thyroid Disease Sister        Objective  BP (!) 142/85   Wt 115.7 kg (255 lb)   BMI 42.43 kg/m        General: healthy, alert and in no acute distress      HEENT: no scleral icterus or conjunctival erythema     Skin: no suspicious lesions or rash. No jaundice.     CV: regular rhythm by palpation, 2+  distal pulses, no pedal edema      Resp: normal respiratory effort without conversational dyspnea     Psych: normal mood and affect      Gait: Slightly antalgic favoring the right leg, mild Trendelenburg gait, appropriate coordination and balance     Neuro:        - Sensation to light touch:  Intact throughout the BLE including all peripheral nerve distributions.        - MSR:  2+ in bilateral patella, achilles.        - Special tests:   - Slump/SLR:  Neg bilaterally    MSK - Hip:       - Inspection:    - No significant asymmetry, erythema, warmth, ecchymosis, lesion.        - ROM:    - Full and painless AROM/PROM at bilateral hips.       - Palpation:    - slightly TTP at the right greater trochanter.  - NTTP elsewhere including the ASIS, PSIS/SI joint, iliac crest, distal iliotibial band.       - Strength:    - 5/5 in BLE including HF, HAb, HAdd, KF, KE, DF, PF, EHL, Inv, Ev.        - Special tests:   - Log roll:  Neg   - Resisted SLR:  Pos   - FADIR:  Pos   - Scour:  Pos   - MAGGIE:  Pos for groin pain and mild lateral hip pain   - Thiago:  Neg    Radiology  I previously independently reviewed the available relevant imaging, with the following interpretation:   - XR pelvis and right hip 5/17/2022 shows mild degenerative changes of the right femoroacetabular joint, no fractures or dislocations, mild degenerative changes in bilateral SI joints and left femoroacetabular joint.      Large Joint Injection/Arthocentesis: R hip joint    Date/Time: 6/15/2023 11:15 AM    Performed by: Elliot Mujica DO  Authorized by: Elliot Mujica DO    Indications:  Pain  Needle Size:  22 G  Guidance: ultrasound    Approach:  Anterior  Location:  Hip      Site:  R hip joint  Medications:  40 mg triamcinolone 40 MG/ML; 3 mL bupivacaine (PF) 0.5 %; 3 mL lidocaine 1 %  Outcome:  Tolerated well, no immediate complications  Procedure discussed: discussed risks, benefits, and alternatives    Consent Given by:  Patient  Prep: patient was  prepped and draped in usual sterile fashion     3 mL 1% Lidocaine used for anesthetic     Ultrasound images of procedure were permanently stored.         Intraarticular Hip Injection - Ultrasound Guided  The patient was informed of the risks and the benefits of the procedure and a written consent was signed.  The patient s right hip was prepped with chlorhexidine in sterile fashion.   Local anesthesia was performed using a 25-gauge 1.5-inch needle to administer 3 mL of 1% lidocaine without epinephrine.  1 mL (40 mg/mL) of triamcinolone suspension was drawn up into a 10 mL syringe with 3 mL of 1% lidocaine and 3 mL of 0.5% bupivacaine.   Injection was performed using sterile technique.  Under ultrasound guidance a 3.5-inch 22-gauge needle was used to enter the right femoracetabular joint.  Anterior approach was used, needle placement was visualized and documented with ultrasound.  Ultrasound visualization was necessary due to decreased joint space in the setting of osteoarthritis.  Injection performed in-plane to the probe.  Injection solution visualized within the joint space.  Images were permanently stored for the patient's record.  There were no complications. The patient tolerated the procedure well. There was negligible bleeding.       Assessment  1. Primary osteoarthritis of right hip        Plan  Komal Miller is a 72 year old female that presents for follow-up of her right hip pain.  Most recent corticosteroid injection for the greater trochanteric bursa was not as helpful as her prior injections.  Re-discussed her symptoms today, and her pain appears much more consistent with intra-articular hip joint pain by history and physical exam today.  Radiographs reveal mild to moderate osteoarthritis of the right hip joint.  Greater trochanteric bursa not as painful today.  Also discussed posterior hip pain with some radiation down the posterior leg that may be radiation of hip joint pain versus lumbosacral  radiculopathy.  Discussed the nature of the condition and treatment options and mutually agreed upon the following plan:    - Medications:         - Discussed pharmacologic options for pain relief. May use NSAIDs as needed for pain control. May also use topical creams such as IcyHot, BioFreeze, or Voltaren gel PRN.  Encouraged to use Voltaren gel up to 4 times per day over the painful hip for pain control.  - Injections:         - Performed a corticosteroid injection of the right intra-articular hip joint under ultrasound guidance today in clinic. Patient tolerated well without complications. See procedure note above for details.   - Therapy:         - Continue home exercise program as instructed by previous physical therapist.  - Modalities:         - May use ice, heat, massage or other modalities PRN.   - Activity:         - Encouraged to remain active and participate in regular activities as symptoms allow.  - Follow up:         - As needed in the future for re-evaluation and update to treatment plan. Patient has clinic contact information for questions or concerns.       Elliot Mujica DO, CAPREMM  [facility] - Sports Medicine  [facility] - Department of Orthopedic Surgery

## 2023-06-15 ENCOUNTER — OFFICE VISIT (OUTPATIENT)
Dept: ORTHOPEDICS | Facility: CLINIC | Age: 73
End: 2023-06-15
Payer: COMMERCIAL

## 2023-06-15 VITALS — DIASTOLIC BLOOD PRESSURE: 85 MMHG | SYSTOLIC BLOOD PRESSURE: 142 MMHG | BODY MASS INDEX: 42.43 KG/M2 | WEIGHT: 255 LBS

## 2023-06-15 DIAGNOSIS — M16.11 PRIMARY OSTEOARTHRITIS OF RIGHT HIP: Primary | ICD-10-CM

## 2023-06-15 PROCEDURE — 20611 DRAIN/INJ JOINT/BURSA W/US: CPT | Mod: RT | Performed by: STUDENT IN AN ORGANIZED HEALTH CARE EDUCATION/TRAINING PROGRAM

## 2023-06-15 PROCEDURE — 99214 OFFICE O/P EST MOD 30 MIN: CPT | Mod: 25 | Performed by: STUDENT IN AN ORGANIZED HEALTH CARE EDUCATION/TRAINING PROGRAM

## 2023-06-15 RX ADMIN — BUPIVACAINE HYDROCHLORIDE 3 ML: 5 INJECTION, SOLUTION EPIDURAL; INTRACAUDAL at 11:15

## 2023-06-15 RX ADMIN — TRIAMCINOLONE ACETONIDE 40 MG: 40 INJECTION, SUSPENSION INTRA-ARTICULAR; INTRAMUSCULAR at 11:15

## 2023-06-15 RX ADMIN — LIDOCAINE HYDROCHLORIDE 3 ML: 10 INJECTION, SOLUTION INFILTRATION; PERINEURAL at 11:15

## 2023-06-15 ASSESSMENT — PAIN SCALES - GENERAL: PAINLEVEL: SEVERE PAIN (7)

## 2023-06-15 NOTE — LETTER
6/15/2023         RE: Komal Miller  [address]  [facility] [address]        Dear Colleague,    Thank you for referring your patient, Komal Miller, to the [facility] [place]. Please see a copy of my visit note below.    Komal Miller  :  1950  DOS: 6/15/2023  MRN: [id number]  PCP: Wesly Lehman    Sports Medicine Clinic Visit    Interim History - Kavita 15, 2023  Since last visit on 5/10/2023 patient had noticed no relief in pain since the last visit.  Right greater trochanteric bursa injection completed on May 10, 2023 provided no significant relief. Notes that her pain is much more focused on the anterior hip/groin and exacerbated by hip internal rotation activities.  There is associated posterior hip pain and radiation down the posterior right leg.  Takes Tylenol on occasion. No new injury in the interim.      Interim History - May 10, 2023  Since last visit on 2023 patient has noticed return of her right lateral hip pain. Walking causes the most pain. Right greater trochanteric bursa injection completed on 2023 provided 3.5 months of great relief.  She is using Tylenol, Voltaren and heat pack at this time with mild relief in pain.  Denies numbness and tingling, radicular shooting pain, neurogenic weakness. Denies groin pain. No new injury in the interim. She is interested in a repeat GTB injection today in hopes of similar relief.     Initial Visit: 2023  HPI  Komal Miller is a 72 year old female who is seen as a self referral presenting with right hip pain.     She reports chronic lateral right hip pain that has responded well to multiple greater trochanteric bursa injections in the past.  Most recent injection in July provided 6 months of relief.  Her pain has come back over the past few weeks, and she is interested in another injection today.  Denies numbness, tingling, radicular shooting pain, weakness.  Denies  groin pain and back pain.  She has been doing physical therapy and using heat, which provides moderate relief.  She avoids NSAIDs due to hypertension, and is on aspirin after a treatment course of Xarelto for history of TIAs.  No other significant questions or concerns today.      Review of Systems  Musculoskeletal: as above  Remainder of review of systems is negative including constitutional, CV, pulmonary, GI, Skin and Neurologic except as noted in HPI or medical history.    Past Medical History:   Diagnosis Date     Unspecified essential hypertension      Past Surgical History:   Procedure Laterality Date     COLONOSCOPY  06/10/08    Internal hemorrhoids, otherwise normal.     COLONOSCOPY  01/20/10     HC DILATION/CURETTAGE DIAG/THER NON OB      D & C     HC LAP, SURG ENTEROLYSIS  05/07/10     HC REMOVAL OF OVARY/TUBE(S)      Salpingo-Oophorectomy, bilateral     LAPAROSCOPY DIAGNOSTIC (GENERAL)  5/6/2014    Procedure: LAPAROSCOPY DIAGNOSTIC (GENERAL);  Surgeon: Seth Matthews MD;  [facility]     LAPAROTOMY, LYSIS ADHESIONS, COMBINED  5/6/2014    Procedure: COMBINED LAPAROTOMY, LYSIS ADHESIONS;  Surgeon: Seth Matthews MD;  Location: [facility]     VIDEO CAPSULE ENDOSCOPY  02/15/10    normal sm intestine     ZZC APPENDECTOMY       ZZC LIGATE FALLOPIAN TUBE       ZZC TOTAL ABDOM HYSTERECTOMY      Hysterectomy, Total Abdominal     ZZC TOTAL KNEE ARTHROPLASTY Left      ZZHC UGI ENDOSCOPY, SIMPLE EXAM  01/20/10     Family History   Problem Relation Age of Onset     C.A.D. Mother         bi-pass     Diabetes Mother         late onset     Allergies Mother         xray dye     Arthritis Mother      Cancer Mother         ovary     Respiratory Mother      C.A.D. Father         bi-pass     Hypertension Brother      Breast Cancer Sister      Allergies Sister      Allergies Brother      Eye Disorder Maternal Aunt      Gastrointestinal Disease Brother      Gynecology Sister      Thyroid Disease Sister         Objective  BP (!) 142/85   Wt 115.7 kg (255 lb)   BMI 42.43 kg/m        General: healthy, alert and in no acute distress      HEENT: no scleral icterus or conjunctival erythema     Skin: no suspicious lesions or rash. No jaundice.     CV: regular rhythm by palpation, 2+ distal pulses, no pedal edema      Resp: normal respiratory effort without conversational dyspnea     Psych: normal mood and affect      Gait: Slightly antalgic favoring the right leg, mild Trendelenburg gait, appropriate coordination and balance     Neuro:        - Sensation to light touch:  Intact throughout the BLE including all peripheral nerve distributions.        - MSR:  2+ in bilateral patella, achilles.        - Special tests:   - Slump/SLR:  Neg bilaterally    MSK - Hip:       - Inspection:    - No significant asymmetry, erythema, warmth, ecchymosis, lesion.        - ROM:    - Full and painless AROM/PROM at bilateral hips.       - Palpation:    - slightly TTP at the right greater trochanter.  - NTTP elsewhere including the ASIS, PSIS/SI joint, iliac crest, distal iliotibial band.       - Strength:    - 5/5 in BLE including HF, HAb, HAdd, KF, KE, DF, PF, EHL, Inv, Ev.        - Special tests:   - Log roll:  Neg   - Resisted SLR:  Pos   - FADIR:  Pos   - Scour:  Pos   - MAGGIE:  Pos for groin pain and mild lateral hip pain   - Thiago:  Neg    Radiology  I previously independently reviewed the available relevant imaging, with the following interpretation:   - XR pelvis and right hip 5/17/2022 shows mild degenerative changes of the right femoroacetabular joint, no fractures or dislocations, mild degenerative changes in bilateral SI joints and left femoroacetabular joint.      Large Joint Injection/Arthocentesis: R hip joint    Date/Time: 6/15/2023 11:15 AM    Performed by: Elliot Mujica DO  Authorized by: Elliot Mujica DO    Indications:  Pain  Needle Size:  22 G  Guidance: ultrasound    Approach:  Anterior  Location:  Hip      Site:  R  hip joint  Medications:  40 mg triamcinolone 40 MG/ML; 3 mL bupivacaine (PF) 0.5 %; 3 mL lidocaine 1 %  Outcome:  Tolerated well, no immediate complications  Procedure discussed: discussed risks, benefits, and alternatives    Consent Given by:  Patient  Prep: patient was prepped and draped in usual sterile fashion     3 mL 1% Lidocaine used for anesthetic     Ultrasound images of procedure were permanently stored.         Intraarticular Hip Injection - Ultrasound Guided  The patient was informed of the risks and the benefits of the procedure and a written consent was signed.  The patient s right hip was prepped with chlorhexidine in sterile fashion.   Local anesthesia was performed using a 25-gauge 1.5-inch needle to administer 3 mL of 1% lidocaine without epinephrine.  1 mL (40 mg/mL) of triamcinolone suspension was drawn up into a 10 mL syringe with 3 mL of 1% lidocaine and 3 mL of 0.5% bupivacaine.   Injection was performed using sterile technique.  Under ultrasound guidance a 3.5-inch 22-gauge needle was used to enter the right femoracetabular joint.  Anterior approach was used, needle placement was visualized and documented with ultrasound.  Ultrasound visualization was necessary due to decreased joint space in the setting of osteoarthritis.  Injection performed in-plane to the probe.  Injection solution visualized within the joint space.  Images were permanently stored for the patient's record.  There were no complications. The patient tolerated the procedure well. There was negligible bleeding.       Assessment  1. Primary osteoarthritis of right hip        Plan  Komal Miller is a 72 year old female that presents for follow-up of her right hip pain.  Most recent corticosteroid injection for the greater trochanteric bursa was not as helpful as her prior injections.  Re-discussed her symptoms today, and her pain appears much more consistent with intra-articular hip joint pain by history and physical exam  today.  Radiographs reveal mild to moderate osteoarthritis of the right hip joint.  Greater trochanteric bursa not as painful today.  Also discussed posterior hip pain with some radiation down the posterior leg that may be radiation of hip joint pain versus lumbosacral radiculopathy.  Discussed the nature of the condition and treatment options and mutually agreed upon the following plan:    - Medications:         - Discussed pharmacologic options for pain relief. May use NSAIDs as needed for pain control. May also use topical creams such as IcyHot, BioFreeze, or Voltaren gel PRN.  Encouraged to use Voltaren gel up to 4 times per day over the painful hip for pain control.  - Injections:         - Performed a corticosteroid injection of the right intra-articular hip joint under ultrasound guidance today in clinic. Patient tolerated well without complications. See procedure note above for details.   - Therapy:         - Continue home exercise program as instructed by previous physical therapist.  - Modalities:         - May use ice, heat, massage or other modalities PRN.   - Activity:         - Encouraged to remain active and participate in regular activities as symptoms allow.  - Follow up:         - As needed in the future for re-evaluation and update to treatment plan. Patient has clinic contact information for questions or concerns.       Elliot Mujica DO, CAQSM  [facility] - Sports Medicine  [facility] - Department of Orthopedic Surgery         Again, thank you for allowing me to participate in the care of your patient.        Sincerely,        Elliot Mujica DO

## 2023-06-15 NOTE — PATIENT INSTRUCTIONS
Klever Komal Miller ,     A copy of your assessment and our treatment plan that we discussed together is included below, as written in your medical chart.   If you have any questions, please feel free to call the clinic.     --------------------------------------------------  Komal Miller is a 72 year old female that presents for follow-up of her right hip pain.  Most recent corticosteroid injection for the greater trochanteric bursa was not as helpful as her prior injections.  Re-discussed her symptoms today, and her pain appears much more consistent with intra-articular hip joint pain by history and physical exam today.  Radiographs reveal mild to moderate osteoarthritis of the right hip joint.  Greater trochanteric bursa not as painful today.  Also discussed posterior hip pain with some radiation down the posterior leg that may be radiation of hip joint pain versus lumbosacral radiculopathy.  Discussed the nature of the condition and treatment options and mutually agreed upon the following plan:    - Medications:         - Discussed pharmacologic options for pain relief. May use NSAIDs as needed for pain control. May also use topical creams such as IcyHot, BioFreeze, or Voltaren gel PRN.  Encouraged to use Voltaren gel up to 4 times per day over the painful hip for pain control.  - Injections:         - Performed a corticosteroid injection of the right intra-articular hip joint under ultrasound guidance today in clinic. Patient tolerated well without complications. See procedure note above for details.   - Therapy:         - Continue home exercise program as instructed by previous physical therapist.  - Modalities:         - May use ice, heat, massage or other modalities PRN.   - Activity:         - Encouraged to remain active and participate in regular activities as symptoms allow.  - Follow up:         - As needed in the future for re-evaluation and update to treatment plan. Patient has clinic contact  information for questions or concerns.   --------------------------------------------------    It was a pleasure seeing you today. Thank you for choosing [facility] for your care.       Elliot Mujica DO, CAQSM  [facility] - Sports Medicine  [facility] - Department of Orthopedic Surgery     Disclaimer:  This note was prepared and written using Dragon Medical dictation software. As a result, there may be errors in the script that have gone undetected. Please consider this when interpreting the information in this note.

## 2023-06-21 RX ORDER — LIDOCAINE HYDROCHLORIDE 10 MG/ML
3 INJECTION, SOLUTION INFILTRATION; PERINEURAL
Status: DISCONTINUED | OUTPATIENT
Start: 2023-06-15 | End: 2023-12-27

## 2023-06-21 RX ORDER — BUPIVACAINE HYDROCHLORIDE 5 MG/ML
3 INJECTION, SOLUTION EPIDURAL; INTRACAUDAL
Status: DISCONTINUED | OUTPATIENT
Start: 2023-06-15 | End: 2023-12-27

## 2023-06-21 RX ORDER — TRIAMCINOLONE ACETONIDE 40 MG/ML
40 INJECTION, SUSPENSION INTRA-ARTICULAR; INTRAMUSCULAR
Status: DISCONTINUED | OUTPATIENT
Start: 2023-06-15 | End: 2023-12-27

## 2023-07-22 DIAGNOSIS — E78.5 HYPERLIPIDEMIA LDL GOAL <130: ICD-10-CM

## 2023-07-24 RX ORDER — ATORVASTATIN CALCIUM 40 MG/1
TABLET, FILM COATED ORAL
Qty: 90 TABLET | Refills: 0 | Status: SHIPPED | OUTPATIENT
Start: 2023-07-24 | End: 2023-12-18

## 2023-07-26 ENCOUNTER — TELEPHONE (OUTPATIENT)
Dept: INTERNAL MEDICINE | Facility: CLINIC | Age: 73
End: 2023-07-26
Payer: COMMERCIAL

## 2023-07-26 NOTE — TELEPHONE ENCOUNTER
Yes, she can decrease the aspirin to 81 mg daily.  If the bruising continues, she will need to set up an appointment for physical evaluation.    [place]

## 2023-07-26 NOTE — TELEPHONE ENCOUNTER
"Patient has history of CVA, HTN    She is currently taking losartan-hydrochlorothiazide daily and propranolol twice daily for BP.   Aspirin 325 mg daily.     Patient reporting she is bruising very easily. Small bumps are causing \"blood blisters\" per patient report.   She is wondering if she needs to continue 325 of Aspirin, or if she could go down to 81 mg?  Not on any other anticoags.     Please call patient back on home number - okay to leave detailed message if she does not answer.     Vero RANDLEN, RN  [facility]    "

## 2023-08-16 NOTE — PROGRESS NOTES
Komal Miller  :  1950  DOS: 2023  MRN: [id number]  PCP: Wesly Lehman    Sports Medicine Clinic Visit    Interim History - 2023  - Last seen in clinic on 6/15/2023 for right lateral hip pain.  - Right hip joint injection completed on 6/15/2023 provided mild relief to date. She notes that her pain has decreased since the injection but her hip continues to give out on her with walking. Heat pack if pain and giving out are bad. Will take ibuprofen/tylenol on occasion.    -Messaged in on 2023 that the injection lasted for a few weeks but symptoms have returned and were painful when walking on uneven ground more pain in the groin now.  Having to externally rotate the leg to decrease pain.     -Offered Medrol, gabapentin, meloxicam, and a follow-up visit to discuss ongoing symptoms and available next steps.    - No interim injury.       Interim History - Kavita 15, 2023  Since last visit on 5/10/2023 patient had noticed no relief in pain since the last visit.  Right greater trochanteric bursa injection completed on May 10, 2023 provided no significant relief. Notes that her pain is much more focused on the anterior hip/groin and exacerbated by hip internal rotation activities.  There is associated posterior hip pain and radiation down the posterior right leg.  Takes Tylenol on occasion. No new injury in the interim.      Interim History - May 10, 2023  Since last visit on 2023 patient has noticed return of her right lateral hip pain. Walking causes the most pain. Right greater trochanteric bursa injection completed on 2023 provided 3.5 months of great relief.  She is using Tylenol, Voltaren and heat pack at this time with mild relief in pain.  Denies numbness and tingling, radicular shooting pain, neurogenic weakness. Denies groin pain. No new injury in the interim. She is interested in a repeat GTB injection today in hopes of similar relief.     Initial Visit: January  16, 2023  [facility]  Komal Miller is a 72 year old female who is seen as a self referral presenting with right hip pain.     She reports chronic lateral right hip pain that has responded well to multiple greater trochanteric bursa injections in the past.  Most recent injection in July provided 6 months of relief.  Her pain has come back over the past few weeks, and she is interested in another injection today.  Denies numbness, tingling, radicular shooting pain, weakness.  Denies groin pain and back pain.  She has been doing physical therapy and using heat, which provides moderate relief.  She avoids NSAIDs due to hypertension, and is on aspirin after a treatment course of Xarelto for history of TIAs.  No other significant questions or concerns today.      Review of Systems  Musculoskeletal: as above  Remainder of review of systems is negative including constitutional, CV, pulmonary, GI, Skin and Neurologic except as noted in HPI or medical history.    Past Medical History:   Diagnosis Date    Unspecified essential hypertension      Past Surgical History:   Procedure Laterality Date    COLONOSCOPY  06/10/08    Internal hemorrhoids, otherwise normal.    COLONOSCOPY  01/20/10    HC DILATION/CURETTAGE DIAG/THER NON OB      D & C    HC LAP, SURG ENTEROLYSIS  05/07/10    HC REMOVAL OF OVARY/TUBE(S)      Salpingo-Oophorectomy, bilateral    LAPAROSCOPY DIAGNOSTIC (GENERAL)  5/6/2014    Procedure: LAPAROSCOPY DIAGNOSTIC (GENERAL);  Surgeon: Seth Matthews MD;  Location: [facility]    LAPAROTOMY, LYSIS ADHESIONS, COMBINED  5/6/2014    Procedure: COMBINED LAPAROTOMY, LYSIS ADHESIONS;  Surgeon: Seth Matthews MD;  Location: [facility] OR    VIDEO CAPSULE ENDOSCOPY  02/15/10    normal sm intestine    ZZC APPENDECTOMY      ZZC LIGATE FALLOPIAN TUBE      ZZC TOTAL ABDOM HYSTERECTOMY      Hysterectomy, Total Abdominal    ZZC TOTAL KNEE ARTHROPLASTY Left     ZZHC UGI ENDOSCOPY, SIMPLE EXAM  01/20/10     Family History   Problem Relation  Age of Onset    C.A.D. Mother         bi-pass    Diabetes Mother         late onset    Allergies Mother         xray dye    Arthritis Mother     Cancer Mother         ovary    Respiratory Mother     C.A.D. Father         bi-pass    Hypertension Brother     Breast Cancer Sister     Allergies Sister     Allergies Brother     Eye Disorder Maternal Aunt     Gastrointestinal Disease Brother     Gynecology Sister     Thyroid Disease Sister        Objective  BP (!) 142/88   Wt 115.7 kg (255 lb)   BMI 42.43 kg/m      General: healthy, alert and in no acute distress    HEENT: no scleral icterus or conjunctival erythema   Skin: no suspicious lesions or rash. No jaundice.   CV: regular rhythm by palpation, 2+ distal pulses, no pedal edema    Resp: normal respiratory effort without conversational dyspnea   Psych: normal mood and affect    Gait: Slightly antalgic favoring the right leg, mild Trendelenburg gait, appropriate coordination and balance     Neuro:        - Sensation to light touch:  Intact throughout the BLE including all peripheral nerve distributions.        - MSR:  2+ in bilateral patella, achilles.        - Special tests:   - Slump/SLR:  Neg bilaterally    MSK - Hip:       - Inspection:    - No significant asymmetry, erythema, warmth, ecchymosis, lesion.        - ROM:    - Full and painless AROM/PROM at bilateral hips.       - Palpation:    - slightly TTP at the right greater trochanter, anterior hip.  - NTTP elsewhere including the ASIS, PSIS/SI joint, iliac crest, distal iliotibial band.       - Strength:    - 5/5 in BLE including HF, HAb, HAdd, KF, KE, DF, PF, EHL, Inv, Ev.        - Special tests:   - Log roll:  Neg   - Resisted SLR:  Pos   - FADIR:  Pos   - Scour:  Pos   - MAGGIE:  Pos for groin pain and mild lateral hip pain   - Thiago:  Neg    Radiology  I previously independently reviewed the available relevant imaging, with the following interpretation:   - XR pelvis and right hip 5/17/2022 shows mild  degenerative changes of the right femoroacetabular joint, no fractures or dislocations, mild degenerative changes in bilateral SI joints and left femoroacetabular joint.      Assessment  1. Primary osteoarthritis of right hip        Plan  Komal Miller is a 72 year old female that presents for follow-up of her right hip pain secondary to primary osteoarthritis and greater trochanteric pain syndrome, with a minor contribution from lumbosacral radiculopathy.  Discussed her progress and further available treatment options and mutually decided on the following plan:    - Medications:         - Discussed pharmacologic options for pain relief.        -Provided a prescription for meloxicam for pain control and instructed not to use any other NSAIDs while taking meloxicam.  May also use topical creams such as IcyHot, BioFreeze, or Voltaren gel PRN.  Encouraged to use Voltaren gel up to 4 times per day over the painful hip for pain control.  - Injections:         -Prior greater trochanteric bursa injection provided no significant relief.  Intra-articular hip joint injection provided some mild relief.  She would like to consider another intra-articular hip joint injection when available, which would be on or after 9/15/2023.  - Therapy:         - Continue home exercise program as instructed by previous physical therapist.  - Modalities:         - May use ice, heat, massage or other modalities PRN.   - Activity:         - Encouraged to remain active and participate in regular activities as symptoms allow.  - Follow up:         - In 4 weeks for consideration of right intra-articular hip joint injection, re-evaluation and update to treatment plan. Patient has clinic contact information for questions or concerns.       Elliot Mujica DO, CAQSM  [facility] - Sports Medicine  [facility] - Department of Orthopedic Surgery

## 2023-08-17 ENCOUNTER — OFFICE VISIT (OUTPATIENT)
Dept: ORTHOPEDICS | Facility: CLINIC | Age: 73
End: 2023-08-17
Payer: COMMERCIAL

## 2023-08-17 VITALS — SYSTOLIC BLOOD PRESSURE: 142 MMHG | DIASTOLIC BLOOD PRESSURE: 88 MMHG | WEIGHT: 255 LBS | BODY MASS INDEX: 42.43 KG/M2

## 2023-08-17 DIAGNOSIS — M16.11 PRIMARY OSTEOARTHRITIS OF RIGHT HIP: Primary | ICD-10-CM

## 2023-08-17 PROCEDURE — 99213 OFFICE O/P EST LOW 20 MIN: CPT | Performed by: STUDENT IN AN ORGANIZED HEALTH CARE EDUCATION/TRAINING PROGRAM

## 2023-08-17 RX ORDER — MELOXICAM 7.5 MG/1
7.5 TABLET ORAL DAILY
Qty: 30 TABLET | Refills: 1 | Status: SHIPPED | OUTPATIENT
Start: 2023-08-17 | End: 2023-11-14

## 2023-08-17 ASSESSMENT — PAIN SCALES - GENERAL: PAINLEVEL: MILD PAIN (3)

## 2023-08-17 NOTE — LETTER
2023         RE: Komal Miller  [address]  [facility] [address]        Dear Colleague,    Thank you for referring your patient, Komal Miller, to the [facility] [place]. Please see a copy of my visit note below.    Komal Miller  :  1950  DOS: 2023  MRN: [id number]  PCP: Wesly Lehman    Sports Medicine Clinic Visit    Interim History - 2023  - Last seen in clinic on 6/15/2023 for right lateral hip pain.  - Right hip joint injection completed on 6/15/2023 provided mild relief to date. She notes that her pain has decreased since the injection but her hip continues to give out on her with walking. Heat pack if pain and giving out are bad. Will take ibuprofen/tylenol on occasion.    -Messaged in on 2023 that the injection lasted for a few weeks but symptoms have returned and were painful when walking on uneven ground more pain in the groin now.  Having to externally rotate the leg to decrease pain.     -Offered Medrol, gabapentin, meloxicam, and a follow-up visit to discuss ongoing symptoms and available next steps.    - No interim injury.       Interim History - Kavita 15, 2023  Since last visit on 5/10/2023 patient had noticed no relief in pain since the last visit.  Right greater trochanteric bursa injection completed on May 10, 2023 provided no significant relief. Notes that her pain is much more focused on the anterior hip/groin and exacerbated by hip internal rotation activities.  There is associated posterior hip pain and radiation down the posterior right leg.  Takes Tylenol on occasion. No new injury in the interim.      Interim History - May 10, 2023  Since last visit on 2023 patient has noticed return of her right lateral hip pain. Walking causes the most pain. Right greater trochanteric bursa injection completed on 2023 provided 3.5 months of great relief.  She is using Tylenol, Voltaren and heat pack at  this time with mild relief in pain.  Denies numbness and tingling, radicular shooting pain, neurogenic weakness. Denies groin pain. No new injury in the interim. She is interested in a repeat GTB injection today in hopes of similar relief.     Initial Visit: January 16, 2023  [facility]  Komal Miller is a 72 year old female who is seen as a self referral presenting with right hip pain.     She reports chronic lateral right hip pain that has responded well to multiple greater trochanteric bursa injections in the past.  Most recent injection in July provided 6 months of relief.  Her pain has come back over the past few weeks, and she is interested in another injection today.  Denies numbness, tingling, radicular shooting pain, weakness.  Denies groin pain and back pain.  She has been doing physical therapy and using heat, which provides moderate relief.  She avoids NSAIDs due to hypertension, and is on aspirin after a treatment course of Xarelto for history of TIAs.  No other significant questions or concerns today.      Review of Systems  Musculoskeletal: as above  Remainder of review of systems is negative including constitutional, CV, pulmonary, GI, Skin and Neurologic except as noted in HPI or medical history.    Past Medical History:   Diagnosis Date     Unspecified essential hypertension      Past Surgical History:   Procedure Laterality Date     COLONOSCOPY  06/10/08    Internal hemorrhoids, otherwise normal.     COLONOSCOPY  01/20/10     HC DILATION/CURETTAGE DIAG/THER NON OB      D & C     HC LAP, SURG ENTEROLYSIS  05/07/10     HC REMOVAL OF OVARY/TUBE(S)      Salpingo-Oophorectomy, bilateral     LAPAROSCOPY DIAGNOSTIC (GENERAL)  5/6/2014    Procedure: LAPAROSCOPY DIAGNOSTIC (GENERAL);  Surgeon: Seth Matthews MD;  Location[facility]     LAPAROTOMY, LYSIS ADHESIONS, COMBINED  5/6/2014    Procedure: COMBINED LAPAROTOMY, LYSIS ADHESIONS;  Surgeon: Seth Matthews MD;  Location: [facility]     VIDEO CAPSULE  ENDOSCOPY  02/15/10    normal sm intestine     ZZC APPENDECTOMY       ZZC LIGATE FALLOPIAN TUBE       ZZC TOTAL ABDOM HYSTERECTOMY      Hysterectomy, Total Abdominal     ZZC TOTAL KNEE ARTHROPLASTY Left      ZZHC UGI ENDOSCOPY, SIMPLE EXAM  01/20/10     Family History   Problem Relation Age of Onset     C.A.D. Mother         bi-pass     Diabetes Mother         late onset     Allergies Mother         xray dye     Arthritis Mother      Cancer Mother         ovary     Respiratory Mother      C.A.D. Father         bi-pass     Hypertension Brother      Breast Cancer Sister      Allergies Sister      Allergies Brother      Eye Disorder Maternal Aunt      Gastrointestinal Disease Brother      Gynecology Sister      Thyroid Disease Sister        Objective  BP (!) 142/88   Wt 115.7 kg (255 lb)   BMI 42.43 kg/m      General: healthy, alert and in no acute distress    HEENT: no scleral icterus or conjunctival erythema   Skin: no suspicious lesions or rash. No jaundice.   CV: regular rhythm by palpation, 2+ distal pulses, no pedal edema    Resp: normal respiratory effort without conversational dyspnea   Psych: normal mood and affect    Gait: Slightly antalgic favoring the right leg, mild Trendelenburg gait, appropriate coordination and balance     Neuro:        - Sensation to light touch:  Intact throughout the BLE including all peripheral nerve distributions.        - MSR:  2+ in bilateral patella, achilles.        - Special tests:   - Slump/SLR:  Neg bilaterally    MSK - Hip:       - Inspection:    - No significant asymmetry, erythema, warmth, ecchymosis, lesion.        - ROM:    - Full and painless AROM/PROM at bilateral hips.       - Palpation:    - slightly TTP at the right greater trochanter, anterior hip.  - NTTP elsewhere including the ASIS, PSIS/SI joint, iliac crest, distal iliotibial band.       - Strength:    - 5/5 in BLE including HF, HAb, HAdd, KF, KE, DF, PF, EHL, Inv, Ev.        - Special tests:   - Log roll:   Neg   - Resisted SLR:  Pos   - FADIR:  Pos   - Scour:  Pos   - MAGGIE:  Pos for groin pain and mild lateral hip pain   - Thiago:  Neg    Radiology  I previously independently reviewed the available relevant imaging, with the following interpretation:   - XR pelvis and right hip 5/17/2022 shows mild degenerative changes of the right femoroacetabular joint, no fractures or dislocations, mild degenerative changes in bilateral SI joints and left femoroacetabular joint.      Assessment  1. Primary osteoarthritis of right hip        Plan  Komal Miller is a 72 year old female that presents for follow-up of her right hip pain secondary to primary osteoarthritis and greater trochanteric pain syndrome, with a minor contribution from lumbosacral radiculopathy.  Discussed her progress and further available treatment options and mutually decided on the following plan:    - Medications:         - Discussed pharmacologic options for pain relief.        -Provided a prescription for meloxicam for pain control and instructed not to use any other NSAIDs while taking meloxicam.  May also use topical creams such as IcyHot, BioFreeze, or Voltaren gel PRN.  Encouraged to use Voltaren gel up to 4 times per day over the painful hip for pain control.  - Injections:         -Prior greater trochanteric bursa injection provided no significant relief.  Intra-articular hip joint injection provided some mild relief.  She would like to consider another intra-articular hip joint injection when available, which would be on or after 9/15/2023.  - Therapy:         - Continue home exercise program as instructed by previous physical therapist.  - Modalities:         - May use ice, heat, massage or other modalities PRN.   - Activity:         - Encouraged to remain active and participate in regular activities as symptoms allow.  - Follow up:         - In 4 weeks for consideration of right intra-articular hip joint injection, re-evaluation and update to  treatment plan. Patient has clinic contact information for questions or concerns.       Elliot Mujica DO, LLOYDM  [facility] - Department of Orthopedic Surgery       Again, thank you for allowing me to participate in the care of your patient.        Sincerely,        Elliot Mujica DO

## 2023-09-11 DIAGNOSIS — G44.009 CLUSTER HEADACHE, NOT INTRACTABLE, UNSPECIFIED CHRONICITY PATTERN: ICD-10-CM

## 2023-09-11 DIAGNOSIS — I10 HYPERTENSION GOAL BP (BLOOD PRESSURE) < 140/90: ICD-10-CM

## 2023-09-11 DIAGNOSIS — K21.00 GASTROESOPHAGEAL REFLUX DISEASE WITH ESOPHAGITIS: ICD-10-CM

## 2023-09-11 RX ORDER — PROPRANOLOL HYDROCHLORIDE 40 MG/1
40 TABLET ORAL 2 TIMES DAILY
Qty: 180 TABLET | Refills: 3 | Status: SHIPPED | OUTPATIENT
Start: 2023-09-11 | End: 2024-09-11

## 2023-09-11 RX ORDER — OMEPRAZOLE 40 MG/1
CAPSULE, DELAYED RELEASE ORAL
Qty: 30 CAPSULE | Refills: 0 | Status: SHIPPED | OUTPATIENT
Start: 2023-09-11 | End: 2023-10-03

## 2023-09-11 RX ORDER — PROPRANOLOL HYDROCHLORIDE 40 MG/1
TABLET ORAL
Qty: 180 TABLET | Refills: 3 | Status: SHIPPED | OUTPATIENT
Start: 2023-09-11 | End: 2023-12-29

## 2023-09-16 NOTE — PROGRESS NOTES
Komal Miller  :  1950  DOS: 2023  MRN: [id number]  PCP: Wesly Lehman    Sports Medicine Clinic Visit    Interim History - 2023  - Last seen in clinic on 2023 for right hip osteoarthritis and right greater trochanteric bursitis with a minor component of lumbosacral radiculopathy.  GTB injection was not helpful.  Right hip IASI on 6/15/2023 was very helpful for weeks, then the pain came back after exacerbating the hip with a lot of walking on a hill  Was too early for repeat injection at the last visit.    - Since the last visit, she notes that the meloxicam has been helpful. She is still noting groin and lateral hip pain with walking and uneven ground. Would like to discuss treatment options today. Interested in a repeat injection if recommended.   - No interim injury.        Interim History - 2023  - Last seen in clinic on 6/15/2023 for right lateral hip pain.  - Right hip joint injection completed on 6/15/2023 provided mild relief to date. She notes that her pain has decreased since the injection but her hip continues to give out on her with walking. Heat pack if pain and giving out are bad. Will take ibuprofen/tylenol on occasion.    -Messaged in on 2023 that the injection lasted for a few weeks but symptoms have returned and were painful when walking on uneven ground more pain in the groin now.  Having to externally rotate the leg to decrease pain.     -Offered Medrol, gabapentin, meloxicam, and a follow-up visit to discuss ongoing symptoms and available next steps.    - No interim injury.       Interim History - Kavita 15, 2023  Since last visit on 5/10/2023 patient had noticed no relief in pain since the last visit.  Right greater trochanteric bursa injection completed on May 10, 2023 provided no significant relief. Notes that her pain is much more focused on the anterior hip/groin and exacerbated by hip internal rotation activities.  There is  associated posterior hip pain and radiation down the posterior right leg.  Takes Tylenol on occasion. No new injury in the interim.      Interim History - May 10, 2023  Since last visit on 1/16/2023 patient has noticed return of her right lateral hip pain. Walking causes the most pain. Right greater trochanteric bursa injection completed on 1/16/2023 provided 3.5 months of great relief.  She is using Tylenol, Voltaren and heat pack at this time with mild relief in pain.  Denies numbness and tingling, radicular shooting pain, neurogenic weakness. Denies groin pain. No new injury in the interim. She is interested in a repeat GTB injection today in hopes of similar relief.     Initial Visit: January 16, 2023  HPI  Komal Miller is a 72 year old female who is seen as a self referral presenting with right hip pain.     She reports chronic lateral right hip pain that has responded well to multiple greater trochanteric bursa injections in the past.  Most recent injection in July provided 6 months of relief.  Her pain has come back over the past few weeks, and she is interested in another injection today.  Denies numbness, tingling, radicular shooting pain, weakness.  Denies groin pain and back pain.  She has been doing physical therapy and using heat, which provides moderate relief.  She avoids NSAIDs due to hypertension, and is on aspirin after a treatment course of Xarelto for history of TIAs.  No other significant questions or concerns today.      Review of Systems  Musculoskeletal: as above  Remainder of review of systems is negative including constitutional, CV, pulmonary, GI, Skin and Neurologic except as noted in HPI or medical history.    Past Medical History:   Diagnosis Date    Unspecified essential hypertension      Past Surgical History:   Procedure Laterality Date    COLONOSCOPY  06/10/08    Internal hemorrhoids, otherwise normal.    COLONOSCOPY  01/20/10    HC DILATION/CURETTAGE DIAG/THER NON OB      D & C     HC LAP, SURG ENTEROLYSIS  05/07/10    HC REMOVAL OF OVARY/TUBE(S)      Salpingo-Oophorectomy, bilateral    LAPAROSCOPY DIAGNOSTIC (GENERAL)  5/6/2014    Procedure: LAPAROSCOPY DIAGNOSTIC (GENERAL);  Surgeon: Steh Matthews MD;  Location: [facility]    LAPAROTOMY, LYSIS ADHESIONS, COMBINED  5/6/2014    Procedure: COMBINED LAPAROTOMY, LYSIS ADHESIONS;  Surgeon: Seth Matthews MD;  Location: [facility]    VIDEO CAPSULE ENDOSCOPY  02/15/10    normal sm intestine    ZZC APPENDECTOMY      ZZC LIGATE FALLOPIAN TUBE      ZZC TOTAL ABDOM HYSTERECTOMY      Hysterectomy, Total Abdominal    ZZC TOTAL KNEE ARTHROPLASTY Left     ZZHC UGI ENDOSCOPY, SIMPLE EXAM  01/20/10     Family History   Problem Relation Age of Onset    C.A.D. Mother         bi-pass    Diabetes Mother         late onset    Allergies Mother         xray dye    Arthritis Mother     Cancer Mother         ovary    Respiratory Mother     C.A.D. Father         bi-pass    Hypertension Brother     Breast Cancer Sister     Allergies Sister     Allergies Brother     Eye Disorder Maternal Aunt     Gastrointestinal Disease Brother     Gynecology Sister     Thyroid Disease Sister        Objective  BP (!) 151/93   Wt 115.7 kg (255 lb)   BMI 42.43 kg/m      General: healthy, alert and in no acute distress    HEENT: no scleral icterus or conjunctival erythema   Skin: no suspicious lesions or rash. No jaundice.   CV: regular rhythm by palpation, 2+ distal pulses, no pedal edema    Resp: normal respiratory effort without conversational dyspnea   Psych: normal mood and affect    Gait: Slightly antalgic favoring the right leg, appropriate coordination and balance     Neuro:        - Sensation to light touch:  Intact throughout the BLE including all peripheral nerve distributions.        - MSR:  2+ in bilateral patella, achilles.        - Special tests:   - Slump/SLR:  Neg bilaterally    MSK - Hip:       - Inspection:    - No significant asymmetry, erythema, warmth,  ecchymosis, lesion.        - ROM:    - Full and painless AROM/PROM at bilateral hips.       - Palpation:    - slightly TTP at the right anterior hip.  - NTTP elsewhere including the ASIS, PSIS/SI joint, iliac crest, distal iliotibial band.       - Strength:    - 5/5 in BLE including HF, HAb, HAdd, KF, KE, DF, PF, EHL, Inv, Ev.        - Special tests:   - Log roll:  Pos   - Resisted SLR:  Pos for groin pain   - FADIR:  Pos   - Scour:  Pos   - MAGGIE:  Pos for groin pain   - Thiago:  Neg    Radiology  I previously independently reviewed the available relevant imaging, with the following interpretation:   - XR pelvis and right hip 5/17/2022 shows mild degenerative changes of the right femoroacetabular joint, no fractures or dislocations, mild degenerative changes in bilateral SI joints and left femoroacetabular joint.    Procedure  Large Joint Injection/Arthocentesis: R hip joint    Date/Time: 9/20/2023 10:35 AM    Performed by: Elliot Mujica DO  Authorized by: Elliot Mujica DO    Indications:  Pain  Needle Size:  22 G  Guidance: ultrasound    Approach:  Anterior  Location:  Hip      Site:  R hip joint  Medications:  40 mg triamcinolone 40 MG/ML; 3 mL lidocaine 1 %; 3 mL BUPivacaine 0.5 %  Outcome:  Tolerated well, no immediate complications  Procedure discussed: discussed risks, benefits, and alternatives    Consent Given by:  Patient  Prep: patient was prepped and draped in usual sterile fashion     3 mL 1% Lidocaine used for anesthetic     Ultrasound images of procedure were permanently stored.       Intraarticular Hip Injection - Ultrasound Guided  The patient was informed of the risks and the benefits of the procedure and a written consent was signed.  The patient s right hip was prepped with chlorhexidine in sterile fashion.   Local anesthesia was performed using a 25-gauge 1.5-inch needle to administer 3 mL of 1% lidocaine without epinephrine.  1 mL (40 mg/mL) of triamcinolone suspension was drawn up into a 10  mL syringe with 3 mL of 1% lidocaine and 3 mL of 0.5% bupivacaine.   Injection was performed using sterile technique.  Under ultrasound guidance a 3.5-inch 22-gauge needle was used to enter the right femoracetabular joint.  Anterior approach was used, needle placement was visualized and documented with ultrasound.  Ultrasound visualization was necessary due to decreased joint space in the setting of osteoarthritis.  Injection performed in-plane to the probe.  Injection solution visualized within the joint space.  Images were permanently stored for the patient's record.  There were no complications. The patient tolerated the procedure well. There was negligible bleeding.       Assessment  1. Primary osteoarthritis of right hip        Plan  Komal Miller is a 72 year old female that presents for follow-up of her right hip pain secondary to primary osteoarthritis and greater trochanteric pain syndrome, with a minor contribution from lumbosacral radiculopathy.  Intra-articular hip joint injection was very helpful for her hip pain for several weeks before she exacerbated it and then had pain return after that point.  Interested in a repeat injection today.  Discussed her progress and further available treatment options and mutually decided on the following plan:    - Medications:         - Discussed pharmacologic options for pain relief.        - May use the rest of the meloxicam as needed for pain control and instructed not to use any other NSAIDs while taking meloxicam, then transition to OTC NSAIDs as needed.       - May also use topical creams such as IcyHot, BioFreeze, or Voltaren gel PRN.  Encouraged to use Voltaren gel up to 4 times per day over the painful hip for pain control.  - Injections:         -Prior greater trochanteric bursa injection provided no significant relief.  Intra-articular hip joint injection provided some mild relief.  She would like to consider another intra-articular hip joint injection  when available, which was performed today in clinic without complication, patient tolerated injection well.  See procedure note above for details.  - Therapy:         - Continue home exercise program as instructed by previous physical therapist.  - Modalities:         - May use ice, heat, massage or other modalities PRN.   - Activity:         - Encouraged to remain active and participate in regular activities as symptoms allow.  - Follow up:         - As needed in the future for re-evaluation and update to treatment plan. Patient has clinic contact information for questions or concerns.       Elliot Mujica DO, DERRELL  [facility] - Sports Medicine  [facility] - Department of Orthopedic Surgery

## 2023-09-20 ENCOUNTER — OFFICE VISIT (OUTPATIENT)
Dept: ORTHOPEDICS | Facility: CLINIC | Age: 73
End: 2023-09-20
Payer: COMMERCIAL

## 2023-09-20 VITALS — SYSTOLIC BLOOD PRESSURE: 151 MMHG | BODY MASS INDEX: 42.43 KG/M2 | DIASTOLIC BLOOD PRESSURE: 93 MMHG | WEIGHT: 255 LBS

## 2023-09-20 DIAGNOSIS — M16.11 PRIMARY OSTEOARTHRITIS OF RIGHT HIP: Primary | ICD-10-CM

## 2023-09-20 PROCEDURE — 99213 OFFICE O/P EST LOW 20 MIN: CPT | Mod: 25 | Performed by: STUDENT IN AN ORGANIZED HEALTH CARE EDUCATION/TRAINING PROGRAM

## 2023-09-20 PROCEDURE — 20611 DRAIN/INJ JOINT/BURSA W/US: CPT | Mod: RT | Performed by: STUDENT IN AN ORGANIZED HEALTH CARE EDUCATION/TRAINING PROGRAM

## 2023-09-20 RX ORDER — TRIAMCINOLONE ACETONIDE 40 MG/ML
40 INJECTION, SUSPENSION INTRA-ARTICULAR; INTRAMUSCULAR
Status: DISCONTINUED | OUTPATIENT
Start: 2023-09-20 | End: 2023-12-27

## 2023-09-20 RX ORDER — BUPIVACAINE HYDROCHLORIDE 5 MG/ML
3 INJECTION, SOLUTION PERINEURAL
Status: DISCONTINUED | OUTPATIENT
Start: 2023-09-20 | End: 2023-12-27

## 2023-09-20 RX ORDER — LIDOCAINE HYDROCHLORIDE 10 MG/ML
3 INJECTION, SOLUTION INFILTRATION; PERINEURAL
Status: DISCONTINUED | OUTPATIENT
Start: 2023-09-20 | End: 2023-12-27

## 2023-09-20 RX ADMIN — LIDOCAINE HYDROCHLORIDE 3 ML: 10 INJECTION, SOLUTION INFILTRATION; PERINEURAL at 10:35

## 2023-09-20 RX ADMIN — BUPIVACAINE HYDROCHLORIDE 3 ML: 5 INJECTION, SOLUTION PERINEURAL at 10:35

## 2023-09-20 RX ADMIN — TRIAMCINOLONE ACETONIDE 40 MG: 40 INJECTION, SUSPENSION INTRA-ARTICULAR; INTRAMUSCULAR at 10:35

## 2023-09-20 NOTE — LETTER
2023         RE: Komal Miller  [address]  [facility] [address]        Dear Colleague,    Thank you for referring your patient, Komal Miller, to the [facility] [place]. Please see a copy of my visit note below.    Komal Miller  :  1950  DOS: 2023  MRN: [id number]  PCP: Wesly Lehman    Sports Medicine Clinic Visit    Interim History - 2023  - Last seen in clinic on 2023 for right hip osteoarthritis and right greater trochanteric bursitis with a minor component of lumbosacral radiculopathy.  GTB injection was not helpful.  Right hip IASI on 6/15/2023 was very helpful for weeks, then the pain came back after exacerbating the hip with a lot of walking on a hill  Was too early for repeat injection at the last visit.    - Since the last visit, she notes that the meloxicam has been helpful. She is still noting groin and lateral hip pain with walking and uneven ground. Would like to discuss treatment options today. Interested in a repeat injection if recommended.   - No interim injury.        Interim History - 2023  - Last seen in clinic on 6/15/2023 for right lateral hip pain.  - Right hip joint injection completed on 6/15/2023 provided mild relief to date. She notes that her pain has decreased since the injection but her hip continues to give out on her with walking. Heat pack if pain and giving out are bad. Will take ibuprofen/tylenol on occasion.    -Messaged in on 2023 that the injection lasted for a few weeks but symptoms have returned and were painful when walking on uneven ground more pain in the groin now.  Having to externally rotate the leg to decrease pain.     -Offered Medrol, gabapentin, meloxicam, and a follow-up visit to discuss ongoing symptoms and available next steps.    - No interim injury.       Interim History - Kavita 15, 2023  Since last visit on 5/10/2023 patient had noticed no relief  in pain since the last visit.  Right greater trochanteric bursa injection completed on May 10, 2023 provided no significant relief. Notes that her pain is much more focused on the anterior hip/groin and exacerbated by hip internal rotation activities.  There is associated posterior hip pain and radiation down the posterior right leg.  Takes Tylenol on occasion. No new injury in the interim.      Interim History - May 10, 2023  Since last visit on 1/16/2023 patient has noticed return of her right lateral hip pain. Walking causes the most pain. Right greater trochanteric bursa injection completed on 1/16/2023 provided 3.5 months of great relief.  She is using Tylenol, Voltaren and heat pack at this time with mild relief in pain.  Denies numbness and tingling, radicular shooting pain, neurogenic weakness. Denies groin pain. No new injury in the interim. She is interested in a repeat GTB injection today in hopes of similar relief.     Initial Visit: January 16, 2023  HPI  Komal Miller is a 72 year old female who is seen as a self referral presenting with right hip pain.     She reports chronic lateral right hip pain that has responded well to multiple greater trochanteric bursa injections in the past.  Most recent injection in July provided 6 months of relief.  Her pain has come back over the past few weeks, and she is interested in another injection today.  Denies numbness, tingling, radicular shooting pain, weakness.  Denies groin pain and back pain.  She has been doing physical therapy and using heat, which provides moderate relief.  She avoids NSAIDs due to hypertension, and is on aspirin after a treatment course of Xarelto for history of TIAs.  No other significant questions or concerns today.      Review of Systems  Musculoskeletal: as above  Remainder of review of systems is negative including constitutional, CV, pulmonary, GI, Skin and Neurologic except as noted in HPI or medical history.    Past Medical  History:   Diagnosis Date     Unspecified essential hypertension      Past Surgical History:   Procedure Laterality Date     COLONOSCOPY  06/10/08    Internal hemorrhoids, otherwise normal.     COLONOSCOPY  01/20/10     HC DILATION/CURETTAGE DIAG/THER NON OB      D & C     HC LAP, SURG ENTEROLYSIS  05/07/10     HC REMOVAL OF OVARY/TUBE(S)      Salpingo-Oophorectomy, bilateral     LAPAROSCOPY DIAGNOSTIC (GENERAL)  5/6/2014    Procedure: LAPAROSCOPY DIAGNOSTIC (GENERAL);  Surgeon: Seth Matthews MD;  Location[facility]     LAPAROTOMY, LYSIS ADHESIONS, COMBINED  5/6/2014    Procedure: COMBINED LAPAROTOMY, LYSIS ADHESIONS;  Surgeon: Seth Matthews MD;  Location: [facility]     VIDEO CAPSULE ENDOSCOPY  02/15/10    normal sm intestine     ZZC APPENDECTOMY       ZZC LIGATE FALLOPIAN TUBE       ZZC TOTAL ABDOM HYSTERECTOMY      Hysterectomy, Total Abdominal     ZZC TOTAL KNEE ARTHROPLASTY Left      ZZHC UGI ENDOSCOPY, SIMPLE EXAM  01/20/10     Family History   Problem Relation Age of Onset     C.A.D. Mother         bi-pass     Diabetes Mother         late onset     Allergies Mother         xray dye     Arthritis Mother      Cancer Mother         ovary     Respiratory Mother      C.A.D. Father         bi-pass     Hypertension Brother      Breast Cancer Sister      Allergies Sister      Allergies Brother      Eye Disorder Maternal Aunt      Gastrointestinal Disease Brother      Gynecology Sister      Thyroid Disease Sister        Objective  BP (!) 151/93   Wt 115.7 kg (255 lb)   BMI 42.43 kg/m      General: healthy, alert and in no acute distress    HEENT: no scleral icterus or conjunctival erythema   Skin: no suspicious lesions or rash. No jaundice.   CV: regular rhythm by palpation, 2+ distal pulses, no pedal edema    Resp: normal respiratory effort without conversational dyspnea   Psych: normal mood and affect    Gait: Slightly antalgic favoring the right leg, appropriate coordination and balance     Neuro:        -  Sensation to light touch:  Intact throughout the BLE including all peripheral nerve distributions.        - MSR:  2+ in bilateral patella, achilles.        - Special tests:   - Slump/SLR:  Neg bilaterally    MSK - Hip:       - Inspection:    - No significant asymmetry, erythema, warmth, ecchymosis, lesion.        - ROM:    - Full and painless AROM/PROM at bilateral hips.       - Palpation:    - slightly TTP at the right anterior hip.  - NTTP elsewhere including the ASIS, PSIS/SI joint, iliac crest, distal iliotibial band.       - Strength:    - 5/5 in BLE including HF, HAb, HAdd, KF, KE, DF, PF, EHL, Inv, Ev.        - Special tests:   - Log roll:  Pos   - Resisted SLR:  Pos for groin pain   - FADIR:  Pos   - Scour:  Pos   - MAGGIE:  Pos for groin pain   - Thiago:  Neg    Radiology  I previously independently reviewed the available relevant imaging, with the following interpretation:   - XR pelvis and right hip 5/17/2022 shows mild degenerative changes of the right femoroacetabular joint, no fractures or dislocations, mild degenerative changes in bilateral SI joints and left femoroacetabular joint.    Procedure  Large Joint Injection/Arthocentesis: R hip joint    Date/Time: 9/20/2023 10:35 AM    Performed by: Elliot Mujica DO  Authorized by: Elliot Mujica DO    Indications:  Pain  Needle Size:  22 G  Guidance: ultrasound    Approach:  Anterior  Location:  Hip      Site:  R hip joint  Medications:  40 mg triamcinolone 40 MG/ML; 3 mL lidocaine 1 %; 3 mL BUPivacaine 0.5 %  Outcome:  Tolerated well, no immediate complications  Procedure discussed: discussed risks, benefits, and alternatives    Consent Given by:  Patient  Prep: patient was prepped and draped in usual sterile fashion     3 mL 1% Lidocaine used for anesthetic     Ultrasound images of procedure were permanently stored.       Intraarticular Hip Injection - Ultrasound Guided  The patient was informed of the risks and the benefits of the procedure and a  written consent was signed.  The patient s right hip was prepped with chlorhexidine in sterile fashion.   Local anesthesia was performed using a 25-gauge 1.5-inch needle to administer 3 mL of 1% lidocaine without epinephrine.  1 mL (40 mg/mL) of triamcinolone suspension was drawn up into a 10 mL syringe with 3 mL of 1% lidocaine and 3 mL of 0.5% bupivacaine.   Injection was performed using sterile technique.  Under ultrasound guidance a 3.5-inch 22-gauge needle was used to enter the right femoracetabular joint.  Anterior approach was used, needle placement was visualized and documented with ultrasound.  Ultrasound visualization was necessary due to decreased joint space in the setting of osteoarthritis.  Injection performed in-plane to the probe.  Injection solution visualized within the joint space.  Images were permanently stored for the patient's record.  There were no complications. The patient tolerated the procedure well. There was negligible bleeding.       Assessment  1. Primary osteoarthritis of right hip        Plan  Komal Miller is a 72 year old female that presents for follow-up of her right hip pain secondary to primary osteoarthritis and greater trochanteric pain syndrome, with a minor contribution from lumbosacral radiculopathy.  Intra-articular hip joint injection was very helpful for her hip pain for several weeks before she exacerbated it and then had pain return after that point.  Interested in a repeat injection today.  Discussed her progress and further available treatment options and mutually decided on the following plan:    - Medications:         - Discussed pharmacologic options for pain relief.        - May use the rest of the meloxicam as needed for pain control and instructed not to use any other NSAIDs while taking meloxicam, then transition to OTC NSAIDs as needed.       - May also use topical creams such as IcyHot, BioFreeze, or Voltaren gel PRN.  Encouraged to use Voltaren gel up  to 4 times per day over the painful hip for pain control.  - Injections:         -Prior greater trochanteric bursa injection provided no significant relief.  Intra-articular hip joint injection provided some mild relief.  She would like to consider another intra-articular hip joint injection when available, which was performed today in clinic without complication, patient tolerated injection well.  See procedure note above for details.  - Therapy:         - Continue home exercise program as instructed by previous physical therapist.  - Modalities:         - May use ice, heat, massage or other modalities PRN.   - Activity:         - Encouraged to remain active and participate in regular activities as symptoms allow.  - Follow up:         - As needed in the future for re-evaluation and update to treatment plan. Patient has clinic contact information for questions or concerns.       Elliot Mujica DO, DERRELL  [facility] - Sports Medicine  [facility] - Department of Orthopedic Surgery       Again, thank you for allowing me to participate in the care of your patient.        Sincerely,        Elliot Mujica DO

## 2023-10-03 DIAGNOSIS — K21.00 GASTROESOPHAGEAL REFLUX DISEASE WITH ESOPHAGITIS: ICD-10-CM

## 2023-10-03 RX ORDER — OMEPRAZOLE 40 MG/1
CAPSULE, DELAYED RELEASE ORAL
Qty: 30 CAPSULE | Refills: 0 | Status: SHIPPED | OUTPATIENT
Start: 2023-10-03 | End: 2023-10-26

## 2023-10-26 DIAGNOSIS — K21.00 GASTROESOPHAGEAL REFLUX DISEASE WITH ESOPHAGITIS: ICD-10-CM

## 2023-10-26 RX ORDER — OMEPRAZOLE 40 MG/1
CAPSULE, DELAYED RELEASE ORAL
Qty: 30 CAPSULE | Refills: 10 | Status: SHIPPED | OUTPATIENT
Start: 2023-10-26

## 2023-10-29 NOTE — PROGRESS NOTES
Chief Complaint   Patient presents with     Allied Health Visit     Imm/Inj     shingles     Prior to immunization administration, verified patients identity using patient s name and date of birth. Please see Immunization Activity for additional information.     Screening Questionnaire for Adult Immunization    Are you sick today?   No   Do you have allergies to medications, food, a vaccine component or latex?   No   Have you ever had a serious reaction after receiving a vaccination?   No   Do you have a long-term health problem with heart, lung, kidney, or metabolic disease (e.g., diabetes), asthma, a blood disorder, no spleen, complement component deficiency, a cochlear implant, or a spinal fluid leak?  Are you on long-term aspirin therapy?   No   Do you have cancer, leukemia, HIV/AIDS, or any other immune system problem?   No   Do you have a parent, brother, or sister with an immune system problem?   No   In the past 3 months, have you taken medications that affect  your immune system, such as prednisone, other steroids, or anticancer drugs; drugs for the treatment of rheumatoid arthritis, Crohn s disease, or psoriasis; or have you had radiation treatments?   No   Have you had a seizure, or a brain or other nervous system problem?   No   During the past year, have you received a transfusion of blood or blood    products, or been given immune (gamma) globulin or antiviral drug?   No   For women: Are you pregnant or is there a chance you could become       pregnant during the next month?   No   Have you received any vaccinations in the past 4 weeks?   No     Immunization questionnaire answers were all negative.        Per orders of Dr. Lehman, injection of shingles given by Diana Nava MA. Patient instructed to remain in clinic for 15 minutes afterwards, and to report any adverse reaction to me immediately.       Screening performed by Diana Nava MA on 3/22/2021 at 9:02 AM.    
29-Oct-2023 06:31

## 2023-11-13 ENCOUNTER — MYC MEDICAL ADVICE (OUTPATIENT)
Dept: ORTHOPEDICS | Facility: CLINIC | Age: 73
End: 2023-11-13
Payer: COMMERCIAL

## 2023-11-13 DIAGNOSIS — M16.11 PRIMARY OSTEOARTHRITIS OF RIGHT HIP: ICD-10-CM

## 2023-11-13 NOTE — TELEPHONE ENCOUNTER
Per chart review:  -9/20/23 last OV with Dr. Mujica right hip joint injection performed   -8/17/23 Mobic prescribed by Dr. Mujica   -6/15/23 Right hip joint injection with Dr. Guanako Valiente [facility] [place], ATC

## 2023-11-14 NOTE — TELEPHONE ENCOUNTER
Patient mycharted in requesting a refill on her Meloxicam. Has been taking them at night along with heating to allow for better sleep. Last filled on 8/17/2023 for 30 tablets. Please advise on refill. Pharmacy updated in chart. Thank you. Annabelle Ledesma, ATC

## 2023-11-15 RX ORDER — MELOXICAM 7.5 MG/1
7.5 TABLET ORAL DAILY
Qty: 30 TABLET | Refills: 0 | Status: SHIPPED | OUTPATIENT
Start: 2023-11-15 | End: 2024-01-02

## 2023-12-22 NOTE — PROGRESS NOTES
Komal Miller  :  1950  DOS: 2023  MRN: [id number]  PCP: Wesly Lehman    Sports Medicine Clinic Visit    Interim History - 2023  - Last seen in clinic on 2023 for right hip osteoarthritis.  - Right hip intra-articular corticosteroid injection completed on 2023 provided limited relief.  Reports several days of good relief of pain which seem to wear off quickly.  - Prior GTB injection was not helpful.  Had prescribed meloxicam which has been helpful, refilled by covering providers in November.  Also tends to use heat which is helpful.  - Since the last visit patient has worsening pain that is significantly limiting her activity level.  Pain continues to be severe in the anterior hip/groin and worsening.  Patient notes using meloxicam and heat with minimal relief at this point.   - No interim injury.       Interim History - 2023  - Last seen in clinic on 2023 for right hip osteoarthritis and right greater trochanteric bursitis with a minor component of lumbosacral radiculopathy.  GTB injection was not helpful.  Right hip IASI on 6/15/2023 was very helpful for weeks, then the pain came back after exacerbating the hip with a lot of walking on a hill  Was too early for repeat injection at the last visit.    - Since the last visit, she notes that the meloxicam has been helpful. She is still noting groin and lateral hip pain with walking and uneven ground. Would like to discuss treatment options today. Interested in a repeat injection if recommended.   - No interim injury.        Interim History - 2023  - Last seen in clinic on 6/15/2023 for right lateral hip pain.  - Right hip joint injection completed on 6/15/2023 provided mild relief to date. She notes that her pain has decreased since the injection but her hip continues to give out on her with walking. Heat pack if pain and giving out are bad. Will take ibuprofen/tylenol on  occasion.    -Messaged in on 8/6/2023 that the injection lasted for a few weeks but symptoms have returned and were painful when walking on uneven ground more pain in the groin now.  Having to externally rotate the leg to decrease pain.     -Offered Medrol, gabapentin, meloxicam, and a follow-up visit to discuss ongoing symptoms and available next steps.    - No interim injury.       Interim History - Kavita 15, 2023  Since last visit on 5/10/2023 patient had noticed no relief in pain since the last visit.  Right greater trochanteric bursa injection completed on May 10, 2023 provided no significant relief. Notes that her pain is much more focused on the anterior hip/groin and exacerbated by hip internal rotation activities.  There is associated posterior hip pain and radiation down the posterior right leg.  Takes Tylenol on occasion. No new injury in the interim.      Interim History - May 10, 2023  Since last visit on 1/16/2023 patient has noticed return of her right lateral hip pain. Walking causes the most pain. Right greater trochanteric bursa injection completed on 1/16/2023 provided 3.5 months of great relief.  She is using Tylenol, Voltaren and heat pack at this time with mild relief in pain.  Denies numbness and tingling, radicular shooting pain, neurogenic weakness. Denies groin pain. No new injury in the interim. She is interested in a repeat GTB injection today in hopes of similar relief.     Initial Visit: January 16, 2023  JAILYN Miller is a 72 year old female who is seen as a self referral presenting with right hip pain.     She reports chronic lateral right hip pain that has responded well to multiple greater trochanteric bursa injections in the past.  Most recent injection in July provided 6 months of relief.  Her pain has come back over the past few weeks, and she is interested in another injection today.  Denies numbness, tingling, radicular shooting pain, weakness.  Denies groin pain and  "back pain.  She has been doing physical therapy and using heat, which provides moderate relief.  She avoids NSAIDs due to hypertension, and is on aspirin after a treatment course of Xarelto for history of TIAs.  No other significant questions or concerns today.      Review of Systems  Musculoskeletal: as above  Remainder of review of systems is negative including constitutional, CV, pulmonary, GI, Skin and Neurologic except as noted in HPI or medical history.    Past Medical History:   Diagnosis Date    Unspecified essential hypertension      Past Surgical History:   Procedure Laterality Date    COLONOSCOPY  06/10/08    Internal hemorrhoids, otherwise normal.    COLONOSCOPY  01/20/10    HC DILATION/CURETTAGE DIAG/THER NON OB      D & C    HC LAP, SURG ENTEROLYSIS  05/07/10    HC REMOVAL OF OVARY/TUBE(S)      Salpingo-Oophorectomy, bilateral    LAPAROSCOPY DIAGNOSTIC (GENERAL)  5/6/2014    Procedure: LAPAROSCOPY DIAGNOSTIC (GENERAL);  Surgeon: Steh Matthews MD;  Location: [facility]    LAPAROTOMY, LYSIS ADHESIONS, COMBINED  5/6/2014    Procedure: COMBINED LAPAROTOMY, LYSIS ADHESIONS;  Surgeon: Seth Matthews MD;  Location: [facility]    VIDEO CAPSULE ENDOSCOPY  02/15/10    normal sm intestine    ZZC APPENDECTOMY      ZZC LIGATE FALLOPIAN TUBE      ZZC TOTAL ABDOM HYSTERECTOMY      Hysterectomy, Total Abdominal    ZZC TOTAL KNEE ARTHROPLASTY Left     ZZHC UGI ENDOSCOPY, SIMPLE EXAM  01/20/10     Family History   Problem Relation Age of Onset    C.A.D. Mother         bi-pass    Diabetes Mother         late onset    Allergies Mother         xray dye    Arthritis Mother     Cancer Mother         ovary    Respiratory Mother     C.A.D. Father         bi-pass    Hypertension Brother     Breast Cancer Sister     Allergies Sister     Allergies Brother     Eye Disorder Maternal Aunt     Gastrointestinal Disease Brother     Gynecology Sister     Thyroid Disease Sister        Objective  Ht 1.651 m (5' 5\")   Wt 120.7 kg (266 " lb)   BMI 44.26 kg/m      General: healthy, alert and in no acute distress    HEENT: no scleral icterus or conjunctival erythema   Skin: no suspicious lesions or rash. No jaundice.   CV: regular rhythm by palpation, 2+ distal pulses, no pedal edema    Resp: normal respiratory effort without conversational dyspnea   Psych: normal mood and affect    Gait: antalgic favoring the right leg, appropriate coordination and balance     Neuro:        - Sensation to light touch:  Intact throughout the BLE including all peripheral nerve distributions.        - MSR:  2+ in bilateral patella, achilles.        - Special tests:   - Slump/SLR:  Neg bilaterally    MSK - Hip:       - Inspection:    - No significant asymmetry, erythema, warmth, ecchymosis, lesion.        - ROM:    - Limited AROM/PROM with hip internal rotation on the right.       - Palpation:    - TTP at the right anterior hip.  - NTTP elsewhere including the GTB, ASIS, PSIS/SI joint, iliac crest, distal iliotibial band.       - Strength:    - 5/5 in BLE including HF, HAb, HAdd, KF, KE, DF, PF, EHL, Inv, Ev.        - Special tests:   - Log roll:  Pos   - Resisted SLR:  Pos for groin pain   - FADIR:  Pos   - Scour:  Pos   - MAGGIE:  Pos for groin pain    Radiology  I previously independently reviewed the available relevant imaging, with the following interpretation:   - XR pelvis and right hip 5/17/2022 shows mild degenerative changes of the right femoroacetabular joint, no fractures or dislocations, mild degenerative changes in bilateral SI joints and left femoroacetabular joint.  -XR pelvis and right hip 12/20/2023 shows significant advancement of osteoarthritis in her right hip joint, now qualifying as end-stage advanced osteoarthritis with bone-on-bone articulation at the superior aspect with sclerosis and osteophytosis, also shows moderate degenerative changes in the left hip joint as well as lower lumbar spine and bilateral SI joints.  No acute fractures or  dislocations.      Assessment  1. Primary osteoarthritis of right hip          Plan  Komal Miller is a 72 year old female that presents for follow-up of her right hip pain secondary to primary osteoarthritis and greater trochanteric pain syndrome, with a minor contribution from lumbosacral radiculopathy.  Intra-articular hip joint injection was very helpful for her hip pain for several weeks before she exacerbated it and then had pain return after that point.  Subsequent intra-articular hip joint injection provided quality relief for a few days, then seem to wear off quickly.  Anterior hip/groin pain has gotten worse and is becoming more debilitating over the past few months.  Discussed her progress and further available treatment options and mutually decided on the following plan:      Update 12/27/23:  Hip x-rays today show advancement of her osteoarthritis of the right hip with now severe end stage degenerative changes and bone-on-bone articulation.  Notes that she got temporary relief from the injections (days) that seem to wear off quickly.  GTB injection still was not helpful.  Pain continues to be in the anterior hip groin, worse with internal rotation and positive impingement signs today.  Discussed MRI, continued PT, additional injections, and with radiographic evidence of advanced arthritis now, discussed replacement surgery.  Her hip has become increasingly debilitating and not allowing her to function on a daily basis or walk appreciable distances.  She has been talking about this with her  and feels like it is likely time to consider replacement.  I would not recommend any additional corticosteroid injections at this time, as they have not provided significant benefit for an acceptable length of time.  May continue NSAIDs/Tylenol and Voltaren gel, as well as home exercise program and modalities in the meantime until her surgical evaluation.  May continue activities as tolerated and follow-up  as needed in the future for reevaluation and updated treatment plan.  Patient has clinic contact information for questions/concerns.    An orthopedic  referral was placed today for consideration of discussion of hip replacement surgery on the right for severe primary osteoarthritis.      Elliot Mujica DO, CAQSM  [facility]  [facility] - Department of Orthopedic Surgery

## 2023-12-27 ENCOUNTER — ANCILLARY PROCEDURE (OUTPATIENT)
Dept: GENERAL RADIOLOGY | Facility: CLINIC | Age: 73
End: 2023-12-27
Attending: STUDENT IN AN ORGANIZED HEALTH CARE EDUCATION/TRAINING PROGRAM
Payer: COMMERCIAL

## 2023-12-27 ENCOUNTER — OFFICE VISIT (OUTPATIENT)
Dept: ORTHOPEDICS | Facility: CLINIC | Age: 73
End: 2023-12-27
Payer: COMMERCIAL

## 2023-12-27 VITALS — WEIGHT: 266 LBS | BODY MASS INDEX: 44.32 KG/M2 | HEIGHT: 65 IN

## 2023-12-27 DIAGNOSIS — M16.11 PRIMARY OSTEOARTHRITIS OF RIGHT HIP: Primary | ICD-10-CM

## 2023-12-27 DIAGNOSIS — M16.11 PRIMARY OSTEOARTHRITIS OF RIGHT HIP: ICD-10-CM

## 2023-12-27 PROCEDURE — 99214 OFFICE O/P EST MOD 30 MIN: CPT | Performed by: STUDENT IN AN ORGANIZED HEALTH CARE EDUCATION/TRAINING PROGRAM

## 2023-12-27 PROCEDURE — 73502 X-RAY EXAM HIP UNI 2-3 VIEWS: CPT | Mod: TC | Performed by: RADIOLOGY

## 2023-12-27 NOTE — LETTER
2023         RE: Komal Miller  [address]  [facility] [address]        Dear Colleague,    Thank you for referring your patient, Komal Miller, to the [facility] [place]. Please see a copy of my visit note below.    Komal Miller  :  1950  DOS: 2023  MRN: [id number]  PCP: Wesly Lehman    Sports Medicine Clinic Visit    Interim History - 2023  - Last seen in clinic on 2023 for right hip osteoarthritis.  - Right hip intra-articular corticosteroid injection completed on 2023 provided limited relief.  Reports several days of good relief of pain which seem to wear off quickly.  - Prior GTB injection was not helpful.  Had prescribed meloxicam which has been helpful, refilled by covering providers in November.  Also tends to use heat which is helpful.  - Since the last visit patient has worsening pain that is significantly limiting her activity level.  Pain continues to be severe in the anterior hip/groin and worsening.  Patient notes using meloxicam and heat with minimal relief at this point.   - No interim injury.       Interim History - 2023  - Last seen in clinic on 2023 for right hip osteoarthritis and right greater trochanteric bursitis with a minor component of lumbosacral radiculopathy.  GTB injection was not helpful.  Right hip IASI on 6/15/2023 was very helpful for weeks, then the pain came back after exacerbating the hip with a lot of walking on a hill  Was too early for repeat injection at the last visit.    - Since the last visit, she notes that the meloxicam has been helpful. She is still noting groin and lateral hip pain with walking and uneven ground. Would like to discuss treatment options today. Interested in a repeat injection if recommended.   - No interim injury.        Interim History - 2023  - Last seen in clinic on 6/15/2023 for right lateral hip pain.  - Right hip  joint injection completed on 6/15/2023 provided mild relief to date. She notes that her pain has decreased since the injection but her hip continues to give out on her with walking. Heat pack if pain and giving out are bad. Will take ibuprofen/tylenol on occasion.    -Messaged in on 8/6/2023 that the injection lasted for a few weeks but symptoms have returned and were painful when walking on uneven ground more pain in the groin now.  Having to externally rotate the leg to decrease pain.     -Offered Medrol, gabapentin, meloxicam, and a follow-up visit to discuss ongoing symptoms and available next steps.    - No interim injury.       Interim History - Kavita 15, 2023  Since last visit on 5/10/2023 patient had noticed no relief in pain since the last visit.  Right greater trochanteric bursa injection completed on May 10, 2023 provided no significant relief. Notes that her pain is much more focused on the anterior hip/groin and exacerbated by hip internal rotation activities.  There is associated posterior hip pain and radiation down the posterior right leg.  Takes Tylenol on occasion. No new injury in the interim.      Interim History - May 10, 2023  Since last visit on 1/16/2023 patient has noticed return of her right lateral hip pain. Walking causes the most pain. Right greater trochanteric bursa injection completed on 1/16/2023 provided 3.5 months of great relief.  She is using Tylenol, Voltaren and heat pack at this time with mild relief in pain.  Denies numbness and tingling, radicular shooting pain, neurogenic weakness. Denies groin pain. No new injury in the interim. She is interested in a repeat GTB injection today in hopes of similar relief.     Initial Visit: January 16, 2023  JAILYN  Komal Miller is a 72 year old female who is seen as a self referral presenting with right hip pain.     She reports chronic lateral right hip pain that has responded well to multiple greater trochanteric bursa injections in the  past.  Most recent injection in July provided 6 months of relief.  Her pain has come back over the past few weeks, and she is interested in another injection today.  Denies numbness, tingling, radicular shooting pain, weakness.  Denies groin pain and back pain.  She has been doing physical therapy and using heat, which provides moderate relief.  She avoids NSAIDs due to hypertension, and is on aspirin after a treatment course of Xarelto for history of TIAs.  No other significant questions or concerns today.      Review of Systems  Musculoskeletal: as above  Remainder of review of systems is negative including constitutional, CV, pulmonary, GI, Skin and Neurologic except as noted in HPI or medical history.    Past Medical History:   Diagnosis Date     Unspecified essential hypertension      Past Surgical History:   Procedure Laterality Date     COLONOSCOPY  06/10/08    Internal hemorrhoids, otherwise normal.     COLONOSCOPY  01/20/10     HC DILATION/CURETTAGE DIAG/THER NON OB      D & C     HC LAP, SURG ENTEROLYSIS  05/07/10     HC REMOVAL OF OVARY/TUBE(S)      Salpingo-Oophorectomy, bilateral     LAPAROSCOPY DIAGNOSTIC (GENERAL)  5/6/2014    Procedure: LAPAROSCOPY DIAGNOSTIC (GENERAL);  Surgeon: Seth Matthews MD;  Location: [facility]     LAPAROTOMY, LYSIS ADHESIONS, COMBINED  5/6/2014    Procedure: COMBINED LAPAROTOMY, LYSIS ADHESIONS;  Surgeon: Seth Matthews MD;  Location: [facility]     VIDEO CAPSULE ENDOSCOPY  02/15/10    normal sm intestine     ZZC APPENDECTOMY       ZZC LIGATE FALLOPIAN TUBE       ZZC TOTAL ABDOM HYSTERECTOMY      Hysterectomy, Total Abdominal     ZZC TOTAL KNEE ARTHROPLASTY Left      ZZHC UGI ENDOSCOPY, SIMPLE EXAM  01/20/10     Family History   Problem Relation Age of Onset     C.A.D. Mother         bi-pass     Diabetes Mother         late onset     Allergies Mother         xray dye     Arthritis Mother      Cancer Mother         ovary     Respiratory Mother      C.A.D. Father          "bi-pass     Hypertension Brother      Breast Cancer Sister      Allergies Sister      Allergies Brother      Eye Disorder Maternal Aunt      Gastrointestinal Disease Brother      Gynecology Sister      Thyroid Disease Sister        Objective  Ht 1.651 m (5' 5\")   Wt 120.7 kg (266 lb)   BMI 44.26 kg/m      General: healthy, alert and in no acute distress    HEENT: no scleral icterus or conjunctival erythema   Skin: no suspicious lesions or rash. No jaundice.   CV: regular rhythm by palpation, 2+ distal pulses, no pedal edema    Resp: normal respiratory effort without conversational dyspnea   Psych: normal mood and affect    Gait: antalgic favoring the right leg, appropriate coordination and balance     Neuro:        - Sensation to light touch:  Intact throughout the BLE including all peripheral nerve distributions.        - MSR:  2+ in bilateral patella, achilles.        - Special tests:   - Slump/SLR:  Neg bilaterally    MSK - Hip:       - Inspection:    - No significant asymmetry, erythema, warmth, ecchymosis, lesion.        - ROM:    - Limited AROM/PROM with hip internal rotation on the right.       - Palpation:    - TTP at the right anterior hip.  - NTTP elsewhere including the GTB, ASIS, PSIS/SI joint, iliac crest, distal iliotibial band.       - Strength:    - 5/5 in BLE including HF, HAb, HAdd, KF, KE, DF, PF, EHL, Inv, Ev.        - Special tests:   - Log roll:  Pos   - Resisted SLR:  Pos for groin pain   - FADIR:  Pos   - Scour:  Pos   - MAGGIE:  Pos for groin pain    Radiology  I previously independently reviewed the available relevant imaging, with the following interpretation:   - XR pelvis and right hip 5/17/2022 shows mild degenerative changes of the right femoroacetabular joint, no fractures or dislocations, mild degenerative changes in bilateral SI joints and left femoroacetabular joint.  -XR pelvis and right hip 12/20/2023 shows significant advancement of osteoarthritis in her right hip joint, now " qualifying as end-stage advanced osteoarthritis with bone-on-bone articulation at the superior aspect with sclerosis and osteophytosis, also shows moderate degenerative changes in the left hip joint as well as lower lumbar spine and bilateral SI joints.  No acute fractures or dislocations.      Assessment  1. Primary osteoarthritis of right hip          Plan  Komal Miller is a 72 year old female that presents for follow-up of her right hip pain secondary to primary osteoarthritis and greater trochanteric pain syndrome, with a minor contribution from lumbosacral radiculopathy.  Intra-articular hip joint injection was very helpful for her hip pain for several weeks before she exacerbated it and then had pain return after that point.  Subsequent intra-articular hip joint injection provided quality relief for a few days, then seem to wear off quickly.  Anterior hip/groin pain has gotten worse and is becoming more debilitating over the past few months.  Discussed her progress and further available treatment options and mutually decided on the following plan:      Update 12/27/23:  Hip x-rays today show advancement of her osteoarthritis of the right hip with now severe end stage degenerative changes and bone-on-bone articulation.  Notes that she got temporary relief from the injections (days) that seem to wear off quickly.  GTB injection still was not helpful.  Pain continues to be in the anterior hip groin, worse with internal rotation and positive impingement signs today.  Discussed MRI, continued PT, additional injections, and with radiographic evidence of advanced arthritis now, discussed replacement surgery.  Her hip has become increasingly debilitating and not allowing her to function on a daily basis or walk appreciable distances.  She has been talking about this with her  and feels like it is likely time to consider replacement.  I would not recommend any additional corticosteroid injections at this  time, as they have not provided significant benefit for an acceptable length of time.  May continue NSAIDs/Tylenol and Voltaren gel, as well as home exercise program and modalities in the meantime until her surgical evaluation.  May continue activities as tolerated and follow-up as needed in the future for reevaluation and updated treatment plan.  Patient has clinic contact information for questions/concerns.    An orthopedic  referral was placed today for consideration of discussion of hip replacement surgery on the right for severe primary osteoarthritis.      Elliot Mujica DO, CAQSM  [facility]  [facility] - Department of Orthopedic Surgery       Again, thank you for allowing me to participate in the care of your patient.        Sincerely,        Elliot Mujica DO

## 2023-12-29 ENCOUNTER — TELEPHONE (OUTPATIENT)
Dept: ORTHOPEDICS | Facility: CLINIC | Age: 73
End: 2023-12-29

## 2023-12-29 ENCOUNTER — OFFICE VISIT (OUTPATIENT)
Dept: ORTHOPEDICS | Facility: CLINIC | Age: 73
End: 2023-12-29
Payer: COMMERCIAL

## 2023-12-29 VITALS
BODY MASS INDEX: 44.32 KG/M2 | WEIGHT: 266 LBS | DIASTOLIC BLOOD PRESSURE: 90 MMHG | TEMPERATURE: 96.8 F | HEIGHT: 65 IN | SYSTOLIC BLOOD PRESSURE: 150 MMHG

## 2023-12-29 DIAGNOSIS — M16.11 PRIMARY OSTEOARTHRITIS OF RIGHT HIP: Primary | ICD-10-CM

## 2023-12-29 DIAGNOSIS — M81.0 AGE-RELATED OSTEOPOROSIS WITHOUT CURRENT PATHOLOGICAL FRACTURE: ICD-10-CM

## 2023-12-29 DIAGNOSIS — E55.9 HYPOVITAMINOSIS D: ICD-10-CM

## 2023-12-29 DIAGNOSIS — M16.11 PRIMARY OSTEOARTHRITIS OF RIGHT HIP: ICD-10-CM

## 2023-12-29 LAB
CRP SERPL-MCNC: 9.96 MG/L
D DIMER PPP FEU-MCNC: 0.44 UG/ML FEU (ref 0–0.5)
ERYTHROCYTE [SEDIMENTATION RATE] IN BLOOD BY WESTERGREN METHOD: 27 MM/HR (ref 0–30)
RHEUMATOID FACT SERPL-ACNC: 12 IU/ML
URATE SERPL-MCNC: 3.8 MG/DL (ref 2.4–5.7)

## 2023-12-29 PROCEDURE — 84550 ASSAY OF BLOOD/URIC ACID: CPT | Performed by: ORTHOPAEDIC SURGERY

## 2023-12-29 PROCEDURE — 86225 DNA ANTIBODY NATIVE: CPT | Performed by: ORTHOPAEDIC SURGERY

## 2023-12-29 PROCEDURE — 86140 C-REACTIVE PROTEIN: CPT | Performed by: ORTHOPAEDIC SURGERY

## 2023-12-29 PROCEDURE — 86431 RHEUMATOID FACTOR QUANT: CPT | Performed by: ORTHOPAEDIC SURGERY

## 2023-12-29 PROCEDURE — 36415 COLL VENOUS BLD VENIPUNCTURE: CPT | Performed by: ORTHOPAEDIC SURGERY

## 2023-12-29 PROCEDURE — 85652 RBC SED RATE AUTOMATED: CPT | Performed by: ORTHOPAEDIC SURGERY

## 2023-12-29 PROCEDURE — 86038 ANTINUCLEAR ANTIBODIES: CPT | Performed by: ORTHOPAEDIC SURGERY

## 2023-12-29 PROCEDURE — 85379 FIBRIN DEGRADATION QUANT: CPT | Performed by: ORTHOPAEDIC SURGERY

## 2023-12-29 PROCEDURE — 99214 OFFICE O/P EST MOD 30 MIN: CPT | Performed by: ORTHOPAEDIC SURGERY

## 2023-12-29 PROCEDURE — 86200 CCP ANTIBODY: CPT | Performed by: ORTHOPAEDIC SURGERY

## 2023-12-29 RX ORDER — CEFAZOLIN SODIUM IN 0.9 % NACL 3 G/100 ML
3 INTRAVENOUS SOLUTION, PIGGYBACK (ML) INTRAVENOUS
Status: CANCELLED | OUTPATIENT
Start: 2023-12-29

## 2023-12-29 RX ORDER — CEFAZOLIN SODIUM IN 0.9 % NACL 3 G/100 ML
3 INTRAVENOUS SOLUTION, PIGGYBACK (ML) INTRAVENOUS SEE ADMIN INSTRUCTIONS
Status: CANCELLED | OUTPATIENT
Start: 2023-12-29

## 2023-12-29 RX ORDER — TRANEXAMIC ACID 650 MG/1
1950 TABLET ORAL ONCE
Status: CANCELLED | OUTPATIENT
Start: 2023-12-29 | End: 2023-12-29

## 2023-12-29 RX ORDER — ACETAMINOPHEN 325 MG/1
975 TABLET ORAL ONCE
Status: CANCELLED | OUTPATIENT
Start: 2023-12-29 | End: 2023-12-29

## 2023-12-29 RX ORDER — CELECOXIB 100 MG/1
400 CAPSULE ORAL ONCE
Status: CANCELLED | OUTPATIENT
Start: 2023-12-29 | End: 2023-12-29

## 2023-12-29 ASSESSMENT — PAIN SCALES - GENERAL: PAINLEVEL: SEVERE PAIN (7)

## 2023-12-29 NOTE — TELEPHONE ENCOUNTER
Type of surgery: ARTHROPLASTY, HIP, TOTAL (Right)   Location of surgery: [facility]  Date and time of surgery: 01/25/2024  Surgeon: Ginna  Pre-Op Appt Date: 01/11/2024  Post-Op Appt Date: 02/19/2024   Packet sent out: Yes  Pre-cert/Authorization completed:  No  Date:

## 2023-12-29 NOTE — LETTER
12/29/2023         RE: Komal Miller  [address]  [facility] [address]        Dear Colleague,    Thank you for referring your patient, Komal Miller, to the [facility]. Please see a copy of my visit note below.    S:  Exacerbation R hip /groin pain.  Has had multiple joint and bursal injection.  Limited in ambulation/more disability and pain.         Patient Active Problem List   Diagnosis     Absence of menstruation     Esophageal reflux     Recurring abdominal pain     Hyperlipidemia LDL goal <130     Hypertension goal BP (blood pressure) < 140/90     Advance Care Planning     Small bowel obstruction (H)     DVT prophylaxis     Cervical adenopathy     Intestinal adhesions     Gastroesophageal reflux disease with esophagitis     Cluster headache, not intractable, unspecified chronicity pattern     Morbid obesity due to excess calories (H)     CVA, old, hemiparesis (H)     Trochanteric bursitis of right hip     Primary osteoarthritis of right hip            Past Medical History:   Diagnosis Date     Unspecified essential hypertension             Past Surgical History:   Procedure Laterality Date     COLONOSCOPY  06/10/08    Internal hemorrhoids, otherwise normal.     COLONOSCOPY  01/20/10     HC DILATION/CURETTAGE DIAG/THER NON OB      D & C     HC LAP, SURG ENTEROLYSIS  05/07/10     HC REMOVAL OF OVARY/TUBE(S)      Salpingo-Oophorectomy, bilateral     LAPAROSCOPY DIAGNOSTIC (GENERAL)  5/6/2014    Procedure: LAPAROSCOPY DIAGNOSTIC (GENERAL);  Surgeon: Seth Matthews MD;  [facility]     LAPAROTOMY, LYSIS ADHESIONS, COMBINED  5/6/2014    Procedure: COMBINED LAPAROTOMY, LYSIS ADHESIONS;  Surgeon: Seth Matthews MD;  Location: [facility]     VIDEO CAPSULE ENDOSCOPY  02/15/10    normal sm intestine     ZZC APPENDECTOMY       ZZC LIGATE FALLOPIAN TUBE       ZZC TOTAL ABDOM HYSTERECTOMY      Hysterectomy, Total Abdominal     ZZC TOTAL KNEE ARTHROPLASTY Left      ZZHC UGI  ENDOSCOPY, SIMPLE EXAM  01/20/10            Social History     Tobacco Use     Smoking status: Former     Packs/day: 0.50     Years: 20.00     Additional pack years: 0.00     Total pack years: 10.00     Types: Cigarettes     Quit date: 1995     Years since quittin.0     Smokeless tobacco: Never   Substance Use Topics     Alcohol use: Yes     Comment: very seldom            Family History   Problem Relation Age of Onset     C.A.D. Mother         bi-pass     Diabetes Mother         late onset     Allergies Mother         xray dye     Arthritis Mother      Cancer Mother         ovary     Respiratory Mother      C.A.D. Father         bi-pass     Hypertension Brother      Breast Cancer Sister      Allergies Sister      Allergies Brother      Eye Disorder Maternal Aunt      Gastrointestinal Disease Brother      Gynecology Sister      Thyroid Disease Sister                Allergies   Allergen Reactions     No Known Drug Allergy             Current Outpatient Medications   Medication Sig Dispense Refill     amLODIPine (NORVASC) 10 MG tablet TAKE 1 TABLET EVERY DAY 90 tablet 3     atorvastatin (LIPITOR) 40 MG tablet TAKE 1 TABLET EVERY DAY 90 tablet 3     losartan-hydrochlorothiazide (HYZAAR) 50-12.5 MG tablet TAKE 1 TABLET EVERY DAY 90 tablet 1     meloxicam (MOBIC) 7.5 MG tablet Take 1 tablet (7.5 mg) by mouth daily 30 tablet 0     omeprazole (PRILOSEC) 40 MG DR capsule TAKE 1 CAPSULE EVERY DAY, 30 TO 60 MINUTES BEFORE A MEAL  (NEED APPOINTMENT FOR FURTHER REFILLS) 30 capsule 10     propranolol (INDERAL) 40 MG tablet Take 1 tablet (40 mg) by mouth 2 times daily 180 tablet 3     aspirin (ASA) 325 MG EC tablet Take 325 mg by mouth 1 tablet daily       bisacodyl (DULCOLAX) 5 MG EC tablet Take 2 tablets at 3 pm the day before your procedure. If your procedure is before 11 am, take 2 additional tablets at 11 pm. If your procedure is after 11 am, take 2 additional tablets at 6 am. For additional instructions refer to  your colonoscopy prep instructions. 4 tablet 0     mometasone (ELOCON) 0.1 % external cream Apply twice daily for 2 weeks. Not face, take 1-2 weeks off (Patient not taking: Reported on 10/25/2022) 50 g 1     polyethylene glycol (GOLYTELY) 236 g suspension The night before the exam at 6 pm drink an 8-ounce glass every 15 minutes until the jug is half empty. If you arrive before 11 AM: Drink the other half of the Golytely jug at 11 PM night before procedure. If you arrive after 11 AM: Drink the other half of the Golytely jug at 6 AM day of procedure. For additional instructions refer to your colonoscopy prep instructions. 4000 mL 0     SUMAtriptan (IMITREX) 25 MG tablet Take 1 tablet (25 mg) by mouth at onset of headache for migraine May repeat in 2 hours. Max 8 tablets/24 hours. (Patient not taking: Reported on 10/25/2022) 4 tablet 1          Review Of Systems  Skin: negative  Eyes: negative  Ears/Nose/Throat: negative  Respiratory: No shortness of breath, dyspnea on exertion, cough, or hemoptysis    O: Physical Exam:  Supple L hip.  R hip with some limit to IR and abduction comparatively.  Some guarding R hip.  IR/ER causes exacerbation groin pain.  Pain to R hip trochanteric bursal palpation.    Lab:Chol 255 to 167    Images:  Narrative & Impression   Examination:  XR PELVIS AND HIP RIGHT 1 VIEW     Date:  12/27/2023 2:15 PM      Clinical Information: Primary osteoarthritis of right hip     Comparison: 5/17/2022.                                                                      Impression:     1.  Negative for fracture or dislocation.     2.  End-stage degenerative arthritis right hip, with complete superior  joint space loss, subchondral sclerosis, marginal osteophytes.     3.  Moderate degenerative joint space narrowing and spurring in the  left hip.     4.  Moderate degenerative disc and facet changes in the lower lumbar  spine. Mild degenerative changes in the bilateral SI joints.            A:  R hip  Arthropathy exacerbation      P:  Patient feels she has exhausted conservative care and would like to proceed with R DEON.  Discussed procedure/risks/benefits/possible complications/postop rehab.  Will continue to work on weight loss.  Start preop check list to include labs and HgbA1C, See back for preop evaluation and schedule OR intervention tentatively.  Notify if exacerbation symptoms.             In addition to the above assessment and plan each active problem on Komal's problem list was evaluated today. This included the questioning of Komal for any medication problems. We will continue the current treatment plan for these active problems except as noted.        Again, thank you for allowing me to participate in the care of your patient.        Sincerely,        Apolinar Chacko MD

## 2023-12-29 NOTE — TELEPHONE ENCOUNTER
Reason for Call:  Other call back    Detailed comments: Patient is scheduled for surgery on 01/25/2024 for Arthroplasty,hip, total right, Patient is requesting that we send a referral for physical therapy to Wilda Ramsay attn: Stanley Vasques fax [phone]. Thank you     Phone Number Patient can be reached at: Home number on file [phone] (home)    Best Time: any    Can we leave a detailed message on this number? YES    Call taken on 12/29/2023 at 1:05 PM by Leslie Glover

## 2023-12-29 NOTE — PROGRESS NOTES
S:  Exacerbation R hip /groin pain.  Has had multiple joint and bursal injection.  Limited in ambulation/more disability and pain.         Patient Active Problem List   Diagnosis    Absence of menstruation    Esophageal reflux    Recurring abdominal pain    Hyperlipidemia LDL goal <130    Hypertension goal BP (blood pressure) < 140/90    Advance Care Planning    Small bowel obstruction (H)    DVT prophylaxis    Cervical adenopathy    Intestinal adhesions    Gastroesophageal reflux disease with esophagitis    Cluster headache, not intractable, unspecified chronicity pattern    Morbid obesity due to excess calories (H)    CVA, old, hemiparesis (H)    Trochanteric bursitis of right hip    Primary osteoarthritis of right hip            Past Medical History:   Diagnosis Date    Unspecified essential hypertension             Past Surgical History:   Procedure Laterality Date    COLONOSCOPY  06/10/08    Internal hemorrhoids, otherwise normal.    COLONOSCOPY  01/20/10    HC DILATION/CURETTAGE DIAG/THER NON OB      D & C    HC LAP, SURG ENTEROLYSIS  05/07/10    HC REMOVAL OF OVARY/TUBE(S)      Salpingo-Oophorectomy, bilateral    LAPAROSCOPY DIAGNOSTIC (GENERAL)  2014    Procedure: LAPAROSCOPY DIAGNOSTIC (GENERAL);  Surgeon: Seth Matthews MD;  [facility]    LAPAROTOMY, LYSIS ADHESIONS, COMBINED  2014    Procedure: COMBINED LAPAROTOMY, LYSIS ADHESIONS;  Surgeon: Seth Matthews MD;  [facility]    VIDEO CAPSULE ENDOSCOPY  02/15/10    normal sm intestine    ZZC APPENDECTOMY      ZZC LIGATE FALLOPIAN TUBE      ZZC TOTAL ABDOM HYSTERECTOMY      Hysterectomy, Total Abdominal    ZZC TOTAL KNEE ARTHROPLASTY Left     ZZHC UGI ENDOSCOPY, SIMPLE EXAM  01/20/10            Social History     Tobacco Use    Smoking status: Former     Packs/day: 0.50     Years: 20.00     Additional pack years: 0.00     Total pack years: 10.00     Types: Cigarettes     Quit date: 1995     Years since quittin.0     Smokeless tobacco: Never   Substance Use Topics    Alcohol use: Yes     Comment: very seldom            Family History   Problem Relation Age of Onset    C.A.D. Mother         bi-pass    Diabetes Mother         late onset    Allergies Mother         xray dye    Arthritis Mother     Cancer Mother         ovary    Respiratory Mother     C.A.D. Father         bi-pass    Hypertension Brother     Breast Cancer Sister     Allergies Sister     Allergies Brother     Eye Disorder Maternal Aunt     Gastrointestinal Disease Brother     Gynecology Sister     Thyroid Disease Sister                Allergies   Allergen Reactions    No Known Drug Allergy             Current Outpatient Medications   Medication Sig Dispense Refill    amLODIPine (NORVASC) 10 MG tablet TAKE 1 TABLET EVERY DAY 90 tablet 3    atorvastatin (LIPITOR) 40 MG tablet TAKE 1 TABLET EVERY DAY 90 tablet 3    losartan-hydrochlorothiazide (HYZAAR) 50-12.5 MG tablet TAKE 1 TABLET EVERY DAY 90 tablet 1    meloxicam (MOBIC) 7.5 MG tablet Take 1 tablet (7.5 mg) by mouth daily 30 tablet 0    omeprazole (PRILOSEC) 40 MG DR capsule TAKE 1 CAPSULE EVERY DAY, 30 TO 60 MINUTES BEFORE A MEAL  (NEED APPOINTMENT FOR FURTHER REFILLS) 30 capsule 10    propranolol (INDERAL) 40 MG tablet Take 1 tablet (40 mg) by mouth 2 times daily 180 tablet 3    aspirin (ASA) 325 MG EC tablet Take 325 mg by mouth 1 tablet daily      bisacodyl (DULCOLAX) 5 MG EC tablet Take 2 tablets at 3 pm the day before your procedure. If your procedure is before 11 am, take 2 additional tablets at 11 pm. If your procedure is after 11 am, take 2 additional tablets at 6 am. For additional instructions refer to your colonoscopy prep instructions. 4 tablet 0    mometasone (ELOCON) 0.1 % external cream Apply twice daily for 2 weeks. Not face, take 1-2 weeks off (Patient not taking: Reported on 10/25/2022) 50 g 1    polyethylene glycol (GOLYTELY) 236 g suspension The night before the exam at 6 pm drink an 8-ounce  glass every 15 minutes until the jug is half empty. If you arrive before 11 AM: Drink the other half of the [employer] jug at 11 PM night before procedure. If you arrive after 11 AM: Drink the other half of the [employer] jug at 6 AM day of procedure. For additional instructions refer to your colonoscopy prep instructions. 4000 mL 0    SUMAtriptan (IMITREX) 25 MG tablet Take 1 tablet (25 mg) by mouth at onset of headache for migraine May repeat in 2 hours. Max 8 tablets/24 hours. (Patient not taking: Reported on 10/25/2022) 4 tablet 1          Review Of Systems  Skin: negative  Eyes: negative  Ears/Nose/Throat: negative  Respiratory: No shortness of breath, dyspnea on exertion, cough, or hemoptysis    O: Physical Exam:  Supple L hip.  R hip with some limit to IR and abduction comparatively.  Some guarding R hip.  IR/ER causes exacerbation groin pain.  Pain to R hip trochanteric bursal palpation.    Lab:Chol 255 to 167    Images:  Narrative & Impression   Examination:  XR PELVIS AND HIP RIGHT 1 VIEW     Date:  12/27/2023 2:15 PM      Clinical Information: Primary osteoarthritis of right hip     Comparison: 5/17/2022.                                                                      Impression:     1.  Negative for fracture or dislocation.     2.  End-stage degenerative arthritis right hip, with complete superior  joint space loss, subchondral sclerosis, marginal osteophytes.     3.  Moderate degenerative joint space narrowing and spurring in the  left hip.     4.  Moderate degenerative disc and facet changes in the lower lumbar  spine. Mild degenerative changes in the bilateral SI joints.            A:  R hip Arthropathy exacerbation      P:  Patient feels she has exhausted conservative care and would like to proceed with R DEON.  Discussed procedure/risks/benefits/possible complications/postop rehab.  Will continue to work on weight loss.  Start preop check list to include labs and HgbA1C, See back for preop  evaluation and schedule OR intervention tentatively.  Notify if exacerbation symptoms.             In addition to the above assessment and plan each active problem on Komal's problem list was evaluated today. This included the questioning of Komal for any medication problems. We will continue the current treatment plan for these active problems except as noted.

## 2024-01-02 LAB
ANA SER QL IF: NEGATIVE
CCP AB SER IA-ACNC: 0.9 U/ML
DSDNA AB SER-ACNC: 12 IU/ML

## 2024-01-04 ENCOUNTER — TRANSFERRED RECORDS (OUTPATIENT)
Dept: HEALTH INFORMATION MANAGEMENT | Facility: CLINIC | Age: 74
End: 2024-01-04
Payer: COMMERCIAL

## 2024-01-05 DIAGNOSIS — I10 HYPERTENSION GOAL BP (BLOOD PRESSURE) < 140/90: ICD-10-CM

## 2024-01-05 RX ORDER — LOSARTAN POTASSIUM AND HYDROCHLOROTHIAZIDE 12.5; 5 MG/1; MG/1
TABLET ORAL
Qty: 90 TABLET | Refills: 0 | Status: SHIPPED | OUTPATIENT
Start: 2024-01-05 | End: 2024-03-25

## 2024-01-06 ENCOUNTER — HEALTH MAINTENANCE LETTER (OUTPATIENT)
Age: 74
End: 2024-01-06

## 2024-01-08 NOTE — COMMUNITY RESOURCES LIST (ENGLISH)
01/08/2024   [facility]  N/A  For questions about this resource list or additional care needs, please contact your primary care clinic or care manager.  Phone: [phone]   Email: N/A   Address: [address] [place] [address]   Hours: N/A        Financial Stability       Rent and mortgage payment assistance  1  [facility] - Main Office - Emergency Housing Assistance - Rent and mortgage payment assistance Distance: 22.69 miles      In-Person   [address] [place] [address]  Language: English  Hours: Mon - Fri 6:00 AM - 6:30 PM  Fees: Free   Phone: (452) 808-3652 Email: starla@[email or web address] Website: http://www.[facility].[employer]     2  [facility] - Emergency Assistance - Rent and mortgage payment assistance Distance: 24.46 miles      In-Person   [address] [place] [address]  Language: English, Vietnamese  Hours: Mon 10:00 AM - 1:30 PM Appt. Only, Wed 10:00 AM - 1:30 PM Appt. Only, Thu 4:30 PM - 6:30 PM Appt. Only, Fri 10:00 AM - 1:30 PM Appt. Only  Fees: Free   Phone: (696) 467-7751 Email: info@[facility] [employer].org Website: http://www.[facility] AvondalefoodsSelect Medical [facility] Hospitalf.org          Food and Nutrition       Food pantry  3  [facility] Distance: 5.73 miles      Pickup   [address] [place] [address]  Language: English  Hours: Thu 12:00 PM - 4:00 PM  Fees: Free   Phone: (819) 933-2585 Email: santos@[email or web address] Website: https://[email or web address]/[facility]/     4  [facility] Distance: 13.21 miles      Pickup   [address] [place] [address]  Language: English  Hours: Mon 1:00 PM - 4:00 PM , Mon 6:00 PM - 8:00 PM , Thu 10:00 AM - 3:00 PM  Fees: Free   Phone: (990) 839-1134 Email: camille@[employer] Website: http://www.[facility].org/     SNAP application assistance  5  [facility] - Main Office Distance: 22.69 miles      In-Person, Phone/Virtual   [address] [place] [address]   Language: English  Hours: Mon - Fri 6:00 AM - 6:30 PM  Fees: Free   Phone: (321) 515-3608 Email: starla@[employer] Website: http://www.[employer]          Important Numbers & Websites       Emergency Services   911  [facility] Services   311  Poison Control   (442) 913-4034  Suicide Prevention Lifeline   (444) 367-9275 (TALK)  Child Abuse Hotline   (398) 969-5347 (4-A-Child)  Sexual Assault Hotline   (497) 473-6408 (HOPE)  National Runaway Safeline   (857) 575-5958 (RUNAWAY)  All-Options Talkline   (365) 175-5144  Substance Abuse Referral   (691) 376-2758 (HELP)

## 2024-01-11 ENCOUNTER — OFFICE VISIT (OUTPATIENT)
Dept: INTERNAL MEDICINE | Facility: CLINIC | Age: 74
End: 2024-01-11
Payer: COMMERCIAL

## 2024-01-11 VITALS
DIASTOLIC BLOOD PRESSURE: 84 MMHG | SYSTOLIC BLOOD PRESSURE: 144 MMHG | RESPIRATION RATE: 12 BRPM | WEIGHT: 263 LBS | TEMPERATURE: 98.2 F | HEIGHT: 65 IN | BODY MASS INDEX: 43.82 KG/M2 | OXYGEN SATURATION: 96 % | HEART RATE: 64 BPM

## 2024-01-11 DIAGNOSIS — I69.359 CVA, OLD, HEMIPARESIS (H): ICD-10-CM

## 2024-01-11 DIAGNOSIS — Z11.59 NEED FOR HEPATITIS C SCREENING TEST: ICD-10-CM

## 2024-01-11 DIAGNOSIS — I10 HYPERTENSION GOAL BP (BLOOD PRESSURE) < 140/90: ICD-10-CM

## 2024-01-11 DIAGNOSIS — E66.01 MORBID OBESITY DUE TO EXCESS CALORIES (H): ICD-10-CM

## 2024-01-11 DIAGNOSIS — Z01.818 PRE-OP EXAM: Primary | ICD-10-CM

## 2024-01-11 DIAGNOSIS — Z12.11 SCREEN FOR COLON CANCER: ICD-10-CM

## 2024-01-11 DIAGNOSIS — M16.11 PRIMARY OSTEOARTHRITIS OF RIGHT HIP: ICD-10-CM

## 2024-01-11 DIAGNOSIS — Z96.653 HISTORY OF TOTAL KNEE ARTHROPLASTY, BILATERAL: ICD-10-CM

## 2024-01-11 DIAGNOSIS — E55.9 HYPOVITAMINOSIS D: ICD-10-CM

## 2024-01-11 DIAGNOSIS — M16.10 ARTHRITIS OF HIP: ICD-10-CM

## 2024-01-11 DIAGNOSIS — E78.5 HYPERLIPIDEMIA LDL GOAL <130: ICD-10-CM

## 2024-01-11 LAB
ALBUMIN SERPL BCG-MCNC: 4 G/DL (ref 3.5–5.2)
ALBUMIN UR-MCNC: NEGATIVE MG/DL
ALP SERPL-CCNC: 73 U/L (ref 40–150)
ALT SERPL W P-5'-P-CCNC: 17 U/L (ref 0–50)
ANION GAP SERPL CALCULATED.3IONS-SCNC: 12 MMOL/L (ref 7–15)
APPEARANCE UR: CLEAR
AST SERPL W P-5'-P-CCNC: 16 U/L (ref 0–45)
BILIRUB DIRECT SERPL-MCNC: <0.2 MG/DL (ref 0–0.3)
BILIRUB SERPL-MCNC: 0.4 MG/DL
BILIRUB UR QL STRIP: NEGATIVE
BUN SERPL-MCNC: 11 MG/DL (ref 8–23)
CALCIUM SERPL-MCNC: 9.2 MG/DL (ref 8.8–10.2)
CHLORIDE SERPL-SCNC: 100 MMOL/L (ref 98–107)
CHOLEST SERPL-MCNC: 183 MG/DL
COLOR UR AUTO: YELLOW
CREAT SERPL-MCNC: 0.64 MG/DL (ref 0.51–0.95)
DEPRECATED HCO3 PLAS-SCNC: 25 MMOL/L (ref 22–29)
EGFRCR SERPLBLD CKD-EPI 2021: >90 ML/MIN/1.73M2
ERYTHROCYTE [DISTWIDTH] IN BLOOD BY AUTOMATED COUNT: 13.3 % (ref 10–15)
FASTING STATUS PATIENT QL REPORTED: YES
GLUCOSE SERPL-MCNC: 103 MG/DL (ref 70–99)
GLUCOSE UR STRIP-MCNC: NEGATIVE MG/DL
HCT VFR BLD AUTO: 41.3 % (ref 35–47)
HDLC SERPL-MCNC: 64 MG/DL
HGB BLD-MCNC: 14.1 G/DL (ref 11.7–15.7)
HGB UR QL STRIP: NEGATIVE
KETONES UR STRIP-MCNC: NEGATIVE MG/DL
LDLC SERPL CALC-MCNC: 103 MG/DL
LEUKOCYTE ESTERASE UR QL STRIP: NEGATIVE
MCH RBC QN AUTO: 28.9 PG (ref 26.5–33)
MCHC RBC AUTO-ENTMCNC: 34.1 G/DL (ref 31.5–36.5)
MCV RBC AUTO: 85 FL (ref 78–100)
NITRATE UR QL: NEGATIVE
NONHDLC SERPL-MCNC: 119 MG/DL
PH UR STRIP: 7 [PH] (ref 5–7)
PLATELET # BLD AUTO: 295 10E3/UL (ref 150–450)
POTASSIUM SERPL-SCNC: 3.8 MMOL/L (ref 3.4–5.3)
PROT SERPL-MCNC: 7.6 G/DL (ref 6.4–8.3)
RBC # BLD AUTO: 4.88 10E6/UL (ref 3.8–5.2)
SODIUM SERPL-SCNC: 137 MMOL/L (ref 135–145)
SP GR UR STRIP: 1.01 (ref 1–1.03)
TRIGL SERPL-MCNC: 81 MG/DL
UROBILINOGEN UR STRIP-MCNC: NORMAL MG/DL
VIT D+METAB SERPL-MCNC: 12 NG/ML (ref 20–50)
WBC # BLD AUTO: 7.8 10E3/UL (ref 4–11)

## 2024-01-11 PROCEDURE — 85027 COMPLETE CBC AUTOMATED: CPT | Performed by: INTERNAL MEDICINE

## 2024-01-11 PROCEDURE — 80061 LIPID PANEL: CPT | Performed by: INTERNAL MEDICINE

## 2024-01-11 PROCEDURE — 93000 ELECTROCARDIOGRAM COMPLETE: CPT | Performed by: INTERNAL MEDICINE

## 2024-01-11 PROCEDURE — 86803 HEPATITIS C AB TEST: CPT | Performed by: INTERNAL MEDICINE

## 2024-01-11 PROCEDURE — 82248 BILIRUBIN DIRECT: CPT | Performed by: INTERNAL MEDICINE

## 2024-01-11 PROCEDURE — 81003 URINALYSIS AUTO W/O SCOPE: CPT | Performed by: INTERNAL MEDICINE

## 2024-01-11 PROCEDURE — 99214 OFFICE O/P EST MOD 30 MIN: CPT | Mod: 25 | Performed by: INTERNAL MEDICINE

## 2024-01-11 PROCEDURE — 80053 COMPREHEN METABOLIC PANEL: CPT | Performed by: INTERNAL MEDICINE

## 2024-01-11 PROCEDURE — 36415 COLL VENOUS BLD VENIPUNCTURE: CPT | Performed by: INTERNAL MEDICINE

## 2024-01-11 PROCEDURE — 82306 VITAMIN D 25 HYDROXY: CPT | Performed by: INTERNAL MEDICINE

## 2024-01-11 RX ORDER — RESPIRATORY SYNCYTIAL VIRUS VACCINE 120MCG/0.5
0.5 KIT INTRAMUSCULAR ONCE
Qty: 1 EACH | Refills: 0 | Status: CANCELLED | OUTPATIENT
Start: 2024-01-11 | End: 2024-01-11

## 2024-01-11 ASSESSMENT — PAIN SCALES - GENERAL: PAINLEVEL: EXTREME PAIN (8)

## 2024-01-11 NOTE — PATIENT INSTRUCTIONS
Patient Instructions:  ++++++++++++++++++++++++++++++++++++++++++++   I have asked the patient to :      Do not take aspirin or meloxicam 1 week prior to the procedure.  Do not take losartan the morning of the procedure.  Please take all your other medications as you normally would.    Thank you for allowing me to participate in your perioperative medical care.    [place]

## 2024-01-11 NOTE — PROGRESS NOTES
[address] [address]  Phone: [phone]  Primary Provider: Wesly Lehman  Pre-op Performing Provider: WESLY LEHMAN      PREOPERATIVE EVALUATION:  Today's date: 1/11/2024    damien is a 73 year old, presenting for the following:  Pre-Op Exam        1/11/2024     9:43 AM   Additional Questions   Roomed by Ariel Catalan CMA       Surgical Information:  Surgery/Procedure:   Surgery Location: [facility]  Surgeon: Apolinar Chacko MD   Surgery Date: 1/25/24  Time of Surgery: 1:00 PM  Where patient plans to recover: At home with family  Fax number for surgical facility: Note does not need to be faxed, will be available electronically in Epic.    Assessment & Plan     The proposed surgical procedure is considered INTERMEDIATE risk.    Pre-op exam    - CBC with platelets; Future    Arthritis of hip      History of total knee arthroplasty, bilateral      Hypertension goal BP (blood pressure) < 140/90    - Basic metabolic panel  (Ca, Cl, CO2, Creat, Gluc, K, Na, BUN); Future  - UA Macroscopic with reflex to Microscopic and Culture - Lab Collect; Future  - EKG 12-lead complete w/read - Clinics    Hyperlipidemia LDL goal <130    - Lipid panel reflex to direct LDL Non-fasting; Future  - Hepatic panel (Albumin, ALT, AST, Bili, Alk Phos, TP); Future    CVA, old, hemiparesis (H)      Morbid obesity due to excess calories (H)      Need for hepatitis C screening test    - Hepatitis C Screen Reflex to HCV RNA Quant and Genotype; Future    Screen for colon cancer    - Colonoscopy Screening  Referral; Future      Possible Sleep Apnea:  {    Based on patient's history, examination, body habitus; she is at increased risk for obstructive sleep apnea.  Please do not leave unattended in the supine position.         Implanted Device:  Patient has an implanted cardiac loop recorder.       - No identified additional risk  factors other than previously addressed    Antiplatelet or Anticoagulation Medication Instructions:   - aspirin: Discontinue aspirin 7-10 days prior to procedure to reduce bleeding risk. It should be resumed postoperatively.     Additional Medication Instructions:  Patient will hold meloxicam 7-10 days prior to the procedure  Patient will hold losartan the morning of the procedure.    RECOMMENDATION:  APPROVAL GIVEN to proceed with proposed procedure, without further diagnostic evaluation.            Subjective       HPI related to upcoming procedure: Patient has severe arthritis in the right hip which is substantially compromised her ability to ambulate and maintain her normal activities.  She has now decided, after lengthy discussion with her surgeon, to pursue total joint arthroplasty.  She is aware of the potential risks and benefits of the procedure.        1/8/2024     7:48 AM   Preop Questions   1. Have you ever had a heart attack or stroke? YES -    2. Have you ever had surgery on your heart or blood vessels, such as a stent placement, a coronary artery bypass, or surgery on an artery in your head, neck, heart, or legs? No   3. Do you have chest pain with activity? No   4. Do you have a history of  heart failure? No   5. Do you currently have a cold, bronchitis or symptoms of other infection? No   6. Do you have a cough, shortness of breath, or wheezing? No   7. Do you or anyone in your family have previous history of blood clots? No   8. Do you or does anyone in your family have a serious bleeding problem such as prolonged bleeding following surgeries or cuts? No   9. Have you ever had problems with anemia or been told to take iron pills? No   10. Have you had any abnormal blood loss such as black, tarry or bloody stools, or abnormal vaginal bleeding? No   11. Have you ever had a blood transfusion? YES -    11a. Have you ever had a transfusion reaction? No   12. Are you willing to have a blood transfusion  if it is medically needed before, during, or after your surgery? Yes   13. Have you or any of your relatives ever had problems with anesthesia? No   14. Do you have sleep apnea, excessive snoring or daytime drowsiness? UNKNOWN -    15. Do you have any artifical heart valves or other implanted medical devices like a pacemaker, defibrillator, or continuous glucose monitor? YES -    15a. What type of device do you have? loop rercorder   15b. Name of the clinic that manages your device:  [facility]   16. Do you have artificial joints? YES -    17. Are you allergic to latex? No       Health Care Directive:  Patient has a Health Care Directive on file      Preoperative Review of :   reviewed - no record of controlled substances prescribed.          Review of Systems  CONSTITUTIONAL: NEGATIVE for fever, chills, change in weight  INTEGUMENTARY/SKIN: NEGATIVE for worrisome rashes, moles or lesions  EYES: NEGATIVE for vision changes or irritation  ENT/MOUTH: NEGATIVE for ear, mouth and throat problems  RESP: NEGATIVE for significant cough or SOB  CV: NEGATIVE for chest pain, palpitations or peripheral edema  GI: NEGATIVE for nausea, abdominal pain, heartburn, or change in bowel habits  : NEGATIVE for frequency, dysuria, or hematuria  MUSCULOSKELETAL: NEGATIVE for significant arthralgias or myalgia  NEURO: History of prior CVA without any significant sequelae.  Extensive workup demonstrated no source of emboli.  ENDOCRINE: NEGATIVE for temperature intolerance, skin/hair changes  HEME: NEGATIVE for bleeding problems  PSYCHIATRIC: NEGATIVE for changes in mood or affect    Patient Active Problem List    Diagnosis Date Noted     Trochanteric bursitis of right hip 06/23/2022     Priority: Medium     Primary osteoarthritis of right hip 06/23/2022     Priority: Medium     CVA, old, hemiparesis (H) 05/03/2022     Priority: Medium     Morbid obesity due to excess calories (H) 07/26/2017     Priority: Medium     Cluster  headache, not intractable, unspecified chronicity pattern 05/08/2017     Priority: Medium     Gastroesophageal reflux disease with esophagitis 10/02/2015     Priority: Medium     Intestinal adhesions 05/06/2014     Priority: Medium     Cervical adenopathy 04/29/2014     Priority: Medium     Small bowel obstruction (H) 04/28/2014     Priority: Medium     DVT prophylaxis 04/28/2014     Priority: Medium     Advance Care Planning 05/07/2012     Priority: Medium     Discussed advance care planning with patient; information given to patient to review. 5/7/2012        Hypertension goal BP (blood pressure) < 140/90 11/15/2011     Priority: Medium     Hyperlipidemia LDL goal <130 04/26/2011     Priority: Medium     Recurring abdominal pain 12/21/2009     Priority: Medium     Esophageal reflux 04/18/2006     Priority: Medium     Absence of menstruation 11/04/2005     Priority: Medium      Past Medical History:   Diagnosis Date     Unspecified essential hypertension      Past Surgical History:   Procedure Laterality Date     COLONOSCOPY  06/10/08    Internal hemorrhoids, otherwise normal.     COLONOSCOPY  01/20/10     HC DILATION/CURETTAGE DIAG/THER NON OB      D & C     HC LAP, SURG ENTEROLYSIS  05/07/10     HC REMOVAL OF OVARY/TUBE(S)      Salpingo-Oophorectomy, bilateral     LAPAROSCOPY DIAGNOSTIC (GENERAL)  5/6/2014    Procedure: LAPAROSCOPY DIAGNOSTIC (GENERAL);  Surgeon: Seth Matthews MD;  Location: [facility]     LAPAROTOMY, LYSIS ADHESIONS, COMBINED  5/6/2014    Procedure: COMBINED LAPAROTOMY, LYSIS ADHESIONS;  Surgeon: Seht Matthews MD;  Location: [facility]     VIDEO CAPSULE ENDOSCOPY  02/15/10    normal sm intestine     ZZC APPENDECTOMY       ZZC LIGATE FALLOPIAN TUBE       ZZC TOTAL ABDOM HYSTERECTOMY      Hysterectomy, Total Abdominal     ZZC TOTAL KNEE ARTHROPLASTY Left      ZZHC UGI ENDOSCOPY, SIMPLE EXAM  01/20/10     Current Outpatient Medications   Medication Sig Dispense Refill     amLODIPine (NORVASC) 10  MG tablet TAKE 1 TABLET EVERY DAY 90 tablet 3     atorvastatin (LIPITOR) 40 MG tablet TAKE 1 TABLET EVERY DAY 90 tablet 3     losartan-hydrochlorothiazide (HYZAAR) 50-12.5 MG tablet TAKE 1 TABLET EVERY DAY 90 tablet 0     meloxicam (MOBIC) 7.5 MG tablet Take 1 tablet (7.5 mg) by mouth daily for 30 days 30 tablet 0     omeprazole (PRILOSEC) 40 MG DR capsule TAKE 1 CAPSULE EVERY DAY, 30 TO 60 MINUTES BEFORE A MEAL  (NEED APPOINTMENT FOR FURTHER REFILLS) 30 capsule 10     propranolol (INDERAL) 40 MG tablet Take 1 tablet (40 mg) by mouth 2 times daily 180 tablet 3     aspirin (ASA) 325 MG EC tablet Take 325 mg by mouth 1 tablet daily       bisacodyl (DULCOLAX) 5 MG EC tablet Take 2 tablets at 3 pm the day before your procedure. If your procedure is before 11 am, take 2 additional tablets at 11 pm. If your procedure is after 11 am, take 2 additional tablets at 6 am. For additional instructions refer to your colonoscopy prep instructions. 4 tablet 0     mometasone (ELOCON) 0.1 % external cream Apply twice daily for 2 weeks. Not face, take 1-2 weeks off (Patient not taking: Reported on 10/25/2022) 50 g 1     polyethylene glycol (GOLYTELY) 236 g suspension The night before the exam at 6 pm drink an 8-ounce glass every 15 minutes until the jug is half empty. If you arrive before 11 AM: Drink the other half of the Golytely jug at 11 PM night before procedure. If you arrive after 11 AM: Drink the other half of the Golytely jug at 6 AM day of procedure. For additional instructions refer to your colonoscopy prep instructions. 4000 mL 0     SUMAtriptan (IMITREX) 25 MG tablet Take 1 tablet (25 mg) by mouth at onset of headache for migraine May repeat in 2 hours. Max 8 tablets/24 hours. (Patient not taking: Reported on 10/25/2022) 4 tablet 1       Allergies   Allergen Reactions     No Known Drug Allergy         Social History     Tobacco Use     Smoking status: Former     Packs/day: 0.50     Years: 20.00     Additional pack years:  "0.00     Total pack years: 10.00     Types: Cigarettes     Quit date: 1995     Years since quittin.0     Smokeless tobacco: Never   Substance Use Topics     Alcohol use: Yes     Comment: very seldom     Family History   Problem Relation Age of Onset     C.A.D. Mother         bi-pass     Diabetes Mother         late onset     Allergies Mother         xray dye     Arthritis Mother      Cancer Mother         ovary     Respiratory Mother      C.A.D. Father         bi-pass     Hypertension Brother      Breast Cancer Sister      Allergies Sister      Allergies Brother      Eye Disorder Maternal Aunt      Gastrointestinal Disease Brother      Gynecology Sister      Thyroid Disease Sister      History   Drug Use No         Objective     BP (!) 144/84 (BP Location: Right arm, Patient Position: Chair, Cuff Size: Adult Large)   Pulse 64   Temp 98.2  F (36.8  C) (Temporal)   Resp 12   Ht 1.651 m (5' 5\")   Wt 119.3 kg (263 lb)   SpO2 96%   BMI 43.77 kg/m      Physical Exam    GENERAL APPEARANCE: healthy, alert and no distress     EYES: EOMI, PERRL     HENT: ear canals and TM's normal and nose and mouth without ulcers or lesions     NECK: no adenopathy, no asymmetry, masses, or scars and thyroid normal to palpation     RESP: lungs clear to auscultation - no rales, rhonchi or wheezes     CV: regular rates and rhythm, normal S1 S2, no S3 or S4 and no murmur, click or rub     ABDOMEN: soft, nontender, without hepatosplenomegaly or masses, bowel sounds normal, no organomegaly, and abdomen is morbidly obese.     MS: extremities normal- no gross deformities noted, no evidence of inflammation in joints, FROM in all extremities.     SKIN: no suspicious lesions or rashes     NEURO: Normal strength and tone, sensory exam grossly normal, mentation intact and speech normal     PSYCH: mentation appears normal. and affect normal/bright     LYMPHATICS: No cervical adenopathy    Recent Labs   Lab Test 10/25/22  1502   HGB 13.8 "         POTASSIUM 3.6   CR 0.68        Diagnostics:  Labs pending at this time.  Results will be reviewed when available.   EKG: appears normal, NSR, normal axis, normal intervals, no acute ST/T changes c/w ischemia, no LVH by voltage criteria, unchanged from previous tracings    Revised Cardiac Risk Index (RCRI):  The patient has the following serious cardiovascular risks for perioperative complications:   - No serious cardiac risks = 0 points     RCRI Interpretation: 1 point: Class II (low risk - 0.9% complication rate)         Signed Electronically by: Wesly Lehman DO  Copy of this evaluation report is provided to requesting physician.

## 2024-01-12 LAB — HCV AB SERPL QL IA: NONREACTIVE

## 2024-01-15 ENCOUNTER — HOSPITAL ENCOUNTER (OUTPATIENT)
Dept: BONE DENSITY | Facility: CLINIC | Age: 74
Discharge: HOME | End: 2024-01-15
Attending: ORTHOPAEDIC SURGERY | Admitting: ORTHOPAEDIC SURGERY
Payer: MEDICARE

## 2024-01-15 DIAGNOSIS — M81.0 AGE-RELATED OSTEOPOROSIS WITHOUT CURRENT PATHOLOGICAL FRACTURE: ICD-10-CM

## 2024-01-15 DIAGNOSIS — M16.11 PRIMARY OSTEOARTHRITIS OF RIGHT HIP: ICD-10-CM

## 2024-01-15 PROCEDURE — 77080 DXA BONE DENSITY AXIAL: CPT

## 2024-01-22 RX ORDER — ASPIRIN 81 MG/1
81 TABLET ORAL DAILY
COMMUNITY

## 2024-01-22 RX ORDER — ANTIOX #8/OM3/DHA/EPA/LUT/ZEAX 250-2.5 MG
1 CAPSULE ORAL EVERY EVENING
COMMUNITY

## 2024-01-22 RX ORDER — ALBUTEROL SULFATE 90 UG/1
AEROSOL, METERED RESPIRATORY (INHALATION)
COMMUNITY
Start: 2023-03-21 | End: 2024-03-18

## 2024-01-22 NOTE — PROVIDER NOTIFICATION
01/22/24 9932   Discharge Planning   Patient/Family Anticipates Transition to home with family   Concerns to be Addressed all concerns addressed in this encounter   Living Arrangements   People in Home spouse   Type of Residence Private Residence   Is your private residence a single family home or apartment? Single family home   Number of Stairs, Within Home, Primary eight   Stair Railings, Within Home, Primary railings safe and in good condition;railings on both sides of stairs   Once home, are you able to live on one level? Yes   Which level? Main Level   Bathroom Shower/Tub Walk-in shower   Equipment Currently Used at Home grab bar, tub/shower;grab bar, toilet;tub bench;walker, rolling   Support System   Support Systems Spouse/Significant Other;Children   Do you have someone available to stay with you one or two nights once you are home? Yes   Blood   Known Bleeding Disorder or Coagulopathy No   Does the patient have any Orthodoxy/cultural preferences related to blood products? No   Education   Has the patient scheduled or completed pre-op total joint education, either in class or online, in the last 12 months? Yes   Patient attended total joint pre-op class/received pre-op teaching  online

## 2024-01-25 ENCOUNTER — HOSPITAL ENCOUNTER (OUTPATIENT)
Facility: CLINIC | Age: 74
Discharge: HOME | End: 2024-01-26
Attending: ORTHOPAEDIC SURGERY | Admitting: ORTHOPAEDIC SURGERY
Payer: MEDICARE

## 2024-01-25 ENCOUNTER — ANESTHESIA EVENT (OUTPATIENT)
Dept: SURGERY | Facility: CLINIC | Age: 74
End: 2024-01-25
Payer: MEDICARE

## 2024-01-25 ENCOUNTER — APPOINTMENT (OUTPATIENT)
Dept: GENERAL RADIOLOGY | Facility: CLINIC | Age: 74
End: 2024-01-25
Attending: PHYSICIAN ASSISTANT
Payer: MEDICARE

## 2024-01-25 ENCOUNTER — HOSPITAL ENCOUNTER (OUTPATIENT)
Dept: GENERAL RADIOLOGY | Facility: CLINIC | Age: 74
Discharge: HOME | End: 2024-01-25
Attending: ORTHOPAEDIC SURGERY | Admitting: ORTHOPAEDIC SURGERY
Payer: MEDICARE

## 2024-01-25 ENCOUNTER — ANESTHESIA (OUTPATIENT)
Dept: SURGERY | Facility: CLINIC | Age: 74
End: 2024-01-25
Payer: MEDICARE

## 2024-01-25 DIAGNOSIS — Z96.641 S/P TOTAL RIGHT HIP ARTHROPLASTY: Primary | ICD-10-CM

## 2024-01-25 DIAGNOSIS — M16.11 PRIMARY OSTEOARTHRITIS OF RIGHT HIP: ICD-10-CM

## 2024-01-25 LAB — GLUCOSE BLDC GLUCOMTR-MCNC: 90 MG/DL (ref 70–99)

## 2024-01-25 PROCEDURE — 120N000001 HC R&B MED SURG/OB

## 2024-01-25 PROCEDURE — 999N000179 XR SURGERY CARM FLUORO LESS THAN 5 MIN W STILLS: Mod: TC

## 2024-01-25 PROCEDURE — 250N000025 HC SEVOFLURANE, PER MIN: Performed by: ORTHOPAEDIC SURGERY

## 2024-01-25 PROCEDURE — 370N000017 HC ANESTHESIA TECHNICAL FEE, PER MIN: Performed by: ORTHOPAEDIC SURGERY

## 2024-01-25 PROCEDURE — 258N000003 HC RX IP 258 OP 636: Performed by: PHYSICIAN ASSISTANT

## 2024-01-25 PROCEDURE — 710N000010 HC RECOVERY PHASE 1, LEVEL 2, PER MIN: Performed by: ORTHOPAEDIC SURGERY

## 2024-01-25 PROCEDURE — 250N000011 HC RX IP 250 OP 636: Performed by: ORTHOPAEDIC SURGERY

## 2024-01-25 PROCEDURE — 258N000003 HC RX IP 258 OP 636: Performed by: NURSE ANESTHETIST, CERTIFIED REGISTERED

## 2024-01-25 PROCEDURE — 272N000001 HC OR GENERAL SUPPLY STERILE: Performed by: ORTHOPAEDIC SURGERY

## 2024-01-25 PROCEDURE — 250N000011 HC RX IP 250 OP 636: Performed by: PHYSICIAN ASSISTANT

## 2024-01-25 PROCEDURE — 250N000009 HC RX 250: Performed by: NURSE ANESTHETIST, CERTIFIED REGISTERED

## 2024-01-25 PROCEDURE — 360N000077 HC SURGERY LEVEL 4, PER MIN: Performed by: ORTHOPAEDIC SURGERY

## 2024-01-25 PROCEDURE — C1713 ANCHOR/SCREW BN/BN,TIS/BN: HCPCS | Performed by: ORTHOPAEDIC SURGERY

## 2024-01-25 PROCEDURE — 250N000011 HC RX IP 250 OP 636: Performed by: NURSE ANESTHETIST, CERTIFIED REGISTERED

## 2024-01-25 PROCEDURE — 258N000001 HC RX 258: Performed by: ORTHOPAEDIC SURGERY

## 2024-01-25 PROCEDURE — 27130 TOTAL HIP ARTHROPLASTY: CPT | Mod: RT | Performed by: ORTHOPAEDIC SURGERY

## 2024-01-25 PROCEDURE — 999N000141 HC STATISTIC PRE-PROCEDURE NURSING ASSESSMENT: Performed by: ORTHOPAEDIC SURGERY

## 2024-01-25 PROCEDURE — C1776 JOINT DEVICE (IMPLANTABLE): HCPCS | Performed by: ORTHOPAEDIC SURGERY

## 2024-01-25 PROCEDURE — 250N000013 HC RX MED GY IP 250 OP 250 PS 637: Performed by: PHYSICIAN ASSISTANT

## 2024-01-25 PROCEDURE — 999N000063 XR PELVIS AND HIP RIGHT 1 VIEW

## 2024-01-25 PROCEDURE — 250N000013 HC RX MED GY IP 250 OP 250 PS 637: Performed by: ORTHOPAEDIC SURGERY

## 2024-01-25 PROCEDURE — 250N000009 HC RX 250: Performed by: ORTHOPAEDIC SURGERY

## 2024-01-25 DEVICE — IMP INSERT HIP DEPUY PINNACLE ALTRX 36X54MM 1221-36-054: Type: IMPLANTABLE DEVICE | Site: HIP | Status: FUNCTIONAL

## 2024-01-25 DEVICE — IMP APEX HOLE ELIMINATOR HIP DEPUY DURALOC 1246-03-000: Type: IMPLANTABLE DEVICE | Site: HIP | Status: FUNCTIONAL

## 2024-01-25 DEVICE — IMPLANTABLE DEVICE: Type: IMPLANTABLE DEVICE | Site: HIP | Status: FUNCTIONAL

## 2024-01-25 DEVICE — IMP SHELL ACET DEPUY PINNACLE GRIPTION 54MM 121732054: Type: IMPLANTABLE DEVICE | Site: HIP | Status: FUNCTIONAL

## 2024-01-25 DEVICE — IMP HEAD FEMORAL DEPUY 36MM +1.5 1365-51-000: Type: IMPLANTABLE DEVICE | Site: HIP | Status: FUNCTIONAL

## 2024-01-25 RX ORDER — BUPIVACAINE HYDROCHLORIDE AND EPINEPHRINE 2.5; 5 MG/ML; UG/ML
INJECTION, SOLUTION INFILTRATION; PERINEURAL PRN
Status: DISCONTINUED | OUTPATIENT
Start: 2024-01-25 | End: 2024-01-25

## 2024-01-25 RX ORDER — ONDANSETRON 2 MG/ML
4 INJECTION INTRAMUSCULAR; INTRAVENOUS EVERY 30 MIN PRN
Status: DISCONTINUED | OUTPATIENT
Start: 2024-01-25 | End: 2024-01-25

## 2024-01-25 RX ORDER — CEFAZOLIN SODIUM/WATER 3 G/30 ML
3 SYRINGE (ML) INTRAVENOUS SEE ADMIN INSTRUCTIONS
Status: DISCONTINUED | OUTPATIENT
Start: 2024-01-25 | End: 2024-01-25 | Stop reason: HOSPADM

## 2024-01-25 RX ORDER — LABETALOL HYDROCHLORIDE 5 MG/ML
10 INJECTION, SOLUTION INTRAVENOUS
Status: DISCONTINUED | OUTPATIENT
Start: 2024-01-25 | End: 2024-01-25

## 2024-01-25 RX ORDER — ONDANSETRON 4 MG/1
4 TABLET, ORALLY DISINTEGRATING ORAL EVERY 30 MIN PRN
Status: DISCONTINUED | OUTPATIENT
Start: 2024-01-25 | End: 2024-01-25

## 2024-01-25 RX ORDER — NALOXONE HYDROCHLORIDE 0.4 MG/ML
0.2 INJECTION, SOLUTION INTRAMUSCULAR; INTRAVENOUS; SUBCUTANEOUS
Status: DISCONTINUED | OUTPATIENT
Start: 2024-01-25 | End: 2024-01-26 | Stop reason: HOSPADM

## 2024-01-25 RX ORDER — BUPIVACAINE HYDROCHLORIDE 2.5 MG/ML
INJECTION, SOLUTION INFILTRATION; PERINEURAL PRN
Status: DISCONTINUED | OUTPATIENT
Start: 2024-01-25 | End: 2024-01-25 | Stop reason: HOSPADM

## 2024-01-25 RX ORDER — AMOXICILLIN 250 MG
1 CAPSULE ORAL 2 TIMES DAILY
Status: DISCONTINUED | OUTPATIENT
Start: 2024-01-25 | End: 2024-01-26 | Stop reason: HOSPADM

## 2024-01-25 RX ORDER — TRANEXAMIC ACID 650 MG/1
1950 TABLET ORAL ONCE
Status: COMPLETED | OUTPATIENT
Start: 2024-01-25 | End: 2024-01-25

## 2024-01-25 RX ORDER — HYDRALAZINE HYDROCHLORIDE 20 MG/ML
2.5-5 INJECTION INTRAMUSCULAR; INTRAVENOUS EVERY 10 MIN PRN
Status: DISCONTINUED | OUTPATIENT
Start: 2024-01-25 | End: 2024-01-25

## 2024-01-25 RX ORDER — ALBUTEROL SULFATE 0.83 MG/ML
2.5 SOLUTION RESPIRATORY (INHALATION) EVERY 4 HOURS PRN
Status: DISCONTINUED | OUTPATIENT
Start: 2024-01-25 | End: 2024-01-25

## 2024-01-25 RX ORDER — FENTANYL CITRATE 50 UG/ML
50 INJECTION, SOLUTION INTRAMUSCULAR; INTRAVENOUS EVERY 5 MIN PRN
Status: DISCONTINUED | OUTPATIENT
Start: 2024-01-25 | End: 2024-01-25

## 2024-01-25 RX ORDER — ONDANSETRON 2 MG/ML
INJECTION INTRAMUSCULAR; INTRAVENOUS PRN
Status: DISCONTINUED | OUTPATIENT
Start: 2024-01-25 | End: 2024-01-25

## 2024-01-25 RX ORDER — HYDROMORPHONE HYDROCHLORIDE 1 MG/ML
0.5 INJECTION, SOLUTION INTRAMUSCULAR; INTRAVENOUS; SUBCUTANEOUS EVERY 5 MIN PRN
Status: DISCONTINUED | OUTPATIENT
Start: 2024-01-25 | End: 2024-01-25

## 2024-01-25 RX ORDER — ONDANSETRON 4 MG/1
4 TABLET, ORALLY DISINTEGRATING ORAL EVERY 6 HOURS PRN
Status: DISCONTINUED | OUTPATIENT
Start: 2024-01-25 | End: 2024-01-26 | Stop reason: HOSPADM

## 2024-01-25 RX ORDER — OXYCODONE HYDROCHLORIDE 5 MG/1
5-10 TABLET ORAL EVERY 4 HOURS PRN
Qty: 40 TABLET | Refills: 0 | Status: SHIPPED | OUTPATIENT
Start: 2024-01-25 | End: 2024-03-18

## 2024-01-25 RX ORDER — CELECOXIB 100 MG/1
100 CAPSULE ORAL 2 TIMES DAILY
Status: DISCONTINUED | OUTPATIENT
Start: 2024-01-25 | End: 2024-01-26 | Stop reason: HOSPADM

## 2024-01-25 RX ORDER — CELECOXIB 200 MG/1
200 CAPSULE ORAL DAILY
Qty: 30 CAPSULE | Refills: 0 | Status: SHIPPED | OUTPATIENT
Start: 2024-01-25 | End: 2024-03-18

## 2024-01-25 RX ORDER — FAMOTIDINE 20 MG/1
20 TABLET, FILM COATED ORAL 2 TIMES DAILY
Status: DISCONTINUED | OUTPATIENT
Start: 2024-01-25 | End: 2024-01-26 | Stop reason: HOSPADM

## 2024-01-25 RX ORDER — HYDROMORPHONE HYDROCHLORIDE 1 MG/ML
0.4 INJECTION, SOLUTION INTRAMUSCULAR; INTRAVENOUS; SUBCUTANEOUS
Status: DISCONTINUED | OUTPATIENT
Start: 2024-01-25 | End: 2024-01-26 | Stop reason: HOSPADM

## 2024-01-25 RX ORDER — ONDANSETRON 2 MG/ML
4 INJECTION INTRAMUSCULAR; INTRAVENOUS EVERY 6 HOURS PRN
Status: DISCONTINUED | OUTPATIENT
Start: 2024-01-25 | End: 2024-01-26 | Stop reason: HOSPADM

## 2024-01-25 RX ORDER — ASPIRIN 325 MG
325 TABLET, DELAYED RELEASE (ENTERIC COATED) ORAL 2 TIMES DAILY
Qty: 60 TABLET | Refills: 0 | Status: SHIPPED | OUTPATIENT
Start: 2024-01-25 | End: 2024-02-12

## 2024-01-25 RX ORDER — LIDOCAINE HYDROCHLORIDE 20 MG/ML
INJECTION, SOLUTION INFILTRATION; PERINEURAL PRN
Status: DISCONTINUED | OUTPATIENT
Start: 2024-01-25 | End: 2024-01-25

## 2024-01-25 RX ORDER — CEFAZOLIN SODIUM/WATER 3 G/30 ML
3 SYRINGE (ML) INTRAVENOUS
Status: COMPLETED | OUTPATIENT
Start: 2024-01-25 | End: 2024-01-25

## 2024-01-25 RX ORDER — ACETAMINOPHEN 325 MG/1
650 TABLET ORAL EVERY 4 HOURS PRN
Qty: 100 TABLET | Refills: 0 | Status: SHIPPED | OUTPATIENT
Start: 2024-01-25 | End: 2024-03-18

## 2024-01-25 RX ORDER — PROPOFOL 10 MG/ML
INJECTION, EMULSION INTRAVENOUS CONTINUOUS PRN
Status: DISCONTINUED | OUTPATIENT
Start: 2024-01-25 | End: 2024-01-25

## 2024-01-25 RX ORDER — HYDROMORPHONE HYDROCHLORIDE 1 MG/ML
0.2 INJECTION, SOLUTION INTRAMUSCULAR; INTRAVENOUS; SUBCUTANEOUS
Status: DISCONTINUED | OUTPATIENT
Start: 2024-01-25 | End: 2024-01-26 | Stop reason: HOSPADM

## 2024-01-25 RX ORDER — EPHEDRINE SULFATE 50 MG/ML
INJECTION, SOLUTION INTRAMUSCULAR; INTRAVENOUS; SUBCUTANEOUS PRN
Status: DISCONTINUED | OUTPATIENT
Start: 2024-01-25 | End: 2024-01-25

## 2024-01-25 RX ORDER — POLYETHYLENE GLYCOL 3350 17 G/17G
17 POWDER, FOR SOLUTION ORAL DAILY
Status: DISCONTINUED | OUTPATIENT
Start: 2024-01-26 | End: 2024-01-26 | Stop reason: HOSPADM

## 2024-01-25 RX ORDER — HYDROXYZINE HYDROCHLORIDE 10 MG/1
10 TABLET, FILM COATED ORAL EVERY 6 HOURS PRN
Qty: 30 TABLET | Refills: 0 | Status: SHIPPED | OUTPATIENT
Start: 2024-01-25 | End: 2024-03-18

## 2024-01-25 RX ORDER — SODIUM CHLORIDE, SODIUM LACTATE, POTASSIUM CHLORIDE, CALCIUM CHLORIDE 600; 310; 30; 20 MG/100ML; MG/100ML; MG/100ML; MG/100ML
INJECTION, SOLUTION INTRAVENOUS CONTINUOUS
Status: DISCONTINUED | OUTPATIENT
Start: 2024-01-25 | End: 2024-01-25

## 2024-01-25 RX ORDER — GABAPENTIN 100 MG/1
100 CAPSULE ORAL AT BEDTIME
Qty: 30 CAPSULE | Refills: 0 | Status: SHIPPED | OUTPATIENT
Start: 2024-01-25 | End: 2024-03-18

## 2024-01-25 RX ORDER — NALOXONE HYDROCHLORIDE 0.4 MG/ML
0.4 INJECTION, SOLUTION INTRAMUSCULAR; INTRAVENOUS; SUBCUTANEOUS
Status: DISCONTINUED | OUTPATIENT
Start: 2024-01-25 | End: 2024-01-26 | Stop reason: HOSPADM

## 2024-01-25 RX ORDER — HYDROMORPHONE HYDROCHLORIDE 1 MG/ML
0.2 INJECTION, SOLUTION INTRAMUSCULAR; INTRAVENOUS; SUBCUTANEOUS EVERY 5 MIN PRN
Status: DISCONTINUED | OUTPATIENT
Start: 2024-01-25 | End: 2024-01-25

## 2024-01-25 RX ORDER — MEPERIDINE HYDROCHLORIDE 25 MG/ML
12.5 INJECTION INTRAMUSCULAR; INTRAVENOUS; SUBCUTANEOUS EVERY 5 MIN PRN
Status: DISCONTINUED | OUTPATIENT
Start: 2024-01-25 | End: 2024-01-25

## 2024-01-25 RX ORDER — DEXAMETHASONE SODIUM PHOSPHATE 10 MG/ML
INJECTION, SOLUTION INTRAMUSCULAR; INTRAVENOUS PRN
Status: DISCONTINUED | OUTPATIENT
Start: 2024-01-25 | End: 2024-01-25

## 2024-01-25 RX ORDER — SODIUM CHLORIDE, SODIUM LACTATE, POTASSIUM CHLORIDE, CALCIUM CHLORIDE 600; 310; 30; 20 MG/100ML; MG/100ML; MG/100ML; MG/100ML
INJECTION, SOLUTION INTRAVENOUS CONTINUOUS
Status: DISCONTINUED | OUTPATIENT
Start: 2024-01-25 | End: 2024-01-25 | Stop reason: HOSPADM

## 2024-01-25 RX ORDER — LIDOCAINE 40 MG/G
CREAM TOPICAL
Status: DISCONTINUED | OUTPATIENT
Start: 2024-01-25 | End: 2024-01-26 | Stop reason: HOSPADM

## 2024-01-25 RX ORDER — FENTANYL CITRATE 50 UG/ML
25 INJECTION, SOLUTION INTRAMUSCULAR; INTRAVENOUS EVERY 5 MIN PRN
Status: DISCONTINUED | OUTPATIENT
Start: 2024-01-25 | End: 2024-01-25

## 2024-01-25 RX ORDER — BISACODYL 10 MG
10 SUPPOSITORY, RECTAL RECTAL DAILY PRN
Status: DISCONTINUED | OUTPATIENT
Start: 2024-01-25 | End: 2024-01-26 | Stop reason: HOSPADM

## 2024-01-25 RX ORDER — FENTANYL CITRATE 50 UG/ML
INJECTION, SOLUTION INTRAMUSCULAR; INTRAVENOUS PRN
Status: DISCONTINUED | OUTPATIENT
Start: 2024-01-25 | End: 2024-01-25

## 2024-01-25 RX ORDER — PROPOFOL 10 MG/ML
INJECTION, EMULSION INTRAVENOUS PRN
Status: DISCONTINUED | OUTPATIENT
Start: 2024-01-25 | End: 2024-01-25

## 2024-01-25 RX ORDER — OXYCODONE HYDROCHLORIDE 5 MG/1
10 TABLET ORAL EVERY 4 HOURS PRN
Status: DISCONTINUED | OUTPATIENT
Start: 2024-01-25 | End: 2024-01-26 | Stop reason: HOSPADM

## 2024-01-25 RX ORDER — ACETAMINOPHEN 325 MG/1
650 TABLET ORAL EVERY 4 HOURS PRN
Status: DISCONTINUED | OUTPATIENT
Start: 2024-01-28 | End: 2024-01-26 | Stop reason: HOSPADM

## 2024-01-25 RX ORDER — PROCHLORPERAZINE MALEATE 5 MG
5 TABLET ORAL EVERY 6 HOURS PRN
Status: DISCONTINUED | OUTPATIENT
Start: 2024-01-25 | End: 2024-01-26 | Stop reason: HOSPADM

## 2024-01-25 RX ORDER — AMOXICILLIN 250 MG
1-2 CAPSULE ORAL 2 TIMES DAILY
Qty: 30 TABLET | Refills: 1 | Status: SHIPPED | OUTPATIENT
Start: 2024-01-25 | End: 2024-03-18

## 2024-01-25 RX ORDER — OXYCODONE HYDROCHLORIDE 5 MG/1
5 TABLET ORAL EVERY 4 HOURS PRN
Status: DISCONTINUED | OUTPATIENT
Start: 2024-01-25 | End: 2024-01-26 | Stop reason: HOSPADM

## 2024-01-25 RX ORDER — CEFAZOLIN SODIUM/WATER 2 G/20 ML
2 SYRINGE (ML) INTRAVENOUS EVERY 8 HOURS
Qty: 40 ML | Refills: 0 | Status: COMPLETED | OUTPATIENT
Start: 2024-01-26 | End: 2024-01-26

## 2024-01-25 RX ORDER — ASPIRIN 325 MG
325 TABLET, DELAYED RELEASE (ENTERIC COATED) ORAL 2 TIMES DAILY
Status: DISCONTINUED | OUTPATIENT
Start: 2024-01-25 | End: 2024-01-26 | Stop reason: HOSPADM

## 2024-01-25 RX ORDER — SODIUM CHLORIDE 9 MG/ML
INJECTION, SOLUTION INTRAVENOUS CONTINUOUS
Status: DISCONTINUED | OUTPATIENT
Start: 2024-01-25 | End: 2024-01-26 | Stop reason: HOSPADM

## 2024-01-25 RX ORDER — CELECOXIB 100 MG/1
400 CAPSULE ORAL ONCE
Status: COMPLETED | OUTPATIENT
Start: 2024-01-25 | End: 2024-01-25

## 2024-01-25 RX ORDER — ACETAMINOPHEN 325 MG/1
975 TABLET ORAL ONCE
Status: COMPLETED | OUTPATIENT
Start: 2024-01-25 | End: 2024-01-25

## 2024-01-25 RX ORDER — GABAPENTIN 100 MG/1
100 CAPSULE ORAL AT BEDTIME
Status: DISCONTINUED | OUTPATIENT
Start: 2024-01-25 | End: 2024-01-26 | Stop reason: HOSPADM

## 2024-01-25 RX ORDER — HYDROXYZINE HYDROCHLORIDE 10 MG/1
10 TABLET, FILM COATED ORAL EVERY 6 HOURS PRN
Status: DISCONTINUED | OUTPATIENT
Start: 2024-01-25 | End: 2024-01-26 | Stop reason: HOSPADM

## 2024-01-25 RX ADMIN — ROCURONIUM BROMIDE 20 MG: 50 INJECTION, SOLUTION INTRAVENOUS at 14:44

## 2024-01-25 RX ADMIN — DEXAMETHASONE SODIUM PHOSPHATE 5 MG: 10 INJECTION, SOLUTION INTRAMUSCULAR; INTRAVENOUS at 13:33

## 2024-01-25 RX ADMIN — FENTANYL CITRATE 100 MCG: 50 INJECTION INTRAMUSCULAR; INTRAVENOUS at 13:11

## 2024-01-25 RX ADMIN — SODIUM CHLORIDE: 9 INJECTION, SOLUTION INTRAVENOUS at 20:15

## 2024-01-25 RX ADMIN — MIDAZOLAM 2 MG: 1 INJECTION INTRAMUSCULAR; INTRAVENOUS at 13:03

## 2024-01-25 RX ADMIN — Medication 3 G: at 13:22

## 2024-01-25 RX ADMIN — SODIUM CHLORIDE, POTASSIUM CHLORIDE, SODIUM LACTATE AND CALCIUM CHLORIDE: 600; 310; 30; 20 INJECTION, SOLUTION INTRAVENOUS at 13:26

## 2024-01-25 RX ADMIN — HYDROMORPHONE HYDROCHLORIDE 0.5 MG: 1 INJECTION, SOLUTION INTRAMUSCULAR; INTRAVENOUS; SUBCUTANEOUS at 17:07

## 2024-01-25 RX ADMIN — ACETAMINOPHEN 975 MG: 325 TABLET, FILM COATED ORAL at 11:40

## 2024-01-25 RX ADMIN — HYDROMORPHONE HYDROCHLORIDE 0.5 MG: 1 INJECTION, SOLUTION INTRAMUSCULAR; INTRAVENOUS; SUBCUTANEOUS at 16:03

## 2024-01-25 RX ADMIN — ROCURONIUM BROMIDE 50 MG: 50 INJECTION, SOLUTION INTRAVENOUS at 13:12

## 2024-01-25 RX ADMIN — PROPOFOL 100 MCG/KG/MIN: 10 INJECTION, EMULSION INTRAVENOUS at 13:18

## 2024-01-25 RX ADMIN — HYDROMORPHONE HYDROCHLORIDE 0.2 MG: 1 INJECTION, SOLUTION INTRAMUSCULAR; INTRAVENOUS; SUBCUTANEOUS at 23:50

## 2024-01-25 RX ADMIN — PROPOFOL 200 MG: 10 INJECTION, EMULSION INTRAVENOUS at 13:12

## 2024-01-25 RX ADMIN — TRANEXAMIC ACID 1950 MG: 650 TABLET ORAL at 11:40

## 2024-01-25 RX ADMIN — LIDOCAINE HYDROCHLORIDE 50 MG: 20 INJECTION, SOLUTION INFILTRATION; PERINEURAL at 13:12

## 2024-01-25 RX ADMIN — HYDROMORPHONE HYDROCHLORIDE 0.5 MG: 1 INJECTION, SOLUTION INTRAMUSCULAR; INTRAVENOUS; SUBCUTANEOUS at 16:21

## 2024-01-25 RX ADMIN — CELECOXIB 100 MG: 100 CAPSULE ORAL at 20:53

## 2024-01-25 RX ADMIN — OXYCODONE HYDROCHLORIDE 10 MG: 5 TABLET ORAL at 20:53

## 2024-01-25 RX ADMIN — BUPIVACAINE HYDROCHLORIDE AND EPINEPHRINE BITARTRATE 40 ML: 2.5; .005 INJECTION, SOLUTION INFILTRATION; PERINEURAL at 17:26

## 2024-01-25 RX ADMIN — CELECOXIB 400 MG: 100 CAPSULE ORAL at 11:40

## 2024-01-25 RX ADMIN — FAMOTIDINE 20 MG: 20 TABLET, FILM COATED ORAL at 19:41

## 2024-01-25 RX ADMIN — Medication 10 MG: at 13:49

## 2024-01-25 RX ADMIN — ASPIRIN 325 MG: 325 TABLET, COATED ORAL at 19:41

## 2024-01-25 RX ADMIN — GABAPENTIN 100 MG: 100 CAPSULE ORAL at 22:22

## 2024-01-25 RX ADMIN — SODIUM CHLORIDE, POTASSIUM CHLORIDE, SODIUM LACTATE AND CALCIUM CHLORIDE: 600; 310; 30; 20 INJECTION, SOLUTION INTRAVENOUS at 16:32

## 2024-01-25 RX ADMIN — Medication 5 MG: at 13:47

## 2024-01-25 RX ADMIN — HYDROMORPHONE HYDROCHLORIDE 0.5 MG: 1 INJECTION, SOLUTION INTRAMUSCULAR; INTRAVENOUS; SUBCUTANEOUS at 13:34

## 2024-01-25 RX ADMIN — Medication 5 MG: at 14:27

## 2024-01-25 RX ADMIN — SODIUM CHLORIDE, POTASSIUM CHLORIDE, SODIUM LACTATE AND CALCIUM CHLORIDE: 600; 310; 30; 20 INJECTION, SOLUTION INTRAVENOUS at 12:34

## 2024-01-25 RX ADMIN — SUGAMMADEX 200 MG: 100 INJECTION, SOLUTION INTRAVENOUS at 17:09

## 2024-01-25 RX ADMIN — ROCURONIUM BROMIDE 30 MG: 50 INJECTION, SOLUTION INTRAVENOUS at 14:09

## 2024-01-25 RX ADMIN — LIDOCAINE HYDROCHLORIDE 1 ML: 10 INJECTION, SOLUTION EPIDURAL; INFILTRATION; INTRACAUDAL; PERINEURAL at 12:33

## 2024-01-25 RX ADMIN — SENNOSIDES AND DOCUSATE SODIUM 1 TABLET: 8.6; 5 TABLET ORAL at 19:41

## 2024-01-25 RX ADMIN — ONDANSETRON 4 MG: 2 INJECTION INTRAMUSCULAR; INTRAVENOUS at 17:09

## 2024-01-25 RX ADMIN — Medication 5 MG: at 15:17

## 2024-01-25 RX ADMIN — Medication 3 G: at 17:09

## 2024-01-25 ASSESSMENT — ACTIVITIES OF DAILY LIVING (ADL)
ADLS_ACUITY_SCORE: 23
ADLS_ACUITY_SCORE: 22
ADLS_ACUITY_SCORE: 23
ADLS_ACUITY_SCORE: 23

## 2024-01-25 NOTE — ANESTHESIA PROCEDURE NOTES
Fascia iliaca Procedure Note    Pre-Procedure   Staff -        Performed By: CRNA       Location: post-op       Pre-Anesthestic Checklist: patient identified, IV checked, site marked, risks and benefits discussed, informed consent, monitors and equipment checked, pre-op evaluation, at physician/surgeon's request and post-op pain management  Timeout:       Correct Patient: Yes        Correct Procedure: Yes        Correct Site: Yes        Correct Position: Yes        Correct Laterality: Yes        Site Marked: Yes  Procedure Documentation  Procedure: Fascia iliaca       Laterality: right       Patient Position: supine       Patient Prep/Sterile Barriers: sterile gloves, mask       Skin prep: Chloraprep       Local skin infiltrated with 1 mL of 1% lidocaine.        Needle Type: insulated       Needle Gauge: 22.        Needle Length (millimeters): 100        Ultrasound guided       1. Ultrasound was used to identify targeted nerve, plexus, vascular marker, or fascial plane and place a needle adjacent to it in real-time.       2. Ultrasound was used to visualize the spread of anesthetic in close proximity to the above referenced structure.       3. A permanent image is entered into the patient's record.       Nerve Stim: Initial Level 0.05 mA.  Lowest motor response mA.    Assessment/Narrative         The placement was negative for: blood aspirated, painful injection and site bleeding       Paresthesias: No.       Test dose of mL at.         Test dose negative, 3 minutes after injection, for signs of intravascular, subdural, or intrathecal injection.       Bolus given via needle..        Secured via.        Insertion/Infusion Method: Single Shot       Complications: none       Injection made incrementally with aspirations every 5 mL.     Comments:  Pt tolerated procedure well.  No complications noted.  Will follow if needed.      FOR [facility] ([facility]) ONLY:   Pain Team Contact information: please page the Pain Team  "Via [place] Emergency Registry. Search \"Pain\". During daytime hours, please page the attending first. At night please page the resident first.      "

## 2024-01-25 NOTE — ANESTHESIA PROCEDURE NOTES
Airway       Patient location during procedure: OR       Procedure Start/Stop Times: 1/25/2024 1:15 PM  Staff -        Performed By: CRNA  Consent for Airway        Urgency: elective  Indications and Patient Condition       Indications for airway management: mariama-procedural       Induction type:intravenous       Mask difficulty assessment: 1 - vent by mask    Final Airway Details       Final airway type: endotracheal airway       Successful airway: ETT - single  Endotracheal Airway Details        ETT size (mm): 7.0       Cuffed: yes       Successful intubation technique: direct laryngoscopy       DL Blade Type: Taylor 2       Grade View of Cords: 1       Adjucts: stylet       Position: Right       Measured from: lips       Secured at (cm): 22       Bite block used: Oral Airway    Post intubation assessment        Placement verified by: capnometry, equal breath sounds and chest rise        Number of attempts at approach: 1       Number of other approaches attempted: 0       Secured with: plastic tape       Ease of procedure: easy       Dentition: Intact and Unchanged    Medication(s) Administered   Medication Administration Time: 1/25/2024 1:15 PM

## 2024-01-25 NOTE — ANESTHESIA CARE TRANSFER NOTE
Patient: Komal Miller    Procedure: Procedure(s):  ARTHROPLASTY, HIP, TOTAL RIGHT       Diagnosis: Primary osteoarthritis of right hip [M16.11]  Diagnosis Additional Information: No value filed.    Anesthesia Type:   General     Note:    Oropharynx: oropharynx clear of all foreign objects and spontaneously breathing  Level of Consciousness: drowsy  Oxygen Supplementation: face mask    Independent Airway: airway patency satisfactory and stable  Dentition: dentition unchanged  Vital Signs Stable: post-procedure vital signs reviewed and stable  Report to RN Given: handoff report given  Patient transferred to: PACU    Handoff Report: Identifed the Patient, Identified the Reponsible Provider, Reviewed the pertinent medical history, Discussed the surgical course, Reviewed Intra-OP anesthesia mangement and issues during anesthesia, Set expectations for post-procedure period and Allowed opportunity for questions and acknowledgement of understanding      Vitals:  Vitals Value Taken Time   BP     Temp 97.52  F (36.4  C) 01/25/24 1739   Pulse 86 01/25/24 1739   Resp 8 01/25/24 1739   SpO2 91 % 01/25/24 1739   Vitals shown include unfiled device data.    Electronically Signed By: MARISOL Cuenca CRNA  January 25, 2024  5:40 PM

## 2024-01-25 NOTE — OR NURSING
Transfer from  pacu to Room ph-2 med-surg  Transferred to bed via Glyder Mat    S: 74 y/o f  S/P right total hip replacement       Anesthesia Type:  general       Surgeon:  Dr. Chacko       Allergies:  See Medication Reconciliation Record       DNR: no      B:  Pertinent Medical History: anesthesia reports hx of CVA and TIA, no long term residual, was on eliquis but now currently only 81 asa per day  Past Medical History:   Diagnosis Date    Unspecified essential hypertension        Surgical History:    Past Surgical History:   Procedure Laterality Date    COLONOSCOPY  06/10/08    Internal hemorrhoids, otherwise normal.    COLONOSCOPY  01/20/10    HC DILATION/CURETTAGE DIAG/THER NON OB      D & C    HC LAP, SURG ENTEROLYSIS  05/07/10    HC REMOVAL OF OVARY/TUBE(S)      Salpingo-Oophorectomy, bilateral    LAPAROSCOPY DIAGNOSTIC (GENERAL)  5/6/2014    Procedure: LAPAROSCOPY DIAGNOSTIC (GENERAL);  Surgeon: Seth Matthews MD;  Location[facility]    LAPAROTOMY, LYSIS ADHESIONS, COMBINED  5/6/2014    Procedure: COMBINED LAPAROTOMY, LYSIS ADHESIONS;  Surgeon: Seth Matthews MD;  Location: [facility]    VIDEO CAPSULE ENDOSCOPY  02/15/10    normal sm intestine    ZZC APPENDECTOMY      ZZC LIGATE FALLOPIAN TUBE      ZZC TOTAL ABDOM HYSTERECTOMY      Hysterectomy, Total Abdominal    ZZC TOTAL KNEE ARTHROPLASTY Left     ZZHC UGI ENDOSCOPY, SIMPLE EXAM  01/20/10      A:  EBL: 225 ml        IVF:  1200 LR        UOP:  no void, bladder scan in pacu: 231 and 186        NPO:  No         Vomiting:  No         Drainage: new incision right thigh, clean dry intact, Hemovac compressed with bloody returns        Skin Integrity: new incision         RFO: Yes: Hemovac drain, right knee        Brace/sling/equipment:  Yes: adductor pillow between legs, pneumatic compression sleeves        Pain: some pain, but tolerable, declines need for pain med. anesthesia administered total of dilaudid 2.0 mg over course of 4 hour surgery also post op trans  plane block right hip         CMS: moves feet, brisk cap refill, +2 pedal pulses bilateral       See PACU record for ongoing assessment, vital signs and pain assessment.       Report given to receiving nurse on ph-2 med-surg: Amelia MULLEN       Pt. Transported on oxygen 4L per nasal cannula.   R: Post-Op vitals and assessments as ordered/indicated per patient's condition.       Follow Post-Op orders and notify Physician prn.       Continue to involve patient/family in plan of care and discharge planning.  notified staff that he was going to go home after pt. Came out of surgery and will return in morning.       Reinforce Pre-Operative education.       Implement skin safety interventions as appropriate.    Name: Adam MSN, RN

## 2024-01-25 NOTE — OP NOTE
R DEON Depuy [place] with Gription 54, Altrx neutral poly 54/36, 36+1.5 metal head, 3 Std Collared Emhysis Stem, apex hole eliminator     Summary: R DEON Depuy [place] Gription 54/apex hole eliminator/neutral 54 altrx/ Emphysis 3  std collared/ 361.5 metal head                                                         Preop Dx:   R hip OA     Postop Dx:   R hip OA     Procedure:  R DEON     Attending Surgeon:  Apolinar Chacko MD     Assistants:  Angy Sharpe PA-C  Anesthesia:   General, local and postop block     Complications:   None     EBL:   325 cc     IV fluids:   See anesthesia records     Components used:  [place] Gription 54 cup  3 std collared Empysis stem  36 mm neutral liner Altrx/ apex hole eliminator  36 mm +1.5 cobalt head     Findings:  Acetabulum with appearance consistent with some inflammatory components:  effusion/synovitis/erosion of articular cartilage in weight bearing area femoral head/osteophytes acetabulum.  Femoral head intact and not sent to pathology.  Bone adequate. . . Depuy 54 [place] with Gription, neutral altrx poly acetabular component, apex hole eliminator,  3 Emphysis std stem with collar, 36+1.5 metal  femoral head. No issues.  Hip stable through range of motion all four quadrants with final implants. Images suggested equal leg length we were able to fully seat collar and had good fit in Metaphyseal/diaphyseal cortical bone.  Overall Leg length equal when examined postop and under Stefani.  Postop images show anatomic orientation of components and equal leg length.            Description of Procedure:  Following verbal and written consent, patient taken to OR 1.  Placed supine with bump under R hip after spinal anesthesia.  Sterile prep and drape R lower extremity.  Curvilinear incision over anterior greater trochanter about 12 cm.  Dissection carried down sharply to interval between tensor fascia cuca and gluteus medius after incision through IT band/gluteus  farnaz fascia.  We did have to T the gluteus medius as we could retract laterally and did release the more deep fibers, mostly about the insertion site of femoral component.  Capsule was identified and a trapezoidal flap with a medial base was developed.  Simpulse lavage irrigation was used throughout the case.  Electrocautery for hemostasis.  The capsule was not matted down to the femoral head/neck and there was not significant synovitis.  We elevated in a sub periosteal fashion and subcapsular.  Dissection was carried to superiorlateral acetabular margin cephalad and lesser trochanter caudad.  Capsule was retracted medially.  Head was dislocated from acetabulum.  We did remove some acetabular osteophytes.  We left the neck as long as possible.Femoral neck transected with recip saw.  Head removed and saved if needed. We did not send femoral head to pathology.   We used acetabulum as template and excised redundant capsule and labrum.  We used currette to remove debris and bone tissue down to medial wall used gauge in a conical fashion.   We reamed from 43 sequentially to 53. We established depth with 43 and subsequently reamed with emphasis cephalad and somewhat medial. Bone adequate and medial inner cortex preserved.  54 [place] sector with gription placed carefully under fluoro.  This appeared to be well seated. We then placed a neutral trial liner.  We then removed and replaced zelpis with leg placed in figure of 4 position to approach proximal femur.  We accepted the neck cut and prepared for broaching.  We started with the chili pepper, then 1 broach and sequential increased.  We placed the 3 emphysis broach with good fit, were able to fully seat with adequate diaphyseal and metaphyseal fit as well and then placed the trial 36+1.5 head.    There appeared to be good fixation of the cup.  Leg length appeared to be equal physically and under fluoro.  Hip was stable in all four quadrants with motion.  All trial  components were removed.  We checked for micromotion of stem and found this to be torsionally stable. We placed 3 emphysis broach, with good fit and stability with torsion and anterior posterior testing.  We then placed final components using apex hole eliminator, 54 neutral poly altrx liner with good fit, we then placed 3 emphysis standard collared stem with standard neck.  36+1.5 metal head impacted on stem and hip reduced.  Leg length equal physically and under fluoro.  Stable through entire range of motion.  We then placed sterile betadine solution for 3 minutes.  This was then irrigated and capsule closed with #2 fiber wire.  Over medium hemovac drain we closed gluteus medius tensor fascia cuca interval with #1 vicryl in a running fashion.  IT band/gluteal fascia interval closed with running #1 vicryl followed by #1 stratofix for water tight seal.  Deep subcutaneous fat layer, intermediate, superficial closed sequentially with #1 vicryl, 0 vicryl, 2-0 vicryl in interrupted fashion.  Epithelium closed with running suibcuticular 3-0 monocryl.  Wound sealed with dermabond.  Xeroform followed by aquacel.  Drain taped in place.  Marcaine also injected in and about wound.  Patient placed in abduction pillow.  Leg length equal post op on table.     The patient was then awoken from anesthesia without any complications.  The patient was then transferred onto the hospital bed and taken to the PACU without any complications.  Multiple sponge and needle counts were performed and were correct at the end of the case.  Dr. Chacko was present and participated throughout all portions of the procedure.     Postoperative plan:  WBAT   Anterior hip precautions   R  hip Postop XR in PACU   Postop abx   Pain control   F/U drain output   DVT ppx - ASA  PT/OT, OOB   Discharge planning        I was present entire procedure.  We placed Depuy [place] with Gription 54 neutral altrx poly 36 +1.5 head metal and apex hole eliminator,  Emphysis standard collared stem 3 well seated with good metaphyseal/ diaphyseal fit.  Leg length equal postop and no complications identified other than posterior perforation femur initially which we bypassed with subsequent broaches and stem.  I spoke with patient's family postop.  Apolinar Chacko MD    surgery assistant Angy Sharpe PA-C was involved in the care and helped with position and retraction.

## 2024-01-25 NOTE — ANESTHESIA PREPROCEDURE EVALUATION
Anesthesia Pre-Procedure Evaluation    Patient: Komal Miller   MRN: [id number] : 1950        Procedure : Procedure(s):  ARTHROPLASTY, HIP, TOTAL          Past Medical History:   Diagnosis Date    Unspecified essential hypertension       Past Surgical History:   Procedure Laterality Date    COLONOSCOPY  06/10/08    Internal hemorrhoids, otherwise normal.    COLONOSCOPY  01/20/10    HC DILATION/CURETTAGE DIAG/THER NON OB      D & C    HC LAP, SURG ENTEROLYSIS  05/07/10    HC REMOVAL OF OVARY/TUBE(S)      Salpingo-Oophorectomy, bilateral    LAPAROSCOPY DIAGNOSTIC (GENERAL)  2014    Procedure: LAPAROSCOPY DIAGNOSTIC (GENERAL);  Surgeon: Seth Matthews MD;  Location: [facility] OR    LAPAROTOMY, LYSIS ADHESIONS, COMBINED  2014    Procedure: COMBINED LAPAROTOMY, LYSIS ADHESIONS;  Surgeon: Seth Matthews MD;  Location: [facility] OR    VIDEO CAPSULE ENDOSCOPY  02/15/10    normal sm intestine    ZZC APPENDECTOMY      ZZC LIGATE FALLOPIAN TUBE      ZZC TOTAL ABDOM HYSTERECTOMY      Hysterectomy, Total Abdominal    ZZC TOTAL KNEE ARTHROPLASTY Left     ZZHC UGI ENDOSCOPY, SIMPLE EXAM  01/20/10      Allergies   Allergen Reactions    No Known Drug Allergy       Social History     Tobacco Use    Smoking status: Former     Packs/day: 0.50     Years: 20.00     Additional pack years: 0.00     Total pack years: 10.00     Types: Cigarettes     Quit date: 1995     Years since quittin.0    Smokeless tobacco: Never   Substance Use Topics    Alcohol use: Yes     Comment: very seldom      Wt Readings from Last 1 Encounters:   24 119.3 kg (263 lb)        Anesthesia Evaluation   Pt has had prior anesthetic. Type: General and MAC.        ROS/MED HX  ENT/Pulmonary:     (+) sleep apnea,                                       Neurologic:     (+)      migraines,    CVA,                      Cardiovascular:     (+) Dyslipidemia hypertension- -   -  - -                                 Previous cardiac testing   Echo:  Date: 2021 Results:  ______________________________________________________________________________  Interpretation Summary     Left ventricular systolic function is normal.  The right ventricle is normal in structure, function and size.  All valves are normal in structure and function.  No cardiac source of emboli noted.  ______________________________________________________________________________  Left Ventricle  The left ventricle is normal in size. There is mild concentric left  ventricular hypertrophy. Left ventricular systolic function is normal. Grade I  or early diastolic dysfunction. No regional wall motion abnormalities noted.     Right Ventricle  The right ventricle is normal in structure, function and size.     Atria  Normal left atrial size. The right atrium is mildly dilated. There is no  atrial shunt seen.     Mitral Valve  The mitral valve leaflets appear normal. There is no evidence of stenosis,  fluttering, or prolapse. There is no mitral regurgitation noted.     Tricuspid Valve  The tricuspid valve is normal in structure and function.     Aortic Valve  There is mild trileaflet aortic sclerosis. No aortic regurgitation is present.  No aortic stenosis is present.     Pulmonic Valve  There is trace pulmonic valvular regurgitation.     Vessels  Normal size aorta.     Pericardium  There is no pericardial effusion.     Rhythm  Sinus rhythm was noted.    Stress Test:  Date: Results:    ECG Reviewed:  Date: 1/11/24 Results:  SR with PVC  Cath:  Date: Results:      METS/Exercise Tolerance:     Hematologic:  - neg hematologic  ROS     Musculoskeletal:   (+)  arthritis,             GI/Hepatic:     (+) GERD, Asymptomatic on medication,                  Renal/Genitourinary:  - neg Renal ROS     Endo:     (+)               Obesity,       Psychiatric/Substance Use:  - neg psychiatric ROS     Infectious Disease:  - neg infectious disease ROS     Malignancy:  - neg malignancy ROS     Other:  - neg other ROS           Physical Exam    Airway  airway exam normal      Mallampati: II   TM distance: > 3 FB   Neck ROM: full   Mouth opening: > 3 cm    Respiratory Devices and Support         Dental           Cardiovascular   cardiovascular exam normal       Rhythm and rate: regular and normal     Pulmonary   pulmonary exam normal        breath sounds clear to auscultation           OUTSIDE LABS:  CBC:   Lab Results   Component Value Date    WBC 7.8 01/11/2024    WBC 9.2 10/25/2022    HGB 14.1 01/11/2024    HGB 13.8 10/25/2022    HCT 41.3 01/11/2024    HCT 40.3 10/25/2022     01/11/2024     10/25/2022     BMP:   Lab Results   Component Value Date     01/11/2024     10/25/2022    POTASSIUM 3.8 01/11/2024    POTASSIUM 3.6 10/25/2022    CHLORIDE 100 01/11/2024    CHLORIDE 104 10/25/2022    CO2 25 01/11/2024    CO2 30 10/25/2022    BUN 11.0 01/11/2024    BUN 18 10/25/2022    CR 0.64 01/11/2024    CR 0.68 10/25/2022     (H) 01/11/2024     (H) 10/25/2022     COAGS:   Lab Results   Component Value Date    INR 1.02 08/03/2020     POC:   Lab Results   Component Value Date     (H) 05/08/2014     HEPATIC:   Lab Results   Component Value Date    ALBUMIN 4.0 01/11/2024    PROTTOTAL 7.6 01/11/2024    ALT 17 01/11/2024    AST 16 01/11/2024    ALKPHOS 73 01/11/2024    BILITOTAL 0.4 01/11/2024     OTHER:   Lab Results   Component Value Date    LACT 0.8 04/27/2014    BAKARI 9.2 01/11/2024    MAG 1.8 10/25/2022    LIPASE 52 04/27/2014    TSH 0.68 05/08/2017    CRP 8.4 (H) 02/18/2020    SED 27 12/29/2023       Anesthesia Plan    ASA Status:  3    NPO Status:  NPO Appropriate    Anesthesia Type: General.     - Airway: ETT   Induction: Intravenous, Propofol.   Maintenance: Balanced.        Consents    Anesthesia Plan(s) and associated risks, benefits, and realistic alternatives discussed. Questions answered and patient/representative(s) expressed understanding.     - Discussed:     - Discussed with:  Patient    "    Use of blood products discussed: No .     Postoperative Care    Pain management: IV analgesics, Oral pain medications, Peripheral nerve block (Single Shot).   PONV prophylaxis: Ondansetron (or other 5HT-3), Dexamethasone or Solumedrol, Background Propofol Infusion     Comments:    Other Comments: The risks and benefits of anesthesia, and the alternatives where applicable, have been discussed with the patient, and they wish to proceed.              MARISOL Cuenca CRNA    I have reviewed the pertinent notes and labs in the chart from the past 30 days and (re)examined the patient.  Any updates or changes from those notes are reflected in this note.             # Drug Induced Platelet Defect: home medication list includes an antiplatelet medication  # Severe Obesity: Estimated body mass index is 43.77 kg/m  as calculated from the following:    Height as of 1/11/24: 1.651 m (5' 5\").    Weight as of 1/11/24: 119.3 kg (263 lb).      "

## 2024-01-26 ENCOUNTER — APPOINTMENT (OUTPATIENT)
Dept: PHYSICAL THERAPY | Facility: CLINIC | Age: 74
End: 2024-01-26
Attending: PHYSICIAN ASSISTANT
Payer: MEDICARE

## 2024-01-26 VITALS
HEIGHT: 65 IN | DIASTOLIC BLOOD PRESSURE: 74 MMHG | BODY MASS INDEX: 44.81 KG/M2 | RESPIRATION RATE: 16 BRPM | SYSTOLIC BLOOD PRESSURE: 135 MMHG | WEIGHT: 268.96 LBS | TEMPERATURE: 98.7 F | OXYGEN SATURATION: 97 % | HEART RATE: 64 BPM

## 2024-01-26 LAB
GLUCOSE SERPL-MCNC: 159 MG/DL (ref 70–99)
HGB BLD-MCNC: 11.5 G/DL (ref 11.7–15.7)

## 2024-01-26 PROCEDURE — 36415 COLL VENOUS BLD VENIPUNCTURE: CPT | Performed by: PHYSICIAN ASSISTANT

## 2024-01-26 PROCEDURE — 97116 GAIT TRAINING THERAPY: CPT | Mod: GP | Performed by: PHYSICAL THERAPIST

## 2024-01-26 PROCEDURE — 97110 THERAPEUTIC EXERCISES: CPT | Mod: GP | Performed by: PHYSICAL THERAPIST

## 2024-01-26 PROCEDURE — 85018 HEMOGLOBIN: CPT | Performed by: PHYSICIAN ASSISTANT

## 2024-01-26 PROCEDURE — 82947 ASSAY GLUCOSE BLOOD QUANT: CPT | Performed by: ORTHOPAEDIC SURGERY

## 2024-01-26 PROCEDURE — 250N000013 HC RX MED GY IP 250 OP 250 PS 637: Performed by: PHYSICIAN ASSISTANT

## 2024-01-26 PROCEDURE — 250N000011 HC RX IP 250 OP 636: Performed by: PHYSICIAN ASSISTANT

## 2024-01-26 PROCEDURE — 999N000111 HC STATISTIC OT IP EVAL DEFER: Performed by: SPECIALIST/TECHNOLOGIST

## 2024-01-26 PROCEDURE — 97161 PT EVAL LOW COMPLEX 20 MIN: CPT | Mod: GP | Performed by: PHYSICAL THERAPIST

## 2024-01-26 RX ORDER — ERGOCALCIFEROL 1.25 MG/1
50000 CAPSULE, LIQUID FILLED ORAL WEEKLY
Qty: 12 CAPSULE | Refills: 0 | Status: SHIPPED | OUTPATIENT
Start: 2024-01-26 | End: 2024-03-18

## 2024-01-26 RX ORDER — CHOLECALCIFEROL (VITAMIN D3) 50 MCG
2 TABLET ORAL DAILY
Qty: 100 TABLET | Refills: 1 | Status: SHIPPED | OUTPATIENT
Start: 2024-02-18 | End: 2024-01-26

## 2024-01-26 RX ORDER — ASPIRIN 325 MG
325 TABLET, DELAYED RELEASE (ENTERIC COATED) ORAL 2 TIMES DAILY
Qty: 90 TABLET | Refills: 0 | Status: SHIPPED | OUTPATIENT
Start: 2024-01-26 | End: 2024-03-11

## 2024-01-26 RX ORDER — ERGOCALCIFEROL 1.25 MG/1
50000 CAPSULE, LIQUID FILLED ORAL WEEKLY
Qty: 4 CAPSULE | Refills: 0 | Status: SHIPPED | OUTPATIENT
Start: 2024-01-26 | End: 2024-01-26

## 2024-01-26 RX ADMIN — SENNOSIDES AND DOCUSATE SODIUM 1 TABLET: 8.6; 5 TABLET ORAL at 08:49

## 2024-01-26 RX ADMIN — Medication 1 LOZENGE: at 08:50

## 2024-01-26 RX ADMIN — ASPIRIN 325 MG: 325 TABLET, COATED ORAL at 08:50

## 2024-01-26 RX ADMIN — FAMOTIDINE 20 MG: 20 TABLET, FILM COATED ORAL at 08:50

## 2024-01-26 RX ADMIN — Medication 2 G: at 08:48

## 2024-01-26 RX ADMIN — POLYETHYLENE GLYCOL 3350 17 G: 17 POWDER, FOR SOLUTION ORAL at 08:49

## 2024-01-26 RX ADMIN — CELECOXIB 100 MG: 100 CAPSULE ORAL at 08:48

## 2024-01-26 RX ADMIN — OXYCODONE HYDROCHLORIDE 10 MG: 5 TABLET ORAL at 00:36

## 2024-01-26 RX ADMIN — Medication 2 G: at 00:31

## 2024-01-26 RX ADMIN — HYDROXYZINE HYDROCHLORIDE 10 MG: 10 TABLET, FILM COATED ORAL at 08:49

## 2024-01-26 RX ADMIN — ONDANSETRON 4 MG: 4 TABLET, ORALLY DISINTEGRATING ORAL at 00:31

## 2024-01-26 RX ADMIN — OXYCODONE HYDROCHLORIDE 10 MG: 5 TABLET ORAL at 04:33

## 2024-01-26 RX ADMIN — OXYCODONE HYDROCHLORIDE 5 MG: 5 TABLET ORAL at 08:50

## 2024-01-26 ASSESSMENT — ACTIVITIES OF DAILY LIVING (ADL)
ADLS_ACUITY_SCORE: 23

## 2024-01-26 NOTE — PROGRESS NOTES
S-(situation): Patient arrives to room 274 via cart from PACU    B-(background): R hip replacement    A-(assessment): Pt AAOx4, slight pain to R hip    R-(recommendations): Orders reviewed with Pt. Will monitor patient per MD orders.     Inpatient nursing criteria listed below were met:    Health care directives status obtained and documented: Yes  VTE ordered/documented: Yes  Skin issues/needs documented:Yes  Isolation addressed and Signage used: NA  Fall Prevention: Care plan updated Yes Education given and documented Yes  Care Plan initiated and Co-Morbidities added: Yes  Education Assessment documented:Yes  Admission Education Documented: Yes  If present CAUTI/CLABI Education done: NA  New medication patient education completed and documented (Possible Side Effects of Common Medications handout): Yes  Allergies Reviewed: Yes  Admission Medication Reconciliation completed: Yes  Home medications if not able to send immediately home with family stored here: NA  Reminder note placed in discharge instructions regarding home meds: NA  Individualized care needs/preferences addressed and charted: Yes  Provider Notified that patient has arrived to the unit: Yes

## 2024-01-26 NOTE — PLAN OF CARE
"Goal Outcome Evaluation:      Plan of Care Reviewed With: patient          Outcome Evaluation: Pt walked 160 feet+ in halway with moderate fatigue.  Up to bathroom to urinate x2 overnight.  2 doses PO oxycodone and 1 dose IV dilaudid given for pain control.  VSS on RA, weaned off O2 overnight.  Zofran given x1 for nausea with activity.  Hemovac with little output overnight. leg wedge in place while in bed    BP (!) 148/83   Pulse 78   Temp 97.7  F (36.5  C) (Oral)   Resp 16   Ht 1.651 m (5' 5\")   Wt 122 kg (268 lb 15.4 oz)   SpO2 94%   BMI 44.76 kg/m     "

## 2024-01-26 NOTE — PLAN OF CARE
Occupational Therapy: Orders received. Chart reviewed and discussed with care team.? Occupational Therapy not indicated due to: per PT screen, no OT needs identified at this time.? Defer discharge recommendations to PT and care team.? Will complete orders.      Thank you for your referral.  Livia Keith OTR/JULES     [facility]  O: [phone]  E: [email or web address]

## 2024-01-26 NOTE — ANESTHESIA POSTPROCEDURE EVALUATION
Patient: Komal Miller    Procedure: Procedure(s):  ARTHROPLASTY, HIP, TOTAL RIGHT       Anesthesia Type:  General    Note:  Disposition: Inpatient   Postop Pain Control: Uneventful            Sign Out: Well controlled pain   PONV: No   Neuro/Psych: Uneventful            Sign Out: Acceptable/Baseline neuro status   Airway/Respiratory: Uneventful            Sign Out: Acceptable/Baseline resp. status   CV/Hemodynamics: Uneventful            Sign Out: Acceptable CV status; No obvious hypovolemia; No obvious fluid overload   Other NRE: NONE   DID A NON-ROUTINE EVENT OCCUR? No    Event details/Postop Comments:  Left voice message on patient's provided phone number to contact anesthesia department with any postoperative anesthesia concerns.           Last vitals:  Vitals Value Taken Time   /82 01/25/24 1845   Temp 97.8  F (36.6  C) 01/25/24 1845   Pulse 88 01/25/24 1850   Resp 12 01/25/24 1850   SpO2 97 % 01/25/24 1850   Vitals shown include unfiled device data.    Electronically Signed By: MARISOL Rendon CRNA  January 26, 2024  2:15 PM

## 2024-01-26 NOTE — DISCHARGE SUMMARY
[facility] Discharge Summary    Komal Miller MRN# [id number]   Age: 73 year old YOB: 1950     Date of Admission:  1/25/2024  Date of Discharge::  1/26/2024  Admitting Physician:  Apolinar Chacko MD  Discharge Physician:  Angy Sharpe PA-C     Home clinic: [facility]          Admission Diagnoses:   Primary osteoarthritis of right hip [M16.11]  S/P total right hip arthroplasty [Z96.641]          Discharge Diagnosis:     Primary osteoarthritis of right hip [M16.11]  S/P total right hip arthroplasty [Z96.641]          Procedures:     Procedure(s): Procedure(s):  ARTHROPLASTY, HIP, TOTAL RIGHT       No other procedures performed during this admission           Medications Prior to Admission:     Medications Prior to Admission   Medication Sig Dispense Refill Last Dose    amLODIPine (NORVASC) 10 MG tablet TAKE 1 TABLET EVERY DAY 90 tablet 3 1/25/2024    aspirin 81 MG EC tablet Take 81 mg by mouth daily   1/19/2024    atorvastatin (LIPITOR) 40 MG tablet TAKE 1 TABLET EVERY DAY 90 tablet 3 1/25/2024    losartan-hydrochlorothiazide (HYZAAR) 50-12.5 MG tablet TAKE 1 TABLET EVERY DAY 90 tablet 0 1/24/2024    omeprazole (PRILOSEC) 40 MG DR capsule TAKE 1 CAPSULE EVERY DAY, 30 TO 60 MINUTES BEFORE A MEAL  (NEED APPOINTMENT FOR FURTHER REFILLS) 30 capsule 10 1/25/2024    propranolol (INDERAL) 40 MG tablet Take 1 tablet (40 mg) by mouth 2 times daily 180 tablet 3 1/25/2024    Multiple Vitamins-Minerals (PRESERVISION AREDS 2) CAPS Take 1 capsule by mouth every evening       VENTOLIN  (90 Base) MCG/ACT inhaler INHALE 1 PUFF BY MOUTH 4 TIMES DAILY AS NEEDED FOR COUGH FOR 7 DAYS       [DISCONTINUED] meloxicam (MOBIC) 7.5 MG tablet Take 1 tablet (7.5 mg) by mouth daily for 30 days 30 tablet 0 1/19/2024     @PTA@          Discharge Medications:     Current Discharge Medication List        START taking these medications    Details   acetaminophen (TYLENOL) 325 MG tablet Take 2  tablets (650 mg) by mouth every 4 hours as needed for other (mild pain)  Qty: 100 tablet, Refills: 0    Associated Diagnoses: S/P total right hip arthroplasty      !! aspirin (ASA) 325 MG EC tablet Take 1 tablet (325 mg) by mouth 2 times daily for 45 days  Qty: 90 tablet, Refills: 0    Associated Diagnoses: S/P total right hip arthroplasty      !! aspirin (ASA) 325 MG EC tablet Take 1 tablet (325 mg) by mouth 2 times daily for 30 days  Qty: 60 tablet, Refills: 0    Associated Diagnoses: S/P total right hip arthroplasty      celecoxib (CELEBREX) 200 MG capsule Take 1 capsule (200 mg) by mouth daily for 30 days Do not take within 6 hours of ibuprofen (MOTRIN, ADVIL) or ketorolac (TORADOL) if prescribed.  Take with food  Qty: 30 capsule, Refills: 0    Associated Diagnoses: S/P total right hip arthroplasty      gabapentin (NEURONTIN) 100 MG capsule Take 1 capsule (100 mg) by mouth at bedtime for 30 days Take one capsule by mouth at bedtime  Qty: 30 capsule, Refills: 0    Associated Diagnoses: S/P total right hip arthroplasty      hydrOXYzine HCl (ATARAX) 10 MG tablet Take 1 tablet (10 mg) by mouth every 6 hours as needed for itching or anxiety (with pain, moderate pain)  Qty: 30 tablet, Refills: 0    Associated Diagnoses: S/P total right hip arthroplasty      oxyCODONE (ROXICODONE) 5 MG tablet Take 1-2 tablets (5-10 mg) by mouth every 4 hours as needed for moderate to severe pain  Qty: 40 tablet, Refills: 0    Associated Diagnoses: S/P total right hip arthroplasty      senna-docusate (SENOKOT-S/PERICOLACE) 8.6-50 MG tablet Take 1-2 tablets by mouth 2 times daily Take while on oral narcotics to prevent or treat constipation.  Qty: 30 tablet, Refills: 1    Comments: While taking narcotics  Associated Diagnoses: S/P total right hip arthroplasty      vitamin D2 (ERGOCALCIFEROL) 04072 units (1250 mcg) capsule Take 1 capsule (50,000 Units) by mouth once a week for 4 doses  Qty: 4 capsule, Refills: 0    Associated Diagnoses:  S/P total right hip arthroplasty       !! - Potential duplicate medications found. Please discuss with provider.        CONTINUE these medications which have NOT CHANGED    Details   amLODIPine (NORVASC) 10 MG tablet TAKE 1 TABLET EVERY DAY  Qty: 90 tablet, Refills: 3    Associated Diagnoses: Hypertension goal BP (blood pressure) < 140/90      !! aspirin 81 MG EC tablet Take 81 mg by mouth daily      atorvastatin (LIPITOR) 40 MG tablet TAKE 1 TABLET EVERY DAY  Qty: 90 tablet, Refills: 3    Associated Diagnoses: Hyperlipidemia LDL goal <130      losartan-hydrochlorothiazide (HYZAAR) 50-12.5 MG tablet TAKE 1 TABLET EVERY DAY  Qty: 90 tablet, Refills: 0    Associated Diagnoses: Hypertension goal BP (blood pressure) < 140/90      omeprazole (PRILOSEC) 40 MG DR capsule TAKE 1 CAPSULE EVERY DAY, 30 TO 60 MINUTES BEFORE A MEAL  (NEED APPOINTMENT FOR FURTHER REFILLS)  Qty: 30 capsule, Refills: 10    Associated Diagnoses: Gastroesophageal reflux disease with esophagitis      propranolol (INDERAL) 40 MG tablet Take 1 tablet (40 mg) by mouth 2 times daily  Qty: 180 tablet, Refills: 3    Associated Diagnoses: Cluster headache, not intractable, unspecified chronicity pattern; Hypertension goal BP (blood pressure) < 140/90      Multiple Vitamins-Minerals (PRESERVISION AREDS 2) CAPS Take 1 capsule by mouth every evening      VENTOLIN  (90 Base) MCG/ACT inhaler INHALE 1 PUFF BY MOUTH 4 TIMES DAILY AS NEEDED FOR COUGH FOR 7 DAYS       !! - Potential duplicate medications found. Please discuss with provider.        STOP taking these medications       meloxicam (MOBIC) 7.5 MG tablet Comments:   Reason for Stopping:                     Consultations:   No consultations were requested during this admission          Brief History of Illness:   Reason for admission requiring a surgical or invasive procedure:   Primary osteoarthritis of right hip [M16.11]  S/P total right hip arthroplasty [Z96.641]   The patient underwent the  following procedure(s):   Procedure(s):  ARTHROPLASTY, HIP, TOTAL RIGHT   There were no immediate complications during this procedure.    Please refer to the full operative summary for details.           Hospital Course:   The patient's hospital course was unremarkable.  She recovered as anticipated and experienced no post-operative complications. Hgb remained stable.  Lowest Hgb was 11.5.  Patient tolerating oral pain medications.  Tolerating therapy.  Patient pain levels well controlled. Compression stockings bilateral.  Drain to be removed prior to discharge. OT/physical therapy seen patient inpatient for recommendations.    # Discharge Pain Plan:   - During hospitalization, patient experienced pain due to total joint replacement.  The pain plan for discharge was discussed and plan created in a collaborative fashion.    - Opioids prescribed on discharge: oxycodone  - Duration of opioids after discharge: per rehab need  - Bowel regimen: senna         Discharge Instructions and Follow-Up:     Discharge diet: Pre-procedure diet or regular   Discharge activity: Activity as tolerated  Driving restrictions: no driving while taking narcotic analgesics  Weight bearing status: Weight bearing as tolerated   Discharge follow-up: Follow up with Dr. Chacko or ortho surgical team 2 weeks.  Patient should already have an appointment scheduled   Wound care: Aquacell dressing in place - OK to shower over this  Aquacell to be removed at follow up           Discharge Disposition:     Discharged to home with outpatient physical therapy      Attestation:  I have reviewed today's vital signs, notes, medications, labs and imaging.    Angy Sharpe PA-C    I evaluated the patient and concur with evaluation and treatment plan.  Will also be treated for hypovitaminosis D.    Apolinar Chacko MD

## 2024-01-26 NOTE — UTILIZATION REVIEW
" Admission Status; Secondary Review Determination     Admission Date: 1/25/2024 11:04 AM       Under the authority of the Utilization Management Committee, the utilization review process indicated a secondary review on the above patient.  The review outcome is based on review of the medical records, discussions with staff, and applying clinical experience noted on the date of the review.        ()      Inpatient Status Appropriate - This patient's medical care is consistent with medical management for inpatient care and reasonable inpatient medical practice.      () Observation Status Appropriate - This patient does not meet hospital inpatient criteria and is placed in observation status. If this patient's primary payer is Medicare and was admitted as an inpatient, Condition Code 44 should be used and patient status changed to \"observation\".   () Admission Status NOT Appropriate - This patient's medical care is not consistent with medical management for Inpatient or Observation Status.        (x) Outpatient Procedure Status Appropriate - Procedure not on Medicare Inpatient list and no complications at the time of this review       RATIONALE FOR DETERMINATION      Brief clinical presentation, information copied from the chart, abbreviated and edited for relevant content:     Paged team to change to OP.  Discharging after one night, admitted IP by mistake.       Komal Miller is a 73 year old female admitted with OA of right hip. status post total right hip arthroplasty. She recovered as anticipated and experienced no post-operative complications. Hgb remained stable.  Lowest Hgb was 11.5.  Patient tolerating oral pain medications.  Tolerating therapy.  Patient pain levels well controlled. Compression stockings bilateral.  Drain to be removed prior to discharge. OT/physical therapy seen patient inpatient for recommendations.         In summary, the severity of illness, intensity of service provided, expected length " of stay and risk for adverse outcome make the care complex, high risk and appropriate for hospital admission.        The information on this document is developed by the utilization review team in order for the business office to ensure compliance.  This only denotes the appropriateness of proper admission status and does not reflect the quality of care rendered.         The definitions of Inpatient Status and Observation Status used in making the determination above are those provided in the CMS Coverage Manual, Chapter 1 and Chapter 6, section 70.4.      Sincerely,      Janice Tse MD   Utilization Review/ Case Management  [facility].

## 2024-01-26 NOTE — DISCHARGE INSTRUCTIONS
Please begin over the counter vitamin D3 (5000 units) daily  You will also be on 50,000 units prescribed weekly.

## 2024-01-26 NOTE — PROGRESS NOTES
S-(situation): Patient discharged to home via wheelchair with spouse.    B-(background): total right hip arthroplasty.    A-(assessment): Pt. A&Ox4, VSS on RA. Pain controlled with oral medications and ice application. CMS intact. Dressing to right hip CDI.    R-(recommendations): Discharge instructions reviewed with patient and spouse. Listed belongings gathered and returned to patient.      Discharge Nursing Criteria:     Care Plan and Patient education resolved: Yes    New Medications- pt has been educated about purpose and side effects: Yes    Vaccines  Influenza status verified at discharge:  Yes    MISC  Prescriptions if needed, hard copies sent with patient  NA  Home medications returned to patient: NA  Medication Bin checked and emptied on discharge Yes  Patient reports post-discharge pain management plan is effective: Yes    TKA/DEON ONLY:   Discharged with IVETTE Stockings Yes  IVETTE stocking Education Completed Yes

## 2024-01-26 NOTE — PROGRESS NOTES
"   01/26/24 0900   Appointment Info   Signing Clinician's Name / Credentials (PT) Olivia Prieto, PT, DPT   Living Environment   People in Home spouse   Current Living Arrangements house   Home Accessibility no concerns   Number of Stairs, Main Entrance 8;none   Stair Railings, Main Entrance railings safe and in good condition;railings on both sides of stairs   Number of Stairs, Within Home, Primary eight   Stair Railings, Within Home, Primary railings safe and in good condition;railings on both sides of stairs   Transportation Anticipated family or friend will provide   Living Environment Comments Split level home   Self-Care   Usual Activity Tolerance good   Current Activity Tolerance moderate   Equipment Currently Used at Home grab bar, tub/shower;grab bar, toilet;tub bench;walker, rolling   Fall history within last six months no   Activity/Exercise/Self-Care Comment Sock aid, shoe horn, everything is handicap accessible.   General Information   Onset of Illness/Injury or Date of Surgery 01/26/24   Referring Physician Dr. Apolinar Chacko   Patient/Family Therapy Goals Statement (PT) Return home with .   Pertinent History of Current Problem (include personal factors and/or comorbidities that impact the POC) Per chart \"Exacerbation R hip /groin pain.  Has had multiple joint and bursal injection.  Limited in ambulation/more disability and pain.\"  Had B TKA in the past.  Pt lives with  in home and everything is handicap accessible.   Existing Precautions/Restrictions no hip hyperextension   Weight-Bearing Status - LUE full weight-bearing   Weight-Bearing Status - RUE full weight-bearing   Weight-Bearing Status - LLE full weight-bearing   Weight-Bearing Status - RLE weight-bearing as tolerated   General Observations Resting in chair with  present in room.   Cognition   Affect/Mental Status (Cognition) WNL   Orientation Status (Cognition) oriented x 4   Follows Commands (Cognition) WNL   Pain " Assessment   Patient Currently in Pain   (Sore, 4-5/10)   Integumentary/Edema   Integumentary/Edema Comments   (Around incision)   Posture    Posture Forward head position;Protracted shoulders   Range of Motion (ROM)   ROM Comment Able to bend knee on R side but not normal.  L LE.   Strength (Manual Muscle Testing)   Strength Comments Challenged with heel slides.  Needs assistance with SLR.   Bed Mobility   Bed Mobility bed mobility (all) activities   All Activities, [place] (Bed Mobility) supervision   Transfers   Transfers sit-stand transfer   Impairments Contributing to Impaired Transfers pain   Sit-Stand Transfer   Sit-Stand [place] (Transfers) supervision   Assistive Device (Sit-Stand Transfers) walker, front-wheeled   Gait/Stairs (Locomotion)   [place] Level (Gait) supervision   Assistive Device (Gait) walker, front-wheeled   Distance in Feet (Gait) 50 ft with good pattern and no concerns.   Pattern (Gait) step-through   Deviations/Abnormal Patterns (Gait) antalgic   Maintains Weight-bearing Status (Gait) able to maintain   Negotiation (Stairs) stairs independence;stairs assistive device;handrail location;number of steps;ascending technique;descending technique   [place] Level (Stairs) supervision   Handrail Location (Stairs) both sides   Number of Steps (Stairs) 10   Ascending Technique (Stairs) step-to-step   Descending Technique (Stairs) step-to-step   Balance   Balance no deficits were identified   Sensory Examination   Sensory Perception WNL   Coordination   Coordination no deficits were identified   Muscle Tone   Muscle Tone no deficits were identified   Clinical Impression   Criteria for Skilled Therapeutic Intervention Yes, treatment indicated   PT Diagnosis (PT) Post op day 1 R DEON   Influenced by the following impairments Pain, weakness, mobility   Functional limitations due to impairments Ambulation, transfers, stairs   Clinical Presentation (PT Evaluation Complexity) stable    Clinical Presentation Rationale Clinical judgement, assessment   Clinical Decision Making (Complexity) low complexity   Planned Therapy Interventions (PT) bed mobility training;gait training;home exercise program;patient/family education;ROM (range of motion);stair training;strengthening;transfer training   Risk & Benefits of therapy have been explained evaluation/treatment results reviewed;care plan/treatment goals reviewed;risks/benefits reviewed;current/potential barriers reviewed;participants voiced agreement with care plan;participants included;patient;spouse/significant other   Clinical Impression Comments Pt is a 73 year old female post op day 1 R DEON.  Pt lives at home with  with stairs to enter and all rooms handicap accessible.  Pt has all ADL and MRADL equipment ready and set up for home.  Recommend pt will return home with assistance by family.  Pt will continue with HEP and frequent walks.   PT Total Evaluation Time   PT Eval, Low Complexity Minutes (46720) 15   Physical Therapy Goals   PT Frequency One time eval and treatment only   PT Predicted Duration/Target Date for Goal Attainment 01/26/24   PT Goals Bed Mobility;Transfers;Gait;Stairs   PT: Bed Mobility Supervision/stand-by assist;Supine to/from sit;Rolling;Goal Met   PT: Transfers Supervision/stand-by assist;Sit to/from stand;Bed to/from chair;Assistive device;Goal Met   PT: Gait Supervision/stand-by assist;Assistive device;Rolling walker;50 feet;Goal Met   PT: Stairs Supervision/stand-by assist;10 stairs;Rail on both sides;Goal Met   Interventions   Interventions Quick Adds Therapeutic Procedure;Gait Training   Therapeutic Procedure/Exercise   Ther. Procedure: strength, endurance, ROM, flexibillity Minutes (45758) 12   Symptoms Noted During/After Treatment increased pain   Treatment Detail/Skilled Intervention Completed DEON exercises: ankle pumps x 10 , quad, HS, and gluteal sets x 10 each, SAQ x 10, SLR x 10, hip abd x 10 , and heel  slides x 10.   Gait Training   Gait Training Minutes (51394) 10   Symptoms Noted During/After Treatment (Gait Training) fatigue;increased pain   Treatment Detail/Skilled Intervention Instructed pt on gait with walker and stair negotiation.   Distance in Feet 50 ft   [place] Level (Gait Training) stand-by assist   Physical Assistance Level (Gait Training) supervision   Weight Bearing (Gait Training) weight-bearing as tolerated   Assistive Device (Gait Training) rolling walker   Pattern Analysis (Gait Training) swing-to gait   Gait Analysis Deviations decreased shelby   Impairments (Gait Analysis/Training) pain;strength decreased   Stair Railings present at both sides   Physical Assist/Nonphysical Assist (Stairs) supervision   Level of [place] (Stairs) stand-by assist   PT Discharge Planning   PT Plan Eval and treat one time, discharge today   PT Discharge Recommendation (DC Rec) home with assist   PT Rationale for DC Rec Pt is near baseline of mobility.  Has full family support and all equipment needed to return home safely. Will be able to get in the home safely as well.   will provide transportation.   PT Brief overview of current status SBA/supervision for transfers, walking, and stairs.  Performed everything well and safe to return home.   Total Session Time   Timed Code Treatment Minutes 22   Total Session Time (sum of timed and untimed services) 37     Thank you for your referral.    Olivia Prieto, PT, DPT  [facility]  [phone]    [facility]  OUTPATIENT PHYSICAL THERAPY EVALUATION  PLAN OF TREATMENT FOR OUTPATIENT REHABILITATION  (COMPLETE FOR INITIAL CLAIMS ONLY)  Patient's Last Name, First Name, M.I.  YOB: 1950  Komal Miller                        Provider's Name  [facility] Medical Record No.  [id number]                             Onset Date:  01/26/24   Start of Care Date:    1/26/2024   Type:     _X_PT   ___OT   ___SLP Medical Diagnosis:                 PT Diagnosis:  Post op day 1 R DEON Visits from SOC:  1     See note for plan of treatment, functional goals and certification details    Cert Date Range: 1/26/2024-1/26/2024    I CERTIFY THE NEED FOR THESE SERVICES FURNISHED UNDER        THIS PLAN OF TREATMENT AND WHILE UNDER MY CARE     (Physician co-signature of this document indicates review and certification of the therapy plan).             I have reviewed records and concur with evaluation and treatment plan.  Apolinar Chacko MD

## 2024-02-02 ENCOUNTER — TELEPHONE (OUTPATIENT)
Dept: ORTHOPEDICS | Facility: CLINIC | Age: 74
End: 2024-02-02
Payer: COMMERCIAL

## 2024-02-02 ENCOUNTER — TELEPHONE (OUTPATIENT)
Dept: ADMISSION | Facility: CLINIC | Age: 74
End: 2024-02-02
Payer: COMMERCIAL

## 2024-02-02 NOTE — TELEPHONE ENCOUNTER
[facility] Call Center    Phone Message    May a detailed message be left on voicemail: yes     Reason for Call: Other: Patient would like to know how long she has to wear the compression socks.  She'd like to sleep with out them. Please call her back to advise. Thanks.     Action Taken: Message routed to:  Other: [facility] Patient Care    Travel Screening: Not Applicable

## 2024-02-02 NOTE — TELEPHONE ENCOUNTER
Reason for Call:  Other call back    Detailed comments: Dr Chacko did a hip replacement for Emily a week ago. She went home with compression stockings but was not told how long she is supposed to wear them.     Emily says she's worn them for the week but doesn't see Dr Chacko for 2 more weeks. She would like to at least take them off to sleep. Would someone kindly give her a call to discuss.     Phone Number Patient can be reached at: Cell number on file:    Telephone Information:   Home [phone]       Best Time: any    Can we leave a detailed message on this number? YES    Call taken on 2/2/2024 at 2:13 PM by Balbir Hale

## 2024-02-02 NOTE — TELEPHONE ENCOUNTER
Reviewed patient request with Carlos IGLESIAS who said she can take off at pm....Dr. Chacko likes stockings on when up x 6 weeks from surgery.    Patient updated and grateful.     Barbara García RN on 2/2/2024 at 3:48 PM

## 2024-02-12 ENCOUNTER — OFFICE VISIT (OUTPATIENT)
Dept: ORTHOPEDICS | Facility: CLINIC | Age: 74
End: 2024-02-12
Payer: COMMERCIAL

## 2024-02-12 VITALS
TEMPERATURE: 97.4 F | DIASTOLIC BLOOD PRESSURE: 76 MMHG | BODY MASS INDEX: 44.15 KG/M2 | WEIGHT: 265 LBS | HEIGHT: 65 IN | SYSTOLIC BLOOD PRESSURE: 126 MMHG

## 2024-02-12 DIAGNOSIS — Z98.890 HISTORY OF HIP SURGERY: Primary | ICD-10-CM

## 2024-02-12 PROCEDURE — 99024 POSTOP FOLLOW-UP VISIT: CPT | Performed by: PHYSICIAN ASSISTANT

## 2024-02-12 RX ORDER — MULTIVITAMIN
1 TABLET ORAL DAILY
COMMUNITY

## 2024-02-12 ASSESSMENT — PAIN SCALES - GENERAL: PAINLEVEL: MILD PAIN (2)

## 2024-02-12 NOTE — PATIENT INSTRUCTIONS
Encounter Diagnosis   Name Primary?    Right total hip arthroplasty, an anterior lateral approach Dr. Chacko 1/25/2024 Yes     Rest, ice and elevate above heart level as needed for pain control  1.  Incision: Looks good.  Do not need a bandage anymore.  Do half Betadine and half soapy water wash once a day for the next week until it is completely healed.  After that you can massage with vitamin E lotion to help desensitize and break down scar tissue.  2.  Therapy: Continue therapy and have them work on weaning you off of the walker.  You are doing this in [place].  3.  Hip precautions: Continue these until you are 3 months out from surgery.  4.  Anticoagulation: You are given aspirin 325 twice a day for 45 days, once that is done you can go back on your 81 mg of aspirin once a day like usual.  5.  Vitamin D: Continue your 50 units of vitamin D until follow-up with Dr. Chacko.  6.  Follow-up: Follow-up with Dr. Chacko on 3/18/2024 and you will get x-ray at that time.  [employer] and [employer] may offer reliable information regarding your diagnosis and treatment plan.    THANK YOU for coming in today. If you receive a survey via [employer] or mail please let us know if there was anything you especially appreciated today or if there is any way we can improve our clinic. We appreciate your input.    GENERAL INFORMATION:  My hours are:  Monday: Clinic 720am-440pm  Tuesday: Clinic 8am-440pm  Wednesday: Surgery  Thursday: Admin  Friday: Surgery      [facility] for any issues or concerns: [phone]    I am not in the office Thursdays. Therefore non- urgent calls and medical messages received on Thursday will be addressed when we are back in the office on Wednesday. Urgent matters will be reviewed and addressed by one of our partners in the office as needed.    If lab work was done today as part of your evaluation you will generally be contacted via [employer], mail, or phone with the results within 1-5 days.  If there is an alarming result we will contact you by phone. Lab results come back at varying times, I generally wait until all labs are resulted before making comments on results. Please note labs are automatically released to [employer] (if you have signed up for it) once available-at times you may see these prior to my having a chance to review them as well.    If you need refills please contact your pharmacist. They will send a refill request to me to review. Please allow 3 business days for us to process all refill requests. All narcotic refills should be handled in the clinic at the time of your visit.

## 2024-02-12 NOTE — LETTER
"    2/12/2024         RE: Komal Miller  [address]  [facility] [address]        Dear Colleague,    Thank you for referring your patient, Komal Miller, to the [facility]. Please see a copy of my visit note below.    Orthopedic Clinic Post-Operative Note    CHIEF COMPLAINT:   Chief Complaint   Patient presents with     Surgical Followup     R DEON       HISTORY OF PRESENT ILLNESS  Location: Right hip  Rating of Pain: 2 out of 10  Pain is better with: Rest  Pain improving overall: Yes  Associated Features: Denies numbness or tingling shooting burning electric pain.  Patient concerns: Wondering about how long to go to physical therapy.  She is doing this in [place].  Additional History: Patient is doing very well without any complaints.  She discontinued taking her Celebrex Neurontin Vistaril relatively quickly within the first week and then did oxycodone at night just for a while and then now she is off of that.  She is taking her 50,000 units of vitamin D.  She is taking her aspirin 325 mg twice a day.  Patient feels the pain is better and she is sleeping better.  She is working with physical therapy in [place].    Patient's past medical, surgical, social and family histories reviewed.     REVIEW OF SYSTEMS  Review of systems negative other than positive findings in HPI.    Physical Exam:  Vitals: /76 (BP Location: Right arm, Cuff Size: Adult Large)   Temp 97.4  F (36.3  C) (Temporal)   Ht 1.651 m (5' 5\")   Wt 120.2 kg (265 lb)   BMI 44.10 kg/m    BMI= Body mass index is 44.1 kg/m .  Constitutional: healthy, alert and no acute distress   Psychiatric: mentation appears normal and affect normal/bright  NEURO: no focal deficits, CMS intact right lower extremity   RESP: Normal with easy respirations and no use of accessory muscles to breathe, no audible wheezing or retractions  CV: Calf soft and nontender to palpation, leg warm   SKIN: Incision healed well with no ecchymosis but " slight swelling and no erythema.  No drainage from the incision.  No erythema, rashes, excoriation, or breakdown. No evidence of infection.  MUSCULOSKELETAL:  INSPECTION of right hip: Skin as mentioned above no gross deformities, erythema, edema, ecchymosis, atrophy or fasciculations.   PALPATION: no tenderness over the lateral hip and greater trochanteric region, thigh or lower leg.   ROM: Passive: Flexion to 110 degrees, internal rotation to 25 degrees, external rotation to 40 degrees.  All range of motion without catching locking or pain.     STRENGTH: 5 out of 5 hip flexion, quad and hamstring without pain.   SPECIAL TEST: none  GAIT: Slight antalgic gait on the right.  Lymph: no palpable lymph nodes    Diagnostic Modalities:  XR Pelvis w Hip Right 1 View    Narrative    EXAM: XR PELVIS AND HIP RIGHT 1 VIEW  LOCATION: [facility]  DATE: 1/25/2024    INDICATION: Follow-up arthroplasty  COMPARISON: None.      Impression    IMPRESSION: Postoperative changes right total hip arthroplasty. Components appear well seated. Surgical drain in place. Post procedural air surrounding the right hip. Left hip negative for fracture with degenerative change and slight joint space   narrowing. Visualized pelvis negative for fracture.     I agree with the above reading.    No radiographs necessary today.    Independent visualization of the images was performed.    PROCEDURE:  Today I used soapy water with 4 x 4's and cleaned around her incision for her.  She tolerated this well.    Impression: 1.  18 days status post left total hip arthroplasty, anterior lateral approach by Dr. Chacko    FOCUSED PLAN:   Patient is doing very well and her groin pain is gone but she still has some lateral hip pain but this is getting better.  Patient has weaned off all her pain meds and even her oxycodone which she was just taking at night for a while.  Patient is doing formal therapy in [place] and is educated to continue  to work with them on weaning off the walker.  Patient was prescribed 325 twice a day of aspirin for 45 days and she is to continue this and then start her usual 81 mg of aspirin afterwards.  Patient can continue her 50,000 units of vitamin D until she follows up with Dr. Chacko again.  Patient educated on Betadine and soapy water wash once a day until incision is healed and then she can do vitamin E lotion massages.  After visit summary was given to the patient today with all information.  Patient can follow-up with Dr. Chacko on 3/18/2024 for x-ray and 6-week visit.    Re-x-ray on return: Yes, patient will get AP pelvis as well as an AP of the right hip that should include the entire prosthesis and a Song lateral      This note was dictated with [employer].    Apolinar Penny PA-C              Again, thank you for allowing me to participate in the care of your patient.        Sincerely,        Apolinar Penny PA-C

## 2024-02-12 NOTE — PROGRESS NOTES
"Orthopedic Clinic Post-Operative Note    CHIEF COMPLAINT:   Chief Complaint   Patient presents with    Surgical Followup     R DEON       HISTORY OF PRESENT ILLNESS  Location: Right hip  Rating of Pain: 2 out of 10  Pain is better with: Rest  Pain improving overall: Yes  Associated Features: Denies numbness or tingling shooting burning electric pain.  Patient concerns: Wondering about how long to go to physical therapy.  She is doing this in [place].  Additional History: Patient is doing very well without any complaints.  She discontinued taking her Celebrex Neurontin Vistaril relatively quickly within the first week and then did oxycodone at night just for a while and then now she is off of that.  She is taking her 50,000 units of vitamin D.  She is taking her aspirin 325 mg twice a day.  Patient feels the pain is better and she is sleeping better.  She is working with physical therapy in [place].    Patient's past medical, surgical, social and family histories reviewed.     REVIEW OF SYSTEMS  Review of systems negative other than positive findings in HPI.    Physical Exam:  Vitals: /76 (BP Location: Right arm, Cuff Size: Adult Large)   Temp 97.4  F (36.3  C) (Temporal)   Ht 1.651 m (5' 5\")   Wt 120.2 kg (265 lb)   BMI 44.10 kg/m    BMI= Body mass index is 44.1 kg/m .  Constitutional: healthy, alert and no acute distress   Psychiatric: mentation appears normal and affect normal/bright  NEURO: no focal deficits, CMS intact right lower extremity   RESP: Normal with easy respirations and no use of accessory muscles to breathe, no audible wheezing or retractions  CV: Calf soft and nontender to palpation, leg warm   SKIN: Incision healed well with no ecchymosis but slight swelling and no erythema.  No drainage from the incision.  No erythema, rashes, excoriation, or breakdown. No evidence of infection.  MUSCULOSKELETAL:  INSPECTION of right hip: Skin as mentioned above no gross deformities, erythema, edema, " ecchymosis, atrophy or fasciculations.   PALPATION: no tenderness over the lateral hip and greater trochanteric region, thigh or lower leg.   ROM: Passive: Flexion to 110 degrees, internal rotation to 25 degrees, external rotation to 40 degrees.  All range of motion without catching locking or pain.     STRENGTH: 5 out of 5 hip flexion, quad and hamstring without pain.   SPECIAL TEST: none  GAIT: Slight antalgic gait on the right.  Lymph: no palpable lymph nodes    Diagnostic Modalities:  XR Pelvis w Hip Right 1 View    Narrative    EXAM: XR PELVIS AND HIP RIGHT 1 VIEW  LOCATION: [facility]  DATE: 1/25/2024    INDICATION: Follow-up arthroplasty  COMPARISON: None.      Impression    IMPRESSION: Postoperative changes right total hip arthroplasty. Components appear well seated. Surgical drain in place. Post procedural air surrounding the right hip. Left hip negative for fracture with degenerative change and slight joint space   narrowing. Visualized pelvis negative for fracture.     I agree with the above reading.    No radiographs necessary today.    Independent visualization of the images was performed.    PROCEDURE:  Today I used soapy water with 4 x 4's and cleaned around her incision for her.  She tolerated this well.    Impression: 1.  18 days status post left total hip arthroplasty, anterior lateral approach by Dr. Chacko    FOCUSED PLAN:   Patient is doing very well and her groin pain is gone but she still has some lateral hip pain but this is getting better.  Patient has weaned off all her pain meds and even her oxycodone which she was just taking at night for a while.  Patient is doing formal therapy in [place] and is educated to continue to work with them on weaning off the walker.  Patient was prescribed 325 twice a day of aspirin for 45 days and she is to continue this and then start her usual 81 mg of aspirin afterwards.  Patient can continue her 50,000 units of vitamin D  until she follows up with Dr. Chacko again.  Patient educated on Betadine and soapy water wash once a day until incision is healed and then she can do vitamin E lotion massages.  After visit summary was given to the patient today with all information.  Patient can follow-up with Dr. Chacko on 3/18/2024 for x-ray and 6-week visit.    Re-x-ray on return: Yes, patient will get AP pelvis as well as an AP of the right hip that should include the entire prosthesis and a Song lateral      This note was dictated with [employer].    Apolinar Penny PA-C

## 2024-03-18 ENCOUNTER — OFFICE VISIT (OUTPATIENT)
Dept: ORTHOPEDICS | Facility: CLINIC | Age: 74
End: 2024-03-18
Payer: COMMERCIAL

## 2024-03-18 ENCOUNTER — ANCILLARY PROCEDURE (OUTPATIENT)
Dept: GENERAL RADIOLOGY | Facility: CLINIC | Age: 74
End: 2024-03-18
Attending: ORTHOPAEDIC SURGERY
Payer: COMMERCIAL

## 2024-03-18 VITALS
DIASTOLIC BLOOD PRESSURE: 74 MMHG | SYSTOLIC BLOOD PRESSURE: 124 MMHG | HEIGHT: 65 IN | WEIGHT: 265 LBS | BODY MASS INDEX: 44.15 KG/M2 | TEMPERATURE: 98 F

## 2024-03-18 DIAGNOSIS — Z96.649 HISTORY OF TOTAL HIP REPLACEMENT, UNSPECIFIED LATERALITY: Primary | ICD-10-CM

## 2024-03-18 DIAGNOSIS — Z96.649 HISTORY OF TOTAL HIP REPLACEMENT, UNSPECIFIED LATERALITY: ICD-10-CM

## 2024-03-18 PROCEDURE — 73502 X-RAY EXAM HIP UNI 2-3 VIEWS: CPT | Mod: TC | Performed by: RADIOLOGY

## 2024-03-18 PROCEDURE — 99024 POSTOP FOLLOW-UP VISIT: CPT | Performed by: ORTHOPAEDIC SURGERY

## 2024-03-18 ASSESSMENT — PAIN SCALES - GENERAL: PAINLEVEL: NO PAIN (0)

## 2024-03-18 NOTE — LETTER
3/18/2024         RE: Komal Miller  [address]  [facility] [address]        Dear Colleague,    Thank you for referring your patient, Komal Miller, to the [facility]. Please see a copy of my visit note below.    S:  Doing well after hip arthroplasty 1/25/24,  had some wound break down but using soap and water each day as well as betadine.         Patient Active Problem List   Diagnosis     Absence of menstruation     Esophageal reflux     Recurring abdominal pain     Hyperlipidemia LDL goal <130     Hypertension goal BP (blood pressure) < 140/90     Advance Care Planning     Small bowel obstruction (H)     DVT prophylaxis     Cervical adenopathy     Intestinal adhesions     Gastroesophageal reflux disease with esophagitis     Cluster headache, not intractable, unspecified chronicity pattern     Morbid obesity due to excess calories (H)     CVA, old, hemiparesis (H)     Trochanteric bursitis of right hip     Primary osteoarthritis of right hip     S/P total right hip arthroplasty            Past Medical History:   Diagnosis Date     Unspecified essential hypertension             Past Surgical History:   Procedure Laterality Date     ARTHROPLASTY HIP Right 1/25/2024    Procedure: ARTHROPLASTY, HIP, TOTAL RIGHT;  Surgeon: Apolinar Chacko MD;  [facility]     COLONOSCOPY  06/10/08    Internal hemorrhoids, otherwise normal.     COLONOSCOPY  01/20/10     HC DILATION/CURETTAGE DIAG/THER NON OB      D & C     HC LAP, SURG ENTEROLYSIS  05/07/10     HC REMOVAL OF OVARY/TUBE(S)      Salpingo-Oophorectomy, bilateral     LAPAROSCOPY DIAGNOSTIC (GENERAL)  5/6/2014    Procedure: LAPAROSCOPY DIAGNOSTIC (GENERAL);  Surgeon: Seth Matthews MD;  Location[facility]     LAPAROTOMY, LYSIS ADHESIONS, COMBINED  5/6/2014    Procedure: COMBINED LAPAROTOMY, LYSIS ADHESIONS;  Surgeon: Seth Matthews MD;  Location: [facility]     VIDEO CAPSULE ENDOSCOPY  02/15/10    normal sm intestine     ZC  APPENDECTOMY       ZZC LIGATE FALLOPIAN TUBE       ZZC TOTAL ABDOM HYSTERECTOMY      Hysterectomy, Total Abdominal     ZZC TOTAL KNEE ARTHROPLASTY Left      ZZHC UGI ENDOSCOPY, SIMPLE EXAM  01/20/10            Social History     Tobacco Use     Smoking status: Former     Packs/day: 0.50     Years: 20.00     Additional pack years: 0.00     Total pack years: 10.00     Types: Cigarettes     Quit date: 1995     Years since quittin.2     Smokeless tobacco: Never   Substance Use Topics     Alcohol use: Yes     Comment: very seldom            Family History   Problem Relation Age of Onset     C.A.D. Mother         bi-pass     Diabetes Mother         late onset     Allergies Mother         xray dye     Arthritis Mother      Cancer Mother         ovary     Respiratory Mother      C.A.D. Father         bi-pass     Hypertension Brother      Breast Cancer Sister      Allergies Sister      Allergies Brother      Eye Disorder Maternal Aunt      Gastrointestinal Disease Brother      Gynecology Sister      Thyroid Disease Sister                Allergies   Allergen Reactions     No Known Drug Allergy             Current Outpatient Medications   Medication Sig Dispense Refill     amLODIPine (NORVASC) 10 MG tablet TAKE 1 TABLET EVERY DAY 90 tablet 3     atorvastatin (LIPITOR) 40 MG tablet TAKE 1 TABLET EVERY DAY 90 tablet 3     losartan-hydrochlorothiazide (HYZAAR) 50-12.5 MG tablet TAKE 1 TABLET EVERY DAY 90 tablet 0     multivitamin w/minerals (MULTI-VITAMIN) tablet Take 1 tablet by mouth daily       omeprazole (PRILOSEC) 40 MG DR capsule TAKE 1 CAPSULE EVERY DAY, 30 TO 60 MINUTES BEFORE A MEAL  (NEED APPOINTMENT FOR FURTHER REFILLS) 30 capsule 10     propranolol (INDERAL) 40 MG tablet Take 1 tablet (40 mg) by mouth 2 times daily 180 tablet 3     aspirin 81 MG EC tablet Take 81 mg by mouth daily (Patient not taking: Reported on 2024)       Multiple Vitamins-Minerals (PRESERVISION AREDS 2) CAPS Take 1 capsule by mouth  every evening (Patient not taking: Reported on 3/18/2024)            Review Of Systems  Skin: negative  Eyes: negative  Ears/Nose/Throat: negative  Respiratory: No shortness of breath, dyspnea on exertion, cough, or hemoptysis    O: Physical Exam:  A few areas of breakdown along the skin incision R hip but good granulation tissue, no erythema, no drainage.  CMS intact.  Leg length equal. Adequate IR/ER/Abduction R hip.    Lab:Hgb 11.5    Images:XR PELVIS AND HIP RIGHT 2 VIEWS 3/18/2024 10:08 AM     HISTORY: History of total hip replacement, right hip pain.     COMPARISON: None.                                                                      IMPRESSION: Right total hip arthroplasty. No fracture or dislocation.  No loosening.         A: Stable following R DEON       P: continue wound care and notify if exacerbation symptoms  Continue to rehab slowly and increase activity as tolerated  Notify if exacerbation symptoms  Recheck wounds 2-3 mos  See back at one year with new images              In addition to the above assessment and plan each active problem on Komal's problem list was evaluated today. This included the questioning of Komal for any medication problems. We will continue the current treatment plan for these active problems except as noted.        Again, thank you for allowing me to participate in the care of your patient.        Sincerely,        Apolinar Chacko MD

## 2024-03-22 NOTE — PROGRESS NOTES
S:  Doing well after hip arthroplasty 1/25/24,  had some wound break down but using soap and water each day as well as betadine.         Patient Active Problem List   Diagnosis    Absence of menstruation    Esophageal reflux    Recurring abdominal pain    Hyperlipidemia LDL goal <130    Hypertension goal BP (blood pressure) < 140/90    Advance Care Planning    Small bowel obstruction (H)    DVT prophylaxis    Cervical adenopathy    Intestinal adhesions    Gastroesophageal reflux disease with esophagitis    Cluster headache, not intractable, unspecified chronicity pattern    Morbid obesity due to excess calories (H)    CVA, old, hemiparesis (H)    Trochanteric bursitis of right hip    Primary osteoarthritis of right hip    S/P total right hip arthroplasty            Past Medical History:   Diagnosis Date    Unspecified essential hypertension             Past Surgical History:   Procedure Laterality Date    ARTHROPLASTY HIP Right 1/25/2024    Procedure: ARTHROPLASTY, HIP, TOTAL RIGHT;  Surgeon: Apolinar Chacko MD;  [facility]    COLONOSCOPY  06/10/08    Internal hemorrhoids, otherwise normal.    COLONOSCOPY  01/20/10    HC DILATION/CURETTAGE DIAG/THER NON OB      D & C    HC LAP, SURG ENTEROLYSIS  05/07/10    HC REMOVAL OF OVARY/TUBE(S)      Salpingo-Oophorectomy, bilateral    LAPAROSCOPY DIAGNOSTIC (GENERAL)  5/6/2014    Procedure: LAPAROSCOPY DIAGNOSTIC (GENERAL);  Surgeon: Seth Matthews MD;  [facility]    LAPAROTOMY, LYSIS ADHESIONS, COMBINED  5/6/2014    Procedure: COMBINED LAPAROTOMY, LYSIS ADHESIONS;  Surgeon: Seth Matthews MD;  [facility]    VIDEO CAPSULE ENDOSCOPY  02/15/10    normal sm intestine    ZZC APPENDECTOMY      ZZC LIGATE FALLOPIAN TUBE      ZZC TOTAL ABDOM HYSTERECTOMY      Hysterectomy, Total Abdominal    ZZC TOTAL KNEE ARTHROPLASTY Left     ZZHC UGI ENDOSCOPY, SIMPLE EXAM  01/20/10            Social History     Tobacco Use    Smoking status: Former     Packs/day: 0.50      Years: 20.00     Additional pack years: 0.00     Total pack years: 10.00     Types: Cigarettes     Quit date: 1995     Years since quittin.2    Smokeless tobacco: Never   Substance Use Topics    Alcohol use: Yes     Comment: very seldom            Family History   Problem Relation Age of Onset    C.A.D. Mother         bi-pass    Diabetes Mother         late onset    Allergies Mother         xray dye    Arthritis Mother     Cancer Mother         ovary    Respiratory Mother     C.A.D. Father         bi-pass    Hypertension Brother     Breast Cancer Sister     Allergies Sister     Allergies Brother     Eye Disorder Maternal Aunt     Gastrointestinal Disease Brother     Gynecology Sister     Thyroid Disease Sister                Allergies   Allergen Reactions    No Known Drug Allergy             Current Outpatient Medications   Medication Sig Dispense Refill    amLODIPine (NORVASC) 10 MG tablet TAKE 1 TABLET EVERY DAY 90 tablet 3    atorvastatin (LIPITOR) 40 MG tablet TAKE 1 TABLET EVERY DAY 90 tablet 3    losartan-hydrochlorothiazide (HYZAAR) 50-12.5 MG tablet TAKE 1 TABLET EVERY DAY 90 tablet 0    multivitamin w/minerals (MULTI-VITAMIN) tablet Take 1 tablet by mouth daily      omeprazole (PRILOSEC) 40 MG DR capsule TAKE 1 CAPSULE EVERY DAY, 30 TO 60 MINUTES BEFORE A MEAL  (NEED APPOINTMENT FOR FURTHER REFILLS) 30 capsule 10    propranolol (INDERAL) 40 MG tablet Take 1 tablet (40 mg) by mouth 2 times daily 180 tablet 3    aspirin 81 MG EC tablet Take 81 mg by mouth daily (Patient not taking: Reported on 2024)      Multiple Vitamins-Minerals (PRESERVISION AREDS 2) CAPS Take 1 capsule by mouth every evening (Patient not taking: Reported on 3/18/2024)            Review Of Systems  Skin: negative  Eyes: negative  Ears/Nose/Throat: negative  Respiratory: No shortness of breath, dyspnea on exertion, cough, or hemoptysis    O: Physical Exam:  A few areas of breakdown along the skin incision R hip but good  granulation tissue, no erythema, no drainage.  CMS intact.  Leg length equal. Adequate IR/ER/Abduction R hip.    Lab:Hgb 11.5    Images:XR PELVIS AND HIP RIGHT 2 VIEWS 3/18/2024 10:08 AM     HISTORY: History of total hip replacement, right hip pain.     COMPARISON: None.                                                                      IMPRESSION: Right total hip arthroplasty. No fracture or dislocation.  No loosening.         A: Stable following R DEON       P: continue wound care and notify if exacerbation symptoms  Continue to rehab slowly and increase activity as tolerated  Notify if exacerbation symptoms  Recheck wounds 2-3 mos  See back at one year with new images              In addition to the above assessment and plan each active problem on Komal's problem list was evaluated today. This included the questioning of Komal for any medication problems. We will continue the current treatment plan for these active problems except as noted.

## 2024-03-25 DIAGNOSIS — I10 HYPERTENSION GOAL BP (BLOOD PRESSURE) < 140/90: ICD-10-CM

## 2024-03-25 RX ORDER — LOSARTAN POTASSIUM AND HYDROCHLOROTHIAZIDE 12.5; 5 MG/1; MG/1
TABLET ORAL
Qty: 90 TABLET | Refills: 1 | Status: SHIPPED | OUTPATIENT
Start: 2024-03-25 | End: 2024-08-26

## 2024-03-27 SDOH — HEALTH STABILITY: PHYSICAL HEALTH: ON AVERAGE, HOW MANY MINUTES DO YOU ENGAGE IN EXERCISE AT THIS LEVEL?: 0 MIN

## 2024-03-27 SDOH — HEALTH STABILITY: PHYSICAL HEALTH: ON AVERAGE, HOW MANY DAYS PER WEEK DO YOU ENGAGE IN MODERATE TO STRENUOUS EXERCISE (LIKE A BRISK WALK)?: 0 DAYS

## 2024-03-27 ASSESSMENT — SOCIAL DETERMINANTS OF HEALTH (SDOH): HOW OFTEN DO YOU GET TOGETHER WITH FRIENDS OR RELATIVES?: TWICE A WEEK

## 2024-03-27 NOTE — COMMUNITY RESOURCES LIST (ENGLISH)
March 27, 2024           YOUR PERSONALIZED LIST OF SERVICES & PROGRAMS           & RECREATION    Sports      [facility] Adult [facility]rich[facility]  Phone: [phone]  Website: [email or web address]  Language: English  Hours: Mon 7:30 AM - 4:00 PM Tue 7:30 AM - 4:00 PM Wed 7:30 AM - 4:00 PM Thu 7:30 AM - 4:00 PM Fri 7:30 AM - 4:00 PM    Classes/Groups      [employer] [facility] - Sit and Be Fit/[employer] FLEX  [address] [place] [address] (Distance: 22.7 miles)  Website: https://physicalAlice.com/programs/sit-and-be-fit/  Language: English  Fee: Free      [facility] [facility] - Group fitness classes  [address] [place] [address] (Distance: 13.1 miles)  Phone: [phone]) [phone]  Website: https://adflyer/  Language: English  Fee: Free      [facility]  Phone: [phone]  Website: [email or web address]  Language: English  Hours: Mon 7:30 AM - 4:00 PM Tue 7:30 AM - 4:00 PM Wed 7:30 AM - 4:00 PM Thu 7:30 AM - 4:00 PM Fri 7:30 AM - 4:00 PM               IMPORTANT NUMBERS & WEBSITES        Emergency Services  911  .   [facility]  211 http://211unitedway.org  .   Poison Control  (183) 170-3530 http://[place]poison.org http://[place]poison.org  .     Suicide and Crisis Lifeline  988 http://988lifeline.org  .   Childhelp National Child Abuse Hotline  718.686.9427 http://Childhelphotline.org   .   National Sexual Assault Hotline  (869) 830-7371 (HOPE) http://Rainn.org   .     National Runaway Safeline  (539) 616-9713 (RUNAWAY) http://1800runaway.org  .   Pregnancy & Postpartum Support  Call/text 367-474-4045  [place]: http://ppsupportmn.org  [place]: http://Workhint.com/wi  .   Substance Abuse National Helpline ([facility])  682-305-HELP (1123) http://Findtreatment.gov   .                DISCLAIMER: [employer] does not endorse any service providers mentioned in this  resource list. [facility] does not guarantee that the services mentioned in this resource list will be available to you or will improve your health or wellness.    [facility]

## 2024-04-02 ENCOUNTER — OFFICE VISIT (OUTPATIENT)
Dept: INTERNAL MEDICINE | Facility: CLINIC | Age: 74
End: 2024-04-02
Payer: COMMERCIAL

## 2024-04-02 VITALS
HEIGHT: 65 IN | DIASTOLIC BLOOD PRESSURE: 70 MMHG | HEART RATE: 60 BPM | OXYGEN SATURATION: 97 % | TEMPERATURE: 97.1 F | SYSTOLIC BLOOD PRESSURE: 128 MMHG | WEIGHT: 260.9 LBS | RESPIRATION RATE: 18 BRPM | BODY MASS INDEX: 43.47 KG/M2

## 2024-04-02 DIAGNOSIS — I10 HYPERTENSION GOAL BP (BLOOD PRESSURE) < 140/90: ICD-10-CM

## 2024-04-02 DIAGNOSIS — E78.5 HYPERLIPIDEMIA LDL GOAL <130: ICD-10-CM

## 2024-04-02 DIAGNOSIS — Z12.31 ENCOUNTER FOR SCREENING MAMMOGRAM FOR BREAST CANCER: ICD-10-CM

## 2024-04-02 DIAGNOSIS — Z00.00 MEDICARE ANNUAL WELLNESS VISIT, SUBSEQUENT: Primary | ICD-10-CM

## 2024-04-02 DIAGNOSIS — E66.01 MORBID OBESITY DUE TO EXCESS CALORIES (H): ICD-10-CM

## 2024-04-02 DIAGNOSIS — E55.9 VITAMIN D DEFICIENCY: ICD-10-CM

## 2024-04-02 LAB — VIT D+METAB SERPL-MCNC: 21 NG/ML (ref 20–50)

## 2024-04-02 PROCEDURE — G0439 PPPS, SUBSEQ VISIT: HCPCS | Performed by: INTERNAL MEDICINE

## 2024-04-02 PROCEDURE — 99214 OFFICE O/P EST MOD 30 MIN: CPT | Mod: 25 | Performed by: INTERNAL MEDICINE

## 2024-04-02 PROCEDURE — 82306 VITAMIN D 25 HYDROXY: CPT | Performed by: INTERNAL MEDICINE

## 2024-04-02 PROCEDURE — 36415 COLL VENOUS BLD VENIPUNCTURE: CPT | Performed by: INTERNAL MEDICINE

## 2024-04-02 RX ORDER — RESPIRATORY SYNCYTIAL VIRUS VACCINE 120MCG/0.5
0.5 KIT INTRAMUSCULAR ONCE
Qty: 1 EACH | Refills: 0 | Status: CANCELLED | OUTPATIENT
Start: 2024-04-02 | End: 2024-04-02

## 2024-04-02 ASSESSMENT — PAIN SCALES - GENERAL: PAINLEVEL: NO PAIN (0)

## 2024-04-02 NOTE — PROGRESS NOTES
Preventive Care Visit  [facility]  Wesly Lehman DO, Internal Medicine  Apr 2, 2024      Assessment & Plan     Medicare annual wellness visit, subsequent  Patient is seen for annual wellness exam.    Vitamin D deficiency  Patient is currently taking Vitamin D supplements due to a critical lab value on 1/11/24 of 12. Vitamin D levels rechecked today as indicated bellow.   - Vitamin D Deficiency    Encounter for screening mammogram for breast cancer  Routine screening ordered at this time.  - *MA Screening Digital Bilateral      Morbid obesity due to excess calories (H)  Discussed with patient diet and lifestyle modifications including portion control, reducing liquid calories, getting a support group to help with motivation, and making easy achievable exercise goals that she can build on over time.     Hyperlipidemia LDL goal <130  Patient is currently controled with atorvostatin 40mg and has no questions or concerns at this time. Lipid panel was last drawn on 1/11/24 and was reviewed to be close to normal ranges. No changes recommend at this time.    Hypertension goal BP (blood pressure) < 140/90  Patient is currently controled on Hyaar 50/12.5, Amlodipine 10mg, and propranolol 40mg. Blood pressure today is 128/70 and has no questions or concerns. No changes recommended at this time.    Need for shingles vaccine  Need for Tdap vaccination  Need for vaccination against respiratory syncytial virus  Patient has family history of Guillain-Barré syndrome,  And has lost her brother due to the disease and potential interaction that it has with vaccinations.  Due to this the patient is refusing vaccinations at this time.    Khloe quezada is a 73 year old, presenting for the following health issues:  Wellness Visit and concerns about weight loss.        4/2/2024    12:39 PM   Additional Questions   Roomed by Anu OLIVEIRA                 Review of Systems  CONSTITUTIONAL:  "NEGATIVE for fever, chills, change in weight  INTEGUMENTARY/SKIN: NEGATIVE for worrisome rashes, moles or lesions  EYES: NEGATIVE for vision changes or irritation  ENT/MOUTH: NEGATIVE for ear, mouth and throat problems  RESP: NEGATIVE for significant cough or SOB  BREAST: NEGATIVE for masses, tenderness or discharge  CV: NEGATIVE for chest pain, palpitations or peripheral edema  GI: NEGATIVE for nausea, abdominal pain, heartburn, or change in bowel habits  : NEGATIVE for frequency, dysuria, or hematuria  MUSCULOSKELETAL: NEGATIVE for significant arthralgias or myalgia  NEURO: NEGATIVE for weakness, dizziness or paresthesias  ENDOCRINE: NEGATIVE for temperature intolerance, skin/hair changes  HEME: NEGATIVE for bleeding problems  PSYCHIATRIC: NEGATIVE for changes in mood or affect      Objective    /70 (BP Location: Right arm, Patient Position: Chair)   Pulse 60   Temp 97.1  F (36.2  C) (Temporal)   Resp 18   Ht 1.657 m (5' 5.25\")   Wt 118.3 kg (260 lb 14.4 oz)   SpO2 97%   BMI 43.08 kg/m    Body mass index is 43.08 kg/m .    GENERAL: alert and no distress  EYES: Eyes grossly normal to inspection, PERRL and conjunctivae and sclerae normal  HENT: ear canals and TM's normal, nose and mouth without ulcers or lesions  NECK: no adenopathy, no asymmetry, masses, or scars  RESP: lungs clear to auscultation - no rales, rhonchi or wheezes  CV: regular rate and rhythm, normal S1 S2, no S3 or S4, no murmur, click or rub, no peripheral edema  ABDOMEN: soft, nontender, no hepatosplenomegaly, no masses and bowel sounds normal  MS: no gross musculoskeletal defects noted, no edema  SKIN: no suspicious lesions or rashes. Hip surgery incision is healing well and has no erythema, swelling, or increased temperature. Incision is healing nicely and has no indications of infection.   NEURO: Normal strength and tone, mentation intact and speech normal  PSYCH: mentation appears normal, affect normal/bright      " "  BMI  Estimated body mass index is 43.08 kg/m  as calculated from the following:    Height as of this encounter: 1.657 m (5' 5.25\").    Weight as of this encounter: 118.3 kg (260 lb 14.4 oz).   Weight management plan: Discussed healthy diet and exercise guidelines    Counseling  Appropriate preventive services were discussed with this patient, including applicable screening as appropriate for fall prevention, nutrition, physical activity, Tobacco-use cessation, weight loss and cognition.  Checklist reviewing preventive services available has been given to the patient.  Reviewed patient's diet, addressing concerns and/or questions.       MEDICATIONS:  Continue current medications without change  Work on weight loss  Regular exercise    Khloe quezada is a 73 year old, presenting for the following:  Wellness Visit        4/2/2024    12:39 PM   Additional Questions   Roomed by Anu OLIVEIRA         Health Care Directive  Patient has a Health Care Directive on file  Discussed advance care planning with patient.    HPI  Patient is being seen today for a Medicare wellness exam with concerns about weight.  She has no questions or concerns at this time about her general health.  Patient is concerned about her weight and is looking for advice on how to effectively lose weight.            3/27/2024   General Health   How would you rate your overall physical health? (!) FAIR   Feel stress (tense, anxious, or unable to sleep) Not at all         3/27/2024   Nutrition   Diet: I don't know         3/27/2024   Exercise   Days per week of moderate/strenous exercise 0 days   Average minutes spent exercising at this level 0 min   (!) EXERCISE CONCERN      3/27/2024   Social Factors   Frequency of gathering with friends or relatives Twice a week   Worry food won't last until get money to buy more No   Food not last or not have enough money for food? No   Do you have housing?  Yes   Are you worried about losing your housing? No   Lack " of transportation? No   Unable to get utilities (heat,electricity)? No         2024   Fall Risk   Gait Speed Test (Document in seconds) 3   Gait Speed Test Interpretation Less than or equal to 5.00 seconds - PASS          3/27/2024   Activities of Daily Living- Home Safety   Needs help with the following daily activites None of the above   Safety concerns in the home None of the above         3/27/2024   Dental   Dentist two times every year? Yes         3/27/2024   Hearing Screening   Hearing concerns? None of the above         3/27/2024   Driving Risk Screening   Patient/family members have concerns about driving No         3/27/2024   General Alertness/Fatigue Screening   Have you been more tired than usual lately? No         3/27/2024   Urinary Incontinence Screening   Bothered by leaking urine in past 6 months No         3/20/2024   TB Screening   Were you born outside of the [place]? No           Today's PHQ-2 Score:       2024     9:49 AM   PHQ-2 (  [employer])   Q1: Little interest or pleasure in doing things 1   Q2: Feeling down, depressed or hopeless 1   PHQ-2 Score 2         3/27/2024   Substance Use   Alcohol more than 3/day or more than 7/wk No   Do you have a current opioid prescription? No   How severe/bad is pain from 1 to 10? 0/10 (No Pain)   Do you use any other substances recreationally? No     Social History     Tobacco Use    Smoking status: Former     Packs/day: 0.50     Years: 20.00     Additional pack years: 0.00     Total pack years: 10.00     Types: Cigarettes     Quit date: 1995     Years since quittin.2    Smokeless tobacco: Never   Vaping Use    Vaping Use: Never used   Substance Use Topics    Alcohol use: Yes     Comment: very seldom    Drug use: No           10/26/2022   LAST FHS-7 RESULTS   1st degree relative breast or ovarian cancer Yes   Any relative bilateral breast cancer No   Any male have breast cancer No   Any ONE woman have BOTH breast AND ovarian cancer No   Any  woman with breast cancer before 50yrs No   2 or more relatives with breast AND/OR ovarian cancer No   2 or more relatives with breast AND/OR bowel cancer No        Mammogram Screening - Mammogram every 1-2 years updated in Health Maintenance based on mutual decision making    ASCVD Risk   The ASCVD Risk score (Kaycee DOWNEY, et al., 2019) failed to calculate for the following reasons:    The patient has a prior MI or stroke diagnosis            Reviewed and updated as needed this visit by Provider                    Past Medical History:   Diagnosis Date    Arthritis     Cancer (H)     Cerebral infarction (H)     History of blood transfusion     Unspecified essential hypertension      Past Surgical History:   Procedure Laterality Date    ARTHROPLASTY HIP Right 1/25/2024    Procedure: ARTHROPLASTY, HIP, TOTAL RIGHT;  Surgeon: Apolinar Chacko MD;  Location: [facility]    COLONOSCOPY  06/10/08    Internal hemorrhoids, otherwise normal.    COLONOSCOPY  01/20/10    HC DILATION/CURETTAGE DIAG/THER NON OB      D & C    HC LAP, SURG ENTEROLYSIS  05/07/10    HC REMOVAL OF OVARY/TUBE(S)      Salpingo-Oophorectomy, bilateral    LAPAROSCOPY DIAGNOSTIC (GENERAL)  5/6/2014    Procedure: LAPAROSCOPY DIAGNOSTIC (GENERAL);  Surgeon: Seth Matthews MD;  Location: [facility]    LAPAROTOMY, LYSIS ADHESIONS, COMBINED  5/6/2014    Procedure: COMBINED LAPAROTOMY, LYSIS ADHESIONS;  Surgeon: Seth Matthews MD;  Location: [facility]    VIDEO CAPSULE ENDOSCOPY  02/15/10    normal sm intestine    ZZC APPENDECTOMY      ZZC LIGATE FALLOPIAN TUBE      ZZC TOTAL ABDOM HYSTERECTOMY      Hysterectomy, Total Abdominal    ZZC TOTAL KNEE ARTHROPLASTY Left     ZZHC UGI ENDOSCOPY, SIMPLE EXAM  01/20/10     OB History   No obstetric history on file.     Lab work is in process  Labs reviewed in EPIC  BP Readings from Last 3 Encounters:   04/02/24 128/70   03/18/24 124/74   02/12/24 126/76    Wt Readings from Last 3 Encounters:   04/02/24 118.3 kg (260 lb  14.4 oz)   24 120.2 kg (265 lb)   24 120.2 kg (265 lb)                  Patient Active Problem List   Diagnosis    Absence of menstruation    Esophageal reflux    Recurring abdominal pain    Hyperlipidemia LDL goal <130    Hypertension goal BP (blood pressure) < 140/90    Advance Care Planning    Small bowel obstruction (H)    DVT prophylaxis    Cervical adenopathy    Intestinal adhesions    Gastroesophageal reflux disease with esophagitis    Cluster headache, not intractable, unspecified chronicity pattern    Morbid obesity due to excess calories (H)    CVA, old, hemiparesis (H)    Trochanteric bursitis of right hip    Primary osteoarthritis of right hip    S/P total right hip arthroplasty     Past Surgical History:   Procedure Laterality Date    ARTHROPLASTY HIP Right 2024    Procedure: ARTHROPLASTY, HIP, TOTAL RIGHT;  Surgeon: Apolinar Chacko MD;  [facility]    COLONOSCOPY  06/10/08    Internal hemorrhoids, otherwise normal.    COLONOSCOPY  01/20/10    HC DILATION/CURETTAGE DIAG/THER NON OB      D & C    HC LAP, SURG ENTEROLYSIS  05/07/10    HC REMOVAL OF OVARY/TUBE(S)      Salpingo-Oophorectomy, bilateral    LAPAROSCOPY DIAGNOSTIC (GENERAL)  2014    Procedure: LAPAROSCOPY DIAGNOSTIC (GENERAL);  Surgeon: Seth Matthews MD;  [facility]    LAPAROTOMY, LYSIS ADHESIONS, COMBINED  2014    Procedure: COMBINED LAPAROTOMY, LYSIS ADHESIONS;  Surgeon: Seth Matthews MD;  [facility]    VIDEO CAPSULE ENDOSCOPY  02/15/10    normal sm intestine    ZZC APPENDECTOMY      ZZC LIGATE FALLOPIAN TUBE      ZZC TOTAL ABDOM HYSTERECTOMY      Hysterectomy, Total Abdominal    ZZC TOTAL KNEE ARTHROPLASTY Left     ZZHC UGI ENDOSCOPY, SIMPLE EXAM  01/20/10       Social History     Tobacco Use    Smoking status: Former     Packs/day: 0.50     Years: 20.00     Additional pack years: 0.00     Total pack years: 10.00     Types: Cigarettes     Quit date: 1995     Years since quittin.2     Smokeless tobacco: Never   Substance Use Topics    Alcohol use: Yes     Comment: very seldom     Family History   Problem Relation Age of Onset    C.A.D. Mother         bi-pass    Diabetes Mother         late onset    Allergies Mother         xray dye    Arthritis Mother     Cancer Mother         ovary    Respiratory Mother     C.A.D. Father         bi-pass    Hypertension Brother     Breast Cancer Sister     Allergies Sister     Allergies Brother     Eye Disorder Maternal Aunt     Gastrointestinal Disease Brother     Gynecology Sister     Hypertension Sister     Cerebrovascular Disease Sister     Thyroid Disease Sister     Thyroid Disease Sister          Current Outpatient Medications   Medication Sig Dispense Refill    amLODIPine (NORVASC) 10 MG tablet TAKE 1 TABLET EVERY DAY 90 tablet 3    aspirin 81 MG EC tablet Take 81 mg by mouth daily      atorvastatin (LIPITOR) 40 MG tablet TAKE 1 TABLET EVERY DAY 90 tablet 3    losartan-hydrochlorothiazide (HYZAAR) 50-12.5 MG tablet TAKE 1 TABLET EVERY DAY 90 tablet 1    Multiple Vitamins-Minerals (PRESERVISION AREDS 2) CAPS Take 1 capsule by mouth every evening      multivitamin w/minerals (MULTI-VITAMIN) tablet Take 1 tablet by mouth daily      omeprazole (PRILOSEC) 40 MG DR capsule TAKE 1 CAPSULE EVERY DAY, 30 TO 60 MINUTES BEFORE A MEAL  (NEED APPOINTMENT FOR FURTHER REFILLS) 30 capsule 10    propranolol (INDERAL) 40 MG tablet Take 1 tablet (40 mg) by mouth 2 times daily 180 tablet 3     Allergies   Allergen Reactions    No Known Drug Allergy      Current providers sharing in care for this patient include:  Patient Care Team:  Wesly Lehman DO as PCP - General (Internal Medicine)  Wesly Lehman DO as Assigned PCP  Elliot Mujica DO as Assigned Neuroscience Provider  Apolinar Chacko MD as Assigned Musculoskeletal Provider    The following health maintenance items are reviewed in Epic and correct as of today:  Health Maintenance   Topic Date  Due    COVID-19 Vaccine (1) Never done    RSV VACCINE (Pregnancy & 60+) (1 - 1-dose 60+ series) Never done    Pneumococcal Vaccine: 65+ Years (2 of 2 - PPSV23 or PCV20) 07/26/2019    COLORECTAL CANCER SCREENING  01/20/2020    ZOSTER IMMUNIZATION (2 of 2) 05/17/2021    DTAP/TDAP/TD IMMUNIZATION (1 - Tdap) 05/04/2022    INFLUENZA VACCINE (1) 09/01/2023    MEDICARE ANNUAL WELLNESS VISIT  10/25/2023    MAMMO SCREENING  10/26/2024    LIPID  01/11/2025    ANNUAL REVIEW OF HM ORDERS  04/02/2025    FALL RISK ASSESSMENT  04/02/2025    GLUCOSE  01/26/2027    ADVANCE CARE PLANNING  10/25/2027    DEXA  01/15/2039    HEPATITIS C SCREENING  Completed    PHQ-2 (once per calendar year)  Completed    IPV IMMUNIZATION  Aged Out    HPV IMMUNIZATION  Aged Out    MENINGITIS IMMUNIZATION  Aged Out    RSV MONOCLONAL ANTIBODY  Aged Out    URINE DRUG SCREEN  Discontinued           4/2/2024   Mini Cog   Clock Draw Score 2 Normal   3 Item Recall 3 objects recalled   Mini Cog Total Score 5              This patient has been interviewed, examined, diagnosed, and informed of the above by me personally.  Medical records and available pertinent information has been reviewed by me personally.  All decisions and discussion have been between myself and the patient/family.  This was done in the presence of Kumar Post , who acted as a medical scribe and recorded the events above.  No diagnosis or decision making was made by the above-mentioned scribe.  The patient, and or his/her ensurors will not be billed for the presence or actions of this scribe.  The information recorded by the scribe has been reviewed by me and found to be accurate.      Signed Electronically by: Wesly Lehman DO

## 2024-04-23 ENCOUNTER — MYC MEDICAL ADVICE (OUTPATIENT)
Dept: INTERNAL MEDICINE | Facility: CLINIC | Age: 74
End: 2024-04-23
Payer: COMMERCIAL

## 2024-04-23 DIAGNOSIS — E66.01 MORBID OBESITY DUE TO EXCESS CALORIES (H): Primary | ICD-10-CM

## 2024-04-24 ENCOUNTER — TELEPHONE (OUTPATIENT)
Dept: INTERNAL MEDICINE | Facility: CLINIC | Age: 74
End: 2024-04-24
Payer: COMMERCIAL

## 2024-06-02 DIAGNOSIS — K21.00 GASTROESOPHAGEAL REFLUX DISEASE WITH ESOPHAGITIS: ICD-10-CM

## 2024-06-03 RX ORDER — OMEPRAZOLE 40 MG/1
CAPSULE, DELAYED RELEASE ORAL
Qty: 90 CAPSULE | Refills: 3 | OUTPATIENT
Start: 2024-06-03

## 2024-08-06 ENCOUNTER — TELEPHONE (OUTPATIENT)
Dept: INTERNAL MEDICINE | Facility: CLINIC | Age: 74
End: 2024-08-06
Payer: COMMERCIAL

## 2024-08-06 DIAGNOSIS — G44.009 CLUSTER HEADACHE, NOT INTRACTABLE, UNSPECIFIED CHRONICITY PATTERN: ICD-10-CM

## 2024-08-06 RX ORDER — SUMATRIPTAN 25 MG/1
25 TABLET, FILM COATED ORAL
Qty: 8 TABLET | Refills: 1 | Status: SHIPPED | OUTPATIENT
Start: 2024-08-06

## 2024-08-06 NOTE — TELEPHONE ENCOUNTER
Not a RN triage call    S: Medication refill    B: Patient states she she usually gets a Rx for Sumatriptan for migraines. Patient is wondering if she can get a refill. Patient has a hx of migraines.    A: Patient states she would like to have some on hand for when she gets a migraine. Patient states she can feel one coming on.    R: Routed message to provider to continue with conversation. Routed to provider for review and approval/denial sumatriptan.     Irma Smith RN on 8/6/2024 at 4:08 PM

## 2024-08-07 ENCOUNTER — TELEPHONE (OUTPATIENT)
Dept: INTERNAL MEDICINE | Facility: CLINIC | Age: 74
End: 2024-08-07
Payer: COMMERCIAL

## 2024-08-07 DIAGNOSIS — R11.0 NAUSEA: Primary | ICD-10-CM

## 2024-08-07 RX ORDER — ONDANSETRON 4 MG/1
4 TABLET, ORALLY DISINTEGRATING ORAL EVERY 8 HOURS PRN
Qty: 20 TABLET | Refills: 1 | Status: SHIPPED | OUTPATIENT
Start: 2024-08-07

## 2024-08-07 NOTE — TELEPHONE ENCOUNTER
Patient has migraine. Did receive Imitrex for symptoms. Patient requesting Zofran for nausea. Please advise if patient can have prescription for zofran. Please call patient and update if prescription has been sent.     Chano Acosta, RAZIA NguyenN, RN

## 2024-08-15 DIAGNOSIS — I10 HYPERTENSION GOAL BP (BLOOD PRESSURE) < 140/90: ICD-10-CM

## 2024-08-15 RX ORDER — LOSARTAN POTASSIUM AND HYDROCHLOROTHIAZIDE 12.5; 5 MG/1; MG/1
TABLET ORAL
Qty: 90 TABLET | Refills: 3 | OUTPATIENT
Start: 2024-08-15

## 2024-08-26 RX ORDER — LOSARTAN POTASSIUM AND HYDROCHLOROTHIAZIDE 12.5; 5 MG/1; MG/1
1 TABLET ORAL DAILY
Qty: 90 TABLET | Refills: 1 | Status: SHIPPED | OUTPATIENT
Start: 2024-08-26

## 2024-09-11 DIAGNOSIS — G44.009 CLUSTER HEADACHE, NOT INTRACTABLE, UNSPECIFIED CHRONICITY PATTERN: ICD-10-CM

## 2024-09-11 DIAGNOSIS — I10 HYPERTENSION GOAL BP (BLOOD PRESSURE) < 140/90: ICD-10-CM

## 2024-09-11 RX ORDER — PROPRANOLOL HYDROCHLORIDE 40 MG/1
40 TABLET ORAL 2 TIMES DAILY
Qty: 180 TABLET | Refills: 1 | Status: SHIPPED | OUTPATIENT
Start: 2024-09-11

## 2024-09-30 NOTE — TELEPHONE ENCOUNTER
Patient notified. Irene Kilpatrick MA     1/9/2018    
Please let this nice patient know that tramadol is considered a scheduled substance and as such, automatic refills are forbidden. I apologize for the inconvenience of having to have her make a phone call every time she needs the bottle refilled but unfortunately, it was seen that this is the only way this can be done. If she calls the day before, I guarantee we will have her prescription at the pharmacy in a timely fashion.    Thank you.    Fallon  
Reason for Call:  Medication or medication refill:    Do you use a [facility]?  Name of the pharmacy and phone number for the current request: [facility]    Name of the medication requested: Tramadol    Other request: Pt states she has to call us and send a message for this refill. She shouldn't have to call every time. She states she will see Fallon for her physical in May. She has seen Matstephenin a couple times. I told her she needs to est care with him. She will see him for her next physical. Can you refill this? She is going to call [email or web address] now and have them fax us her prescription. Please call her either way and let her know what happens.     Can we leave a detailed message on this number? YES    Phone number patient can be reached at: Cell number on file:    Telephone Information:   Mobile [phone]       Best Time: anytime    Call taken on 1/9/2018 at 8:32 AM by Priscila Donahue      
Script faxed. Makenna Feng CMA ([facility])    
Statement Selected

## 2024-10-05 DIAGNOSIS — E78.5 HYPERLIPIDEMIA LDL GOAL <130: ICD-10-CM

## 2024-10-05 DIAGNOSIS — I10 HYPERTENSION GOAL BP (BLOOD PRESSURE) < 140/90: ICD-10-CM

## 2024-10-07 RX ORDER — ATORVASTATIN CALCIUM 40 MG/1
TABLET, FILM COATED ORAL
Qty: 90 TABLET | Refills: 3 | OUTPATIENT
Start: 2024-10-07

## 2024-10-07 RX ORDER — AMLODIPINE BESYLATE 10 MG/1
TABLET ORAL
Qty: 90 TABLET | Refills: 3 | OUTPATIENT
Start: 2024-10-07

## 2024-10-16 DIAGNOSIS — E78.5 HYPERLIPIDEMIA LDL GOAL <130: ICD-10-CM

## 2024-10-16 DIAGNOSIS — I10 HYPERTENSION GOAL BP (BLOOD PRESSURE) < 140/90: ICD-10-CM

## 2024-10-16 RX ORDER — ATORVASTATIN CALCIUM 40 MG/1
40 TABLET, FILM COATED ORAL DAILY
Qty: 90 TABLET | Refills: 3 | OUTPATIENT
Start: 2024-10-16

## 2024-10-16 RX ORDER — AMLODIPINE BESYLATE 10 MG/1
10 TABLET ORAL DAILY
Qty: 90 TABLET | Refills: 3 | OUTPATIENT
Start: 2024-10-16

## 2024-11-25 ENCOUNTER — TELEPHONE (OUTPATIENT)
Dept: INTERNAL MEDICINE | Facility: CLINIC | Age: 74
End: 2024-11-25
Payer: COMMERCIAL

## 2024-11-25 NOTE — TELEPHONE ENCOUNTER
Call from patient.   Starting January 1, 2025 her insurance will be changing and will need prescriptions sent to [facility] [place]. She will have enough medication at home to get through to this date.     Updated pharmacy in chart. Instructed patient to call back after 1/1/25 and we can transfer her prescriptions when ready.     Vero RANDLEN, RN

## 2024-12-31 ENCOUNTER — TELEPHONE (OUTPATIENT)
Dept: INTERNAL MEDICINE | Facility: CLINIC | Age: 74
End: 2024-12-31

## 2024-12-31 NOTE — LETTER
March 25, 2025    To  Komal Miller  [address] [facility] [id number]        Your team at [facility] cares about your health. We have reviewed your chart and based on our findings; we are making the following recommendations to better manage your health.     You are in particular need of attention regarding the following:     Call or MyChart message your clinic to schedule a colonoscopy, schedule/ a FIT Test, or order a Cologuard test. If you are unsure what type of test you need, please call your clinic and speak to clinic staff.   Colon cancer is now the second leading cause of cancer-related deaths in the [place] for both men and women and there are over 130,000 new cases and 50,000 deaths per year from colon cancer. Colonoscopies can prevent 90-95% of these deaths. Problem lesions can be removed before they ever become cancer. This test is not only looking for cancer, but also getting rid of precancerous lesions.   If you are under/uninsured, we recommend you contact the [employer] Program.[employer] is a free colorectal cancer screening program that provides colonoscopies for eligible under/uninsured [place] men and women. If you are interested in receiving a free colonoscopy, please call [employer] at t [phone] (mention code [employer]) to see if you're eligible. Please have them send us the results.     If you have already completed these items, please contact the clinic via phone or   [facility] so your care team can review and update your records. Thank you for   choosing [facility] for your healthcare needs. For any questions,   concerns, or to schedule an appointment please contact our clinic.    Healthy Regards,      Your [facility] Care Team

## 2024-12-31 NOTE — TELEPHONE ENCOUNTER
Patient Quality Outreach    Patient is due for the following:   Colon Cancer Screening  Breast Cancer Screening - Mammogram      Topic Date Due    Pneumococcal Vaccine (2 of 2 - PPSV23) 05/31/2020    Zoster (Shingles) Vaccine (3 of 3) 05/17/2021    Diptheria Tetanus Pertussis (DTAP/TDAP/TD) Vaccine (1 - Tdap) 05/04/2022    Flu Vaccine (1) 09/01/2024    COVID-19 Vaccine (1 - 2024-25 season) Never done       Action(s) Taken:       Type of outreach:    Sent [employer] message.    Questions for provider review:    None           Anu Mauricio LPN

## 2025-01-25 ENCOUNTER — HEALTH MAINTENANCE LETTER (OUTPATIENT)
Age: 75
End: 2025-01-25

## 2025-01-28 ENCOUNTER — HOSPITAL ENCOUNTER (OUTPATIENT)
Dept: MAMMOGRAPHY | Facility: CLINIC | Age: 75
Discharge: HOME | End: 2025-01-28
Attending: INTERNAL MEDICINE
Payer: COMMERCIAL

## 2025-01-28 DIAGNOSIS — Z12.31 VISIT FOR SCREENING MAMMOGRAM: ICD-10-CM

## 2025-01-28 PROCEDURE — 77063 BREAST TOMOSYNTHESIS BI: CPT

## 2025-02-18 DIAGNOSIS — K21.00 GASTROESOPHAGEAL REFLUX DISEASE WITH ESOPHAGITIS: ICD-10-CM

## 2025-02-18 RX ORDER — OMEPRAZOLE 40 MG/1
CAPSULE, DELAYED RELEASE ORAL
Qty: 90 CAPSULE | Refills: 0 | Status: SHIPPED | OUTPATIENT
Start: 2025-02-18

## 2025-03-04 ENCOUNTER — PATIENT OUTREACH (OUTPATIENT)
Dept: CARE COORDINATION | Facility: CLINIC | Age: 75
End: 2025-03-04
Payer: COMMERCIAL

## 2025-03-25 NOTE — TELEPHONE ENCOUNTER
Patient Quality Outreach    Patient is due for the following:   Colon Cancer Screening    Action(s) Taken:       Type of outreach:    Sent letter.    Questions for provider review:    None         Anu Mauricio LPN  Chart routed to .

## 2025-04-03 ENCOUNTER — OFFICE VISIT (OUTPATIENT)
Dept: INTERNAL MEDICINE | Facility: CLINIC | Age: 75
End: 2025-04-03
Payer: COMMERCIAL

## 2025-04-03 VITALS
HEART RATE: 74 BPM | SYSTOLIC BLOOD PRESSURE: 128 MMHG | RESPIRATION RATE: 18 BRPM | BODY MASS INDEX: 42.27 KG/M2 | DIASTOLIC BLOOD PRESSURE: 90 MMHG | WEIGHT: 263 LBS | OXYGEN SATURATION: 97 % | TEMPERATURE: 97.5 F | HEIGHT: 66 IN

## 2025-04-03 DIAGNOSIS — I69.359 CVA, OLD, HEMIPARESIS (H): ICD-10-CM

## 2025-04-03 DIAGNOSIS — J45.20 MILD INTERMITTENT ASTHMA WITHOUT COMPLICATION: ICD-10-CM

## 2025-04-03 DIAGNOSIS — G44.009 CLUSTER HEADACHE, NOT INTRACTABLE, UNSPECIFIED CHRONICITY PATTERN: ICD-10-CM

## 2025-04-03 DIAGNOSIS — E78.5 HYPERLIPIDEMIA LDL GOAL <130: ICD-10-CM

## 2025-04-03 DIAGNOSIS — E66.01 MORBID OBESITY DUE TO EXCESS CALORIES (H): ICD-10-CM

## 2025-04-03 DIAGNOSIS — K21.00 GASTROESOPHAGEAL REFLUX DISEASE WITH ESOPHAGITIS WITHOUT HEMORRHAGE: ICD-10-CM

## 2025-04-03 DIAGNOSIS — E55.9 HYPOVITAMINOSIS D: ICD-10-CM

## 2025-04-03 DIAGNOSIS — I10 HYPERTENSION GOAL BP (BLOOD PRESSURE) < 140/90: ICD-10-CM

## 2025-04-03 DIAGNOSIS — Z00.00 MEDICARE ANNUAL WELLNESS VISIT, SUBSEQUENT: Primary | ICD-10-CM

## 2025-04-03 DIAGNOSIS — Z12.11 SCREEN FOR COLON CANCER: ICD-10-CM

## 2025-04-03 DIAGNOSIS — I12.9 HYPERTENSIVE CHRONIC KIDNEY DISEASE WITH STAGE 1 THROUGH STAGE 4 CHRONIC KIDNEY DISEASE, OR UNSPECIFIED CHRONIC KIDNEY DISEASE: ICD-10-CM

## 2025-04-03 LAB
ALBUMIN SERPL BCG-MCNC: 4 G/DL (ref 3.5–5.2)
ALBUMIN UR-MCNC: NEGATIVE MG/DL
ALP SERPL-CCNC: 78 U/L (ref 40–150)
ALT SERPL W P-5'-P-CCNC: 18 U/L (ref 0–50)
ANION GAP SERPL CALCULATED.3IONS-SCNC: 8 MMOL/L (ref 7–15)
APPEARANCE UR: CLEAR
AST SERPL W P-5'-P-CCNC: 19 U/L (ref 0–45)
BILIRUB DIRECT SERPL-MCNC: 0.13 MG/DL (ref 0–0.3)
BILIRUB SERPL-MCNC: 0.4 MG/DL
BILIRUB UR QL STRIP: NEGATIVE
BUN SERPL-MCNC: 11.8 MG/DL (ref 8–23)
CALCIUM SERPL-MCNC: 9.8 MG/DL (ref 8.8–10.4)
CHLORIDE SERPL-SCNC: 100 MMOL/L (ref 98–107)
CHOLEST SERPL-MCNC: 173 MG/DL
COLOR UR AUTO: YELLOW
CREAT SERPL-MCNC: 0.72 MG/DL (ref 0.51–0.95)
EGFRCR SERPLBLD CKD-EPI 2021: 88 ML/MIN/1.73M2
FASTING STATUS PATIENT QL REPORTED: YES
FASTING STATUS PATIENT QL REPORTED: YES
GLUCOSE SERPL-MCNC: 110 MG/DL (ref 70–99)
GLUCOSE UR STRIP-MCNC: NEGATIVE MG/DL
HCO3 SERPL-SCNC: 29 MMOL/L (ref 22–29)
HDLC SERPL-MCNC: 53 MG/DL
HGB UR QL STRIP: NEGATIVE
KETONES UR STRIP-MCNC: NEGATIVE MG/DL
LDLC SERPL CALC-MCNC: 99 MG/DL
LEUKOCYTE ESTERASE UR QL STRIP: NEGATIVE
NITRATE UR QL: NEGATIVE
NONHDLC SERPL-MCNC: 120 MG/DL
PH UR STRIP: 7 [PH] (ref 5–7)
POTASSIUM SERPL-SCNC: 4 MMOL/L (ref 3.4–5.3)
PROT SERPL-MCNC: 7.6 G/DL (ref 6.4–8.3)
SODIUM SERPL-SCNC: 137 MMOL/L (ref 135–145)
SP GR UR STRIP: 1.02 (ref 1–1.03)
TRIGL SERPL-MCNC: 106 MG/DL
UROBILINOGEN UR STRIP-MCNC: NORMAL MG/DL
VIT D+METAB SERPL-MCNC: 16 NG/ML (ref 20–50)

## 2025-04-03 RX ORDER — AMLODIPINE BESYLATE 10 MG/1
10 TABLET ORAL DAILY
Qty: 90 TABLET | Refills: 3 | Status: SHIPPED | OUTPATIENT
Start: 2025-04-03

## 2025-04-03 RX ORDER — OMEPRAZOLE 40 MG/1
CAPSULE, DELAYED RELEASE ORAL
Qty: 90 CAPSULE | Refills: 0 | Status: SHIPPED | OUTPATIENT
Start: 2025-04-03

## 2025-04-03 RX ORDER — PROPRANOLOL HYDROCHLORIDE 40 MG/1
40 TABLET ORAL 2 TIMES DAILY
Qty: 180 TABLET | Refills: 3 | Status: SHIPPED | OUTPATIENT
Start: 2025-04-03

## 2025-04-03 RX ORDER — FLUTICASONE PROPIONATE AND SALMETEROL 100; 50 UG/1; UG/1
1 POWDER RESPIRATORY (INHALATION) EVERY 12 HOURS
COMMUNITY
Start: 2025-04-03

## 2025-04-03 RX ORDER — ATORVASTATIN CALCIUM 40 MG/1
40 TABLET, FILM COATED ORAL DAILY
Qty: 90 TABLET | Refills: 3 | Status: SHIPPED | OUTPATIENT
Start: 2025-04-03

## 2025-04-03 RX ORDER — LOSARTAN POTASSIUM AND HYDROCHLOROTHIAZIDE 12.5; 5 MG/1; MG/1
1 TABLET ORAL DAILY
Qty: 90 TABLET | Refills: 3 | Status: SHIPPED | OUTPATIENT
Start: 2025-04-03

## 2025-04-03 SDOH — HEALTH STABILITY: PHYSICAL HEALTH: ON AVERAGE, HOW MANY DAYS PER WEEK DO YOU ENGAGE IN MODERATE TO STRENUOUS EXERCISE (LIKE A BRISK WALK)?: 2 DAYS

## 2025-04-03 ASSESSMENT — PATIENT HEALTH QUESTIONNAIRE - PHQ9
SUM OF ALL RESPONSES TO PHQ QUESTIONS 1-9: 0
10. IF YOU CHECKED OFF ANY PROBLEMS, HOW DIFFICULT HAVE THESE PROBLEMS MADE IT FOR YOU TO DO YOUR WORK, TAKE CARE OF THINGS AT HOME, OR GET ALONG WITH OTHER PEOPLE: NOT DIFFICULT AT ALL
SUM OF ALL RESPONSES TO PHQ QUESTIONS 1-9: 0

## 2025-04-03 ASSESSMENT — SOCIAL DETERMINANTS OF HEALTH (SDOH): HOW OFTEN DO YOU GET TOGETHER WITH FRIENDS OR RELATIVES?: ONCE A WEEK

## 2025-04-03 ASSESSMENT — PAIN SCALES - GENERAL: PAINLEVEL_OUTOF10: NO PAIN (0)

## 2025-04-03 NOTE — PROGRESS NOTES
Preventive Care Visit  [facility]  Wesly Lehman DO, Internal Medicine  Apr 3, 2025      Assessment & Plan     Medicare annual wellness visit, subsequent      Hypertension goal BP (blood pressure) < 140/90    - BASIC METABOLIC PANEL; Future  - amLODIPine (NORVASC) 10 MG tablet; Take 1 tablet (10 mg) by mouth daily.  - losartan-hydrochlorothiazide (HYZAAR) 50-12.5 MG tablet; Take 1 tablet by mouth daily.  - propranolol (INDERAL) 40 MG tablet; Take 1 tablet (40 mg) by mouth 2 times daily.  - UA Macroscopic with reflex to Microscopic and Culture - Lab Collect; Future    Hyperlipidemia LDL goal <130    - Lipid panel reflex to direct LDL Non-fasting; Future  - atorvastatin (LIPITOR) 40 MG tablet; Take 1 tablet (40 mg) by mouth daily.  - Hepatic panel (Albumin, ALT, AST, Bili, Alk Phos, TP); Future  - Hepatic panel (Albumin, ALT, AST, Bili, Alk Phos, TP); Future    Morbid obesity due to excess calories (H)  Patient is wishes a nutritional/dietary consultation.  - Adult Nutrition  Referral; Future    Hypertensive chronic kidney disease with stage 1 through stage 4 chronic kidney disease, or unspecified chronic kidney disease  Patient has stage I kidney disease.  Requires nutritional consultation   - Adult Nutrition  Referral; Future    Gastroesophageal reflux disease with esophagitis without hemorrhage  continue omeprazole.  Recommend elevation of the head of the bed  - omeprazole (PRILOSEC) 40 MG DR capsule; TAKE 1 CAPSULE EVERY DAY, 30 TO 60 MINUTES BEFORE A MEAL  (NEED APPOINTMENT FOR FURTHER REFILLS)    Cluster headache, not intractable, unspecified chronicity pattern  Stable and resolved with therapy  - propranolol (INDERAL) 40 MG tablet; Take 1 tablet (40 mg) by mouth 2 times daily.    Hypovitaminosis D  Check vitamin D levels  - Vitamin D Deficiency; Future    Mild intermittent asthma without complication  Continue Advair  - fluticasone-salmeterol  "(ADVAIR) 100-50 MCG/ACT inhaler; Inhale 1 puff into the lungs every 12 hours.    Screen for colon cancer  Screening  - Colonoscopy Screening  Referral; Future    CVA, old, hemiparesis (H)  Minimal residual sequelae noted.    Patient has been advised of split billing requirements and indicates understanding: Yes        BMI  Estimated body mass index is 42.27 kg/m  as calculated from the following:    Height as of this encounter: 1.68 m (5' 6.14\").    Weight as of this encounter: 119.3 kg (263 lb).   Weight management plan: Patient referred to endocrine and/or weight management specialty    Counseling  Appropriate preventive services were addressed with this patient via screening, questionnaire, or discussion as appropriate for fall prevention, nutrition, physical activity, Tobacco-use cessation, social engagement, weight loss and cognition.  Checklist reviewing preventive services available has been given to the patient.  Reviewed patient's diet, addressing concerns and/or questions.   She is at risk for lack of exercise and has been provided with information to increase physical activity for the benefit of her well-being.       MEDICATIONS:  Continue current medications without change  Work on weight loss  Regular exercise    Khloe quezada is a 74 year old, presenting for the following:  Wellness Visit        4/3/2025     7:30 AM   Additional Questions   Roomed by Ganesh GLASER           Advance Care Planning  Patient has a Health Care Directive on file  Advance care planning document is on file and is current.      4/3/2025   General Health   How would you rate your overall physical health? (!) FAIR   Feel stress (tense, anxious, or unable to sleep) Not at all         4/3/2025   Nutrition   Diet: Other   If other, please elaborate: none         4/3/2025   Exercise   Days per week of moderate/strenous exercise 2 days   (!) EXERCISE CONCERN      4/3/2025   Social Factors   Frequency of gathering " with friends or relatives Once a week   Worry food won't last until get money to buy more No   Food not last or not have enough money for food? No   Do you have housing? (Housing is defined as stable permanent housing and does not include staying ouside in a car, in a tent, in an abandoned building, in an overnight shelter, or couch-surfing.) Yes   Are you worried about losing your housing? No   Lack of transportation? No   Unable to get utilities (heat,electricity)? No         4/3/2025   Fall Risk   Fallen 2 or more times in the past year? No   Trouble with walking or balance? Yes   Gait Speed Test (Document in seconds) 5.22   Gait Speed Test Interpretation Greater than 5.01 seconds - ABNORMAL          4/3/2025   Activities of Daily Living- Home Safety   Needs help with the following daily activites None of the above   Safety concerns in the home None of the above         4/3/2025   Dental   Dentist two times every year? Yes         4/3/2025   Hearing Screening   Hearing concerns? None of the above         4/3/2025   Driving Risk Screening   Patient/family members have concerns about driving No         4/3/2025   General Alertness/Fatigue Screening   Have you been more tired than usual lately? No         4/3/2025   Urinary Incontinence Screening   Bothered by leaking urine in past 6 months No           3/20/2024   TB Screening   Were you born outside of the [place]? No         Today's PHQ-9 Score:       4/3/2025     7:22 AM   PHQ-9 SCORE   PHQ-9 Total Score MyChart 0   PHQ-9 Total Score 0        Patient-reported         4/3/2025   Substance Use   Alcohol more than 3/day or more than 7/wk No   Do you have a current opioid prescription? No   How severe/bad is pain from 1 to 10? 2/10   Do you use any other substances recreationally? No     Social History     Tobacco Use    Smoking status: Former     Current packs/day: 0.00     Average packs/day: 0.5 packs/day for 20.0 years (10.0 ttl pk-yrs)     Types: Cigarettes     Start  date: 1975     Quit date: 1995     Years since quittin.2    Smokeless tobacco: Never   Vaping Use    Vaping status: Never Used   Substance Use Topics    Alcohol use: Yes     Comment: very seldom    Drug use: No           2025   LAST FHS-7 RESULTS   1st degree relative breast or ovarian cancer Yes   Any relative bilateral breast cancer No   Any male have breast cancer No   Any ONE woman have BOTH breast AND ovarian cancer No   Any woman with breast cancer before 50yrs No   2 or more relatives with breast AND/OR ovarian cancer Yes   2 or more relatives with breast AND/OR bowel cancer No        Mammogram Screening - Mammogram every 1-2 years updated in Health Maintenance based on mutual decision making    ASCVD Risk   The ASCVD Risk score (Kaycee DOWNEY, et al., 2019) failed to calculate for the following reasons:    Risk score cannot be calculated because patient has a medical history suggesting prior/existing ASCVD            Reviewed and updated as needed this visit by Provider                    Past Medical History:   Diagnosis Date    Arthritis     Cancer (H)     Cerebral infarction (H)     History of blood transfusion     Unspecified essential hypertension      Past Surgical History:   Procedure Laterality Date    ARTHROPLASTY HIP Right 2024    Procedure: ARTHROPLASTY, HIP, TOTAL RIGHT;  Surgeon: Apolinar Chacko MD;  Location: [facility]    COLONOSCOPY  06/10/08    Internal hemorrhoids, otherwise normal.    COLONOSCOPY  01/20/10    HC DILATION/CURETTAGE DIAG/THER NON OB      D & C    HC LAP, SURG ENTEROLYSIS  05/07/10    HC REMOVAL OF OVARY/TUBE(S)      Salpingo-Oophorectomy, bilateral    LAPAROSCOPY DIAGNOSTIC (GENERAL)  2014    Procedure: LAPAROSCOPY DIAGNOSTIC (GENERAL);  Surgeon: Seth Matthews MD;  Location: [facility]    LAPAROTOMY, LYSIS ADHESIONS, COMBINED  2014    Procedure: COMBINED LAPAROTOMY, LYSIS ADHESIONS;  Surgeon: Seth Matthews MD;  Location: [facility]    VIDEO  CAPSULE ENDOSCOPY  02/15/10    normal sm intestine    ZZC APPENDECTOMY      ZZC LIGATE FALLOPIAN TUBE      ZZC TOTAL ABDOM HYSTERECTOMY      Hysterectomy, Total Abdominal    ZZC TOTAL KNEE ARTHROPLASTY Left     ZZ[facility] UGI ENDOSCOPY, SIMPLE EXAM  01/20/10     OB History   No obstetric history on file.     Lab work is in process  Labs reviewed in EPIC  BP Readings from Last 3 Encounters:   04/03/25 128/90   04/02/24 128/70   03/18/24 124/74    Wt Readings from Last 3 Encounters:   04/03/25 119.3 kg (263 lb)   04/02/24 118.3 kg (260 lb 14.4 oz)   03/18/24 120.2 kg (265 lb)                  Patient Active Problem List   Diagnosis    Absence of menstruation    Esophageal reflux    Recurring abdominal pain    Hyperlipidemia LDL goal <130    Hypertension goal BP (blood pressure) < 140/90    Small bowel obstruction (H)    DVT prophylaxis    Cervical adenopathy    Intestinal adhesions    Gastroesophageal reflux disease with esophagitis    Cluster headache, not intractable, unspecified chronicity pattern    Morbid obesity due to excess calories (H)    CVA, old, hemiparesis (H)    Trochanteric bursitis of right hip    Primary osteoarthritis of right hip    S/P total right hip arthroplasty     Past Surgical History:   Procedure Laterality Date    ARTHROPLASTY HIP Right 1/25/2024    Procedure: ARTHROPLASTY, HIP, TOTAL RIGHT;  Surgeon: Apolinar Chacko MD;  [facility]    COLONOSCOPY  06/10/08    Internal hemorrhoids, otherwise normal.    COLONOSCOPY  01/20/10    HC DILATION/CURETTAGE DIAG/THER NON OB      D & C    HC LAP, SURG ENTEROLYSIS  05/07/10    HC REMOVAL OF OVARY/TUBE(S)      Salpingo-Oophorectomy, bilateral    LAPAROSCOPY DIAGNOSTIC (GENERAL)  5/6/2014    Procedure: LAPAROSCOPY DIAGNOSTIC (GENERAL);  Surgeon: Seth Matthews MD;  [facility]    LAPAROTOMY, LYSIS ADHESIONS, COMBINED  5/6/2014    Procedure: COMBINED LAPAROTOMY, LYSIS ADHESIONS;  Surgeon: Seth Matthews MD;  Location: [facility]    VIDEO CAPSULE  ENDOSCOPY  02/15/10    normal sm intestine    ZZC APPENDECTOMY      ZZC LIGATE FALLOPIAN TUBE      ZZC TOTAL ABDOM HYSTERECTOMY      Hysterectomy, Total Abdominal    ZZC TOTAL KNEE ARTHROPLASTY Left     ZZHC UGI ENDOSCOPY, SIMPLE EXAM  01/20/10       Social History     Tobacco Use    Smoking status: Former     Current packs/day: 0.00     Average packs/day: 0.5 packs/day for 20.0 years (10.0 ttl pk-yrs)     Types: Cigarettes     Start date: 1975     Quit date: 1995     Years since quittin.2    Smokeless tobacco: Never   Substance Use Topics    Alcohol use: Yes     Comment: very seldom     Family History   Problem Relation Age of Onset    C.A.D. Mother         bi-pass    Diabetes Mother         late onset    Allergies Mother         xray dye    Arthritis Mother     Cancer Mother         ovary    Respiratory Mother     C.A.D. Father         bi-pass    Hypertension Brother     Breast Cancer Sister     Allergies Sister     Allergies Brother     Eye Disorder Maternal Aunt     Gastrointestinal Disease Brother     Gynecology Sister     Hypertension Sister     Cerebrovascular Disease Sister     Thyroid Disease Sister     Thyroid Disease Sister          Current Outpatient Medications   Medication Sig Dispense Refill    amLODIPine (NORVASC) 10 MG tablet Take 1 tablet (10 mg) by mouth daily. 90 tablet 3    aspirin 81 MG EC tablet Take 81 mg by mouth daily      atorvastatin (LIPITOR) 40 MG tablet Take 1 tablet (40 mg) by mouth daily. 90 tablet 3    fluticasone-salmeterol (ADVAIR) 100-50 MCG/ACT inhaler Inhale 1 puff into the lungs every 12 hours.      losartan-hydrochlorothiazide (HYZAAR) 50-12.5 MG tablet Take 1 tablet by mouth daily. 90 tablet 3    multivitamin w/minerals (MULTI-VITAMIN) tablet Take 1 tablet by mouth daily      omeprazole (PRILOSEC) 40 MG DR capsule TAKE 1 CAPSULE EVERY DAY, 30 TO 60 MINUTES BEFORE A MEAL  (NEED APPOINTMENT FOR FURTHER REFILLS) 90 capsule 0    propranolol (INDERAL) 40 MG tablet  Take 1 tablet (40 mg) by mouth 2 times daily. 180 tablet 3    SUMAtriptan (IMITREX) 25 MG tablet Take 1 tablet (25 mg) by mouth at onset of headache for migraine May repeat in 2 hours. Max 8 tablets/24 hours. 8 tablet 1    Multiple Vitamins-Minerals (PRESERVISION AREDS 2) CAPS Take 1 capsule by mouth every evening (Patient not taking: Reported on 4/3/2025)      ondansetron (ZOFRAN ODT) 4 MG ODT tab Take 1 tablet (4 mg) by mouth every 8 hours as needed for nausea 20 tablet 1    Semaglutide-Weight Management (WEGOVY) 1 MG/0.5ML pen Inject 1 mg Subcutaneous once a week 2 mL 0     Allergies   Allergen Reactions    No Known Drug Allergy      Current providers sharing in care for this patient include:  Patient Care Team:  Wesly Lehman DO as PCP - General (Internal Medicine)  Wesly Lehman DO as Assigned PCP  Apolinar Chacko MD as Assigned Musculoskeletal Provider    The following health maintenance items are reviewed in Epic and correct as of today:  Health Maintenance   Topic Date Due    LUNG CANCER SCREENING  Never done    COLORECTAL CANCER SCREENING  01/20/2020    Pneumococcal Vaccine: 50+ Years (2 of 2 - PPSV23) 05/31/2020    ZOSTER IMMUNIZATION (3 of 3) 05/17/2021    DTAP/TDAP/TD IMMUNIZATION (1 - Tdap) 05/04/2022    INFLUENZA VACCINE (1) 09/01/2024    COVID-19 Vaccine (1 - 2024-25 season) Never done    BMP  01/11/2025    LIPID  01/11/2025    ANNUAL REVIEW OF HM ORDERS  04/02/2025    MEDICARE ANNUAL WELLNESS VISIT  04/02/2025    FALL RISK ASSESSMENT  04/03/2026    DIABETES SCREENING  01/26/2027    MAMMO SCREENING  01/28/2027    RSV VACCINE (1 - 1-dose 75+ series) 05/05/2027    ADVANCE CARE PLANNING  04/02/2029    DEXA  01/15/2039    HEPATITIS C SCREENING  Completed    PHQ-2 (once per calendar year)  Completed    HPV IMMUNIZATION  Aged Out    MENINGITIS IMMUNIZATION  Aged Out    URINE DRUG SCREEN  Discontinued         Review of Systems  CONSTITUTIONAL: NEGATIVE for fever, chills, change in  "weight  INTEGUMENTARY/SKIN: NEGATIVE for worrisome rashes, moles or lesions  EYES: NEGATIVE for vision changes or irritation  ENT/MOUTH: NEGATIVE for ear, mouth and throat problems  RESP: NEGATIVE for significant cough or SOB  BREAST: NEGATIVE for masses, tenderness or discharge  CV: NEGATIVE for chest pain, palpitations or peripheral edema  GI: NEGATIVE for nausea, abdominal pain, heartburn, or change in bowel habits  : NEGATIVE for frequency, dysuria, or hematuria  MUSCULOSKELETAL: NEGATIVE for significant arthralgias or myalgia  NEURO: NEGATIVE for weakness, dizziness or paresthesias  ENDOCRINE: NEGATIVE for temperature intolerance, skin/hair changes  HEME: NEGATIVE for bleeding problems  PSYCHIATRIC: NEGATIVE for changes in mood or affect     Objective    Exam  /90 (BP Location: Right arm, Patient Position: Sitting, Cuff Size: Adult Large)   Pulse 74   Temp 97.5  F (36.4  C) (Temporal)   Resp 18   Ht 1.68 m (5' 6.14\")   Wt 119.3 kg (263 lb)   SpO2 97%   BMI 42.27 kg/m     Estimated body mass index is 42.27 kg/m  as calculated from the following:    Height as of this encounter: 1.68 m (5' 6.14\").    Weight as of this encounter: 119.3 kg (263 lb).    Physical Exam  GENERAL: alert, no distress, and obese  EYES: Eyes grossly normal to inspection, PERRL and conjunctivae and sclerae normal  HENT: ear canals and TM's normal, nose and mouth without ulcers or lesions  NECK: no adenopathy, no asymmetry, masses, or scars  RESP: lungs clear to auscultation - no rales, rhonchi or wheezes  CV: regular rate and rhythm, normal S1 S2, no S3 or S4, no murmur, click or rub, no peripheral edema  ABDOMEN: soft, nontender, no hepatosplenomegaly, no masses and bowel sounds normal  MS: no gross musculoskeletal defects noted, no edema  SKIN: no suspicious lesions or rashes  NEURO: Normal strength and tone, mentation intact and speech normal  PSYCH: mentation appears normal, affect normal/bright        4/3/2025   Mini Cog "   Clock Draw Score 2 Normal   3 Item Recall 3 objects recalled   Mini Cog Total Score 5              I have reviewed the patient's vaccination schedule and discussed the benefits of prophylactic vaccination in detail.  I recommend the patient contact their pharmacist for vaccinations.  Discussed that most insurance companies now favor reimbursement to the pharmacies and it will financially behoove the patient to have vaccinations performed at their pharmacy.        Signed Electronically by: Wesly Lehman DO

## 2025-04-14 ENCOUNTER — TRANSCRIBE ORDERS (OUTPATIENT)
Dept: OTHER | Age: 75
End: 2025-04-14

## 2025-04-14 DIAGNOSIS — I12.9 HYPERTENSIVE CHRONIC KIDNEY DISEASE WITH STAGE 1 THROUGH STAGE 4 CHRONIC KIDNEY DISEASE, OR UNSPECIFIED CHRONIC KIDNEY DISEASE: ICD-10-CM

## 2025-04-14 DIAGNOSIS — E66.01 MORBID OBESITY DUE TO EXCESS CALORIES (H): Primary | ICD-10-CM

## 2025-04-15 ENCOUNTER — TELEPHONE (OUTPATIENT)
Dept: INTERNAL MEDICINE | Facility: CLINIC | Age: 75
End: 2025-04-15
Payer: COMMERCIAL

## 2025-04-15 NOTE — TELEPHONE ENCOUNTER
Patient is wondering about the kidney disease stage 1 from her physical on 4/3/25.    She would like to know when she was diagnosed with this?    She would also like to know if she should call her insurance to make sure they cover the dietician and can she tell them the 2 reasons for her need for referral?      Patient encouraged to call her insurance to find out coverage amount. When diagnosis of hypertensive chronic kidney disease was read to her she states she now understands it is due to her blood pressure and does not need message sent to provider.    Charley Pineda RN on 4/15/2025 at 9:16 AM

## 2025-04-21 ENCOUNTER — PATIENT OUTREACH (OUTPATIENT)
Dept: CARE COORDINATION | Facility: CLINIC | Age: 75
End: 2025-04-21
Payer: COMMERCIAL

## 2025-05-19 DIAGNOSIS — J45.20 MILD INTERMITTENT ASTHMA WITHOUT COMPLICATION: ICD-10-CM

## 2025-05-19 RX ORDER — FLUTICASONE PROPIONATE AND SALMETEROL 100; 50 UG/1; UG/1
1 POWDER RESPIRATORY (INHALATION) EVERY 12 HOURS
Qty: 1 EACH | Refills: 3 | Status: SHIPPED | OUTPATIENT
Start: 2025-05-19

## 2025-05-26 ENCOUNTER — NURSE TRIAGE (OUTPATIENT)
Dept: NURSING | Facility: CLINIC | Age: 75
End: 2025-05-26
Payer: COMMERCIAL

## 2025-05-26 NOTE — TELEPHONE ENCOUNTER
"Nurse Triage SBAR    Is this a 2nd Level Triage? NO    Situation: Severe left hip pain    Background: Has had pain for a while. Thinks it arthritis. Past week pain has been severe.    Assessment: Needs to be evaluated.    Protocol Recommended Disposition:   ED  Caller stated understanding and agreement.  Stated she'll drive herself.    Reason for Disposition   [1] SEVERE pain (e.g., excruciating, unable to do any normal activities) AND [2] fever    Additional Information   Negative: Looks like a broken bone or dislocated joint (e.g., crooked or deformed)   Negative: Sounds like a life-threatening emergency to the triager   Negative: Followed a hip injury   Negative: Leg pain is main symptom   Negative: Back pain radiating (shooting) into hip    Answer Assessment - Initial Assessment Questions  1. LOCATION and RADIATION: \"Where is the pain located?\"       Left hip  2. QUALITY: \"What does the pain feel like?\"  (e.g., sharp, dull, aching, burning)      aching  3. SEVERITY: \"How bad is the pain?\" \"What does it keep you from doing?\"   (Scale 1-10; or mild, moderate, severe)    -  MILD (1-3): doesn't interfere with normal activities     -  MODERATE (4-7): interferes with normal activities (e.g., work or school) or awakens from sleep, limping     -  SEVERE (8-10): excruciating pain, unable to do any normal activities, unable to walk      9/10  4. ONSET: \"When did the pain start?\" \"Does it come and go, or is it there all the time?\"      Constant and severe past weeks  5. WORK OR EXERCISE: \"Has there been any recent work or exercise that involved this part of the body?\"       No  6. CAUSE: \"What do you think is causing the hip pain?\"       Arthitis  7. AGGRAVATING FACTORS: \"What makes the hip pain worse?\" (e.g., walking, climbing stairs, running)      It's just bad.  8. OTHER SYMPTOMS: \"Do you have any other symptoms?\" (e.g., back pain, pain shooting down leg,  fever, rash)      No    Protocols used: Hip Pain-A-  Nilda MUÑOZ, " RN [facility]

## 2025-05-27 ENCOUNTER — OFFICE VISIT (OUTPATIENT)
Dept: ORTHOPEDICS | Facility: CLINIC | Age: 75
End: 2025-05-27
Payer: COMMERCIAL

## 2025-05-27 ENCOUNTER — ANCILLARY PROCEDURE (OUTPATIENT)
Dept: GENERAL RADIOLOGY | Facility: CLINIC | Age: 75
End: 2025-05-27
Attending: PHYSICIAN ASSISTANT
Payer: COMMERCIAL

## 2025-05-27 VITALS — TEMPERATURE: 97.9 F | WEIGHT: 265 LBS | HEIGHT: 65 IN | BODY MASS INDEX: 44.15 KG/M2

## 2025-05-27 DIAGNOSIS — M70.62 GREATER TROCHANTERIC BURSITIS OF LEFT HIP: Primary | ICD-10-CM

## 2025-05-27 DIAGNOSIS — M25.552 LEFT HIP PAIN: ICD-10-CM

## 2025-05-27 PROCEDURE — 73502 X-RAY EXAM HIP UNI 2-3 VIEWS: CPT | Mod: TC | Performed by: RADIOLOGY

## 2025-05-27 PROCEDURE — 20610 DRAIN/INJ JOINT/BURSA W/O US: CPT | Mod: LT | Performed by: PHYSICIAN ASSISTANT

## 2025-05-27 PROCEDURE — 1125F AMNT PAIN NOTED PAIN PRSNT: CPT | Performed by: PHYSICIAN ASSISTANT

## 2025-05-27 PROCEDURE — 99213 OFFICE O/P EST LOW 20 MIN: CPT | Mod: 25 | Performed by: PHYSICIAN ASSISTANT

## 2025-05-27 RX ORDER — BUPIVACAINE HYDROCHLORIDE 5 MG/ML
3 INJECTION, SOLUTION PERINEURAL
Status: COMPLETED | OUTPATIENT
Start: 2025-05-27 | End: 2025-05-27

## 2025-05-27 RX ORDER — TRIAMCINOLONE ACETONIDE 40 MG/ML
40 INJECTION, SUSPENSION INTRA-ARTICULAR; INTRAMUSCULAR
Status: COMPLETED | OUTPATIENT
Start: 2025-05-27 | End: 2025-05-27

## 2025-05-27 RX ADMIN — BUPIVACAINE HYDROCHLORIDE 3 ML: 5 INJECTION, SOLUTION PERINEURAL at 11:58

## 2025-05-27 RX ADMIN — TRIAMCINOLONE ACETONIDE 40 MG: 40 INJECTION, SUSPENSION INTRA-ARTICULAR; INTRAMUSCULAR at 11:58

## 2025-05-27 ASSESSMENT — PAIN SCALES - GENERAL: PAINLEVEL_OUTOF10: SEVERE PAIN (8)

## 2025-05-27 NOTE — LETTER
5/27/2025      Komal Miller  [address]  [facility] [address]      Dear Colleague,    Thank you for referring your patient, Komal Miller, to the [facility]. Please see a copy of my visit note below.    ORTHOPEDIC CONSULT      Chief Complaint: Komal Miller is a 75 year old     She is being seen for   Chief Complaint   Patient presents with     Musculoskeletal Problem     Left hip     Consult         History of Present Illness:   Mechanism of Injury: no injury fall or trauma.  Location: lateral left hip  Duration of Pain: 1 month but much worse over the last 3 days  Rating of Pain: 9 out of 10.    Pain Quality: achy  Pain is better with: Rest and slightly with ibuprofen  Pain is worse with: Activity and ambulation.  Treatment so far consists of: Took her 's ibuprofen 600 mg just once a day with some relief and alternated with Tylenol with some relief with that.  She has taken half tablets of Norco at night the last 3 days and that has not helped.  Patient has not tried any topicals.  In the past she has had this pain and gotten a steroid injection in that seemed to cure the problem.   Associated Features: Denies numbness or tingling shooting burning or electric pain.  Denies any back pain or radicular symptoms.  Denies any pain in the groin  Prior history of related problems: Yes she has had left hip greater trochanteric bursitis in the past.  She also has a moderate joint space narrowing of the left hip  Pain is Limiting: Comfortable ambulation and activity  Here to: Orthopedic consultation   The Pain Has: gotten worse over the last 3 days  Additional History: Patient had right total hip arthroplasty by Dr. Chacko on 1/25/2024, which is doing well      Patient's past medical, surgical, social and family histories reviewed.     Past Medical History:   Diagnosis Date     Arthritis      Cancer (H)      Cerebral infarction (H)      History of blood transfusion      Unspecified  essential hypertension         Past Surgical History:   Procedure Laterality Date     ARTHROPLASTY HIP Right 1/25/2024    Procedure: ARTHROPLASTY, HIP, TOTAL RIGHT;  Surgeon: Apolinar Chacko MD;  Location: [facility]     COLONOSCOPY  06/10/08    Internal hemorrhoids, otherwise normal.     COLONOSCOPY  01/20/10     HC DILATION/CURETTAGE DIAG/THER NON OB      D & C     HC LAP, SURG ENTEROLYSIS  05/07/10     HC REMOVAL OF OVARY/TUBE(S)      Salpingo-Oophorectomy, bilateral     LAPAROSCOPY DIAGNOSTIC (GENERAL)  5/6/2014    Procedure: LAPAROSCOPY DIAGNOSTIC (GENERAL);  Surgeon: Seth Matthews MD;  Location: [facility]     LAPAROTOMY, LYSIS ADHESIONS, COMBINED  5/6/2014    Procedure: COMBINED LAPAROTOMY, LYSIS ADHESIONS;  Surgeon: Seth Matthews MD;  Location: [facility]     VIDEO CAPSULE ENDOSCOPY  02/15/10    normal sm intestine     ZZC APPENDECTOMY       ZZC LIGATE FALLOPIAN TUBE       ZZC TOTAL ABDOM HYSTERECTOMY      Hysterectomy, Total Abdominal     ZZC TOTAL KNEE ARTHROPLASTY Left      ZZHC UGI ENDOSCOPY, SIMPLE EXAM  01/20/10       Medications:  Current Outpatient Medications   Medication Sig Dispense Refill     amLODIPine (NORVASC) 10 MG tablet Take 1 tablet (10 mg) by mouth daily. 90 tablet 3     aspirin 81 MG EC tablet Take 81 mg by mouth daily       atorvastatin (LIPITOR) 40 MG tablet Take 1 tablet (40 mg) by mouth daily. 90 tablet 3     fluticasone-salmeterol (ADVAIR) 100-50 MCG/ACT inhaler Inhale 1 puff into the lungs every 12 hours. 1 each 3     losartan-hydrochlorothiazide (HYZAAR) 50-12.5 MG tablet Take 1 tablet by mouth daily. 90 tablet 3     multivitamin w/minerals (MULTI-VITAMIN) tablet Take 1 tablet by mouth daily       omeprazole (PRILOSEC) 40 MG DR capsule TAKE 1 CAPSULE EVERY DAY, 30 TO 60 MINUTES BEFORE A MEAL  (NEED APPOINTMENT FOR FURTHER REFILLS) 90 capsule 0     propranolol (INDERAL) 40 MG tablet Take 1 tablet (40 mg) by mouth 2 times daily. 180 tablet 3     SUMAtriptan (IMITREX) 25 MG tablet Take 1  "tablet (25 mg) by mouth at onset of headache for migraine May repeat in 2 hours. Max 8 tablets/24 hours. 8 tablet 1     vitamin D3 (CHOLECALCIFEROL) 250 mcg (57023 units) capsule Take 1 capsule (250 mcg) by mouth daily. 90 capsule 1     No current facility-administered medications for this visit.       Allergies   Allergen Reactions     No Known Drug Allergy        Social History     Occupational History     Occupation:  dispatcher/     Employer: [employer]     Comment: [employer]   Tobacco Use     Smoking status: Former     Current packs/day: 0.00     Average packs/day: 0.5 packs/day for 20.0 years (10.0 ttl pk-yrs)     Types: Cigarettes     Start date: 1975     Quit date: 1995     Years since quittin.4     Smokeless tobacco: Never   Vaping Use     Vaping status: Never Used   Substance and Sexual Activity     Alcohol use: Yes     Comment: very seldom     Drug use: No     Sexual activity: Not Currently     Partners: Male       Family History   Problem Relation Age of Onset     C.A.D. Mother         bi-pass     Diabetes Mother         late onset     Allergies Mother         xray dye     Arthritis Mother      Cancer Mother         ovary     Respiratory Mother      C.A.D. Father         bi-pass     Hypertension Brother      Breast Cancer Sister      Allergies Sister      Allergies Brother      Eye Disorder Maternal Aunt      Gastrointestinal Disease Brother      Gynecology Sister      Hypertension Sister      Cerebrovascular Disease Sister      Thyroid Disease Sister      Thyroid Disease Sister        REVIEW OF SYSTEMS  10 point review systems performed otherwise negative as noted as per history of present illness.    Physical Exam:  Vitals: Temp 97.9  F (36.6  C) (Temporal)   Ht 1.651 m (5' 5\")   Wt 120.2 kg (265 lb)   BMI 44.10 kg/m    BMI= Body mass index is 44.1 kg/m .    Constitutional: healthy, alert and no acute distress   Psychiatric: mentation " appears normal and affect normal/bright  NEURO: no focal deficits, CMS intact left lower extremity   RESP: Normal with easy respirations and no use of accessory muscles to breathe, no audible wheezing or retractions  CV: Calf soft and nontender to palpation, leg warm   SKIN: No erythema, rashes, excoriation, or breakdown. No evidence of infection.   MUSCULOSKELETAL:  INSPECTION of left hip: No gross deformities, erythema, edema, ecchymosis, atrophy or fasciculations.   PALPATION: Greater trochanteric tenderness.  No tenderness anywhere else on the lateral hip, or thigh or lower leg.    ROM: Passive: Flexion to 120 degrees, internal rotation to 25 degrees, external rotation to 60 degrees.  All range of motion without catching locking or pain.     STRENGTH: 5 out of 5 hip flexion, quad and hamstring without pain.   SPECIAL TEST: Negative Camacho's maneuver, negative FADIR maneuver, negative Scour maneuver, did not do Thiago's test.   SPECIAL TEST SPINE: Patient has no tenderness on palpation of cervical/thoracic/lumbar spine or SI joints. Negative straight leg raise to 90 . Negative Camacho's maneuver.  GAIT: Slight left antalgic  Lymph: no palpable lymph nodes    Diagnostic Modalities:  X-rays done today showing mild joint space narrowing in the left hip mostly inferior.  No cam deformity or pincer deformity.  Right total hip arthroplasty with good prosthesis placement no prosthesis failure and no loosening of the prosthesis noted.  No fracture dislocation or tumor.    Independent visualization of the images was performed.    Impression:  1. Left hip greater trochanteric bursitis    Plan:  All of the above pertinent physical exam and imaging modalities findings was reviewed with Komal.    Large Joint Injection/Arthocentesis: L greater trochanteric bursa    Date/Time: 5/27/2025 11:58 AM    Performed by: Apolinar Penny PA-C  Authorized by: Apolinar Penny PA-C    Indications:  Pain  Needle Size:  22 G  Needle Size  comment:  Spinal needle use  Guidance: landmark guided    Approach:  Lateral  Location:  Hip      Site:  L greater trochanteric bursa  Medications:  3 mL BUPivacaine 0.5 %; 40 mg triamcinolone 40 MG/ML  Aspirate amount (mL):  0  Procedure discussed: discussed risks, benefits, and alternatives    Consent Given by:  Patient  Timeout: timeout called immediately prior to procedure    Prep: patient was prepped and draped in usual sterile fashion     I had the patient lay in the lateral position. The skin was prepped with betadine.  I used faye chloride spray prior to doing the injection and I also palpated to find the area of most tenderness prior to doing injection. I moved the needle around to spread the medication around the area. The patient tolerated the injection well, and there were no complications. The injection site was covered with a Band-Aid.       FOCUSED PLAN:  1 month of lateral hip pain that got worse over the last 3 days and she has a history of greater trochanteric bursitis on that side and this feels the same she states with lateral tenderness on palpation on exam today.  We proceeded with a steroid injection in the greater trochanteric bursa which she has had in the past and got relief from.  We also ordered formal physical therapy to work on ultrasound and IT band stretching and iontophoresis.  She is educated she can take 600 of ibuprofen 3 times a day instead of once a day with food if needed.  She can follow-up on an as-needed basis.    Re-x-ray on return: No      This note was dictated with [employer].    Apolinar Penny PA-C      Again, thank you for allowing me to participate in the care of your patient.        Sincerely,        Apolinar Penny PA-C    Electronically signed

## 2025-05-27 NOTE — PROGRESS NOTES
ORTHOPEDIC CONSULT      Chief Complaint: Komal Miller is a 75 year old     She is being seen for   Chief Complaint   Patient presents with    Musculoskeletal Problem     Left hip    Consult         History of Present Illness:   Mechanism of Injury: no injury fall or trauma.  Location: lateral left hip  Duration of Pain: 1 month but much worse over the last 3 days  Rating of Pain: 9 out of 10.    Pain Quality: achy  Pain is better with: Rest and slightly with ibuprofen  Pain is worse with: Activity and ambulation.  Treatment so far consists of: Took her 's ibuprofen 600 mg just once a day with some relief and alternated with Tylenol with some relief with that.  She has taken half tablets of Norco at night the last 3 days and that has not helped.  Patient has not tried any topicals.  In the past she has had this pain and gotten a steroid injection in that seemed to cure the problem.   Associated Features: Denies numbness or tingling shooting burning or electric pain.  Denies any back pain or radicular symptoms.  Denies any pain in the groin  Prior history of related problems: Yes she has had left hip greater trochanteric bursitis in the past.  She also has a moderate joint space narrowing of the left hip  Pain is Limiting: Comfortable ambulation and activity  Here to: Orthopedic consultation   The Pain Has: gotten worse over the last 3 days  Additional History: Patient had right total hip arthroplasty by Dr. Chacko on 1/25/2024, which is doing well      Patient's past medical, surgical, social and family histories reviewed.     Past Medical History:   Diagnosis Date    Arthritis     Cancer (H)     Cerebral infarction (H)     History of blood transfusion     Unspecified essential hypertension         Past Surgical History:   Procedure Laterality Date    ARTHROPLASTY HIP Right 1/25/2024    Procedure: ARTHROPLASTY, HIP, TOTAL RIGHT;  Surgeon: Apolinar Chacko MD;  [facility]    COLONOSCOPY  06/10/08     Internal hemorrhoids, otherwise normal.    COLONOSCOPY  01/20/10    HC DILATION/CURETTAGE DIAG/THER NON OB      D & C    HC LAP, SURG ENTEROLYSIS  05/07/10    HC REMOVAL OF OVARY/TUBE(S)      Salpingo-Oophorectomy, bilateral    LAPAROSCOPY DIAGNOSTIC (GENERAL)  5/6/2014    Procedure: LAPAROSCOPY DIAGNOSTIC (GENERAL);  Surgeon: Seth Matthews MD;  [facility]    LAPAROTOMY, LYSIS ADHESIONS, COMBINED  5/6/2014    Procedure: COMBINED LAPAROTOMY, LYSIS ADHESIONS;  Surgeon: Seth Matthews MD;  Location[facility]    VIDEO CAPSULE ENDOSCOPY  02/15/10    normal sm intestine    ZZC APPENDECTOMY      ZZC LIGATE FALLOPIAN TUBE      ZZC TOTAL ABDOM HYSTERECTOMY      Hysterectomy, Total Abdominal    ZZC TOTAL KNEE ARTHROPLASTY Left     ZZHC UGI ENDOSCOPY, SIMPLE EXAM  01/20/10       Medications:  Current Outpatient Medications   Medication Sig Dispense Refill    amLODIPine (NORVASC) 10 MG tablet Take 1 tablet (10 mg) by mouth daily. 90 tablet 3    aspirin 81 MG EC tablet Take 81 mg by mouth daily      atorvastatin (LIPITOR) 40 MG tablet Take 1 tablet (40 mg) by mouth daily. 90 tablet 3    fluticasone-salmeterol (ADVAIR) 100-50 MCG/ACT inhaler Inhale 1 puff into the lungs every 12 hours. 1 each 3    losartan-hydrochlorothiazide (HYZAAR) 50-12.5 MG tablet Take 1 tablet by mouth daily. 90 tablet 3    multivitamin w/minerals (MULTI-VITAMIN) tablet Take 1 tablet by mouth daily      omeprazole (PRILOSEC) 40 MG DR capsule TAKE 1 CAPSULE EVERY DAY, 30 TO 60 MINUTES BEFORE A MEAL  (NEED APPOINTMENT FOR FURTHER REFILLS) 90 capsule 0    propranolol (INDERAL) 40 MG tablet Take 1 tablet (40 mg) by mouth 2 times daily. 180 tablet 3    SUMAtriptan (IMITREX) 25 MG tablet Take 1 tablet (25 mg) by mouth at onset of headache for migraine May repeat in 2 hours. Max 8 tablets/24 hours. 8 tablet 1    vitamin D3 (CHOLECALCIFEROL) 250 mcg (29515 units) capsule Take 1 capsule (250 mcg) by mouth daily. 90 capsule 1     No current  "facility-administered medications for this visit.       Allergies   Allergen Reactions    No Known Drug Allergy        Social History     Occupational History    Occupation:  dispatcher/     Employer: [employer]     Comment: [employer]   Tobacco Use    Smoking status: Former     Current packs/day: 0.00     Average packs/day: 0.5 packs/day for 20.0 years (10.0 ttl pk-yrs)     Types: Cigarettes     Start date: 1975     Quit date: 1995     Years since quittin.4    Smokeless tobacco: Never   Vaping Use    Vaping status: Never Used   Substance and Sexual Activity    Alcohol use: Yes     Comment: very seldom    Drug use: No    Sexual activity: Not Currently     Partners: Male       Family History   Problem Relation Age of Onset    C.A.D. Mother         bi-pass    Diabetes Mother         late onset    Allergies Mother         xray dye    Arthritis Mother     Cancer Mother         ovary    Respiratory Mother     C.A.D. Father         bi-pass    Hypertension Brother     Breast Cancer Sister     Allergies Sister     Allergies Brother     Eye Disorder Maternal Aunt     Gastrointestinal Disease Brother     Gynecology Sister     Hypertension Sister     Cerebrovascular Disease Sister     Thyroid Disease Sister     Thyroid Disease Sister        REVIEW OF SYSTEMS  10 point review systems performed otherwise negative as noted as per history of present illness.    Physical Exam:  Vitals: Temp 97.9  F (36.6  C) (Temporal)   Ht 1.651 m (5' 5\")   Wt 120.2 kg (265 lb)   BMI 44.10 kg/m    BMI= Body mass index is 44.1 kg/m .    Constitutional: healthy, alert and no acute distress   Psychiatric: mentation appears normal and affect normal/bright  NEURO: no focal deficits, CMS intact left lower extremity   RESP: Normal with easy respirations and no use of accessory muscles to breathe, no audible wheezing or retractions  CV: Calf soft and nontender to palpation, leg warm "   SKIN: No erythema, rashes, excoriation, or breakdown. No evidence of infection.   MUSCULOSKELETAL:  INSPECTION of left hip: No gross deformities, erythema, edema, ecchymosis, atrophy or fasciculations.   PALPATION: Greater trochanteric tenderness.  No tenderness anywhere else on the lateral hip, or thigh or lower leg.    ROM: Passive: Flexion to 120 degrees, internal rotation to 25 degrees, external rotation to 60 degrees.  All range of motion without catching locking or pain.     STRENGTH: 5 out of 5 hip flexion, quad and hamstring without pain.   SPECIAL TEST: Negative Camacho's maneuver, negative FADIR maneuver, negative Scour maneuver, did not do Thiago's test.   SPECIAL TEST SPINE: Patient has no tenderness on palpation of cervical/thoracic/lumbar spine or SI joints. Negative straight leg raise to 90 . Negative Camacho's maneuver.  GAIT: Slight left antalgic  Lymph: no palpable lymph nodes    Diagnostic Modalities:  X-rays done today showing mild joint space narrowing in the left hip mostly inferior.  No cam deformity or pincer deformity.  Right total hip arthroplasty with good prosthesis placement no prosthesis failure and no loosening of the prosthesis noted.  No fracture dislocation or tumor.    Independent visualization of the images was performed.    Impression:  1. Left hip greater trochanteric bursitis    Plan:  All of the above pertinent physical exam and imaging modalities findings was reviewed with Komal.    Large Joint Injection/Arthocentesis: L greater trochanteric bursa    Date/Time: 5/27/2025 11:58 AM    Performed by: Apolinar Penny PA-C  Authorized by: Apolinar Penny PA-C    Indications:  Pain  Needle Size:  22 G  Needle Size comment:  Spinal needle use  Guidance: landmark guided    Approach:  Lateral  Location:  Hip      Site:  L greater trochanteric bursa  Medications:  3 mL BUPivacaine 0.5 %; 40 mg triamcinolone 40 MG/ML  Aspirate amount (mL):  0  Procedure discussed: discussed risks,  benefits, and alternatives    Consent Given by:  Patient  Timeout: timeout called immediately prior to procedure    Prep: patient was prepped and draped in usual sterile fashion     I had the patient lay in the lateral position. The skin was prepped with betadine.  I used faye chloride spray prior to doing the injection and I also palpated to find the area of most tenderness prior to doing injection. I moved the needle around to spread the medication around the area. The patient tolerated the injection well, and there were no complications. The injection site was covered with a Band-Aid.       FOCUSED PLAN:  1 month of lateral hip pain that got worse over the last 3 days and she has a history of greater trochanteric bursitis on that side and this feels the same she states with lateral tenderness on palpation on exam today.  We proceeded with a steroid injection in the greater trochanteric bursa which she has had in the past and got relief from.  We also ordered formal physical therapy to work on ultrasound and IT band stretching and iontophoresis.  She is educated she can take 600 of ibuprofen 3 times a day instead of once a day with food if needed.  She can follow-up on an as-needed basis.    Re-x-ray on return: No      This note was dictated with [employer].    Apolinar Penny PA-C

## 2025-06-02 ENCOUNTER — THERAPY VISIT (OUTPATIENT)
Dept: PHYSICAL THERAPY | Facility: CLINIC | Age: 75
End: 2025-06-02
Attending: PHYSICIAN ASSISTANT
Payer: COMMERCIAL

## 2025-06-02 DIAGNOSIS — M70.62 GREATER TROCHANTERIC BURSITIS OF LEFT HIP: ICD-10-CM

## 2025-06-02 PROCEDURE — 97110 THERAPEUTIC EXERCISES: CPT | Mod: GP | Performed by: PHYSICAL THERAPIST

## 2025-06-02 PROCEDURE — 97161 PT EVAL LOW COMPLEX 20 MIN: CPT | Mod: GP | Performed by: PHYSICAL THERAPIST

## 2025-06-02 ASSESSMENT — ACTIVITIES OF DAILY LIVING (ADL)
LIGHT_TO_MODERATE_WORK: SLIGHT DIFFICULTY
HOS_ADL_ITEM_SCORE_TOTAL: 43
GETTING_INTO_AND_OUT_OF_AN_AVERAGE_CAR: NO DIFFICULTY AT ALL
SPORTS_HIGHEST_POTENTIAL_SCORE: 28
STANDING_FOR_15_MINUTES: SLIGHT DIFFICULTY
LOW_IMPACT_ACTIVITIES_LIKE_FAST_WALKING: MODERATE DIFFICULTY
STANDING FOR 15 MINUTES: SLIGHT DIFFICULTY
WALKING_15_MINUTES_OR_GREATER: MODERATE DIFFICULTY
SITTING FOR 15 MINUTES: NO DIFFICULTY AT ALL
HOW_WOULD_YOU_RATE_YOUR_CURRENT_LEVEL_OF_FUNCTION?: NEARLY NORMAL
HOS_ADL_HIGHEST_POTENTIAL_SCORE: 64
SPORTS_COUNT: 7
PUTTING_ON_SOCKS_AND_SHOES: NO DIFFICULTY AT ALL
WALKING_15_MINUTES_OR_GREATER: MODERATE DIFFICULTY
SPORTS_TOTAL_ITEM_SCORE: INCOMPLETE
STEPPING_UP_AND_DOWN_CURBS: SLIGHT DIFFICULTY
RECREATIONAL_ACTIVITIES: SLIGHT DIFFICULTY
DEEP_SQUATTING: UNABLE TO DO
WALKING_INITIALLY: NO DIFFICULTY AT ALL
STARTING_AND_STOPPING_QUICKLY: UNABLE TO DO
ROLLING OVER IN BED: NO DIFFICULTY AT ALL
WALKING_APPROXIMATELY_10_MINUTES: MODERATE DIFFICULTY
SPORTS_SCORE(%): INCOMPLETE
HOS_ADL_COUNT: 16
WALKING_DOWN_STEEP_HILLS: MODERATE DIFFICULTY
SITTING_FOR_15_MINUTES: NO DIFFICULTY AT ALL
GOING_UP_1_FLIGHT_OF_STAIRS: SLIGHT DIFFICULTY
ADL_SCORE(%): INCOMPLETE
WALKING_FOR_APPROXIMATELY_10_MINUTES: MODERATE DIFFICULTY
ABILITY_TO_PERFORM_ACTIVITY_WITH_YOUR_NORMAL_TECHNIQUE: UNABLE TO DO
DEEP SQUATTING: UNABLE TO DO
GOING DOWN 1 FLIGHT OF STAIRS: MODERATE DIFFICULTY
HEAVY_WORK: SLIGHT DIFFICULTY
WALKING_UP_STEEP_HILLS: MODERATE DIFFICULTY
WALKING_DOWN_STEEP_HILLS: MODERATE DIFFICULTY
RECREATIONAL ACTIVITIES: SLIGHT DIFFICULTY
JUMPING: UNABLE TO DO
WALKING_INITIALLY: NO DIFFICULTY AT ALL
ROLLING_OVER_IN_BED: NO DIFFICULTY AT ALL
ADL_COUNT: 13
PLEASE_INDICATE_YOR_PRIMARY_REASON_FOR_REFERRAL_TO_THERAPY:: HIP
RUNNING_ONE_MILE: UNABLE TO DO
PUTTING ON SOCKS AND SHOES: NO DIFFICULTY AT ALL
CUTTING/LATERAL_MOVEMENTS: UNABLE TO DO
TWISTING/PIVOTING_ON_INVOLVED_LEG: SLIGHT DIFFICULTY
ADL_TOTAL_ITEM_SCORE: INCOMPLETE
WALKING_UP_STEEP_HILLS: MODERATE DIFFICULTY
GOING_DOWN_1_FLIGHT_OF_STAIRS: MODERATE DIFFICULTY
LIGHT_TO_MODERATE_WORK: SLIGHT DIFFICULTY
HEAVY_WORK: SLIGHT DIFFICULTY
ADL_HIGHEST_POTENTIAL_SCORE: 52
SWINGING_OBJECTS_LIKE_A_GOLF_CLUB: UNABLE TO DO
TWISTING/PIVOTING ON INVOLVED LEG: SLIGHT DIFFICULTY
HOS_ADL_SCORE(%): 67.19

## 2025-06-02 NOTE — PROGRESS NOTES
PHYSICAL THERAPY EVALUATION  Type of Visit: Evaluation       Fall Risk Screen:  Have you fallen 2 or more times in the past year?: No  Have you fallen and had an injury in the past year?: No    Subjective         Presenting condition or subjective complaint: bursitis n left hip  Pt is a 74 yo female familiar to this PT who presents to PT with L hip pain, starting a couple weeks ago and reaching greatest pain around Memorial Day weekend. Pt was able to get into an appt with orthopedics recently and received a steroid injection and is reporting significant relief. Pt was previously tx for this problem in late 2022 with fair results, discontinuing after only 4 visits.  Date of onset: 05/27/25    Relevant medical history: Arthritis   Past Medical History:   Diagnosis Date    Arthritis     Cancer (H)     Cerebral infarction (H)     History of blood transfusion     Unspecified essential hypertension        Dates & types of surgery:     Past Surgical History:   Procedure Laterality Date    ARTHROPLASTY HIP Right 1/25/2024    Procedure: ARTHROPLASTY, HIP, TOTAL RIGHT;  Surgeon: Apolinar Chacko MD;  Location: [facility]    COLONOSCOPY  06/10/08    Internal hemorrhoids, otherwise normal.    COLONOSCOPY  01/20/10    HC DILATION/CURETTAGE DIAG/THER NON OB      D & C    HC LAP, SURG ENTEROLYSIS  05/07/10    HC REMOVAL OF OVARY/TUBE(S)      Salpingo-Oophorectomy, bilateral    LAPAROSCOPY DIAGNOSTIC (GENERAL)  5/6/2014    Procedure: LAPAROSCOPY DIAGNOSTIC (GENERAL);  Surgeon: Seth Matthews MD;  Location[facility]    LAPAROTOMY, LYSIS ADHESIONS, COMBINED  5/6/2014    Procedure: COMBINED LAPAROTOMY, LYSIS ADHESIONS;  Surgeon: Seth Matthews MD;  Location: [facility]    VIDEO CAPSULE ENDOSCOPY  02/15/10    normal sm intestine    ZZC APPENDECTOMY      ZZC LIGATE FALLOPIAN TUBE      ZZC TOTAL ABDOM HYSTERECTOMY      Hysterectomy, Total Abdominal    ZZC TOTAL KNEE ARTHROPLASTY Left     ZZHC UGI ENDOSCOPY, SIMPLE EXAM  01/20/10          Prior diagnostic imaging/testing results:       Prior therapy history for the same diagnosis, illness or injury: Yes      Prior Level of Function  Transfers:   Ambulation:   ADL:   IADL:     Living Environment  Social support: With a significant other or spouse   Type of home: Multi-level   Stairs to enter the home: Yes 8 Is there a railing: Yes     Ramp: No   Stairs inside the home: Yes 8     Help at home: None  Equipment owned:       Employment: No    Hobbies/Interests: camping    Patient goals for therapy: it is gone now    Pain assessment: Pain present     Objective   HIP EVALUATION  PAIN: Pain Level at Rest: 0/10  Pain Level with Use: 0/10  INTEGUMENTARY (edema, incisions):   POSTURE:   GAIT:   Weightbearing Status: WBAT  Assistive Device(s): None  Gait Deviations: WFL  BALANCE/PROPRIOCEPTION:   WEIGHTBEARING ALIGNMENT:   NON-WEIGHTBEARING ALIGNMENT:    ROM:     PELVIC/SI SCREEN:   STRENGTH:   LE FLEXIBILITY:   SPECIAL TESTS:   FUNCTIONAL TESTS:   PALPATION:   JOINT MOBILITY:     Assessment & Plan   CLINICAL IMPRESSIONS  Medical Diagnosis: Greater trochanteric bursitis of left hip (M70.62)    Treatment Diagnosis: L hip pain, weakness, impaired mobility   Impression/Assessment: Pt presents to PT with no pain complaints, but has weakness in B LE contributing to poor balance and difficulty in mobility. Pt would benefit from skilled PT interventions in order to address her weakness to prevent future recurrence of her hip pain. Pt currently planning only several visits to obtain appropriate HEP.    Clinical Decision Making (Complexity):  Clinical Presentation: Stable/Uncomplicated  Clinical Presentation Rationale: based on medical and personal factors listed in PT evaluation  Clinical Decision Making (Complexity): Low complexity    PLAN OF CARE  Treatment Interventions:  Interventions: Gait Training, Manual Therapy, Neuromuscular Re-education, Therapeutic Activity, Therapeutic Exercise    Long Term Goals      PT Goal 1  Goal Identifier: HEP  Goal Description: Pt will demo independence in performance and progression of strengthening and balance HEP in order to improve CLOF.  Target Date:  (Ongoing, updates as needed)  PT Goal 2  Goal Identifier: HOS  Goal Description: Pt will demo improved hip function and pain as shown by increased HOS score of at least 15 points in order to show significant improvement and greater return to previous function.  Goal Progress: 43/64 (eval)  Target Date: 06/30/25      Frequency of Treatment: 1x/week  Duration of Treatment: 8 weeks    Recommended Referrals to Other Professionals:   Education Assessment:   Learner/Method: Patient;Listening;Demonstration    Risks and benefits of evaluation/treatment have been explained.   Patient/Family/caregiver agrees with Plan of Care.     Evaluation Time:     PT Eval, Low Complexity Minutes (11034): 10       Signing Clinician: ETHAN [facility]                                                                                   OUTPATIENT PHYSICAL THERAPY      PLAN OF TREATMENT FOR OUTPATIENT REHABILITATION   Patient's Last Name, First Name, GUILLERMINA GoodwinKomal mujica YOB: 1950   Provider's Name   [facility]   Medical Record No.  [id number]     Onset Date: 05/27/25  Start of Care Date: 06/02/25     Medical Diagnosis:  Greater trochanteric bursitis of left hip (M70.62)      PT Treatment Diagnosis:  L hip pain, weakness, impaired mobility Plan of Treatment  Frequency/Duration: 1x/week/ 8 weeks    Certification date from 06/02/25 to 07/28/25         See note for plan of treatment details and functional goals     Bassam Thompson, PT                         I CERTIFY THE NEED FOR THESE SERVICES FURNISHED UNDER        THIS PLAN OF TREATMENT AND WHILE UNDER MY CARE     (Physician attestation of this document indicates review and certification of the therapy plan).               Referring Provider:  Apolinar Penny PA-C    Initial Assessment  See Epic Evaluation- Start of Care Date: 06/02/25

## 2025-06-11 ENCOUNTER — THERAPY VISIT (OUTPATIENT)
Dept: PHYSICAL THERAPY | Facility: CLINIC | Age: 75
End: 2025-06-11
Attending: PHYSICIAN ASSISTANT
Payer: COMMERCIAL

## 2025-06-11 DIAGNOSIS — M70.62 GREATER TROCHANTERIC BURSITIS OF LEFT HIP: Primary | ICD-10-CM

## 2025-06-11 PROCEDURE — 97110 THERAPEUTIC EXERCISES: CPT | Mod: GP | Performed by: PHYSICAL THERAPIST

## 2025-07-07 ENCOUNTER — OFFICE VISIT (OUTPATIENT)
Dept: INTERNAL MEDICINE | Facility: CLINIC | Age: 75
End: 2025-07-07
Payer: COMMERCIAL

## 2025-07-07 VITALS
OXYGEN SATURATION: 96 % | DIASTOLIC BLOOD PRESSURE: 86 MMHG | HEIGHT: 65 IN | WEIGHT: 268 LBS | HEART RATE: 63 BPM | RESPIRATION RATE: 16 BRPM | BODY MASS INDEX: 44.65 KG/M2 | SYSTOLIC BLOOD PRESSURE: 154 MMHG | TEMPERATURE: 97 F

## 2025-07-07 DIAGNOSIS — I10 HYPERTENSION GOAL BP (BLOOD PRESSURE) < 140/90: ICD-10-CM

## 2025-07-07 DIAGNOSIS — E66.01 MORBID OBESITY DUE TO EXCESS CALORIES (H): ICD-10-CM

## 2025-07-07 DIAGNOSIS — K21.00 GASTROESOPHAGEAL REFLUX DISEASE WITH ESOPHAGITIS WITHOUT HEMORRHAGE: ICD-10-CM

## 2025-07-07 DIAGNOSIS — Z01.818 PRE-OP EXAM: Primary | ICD-10-CM

## 2025-07-07 DIAGNOSIS — E78.5 HYPERLIPIDEMIA LDL GOAL <130: ICD-10-CM

## 2025-07-07 DIAGNOSIS — I69.359 CVA, OLD, HEMIPARESIS (H): ICD-10-CM

## 2025-07-07 DIAGNOSIS — E55.9 HYPOVITAMINOSIS D: ICD-10-CM

## 2025-07-07 DIAGNOSIS — H25.9 AGE-RELATED CATARACT OF BOTH EYES, UNSPECIFIED AGE-RELATED CATARACT TYPE: ICD-10-CM

## 2025-07-07 DIAGNOSIS — J45.20 MILD INTERMITTENT ASTHMA WITHOUT COMPLICATION: ICD-10-CM

## 2025-07-07 PROCEDURE — 3079F DIAST BP 80-89 MM HG: CPT | Performed by: INTERNAL MEDICINE

## 2025-07-07 PROCEDURE — 1126F AMNT PAIN NOTED NONE PRSNT: CPT | Performed by: INTERNAL MEDICINE

## 2025-07-07 PROCEDURE — 99214 OFFICE O/P EST MOD 30 MIN: CPT | Performed by: INTERNAL MEDICINE

## 2025-07-07 PROCEDURE — 3077F SYST BP >= 140 MM HG: CPT | Performed by: INTERNAL MEDICINE

## 2025-07-07 ASSESSMENT — ASTHMA QUESTIONNAIRES
QUESTION_4 LAST FOUR WEEKS HOW OFTEN HAVE YOU USED YOUR RESCUE INHALER OR NEBULIZER MEDICATION (SUCH AS ALBUTEROL): NOT AT ALL
QUESTION_1 LAST FOUR WEEKS HOW MUCH OF THE TIME DID YOUR ASTHMA KEEP YOU FROM GETTING AS MUCH DONE AT WORK, SCHOOL OR AT HOME: NONE OF THE TIME
QUESTION_2 LAST FOUR WEEKS HOW OFTEN HAVE YOU HAD SHORTNESS OF BREATH: NOT AT ALL
QUESTION_5 LAST FOUR WEEKS HOW WOULD YOU RATE YOUR ASTHMA CONTROL: COMPLETELY CONTROLLED
ACT_TOTALSCORE: 25
QUESTION_3 LAST FOUR WEEKS HOW OFTEN DID YOUR ASTHMA SYMPTOMS (WHEEZING, COUGHING, SHORTNESS OF BREATH, CHEST TIGHTNESS OR PAIN) WAKE YOU UP AT NIGHT OR EARLIER THAN USUAL IN THE MORNING: NOT AT ALL

## 2025-07-07 ASSESSMENT — PAIN SCALES - GENERAL: PAINLEVEL_OUTOF10: NO PAIN (0)

## 2025-07-07 NOTE — PROGRESS NOTES
Preoperative Evaluation  [address] [address]  Phone: [phone]  Primary Provider: Wesly Lehman DO  Pre-op Performing Provider: Wesly Lehman DO  Jul 7, 2025 7/7/2025   Surgical Information   What procedure is being done? caterak   Facility or Hospital where procedure/surgery will be performed: [facility]   Who is doing the procedure / surgery? dejuan   Date of surgery / procedure: 071725   Time of surgery / procedure: 1230   Where do you plan to recover after surgery? at home with family     Fax number for surgical facility: Note does not need to be faxed, will be available electronically in Epic.    Assessment & Plan     The proposed surgical procedure is considered LOW risk.    Pre-op exam      Age-related cataract of both eyes, unspecified age-related cataract type  Bilateral cataracts which are now causing significant decrease in the patient's vision and subsequent ability maintain her normal quality of life.    Hypertension goal BP (blood pressure) < 140/90  Controlled on current medication.  Patient instructed to hold her losartan the morning of the procedure.    Hyperlipidemia LDL goal <130  Controlled with statin therapy and diet.  Continue    CVA, old, hemiparesis (H)  No significant sequela I.  Minimal imperceptible weakness.    Morbid obesity due to excess calories (H)  Recommend weight loss responsible caloric restriction and exercise as tolerated    Mild intermittent asthma without complication  Very modest.  Recommend she continues her Advair right up to surgery    Gastroesophageal reflux disease with esophagitis without hemorrhage  Recommend continued elevation of the head of the bed and use of omeprazole.    Hypovitaminosis D  Continue vitamin D supplement      Possible Sleep Apnea: Based on the patient's history and body habitus, she is at increased risk for obstructive sleep apnea.  Please do not  leave unattended in the supine position.         - No identified additional risk factors other than previously addressed    Antiplatelet or Anticoagulation Medication Instructions   - aspirin: Bleeding risk is low for this procedure and daily aspirin may be continued without modification.     Additional Medication Instructions  Patient will hold the losartan the morning of the procedure.    Recommendation  Approval given to proceed with proposed procedure, without further diagnostic evaluation.        Khloe Diaz is a 75 year old, presenting for the following:  Pre-Op Exam ([facility], [place], 7/17/25 7/31/25)          7/7/2025     9:44 AM   Additional Questions   Roomed by Nola GLASER: Bilateral cataracts with substantial compromise in her ability to maintain her normal activities of daily living due to visual limitations          7/7/2025   Pre-Op Questionnaire   Have you ever had a heart attack or stroke? (!) YES    Have you ever had surgery on your heart or blood vessels, such as a stent placement, a coronary artery bypass, or surgery on an artery in your head, neck, heart, or legs? No   Do you have chest pain with activity? No   Do you have a history of heart failure? No   Do you currently have a cold, bronchitis or symptoms of other infection? No   Do you have a cough, shortness of breath, or wheezing? No   Do you or anyone in your family have previous history of blood clots? No   Do you or does anyone in your family have a serious bleeding problem such as prolonged bleeding following surgeries or cuts? No   Have you ever had problems with anemia or been told to take iron pills? (!) YES    Have you had any abnormal blood loss such as black, tarry or bloody stools, or abnormal vaginal bleeding? (!) YES    Have you ever had a blood transfusion? (!) YES   Have you ever had a transfusion reaction? No   Are you willing to have a blood transfusion if it is medically needed before, during, or after your  surgery? Yes   Have you or any of your relatives ever had problems with anesthesia? No   Do you have sleep apnea, excessive snoring or daytime drowsiness? (!) UNKNOWN   Do you have any artifical heart valves or other implanted medical devices like a pacemaker, defibrillator, or continuous glucose monitor? (!) UNKNOWN   Do you have artificial joints? (!) YES   Are you allergic to latex? No     Advance Care Planning    Discussed advance care planning with patient; however, patient declined at this time.    Preoperative Review of    reviewed - no record of controlled substances prescribed.          Patient Active Problem List    Diagnosis Date Noted    Greater trochanteric bursitis of left hip 06/02/2025     Priority: Medium    S/P total right hip arthroplasty 01/25/2024     Priority: Medium    Trochanteric bursitis of right hip 06/23/2022     Priority: Medium    Primary osteoarthritis of right hip 06/23/2022     Priority: Medium    CVA, old, hemiparesis (H) 05/03/2022     Priority: Medium    Morbid obesity due to excess calories (H) 07/26/2017     Priority: Medium    Cluster headache, not intractable, unspecified chronicity pattern 05/08/2017     Priority: Medium    Gastroesophageal reflux disease with esophagitis 10/02/2015     Priority: Medium    Intestinal adhesions 05/06/2014     Priority: Medium    Cervical adenopathy 04/29/2014     Priority: Medium    Small bowel obstruction (H) 04/28/2014     Priority: Medium    DVT prophylaxis 04/28/2014     Priority: Medium    Hypertension goal BP (blood pressure) < 140/90 11/15/2011     Priority: Medium    Hyperlipidemia LDL goal <130 04/26/2011     Priority: Medium    Recurring abdominal pain 12/21/2009     Priority: Medium    Esophageal reflux 04/18/2006     Priority: Medium    Absence of menstruation 11/04/2005     Priority: Medium      Past Medical History:   Diagnosis Date    Arthritis     Cancer (H)     Cerebral infarction (H)     History of blood transfusion      Unspecified essential hypertension      Past Surgical History:   Procedure Laterality Date    ARTHROPLASTY HIP Right 1/25/2024    Procedure: ARTHROPLASTY, HIP, TOTAL RIGHT;  Surgeon: Apolinar Chacko MD;  [facility]    COLONOSCOPY  06/10/08    Internal hemorrhoids, otherwise normal.    COLONOSCOPY  01/20/10    HC DILATION/CURETTAGE DIAG/THER NON OB      D & C    HC LAP, SURG ENTEROLYSIS  05/07/10    HC REMOVAL OF OVARY/TUBE(S)      Salpingo-Oophorectomy, bilateral    LAPAROSCOPY DIAGNOSTIC (GENERAL)  5/6/2014    Procedure: LAPAROSCOPY DIAGNOSTIC (GENERAL);  Surgeon: Seth Matthews MD;  [facility]    LAPAROTOMY, LYSIS ADHESIONS, COMBINED  5/6/2014    Procedure: COMBINED LAPAROTOMY, LYSIS ADHESIONS;  Surgeon: Seth Matthews MD;  [facility]    VIDEO CAPSULE ENDOSCOPY  02/15/10    normal sm intestine    ZZC APPENDECTOMY      ZZC LIGATE FALLOPIAN TUBE      ZZC TOTAL ABDOM HYSTERECTOMY      Hysterectomy, Total Abdominal    ZZC TOTAL KNEE ARTHROPLASTY Left     ZZHC UGI ENDOSCOPY, SIMPLE EXAM  01/20/10     Current Outpatient Medications   Medication Sig Dispense Refill    amLODIPine (NORVASC) 10 MG tablet Take 1 tablet (10 mg) by mouth daily. 90 tablet 3    aspirin 81 MG EC tablet Take 81 mg by mouth daily      atorvastatin (LIPITOR) 40 MG tablet Take 1 tablet (40 mg) by mouth daily. 90 tablet 3    fluticasone-salmeterol (ADVAIR) 100-50 MCG/ACT inhaler Inhale 1 puff into the lungs every 12 hours. 1 each 3    losartan-hydrochlorothiazide (HYZAAR) 50-12.5 MG tablet Take 1 tablet by mouth daily. 90 tablet 3    multivitamin w/minerals (MULTI-VITAMIN) tablet Take 1 tablet by mouth daily      omeprazole (PRILOSEC) 40 MG DR capsule TAKE 1 CAPSULE EVERY DAY, 30 TO 60 MINUTES BEFORE A MEAL  (NEED APPOINTMENT FOR FURTHER REFILLS) 90 capsule 0    propranolol (INDERAL) 40 MG tablet Take 1 tablet (40 mg) by mouth 2 times daily. 180 tablet 3    SUMAtriptan (IMITREX) 25 MG tablet Take 1 tablet (25 mg) by mouth at  onset of headache for migraine May repeat in 2 hours. Max 8 tablets/24 hours. 8 tablet 1    vitamin D3 (CHOLECALCIFEROL) 250 mcg (93079 units) capsule Take 1 capsule (250 mcg) by mouth daily. 90 capsule 1       Allergies   Allergen Reactions    No Known Drug Allergy         Social History     Tobacco Use    Smoking status: Former     Current packs/day: 0.00     Average packs/day: 0.5 packs/day for 20.0 years (10.0 ttl pk-yrs)     Types: Cigarettes     Start date: 1975     Quit date: 1995     Years since quittin.5    Smokeless tobacco: Never   Substance Use Topics    Alcohol use: Yes     Comment: very seldom     Family History   Problem Relation Age of Onset    C.A.D. Mother         bi-pass    Diabetes Mother         late onset    Allergies Mother         xray dye    Arthritis Mother     Cancer Mother         ovary    Respiratory Mother     C.A.D. Father         bi-pass    Hypertension Brother     Breast Cancer Sister     Allergies Sister     Allergies Brother     Eye Disorder Maternal Aunt     Gastrointestinal Disease Brother     Gynecology Sister     Hypertension Sister     Cerebrovascular Disease Sister     Thyroid Disease Sister     Thyroid Disease Sister      History   Drug Use No             Review of Systems  CONSTITUTIONAL: NEGATIVE for fever, chills, change in weight  INTEGUMENTARY/SKIN: NEGATIVE for worrisome rashes, moles or lesions  EYES: Decreased visual acuity due to the presence of bilateral cataracts.  ENT/MOUTH: NEGATIVE for ear, mouth and throat problems  RESP: Occasional mild dyspnea.  Patient using Advair regularly with substantial relief   BREAST: NEGATIVE for masses, tenderness or discharge  CV: NEGATIVE for chest pain, palpitations or peripheral edema  GI: NEGATIVE for nausea, abdominal pain, heartburn, or change in bowel habits  : NEGATIVE for frequency, dysuria, or hematuria  MUSCULOSKELETAL: NEGATIVE for significant arthralgias or myalgia  NEURO: NEGATIVE for weakness,  "dizziness or paresthesias  ENDOCRINE: NEGATIVE for temperature intolerance, skin/hair changes  HEME: NEGATIVE for bleeding problems  PSYCHIATRIC: NEGATIVE for changes in mood or affect    Objective    BP (!) 154/86   Pulse 63   Temp 97  F (36.1  C) (Temporal)   Resp 16   Ht 1.651 m (5' 5\")   Wt 121.6 kg (268 lb)   SpO2 96%   BMI 44.60 kg/m     Estimated body mass index is 44.6 kg/m  as calculated from the following:    Height as of this encounter: 1.651 m (5' 5\").    Weight as of this encounter: 121.6 kg (268 lb).  Physical Exam  GENERAL: alert, no distress, and obese  EYES: After examination is deferred to the expertise of the patient's ophthalmologist.  HENT: ear canals and TM's normal, nose and mouth without ulcers or lesions  NECK: no adenopathy, no asymmetry, masses, or scars  RESP: lungs clear to auscultation - no rales, rhonchi or wheezes  CV: regular rate and rhythm, normal S1 S2, no S3 or S4, no murmur, click or rub, no peripheral edema  ABDOMEN: soft, nontender, no hepatosplenomegaly, no masses and bowel sounds normal  MS: no gross musculoskeletal defects noted, no edema  SKIN: no suspicious lesions or rashes  NEURO: Normal strength and tone, mentation intact and speech normal  PSYCH: mentation appears normal, affect normal/bright    Recent Labs   Lab Test 04/03/25  0816      POTASSIUM 4.0   CR 0.72        Diagnostics  No labs were ordered during this visit.  Labs pending at this time.  Results will be reviewed when available.   No EKG required for low risk surgery (cataract, skin procedure, breast biopsy, etc).    Revised Cardiac Risk Index (RCRI)  The patient has the following serious cardiovascular risks for perioperative complications:   - No serious cardiac risks = 0 points     RCRI Interpretation: 0 points: Class I (very low risk - 0.4% complication rate)         Signed Electronically by: Wesly Lehman DO  A copy of this evaluation report is provided to the requesting " physician.

## 2025-07-17 ENCOUNTER — ANESTHESIA (OUTPATIENT)
Dept: SURGERY | Facility: CLINIC | Age: 75
End: 2025-07-17
Payer: COMMERCIAL

## 2025-07-17 ENCOUNTER — HOSPITAL ENCOUNTER (OUTPATIENT)
Facility: CLINIC | Age: 75
Discharge: HOME | End: 2025-07-17
Attending: STUDENT IN AN ORGANIZED HEALTH CARE EDUCATION/TRAINING PROGRAM | Admitting: STUDENT IN AN ORGANIZED HEALTH CARE EDUCATION/TRAINING PROGRAM
Payer: COMMERCIAL

## 2025-07-17 ENCOUNTER — ANESTHESIA EVENT (OUTPATIENT)
Dept: SURGERY | Facility: CLINIC | Age: 75
End: 2025-07-17
Payer: COMMERCIAL

## 2025-07-17 VITALS
SYSTOLIC BLOOD PRESSURE: 156 MMHG | DIASTOLIC BLOOD PRESSURE: 86 MMHG | RESPIRATION RATE: 18 BRPM | OXYGEN SATURATION: 96 % | TEMPERATURE: 97.4 F | HEART RATE: 65 BPM

## 2025-07-17 PROCEDURE — 250N000009 HC RX 250: Performed by: STUDENT IN AN ORGANIZED HEALTH CARE EDUCATION/TRAINING PROGRAM

## 2025-07-17 PROCEDURE — 250N000011 HC RX IP 250 OP 636: Performed by: NURSE ANESTHETIST, CERTIFIED REGISTERED

## 2025-07-17 PROCEDURE — V2632 POST CHMBR INTRAOCULAR LENS: HCPCS | Performed by: STUDENT IN AN ORGANIZED HEALTH CARE EDUCATION/TRAINING PROGRAM

## 2025-07-17 PROCEDURE — 761N000008 HC RECOVERY CATRACT PACKAGE: Performed by: STUDENT IN AN ORGANIZED HEALTH CARE EDUCATION/TRAINING PROGRAM

## 2025-07-17 PROCEDURE — 360N000007 HC CATARACT SURGICAL PACKAGE: Performed by: STUDENT IN AN ORGANIZED HEALTH CARE EDUCATION/TRAINING PROGRAM

## 2025-07-17 PROCEDURE — 250N000011 HC RX IP 250 OP 636: Performed by: STUDENT IN AN ORGANIZED HEALTH CARE EDUCATION/TRAINING PROGRAM

## 2025-07-17 PROCEDURE — 370N000004 HC ANESTHESIA CATARACT PACKAGE: Performed by: STUDENT IN AN ORGANIZED HEALTH CARE EDUCATION/TRAINING PROGRAM

## 2025-07-17 DEVICE — EYE IMP IOL AMO PCL TECNIS ZCB00 20.5: Type: IMPLANTABLE DEVICE | Site: EYE | Status: FUNCTIONAL

## 2025-07-17 RX ORDER — DICLOFENAC SODIUM 1 MG/ML
1 SOLUTION/ DROPS OPHTHALMIC
Status: COMPLETED | OUTPATIENT
Start: 2025-07-17 | End: 2025-07-17

## 2025-07-17 RX ORDER — ONDANSETRON 2 MG/ML
4 INJECTION INTRAMUSCULAR; INTRAVENOUS EVERY 30 MIN PRN
Status: CANCELLED | OUTPATIENT
Start: 2025-07-17

## 2025-07-17 RX ORDER — NALOXONE HYDROCHLORIDE 0.4 MG/ML
0.1 INJECTION, SOLUTION INTRAMUSCULAR; INTRAVENOUS; SUBCUTANEOUS
Status: CANCELLED | OUTPATIENT
Start: 2025-07-17

## 2025-07-17 RX ORDER — DEXAMETHASONE SODIUM PHOSPHATE 10 MG/ML
4 INJECTION, SOLUTION INTRAMUSCULAR; INTRAVENOUS
Status: CANCELLED | OUTPATIENT
Start: 2025-07-17

## 2025-07-17 RX ORDER — ONDANSETRON 4 MG/1
4 TABLET, ORALLY DISINTEGRATING ORAL EVERY 30 MIN PRN
Status: CANCELLED | OUTPATIENT
Start: 2025-07-17

## 2025-07-17 RX ORDER — MOXIFLOXACIN 5 MG/ML
1 SOLUTION/ DROPS OPHTHALMIC
Status: COMPLETED | OUTPATIENT
Start: 2025-07-17 | End: 2025-07-17

## 2025-07-17 RX ORDER — PROPARACAINE HYDROCHLORIDE 5 MG/ML
1 SOLUTION/ DROPS OPHTHALMIC ONCE
Status: DISCONTINUED | OUTPATIENT
Start: 2025-07-17 | End: 2025-07-17 | Stop reason: HOSPADM

## 2025-07-17 RX ORDER — PROPARACAINE HYDROCHLORIDE 5 MG/ML
1 SOLUTION/ DROPS OPHTHALMIC ONCE
Status: COMPLETED | OUTPATIENT
Start: 2025-07-17 | End: 2025-07-17

## 2025-07-17 RX ORDER — PHENYLEPHRINE HYDROCHLORIDE 25 MG/ML
1 SOLUTION/ DROPS OPHTHALMIC
Status: COMPLETED | OUTPATIENT
Start: 2025-07-17 | End: 2025-07-17

## 2025-07-17 RX ORDER — TROPICAMIDE 10 MG/ML
1 SOLUTION/ DROPS OPHTHALMIC
Status: COMPLETED | OUTPATIENT
Start: 2025-07-17 | End: 2025-07-17

## 2025-07-17 RX ORDER — LIDOCAINE 40 MG/G
CREAM TOPICAL
Status: DISCONTINUED | OUTPATIENT
Start: 2025-07-17 | End: 2025-07-17 | Stop reason: HOSPADM

## 2025-07-17 RX ORDER — POVIDONE-IODINE 5 %
1 SOLUTION, NON-ORAL OPHTHALMIC (EYE) ONCE
Status: DISCONTINUED | OUTPATIENT
Start: 2025-07-17 | End: 2025-07-17 | Stop reason: HOSPADM

## 2025-07-17 RX ADMIN — TROPICAMIDE 1 DROP: 10 SOLUTION/ DROPS OPHTHALMIC at 11:28

## 2025-07-17 RX ADMIN — PHENYLEPHRINE HYDROCHLORIDE 1 DROP: 25 SOLUTION/ DROPS OPHTHALMIC at 11:37

## 2025-07-17 RX ADMIN — DICLOFENAC SODIUM 1 DROP: 1 SOLUTION OPHTHALMIC at 11:37

## 2025-07-17 RX ADMIN — MOXIFLOXACIN HYDROCHLORIDE 1 DROP: 5 SOLUTION/ DROPS OPHTHALMIC at 11:37

## 2025-07-17 RX ADMIN — PROPARACAINE HYDROCHLORIDE 1 DROP: 5 SOLUTION/ DROPS OPHTHALMIC at 11:27

## 2025-07-17 RX ADMIN — MOXIFLOXACIN HYDROCHLORIDE 1 DROP: 5 SOLUTION/ DROPS OPHTHALMIC at 11:48

## 2025-07-17 RX ADMIN — TROPICAMIDE 1 DROP: 10 SOLUTION/ DROPS OPHTHALMIC at 11:48

## 2025-07-17 RX ADMIN — PHENYLEPHRINE HYDROCHLORIDE 1 DROP: 25 SOLUTION/ DROPS OPHTHALMIC at 11:48

## 2025-07-17 RX ADMIN — DICLOFENAC SODIUM 1 DROP: 1 SOLUTION OPHTHALMIC at 11:28

## 2025-07-17 RX ADMIN — MIDAZOLAM 1 MG: 1 INJECTION INTRAMUSCULAR; INTRAVENOUS at 12:18

## 2025-07-17 RX ADMIN — MOXIFLOXACIN HYDROCHLORIDE 1 DROP: 5 SOLUTION/ DROPS OPHTHALMIC at 11:28

## 2025-07-17 RX ADMIN — MIDAZOLAM 1 MG: 1 INJECTION INTRAMUSCULAR; INTRAVENOUS at 12:21

## 2025-07-17 RX ADMIN — TROPICAMIDE 1 DROP: 10 SOLUTION/ DROPS OPHTHALMIC at 11:37

## 2025-07-17 RX ADMIN — DICLOFENAC SODIUM 1 DROP: 1 SOLUTION OPHTHALMIC at 11:48

## 2025-07-17 RX ADMIN — PHENYLEPHRINE HYDROCHLORIDE 1 DROP: 25 SOLUTION/ DROPS OPHTHALMIC at 11:28

## 2025-07-17 ASSESSMENT — ACTIVITIES OF DAILY LIVING (ADL)
ADLS_ACUITY_SCORE: 37
ADLS_ACUITY_SCORE: 37

## 2025-07-17 NOTE — ANESTHESIA CARE TRANSFER NOTE
Patient: Komal Miller    Procedure: Procedure(s):  Phacoemulsification with a standard intraocular lens implant       Diagnosis: Nuclear sclerotic cataract of right eye [H25.11]  Diagnosis Additional Information: No value filed.    Anesthesia Type:   MAC     Note:    Oropharynx: oropharynx clear of all foreign objects and spontaneously breathing  Level of Consciousness: awake  Oxygen Supplementation: room air    Independent Airway: airway patency satisfactory and stable  Dentition: dentition unchanged  Vital Signs Stable: post-procedure vital signs reviewed and stable  Report to RN Given: handoff report given  Patient transferred to: Phase II    Handoff Report: Identifed the Patient, Identified the Reponsible Provider, Reviewed the pertinent medical history, Discussed the surgical course, Reviewed Intra-OP anesthesia mangement and issues during anesthesia, Set expectations for post-procedure period and Allowed opportunity for questions and acknowledgement of understanding      Vitals:  Vitals Value Taken Time   /84 07/17/25 12:45   Temp     Pulse 60 07/17/25 12:45   Resp     SpO2 94 % 07/17/25 12:46   Vitals shown include unfiled device data.    Electronically Signed By: MARISOL Self CRNA  July 17, 2025  12:48 PM

## 2025-07-17 NOTE — ANESTHESIA POSTPROCEDURE EVALUATION
Patient: Komal Miller    Procedure: Procedure(s):  Phacoemulsification with a standard intraocular lens implant       Anesthesia Type:  MAC    Note:  Disposition: Outpatient   Postop Pain Control: Uneventful            Sign Out: Well controlled pain   PONV: No   Neuro/Psych: Uneventful            Sign Out: Acceptable/Baseline neuro status   Airway/Respiratory: Uneventful            Sign Out: Acceptable/Baseline resp. status   CV/Hemodynamics: Uneventful            Sign Out: Acceptable CV status   Other NRE: NONE   DID A NON-ROUTINE EVENT OCCUR? No    Event details/Postop Comments:  Pt was happy with anesthesia care.  No complications.  I will follow up with the pt if needed.           Last vitals:  Vitals Value Taken Time   /86 07/17/25 13:00   Temp     Pulse 65 07/17/25 13:00   Resp 18 07/17/25 13:00   SpO2 95 % 07/17/25 13:04   Vitals shown include unfiled device data.    Electronically Signed By: MARISOL Self CRNA  July 17, 2025  1:26 PM

## 2025-07-17 NOTE — ANESTHESIA PREPROCEDURE EVALUATION
Anesthesia Pre-Procedure Evaluation    Patient: Komal Miller   MRN: [id number] : 1950          Procedure : Procedure(s):  Phacoemulsification with a standard intraocular lens implant         Past Medical History:   Diagnosis Date    Arthritis     Cancer (H)     Cerebral infarction (H)     History of blood transfusion     Unspecified essential hypertension       Past Surgical History:   Procedure Laterality Date    ARTHROPLASTY HIP Right 2024    Procedure: ARTHROPLASTY, HIP, TOTAL RIGHT;  Surgeon: Apolinar Chacko MD;  Location: [facility]    COLONOSCOPY  06/10/08    Internal hemorrhoids, otherwise normal.    COLONOSCOPY  01/20/10    HC DILATION/CURETTAGE DIAG/THER NON OB      D & C    HC LAP, SURG ENTEROLYSIS  05/07/10    HC REMOVAL OF OVARY/TUBE(S)      Salpingo-Oophorectomy, bilateral    LAPAROSCOPY DIAGNOSTIC (GENERAL)  2014    Procedure: LAPAROSCOPY DIAGNOSTIC (GENERAL);  Surgeon: Seth Matthews MD;  [facility]    LAPAROTOMY, LYSIS ADHESIONS, COMBINED  2014    Procedure: COMBINED LAPAROTOMY, LYSIS ADHESIONS;  Surgeon: Seth Matthews MD;  Location[facility]    VIDEO CAPSULE ENDOSCOPY  02/15/10    normal sm intestine    ZZC APPENDECTOMY      ZZC LIGATE FALLOPIAN TUBE      ZZC TOTAL ABDOM HYSTERECTOMY      Hysterectomy, Total Abdominal    ZZC TOTAL KNEE ARTHROPLASTY Left     ZZHC UGI ENDOSCOPY, SIMPLE EXAM  01/20/10      Allergies   Allergen Reactions    No Known Drug Allergy       Social History     Tobacco Use    Smoking status: Former     Current packs/day: 0.00     Average packs/day: 0.5 packs/day for 20.0 years (10.0 ttl pk-yrs)     Types: Cigarettes     Start date: 1975     Quit date: 1995     Years since quittin.5    Smokeless tobacco: Never   Substance Use Topics    Alcohol use: Yes     Comment: very seldom      Wt Readings from Last 1 Encounters:   25 121.6 kg (268 lb)        Anesthesia Evaluation   Pt has had prior anesthetic. Type: General and MAC.         ROS/MED HX  ENT/Pulmonary:     (+) sleep apnea,                                       Neurologic:     (+)      migraines,    CVA,                      Cardiovascular:     (+) Dyslipidemia hypertension- -   -  - -                                 Previous cardiac testing   Echo: Date: 2021 Results:  ______________________________________________________________________________  Interpretation Summary     Left ventricular systolic function is normal.  The right ventricle is normal in structure, function and size.  All valves are normal in structure and function.  No cardiac source of emboli noted.  ______________________________________________________________________________  Left Ventricle  The left ventricle is normal in size. There is mild concentric left  ventricular hypertrophy. Left ventricular systolic function is normal. Grade I  or early diastolic dysfunction. No regional wall motion abnormalities noted.     Right Ventricle  The right ventricle is normal in structure, function and size.     Atria  Normal left atrial size. The right atrium is mildly dilated. There is no  atrial shunt seen.     Mitral Valve  The mitral valve leaflets appear normal. There is no evidence of stenosis,  fluttering, or prolapse. There is no mitral regurgitation noted.     Tricuspid Valve  The tricuspid valve is normal in structure and function.     Aortic Valve  There is mild trileaflet aortic sclerosis. No aortic regurgitation is present.  No aortic stenosis is present.     Pulmonic Valve  There is trace pulmonic valvular regurgitation.     Vessels  Normal size aorta.     Pericardium  There is no pericardial effusion.     Rhythm  Sinus rhythm was noted.    Stress Test:  Date: Results:    ECG Reviewed:  Date: 1/11/24 Results:  SR with PVC  Cath:  Date: Results:      METS/Exercise Tolerance:     Hematologic:  - neg hematologic  ROS     Musculoskeletal:   (+)  arthritis,             GI/Hepatic:     (+) GERD, Asymptomatic on  medication,                  Renal/Genitourinary:  - neg Renal ROS     Endo:     (+)               Obesity,       Psychiatric/Substance Use:  - neg psychiatric ROS     Infectious Disease:  - neg infectious disease ROS     Malignancy:  - neg malignancy ROS     Other:  - neg other ROS            Physical Exam  Airway  Mallampati: II  TM distance: >3 FB  Neck ROM: full  Mouth opening: >= 4 cm    Cardiovascular - normal exam  Rhythm: regular  Rate: normal rate     Dental     Pulmonary - normal examBreath sounds clear to auscultation        Neurological - normal exam  She appears awake, alert and oriented x3.    Other Findings       OUTSIDE LABS:  CBC:   Lab Results   Component Value Date    WBC 7.8 01/11/2024    WBC 9.2 10/25/2022    HGB 11.5 (L) 01/26/2024    HGB 14.1 01/11/2024    HCT 41.3 01/11/2024    HCT 40.3 10/25/2022     01/11/2024     10/25/2022     BMP:   Lab Results   Component Value Date     04/03/2025     01/11/2024    POTASSIUM 4.0 04/03/2025    POTASSIUM 3.8 01/11/2024    CHLORIDE 100 04/03/2025    CHLORIDE 100 01/11/2024    CO2 29 04/03/2025    CO2 25 01/11/2024    BUN 11.8 04/03/2025    BUN 11.0 01/11/2024    CR 0.72 04/03/2025    CR 0.64 01/11/2024     (H) 04/03/2025     (H) 01/26/2024     COAGS:   Lab Results   Component Value Date    INR 1.02 08/03/2020     POC:   Lab Results   Component Value Date     (H) 05/08/2014     HEPATIC:   Lab Results   Component Value Date    ALBUMIN 4.0 04/03/2025    PROTTOTAL 7.6 04/03/2025    ALT 18 04/03/2025    AST 19 04/03/2025    ALKPHOS 78 04/03/2025    BILITOTAL 0.4 04/03/2025     OTHER:   Lab Results   Component Value Date    LACT 0.8 04/27/2014    BAKARI 9.8 04/03/2025    MAG 1.8 10/25/2022    LIPASE 52 04/27/2014    TSH 0.68 05/08/2017    CRP 8.4 (H) 02/18/2020    SED 27 12/29/2023       Anesthesia Plan    ASA Status:  3      NPO Status: NPO Appropriate   Anesthesia Type: MAC.  Airway: natural airway.  Induction:  "intravenous.   Techniques and Equipment:       - Monitoring Plan: standard ASA monitoring     Consents    Anesthesia Plan(s) and associated risks, benefits, and realistic alternatives discussed. Questions answered and patient/representative(s) expressed understanding.     - Discussed: surgeon     - Discussed with:  Patient        - Pt is DNR/DNI Status: no DNR     Blood Consent:      - Discussed with: patient.     - Consented: consented to blood products     Postoperative Care         Comments:                   MARISOL Self CRNA    I have reviewed the pertinent notes and labs in the chart from the past 30 days and (re)examined the patient.  Any updates or changes from those notes are reflected in this note.    Clinically Significant Risk Factors Present on Admission                   # Hypertension: Noted on problem list           # Morbid Obesity: Estimated body mass index is 44.6 kg/m  as calculated from the following:    Height as of 7/7/25: 1.651 m (5' 5\").    Weight as of 7/7/25: 121.6 kg (268 lb).                    "

## 2025-07-17 NOTE — OP NOTE
[facility]  Ophthalmology Operative Note    PREOPERATIVE DIAGNOSIS: Cataract, Right eye.     POSTOPERATIVE DIAGNOSIS: Cataract, Right eye.     OPERATION: Cataract extraction with placement of posterior chamber intraocular lens in the Right eye.     ANESTHESIA: MAC combined with topical     INDICATIONS FOR PROCEDURE: Komal Miller was seen in the [facility] for decreased visual acuity in the Right eye. The patient was found to have a visually significant cataract in the Right eye. The risks, benefits, alternatives and goals of cataract extraction were discussed with the patient, and after adequate discussion the patient understood and agreed to these, and a signed informed consent was obtained prior to the procedure.     DESCRIPTION OF PROCEDURE: After proper patient identification, topical anesthesia was applied to the Right eye. The patient was brought to the operating room and the Right eye was prepped and draped in the usual sterile fashion for intraocular surgery. A lid speculum was placed in the Right eye. A paracentesis was then created and the anterior chamber was filled with 1% non-preserved lidocaine followed by Viscoat. A clear corneal incision was then created temporally using a 2.4mm keratome. A continuous curvilinear capsulorrhexis was then created using a cystotome and Utrata forceps. Hydrodissection was carried out with BSS on a Thomas cannula and the lens rotated freely within the capsular bag. Phacoemulsification was then carried out using the stop and chop technique. Residual cortical material was removed using the I&A handpiece. The capsular bag was then filled with Provisc and a ZCB00 +20.5 diopter intraocular lens was then injected into the capsular bag. The lens showed good centration and stability. Residual viscoelastic was removed using the I&A handpiece. The wound was then hydrated and the anterior chamber reformed. Intracameral Moxifloxacin was  then injected into the anterior chamber. The wounds were then checked and found to be sealed. Topical Prednisolone drops were placed in the patient's Right eye followed by a Ulloa shield over the top of this. The patient tolerated the procedure well without complications and was told to follow up in the clinic in the next postoperative day.     Implant Name Type Inv. Item Serial No.  Lot No. LRB No. Used Action   EYE IMP IOL SHAWN PCL TECNIS ZCB00 20.5 - [id number] Lens/Eye Implant EYE IMP IOL SHAWN PCL TECNIS ZCB00 20.5 [id number] [employer]  Right 1 Implanted        Karri Zuleta MD

## 2025-07-17 NOTE — ANESTHESIA POSTPROCEDURE EVALUATION
Patient: Komal Miller    Procedure: Procedure(s):  Phacoemulsification with a standard intraocular lens implant       Anesthesia Type:  MAC    Note:  Disposition: Outpatient   Postop Pain Control: Uneventful            Sign Out: Well controlled pain   PONV: No   Neuro/Psych: Uneventful            Sign Out: Acceptable/Baseline neuro status   Airway/Respiratory: Uneventful            Sign Out: Acceptable/Baseline resp. status   CV/Hemodynamics: Uneventful            Sign Out: Acceptable CV status   Other NRE: NONE   DID A NON-ROUTINE EVENT OCCUR? No    Event details/Postop Comments:  Pt was happy with anesthesia care.  No complications.  I will follow up with the pt if needed.           Last vitals:  Vitals Value Taken Time   /84 07/17/25 12:45   Temp     Pulse 60 07/17/25 12:45   Resp     SpO2 95 % 07/17/25 12:47   Vitals shown include unfiled device data.    Electronically Signed By: MARISOL Self CRNA  July 17, 2025  12:48 PM

## 2025-07-24 ENCOUNTER — TELEPHONE (OUTPATIENT)
Dept: DERMATOLOGY | Facility: CLINIC | Age: 75
End: 2025-07-24
Payer: COMMERCIAL

## 2025-07-24 NOTE — TELEPHONE ENCOUNTER
Pt called and scheduled for spot check only appointment tomorrow at [facility].    Kandis Go RN on 7/24/2025 at 10:59 AM

## 2025-07-24 NOTE — TELEPHONE ENCOUNTER
[facility] Call Center    Phone Message    May a detailed message be left on voicemail: yes     Reason for Call: Symptoms or Concerns     If patient has red-flag symptoms, warm transfer to triage line    Current symptom or concern: Spot on L cheek    Symptoms have been present for:  Pt just noticed it today. Pt recently had cataract surgery and noticed her skin    Has patient previously been seen for this? No      Are there any new or worsening symptoms? Yes: Texture is different and color is darker    Action Taken: TE SENT    Travel Screening: Not Applicable     Date of Service:          Thank you!  [facility]

## 2025-07-30 ENCOUNTER — ANESTHESIA EVENT (OUTPATIENT)
Dept: SURGERY | Facility: CLINIC | Age: 75
End: 2025-07-30
Payer: COMMERCIAL

## 2025-07-30 NOTE — ANESTHESIA PREPROCEDURE EVALUATION
Anesthesia Pre-Procedure Evaluation    Patient: Komal Miller   MRN: [id number] : 1950          Procedure : Procedure(s):  Phacoemulsification with a standard intraocular lens implant         Past Medical History:   Diagnosis Date    Arthritis     Cancer (H)     Cerebral infarction (H)     History of blood transfusion     Unspecified essential hypertension       Past Surgical History:   Procedure Laterality Date    ARTHROPLASTY HIP Right 2024    Procedure: ARTHROPLASTY, HIP, TOTAL RIGHT;  Surgeon: Apolinar Chacko MD;  [facility]    COLONOSCOPY  06/10/08    Internal hemorrhoids, otherwise normal.    COLONOSCOPY  01/20/10    HC DILATION/CURETTAGE DIAG/THER NON OB      D & C    HC LAP, SURG ENTEROLYSIS  05/07/10    HC REMOVAL OF OVARY/TUBE(S)      Salpingo-Oophorectomy, bilateral    LAPAROSCOPY DIAGNOSTIC (GENERAL)  2014    Procedure: LAPAROSCOPY DIAGNOSTIC (GENERAL);  Surgeon: Seth Matthews MD;  [facility]    LAPAROTOMY, LYSIS ADHESIONS, COMBINED  2014    Procedure: COMBINED LAPAROTOMY, LYSIS ADHESIONS;  Surgeon: Seth Matthews MD;  [facility]    PHACOEMULSIFICATION WITH STANDARD INTRAOCULAR LENS IMPLANT Right 2025    Procedure: Phacoemulsification with a standard intraocular lens implant;  Surgeon: Karri Zuleta MD;  [facility]    VIDEO CAPSULE ENDOSCOPY  02/15/10    normal sm intestine    ZZC APPENDECTOMY      ZZC LIGATE FALLOPIAN TUBE      ZZC TOTAL ABDOM HYSTERECTOMY      Hysterectomy, Total Abdominal    ZZC TOTAL KNEE ARTHROPLASTY Left     ZZHC UGI ENDOSCOPY, SIMPLE EXAM  01/20/10      Allergies   Allergen Reactions    No Known Drug Allergy       Social History     Tobacco Use    Smoking status: Former     Current packs/day: 0.00     Average packs/day: 0.5 packs/day for 20.0 years (10.0 ttl pk-yrs)     Types: Cigarettes     Start date: 1975     Quit date: 1995     Years since quittin.5    Smokeless tobacco: Never   Substance Use Topics     Alcohol use: Yes     Comment: very seldom      Wt Readings from Last 1 Encounters:   07/07/25 121.6 kg (268 lb)        Anesthesia Evaluation   Pt has had prior anesthetic. Type: General and MAC.        ROS/MED HX  ENT/Pulmonary:     (+) sleep apnea,                                       Neurologic:     (+)      migraines,    CVA,                      Cardiovascular:     (+) Dyslipidemia hypertension- -   -  - -                                 Previous cardiac testing   Echo: Date: 2021 Results:  ______________________________________________________________________________  Interpretation Summary     Left ventricular systolic function is normal.  The right ventricle is normal in structure, function and size.  All valves are normal in structure and function.  No cardiac source of emboli noted.  ______________________________________________________________________________  Left Ventricle  The left ventricle is normal in size. There is mild concentric left  ventricular hypertrophy. Left ventricular systolic function is normal. Grade I  or early diastolic dysfunction. No regional wall motion abnormalities noted.     Right Ventricle  The right ventricle is normal in structure, function and size.     Atria  Normal left atrial size. The right atrium is mildly dilated. There is no  atrial shunt seen.     Mitral Valve  The mitral valve leaflets appear normal. There is no evidence of stenosis,  fluttering, or prolapse. There is no mitral regurgitation noted.     Tricuspid Valve  The tricuspid valve is normal in structure and function.     Aortic Valve  There is mild trileaflet aortic sclerosis. No aortic regurgitation is present.  No aortic stenosis is present.     Pulmonic Valve  There is trace pulmonic valvular regurgitation.     Vessels  Normal size aorta.     Pericardium  There is no pericardial effusion.     Rhythm  Sinus rhythm was noted.    Stress Test:  Date: Results:    ECG Reviewed:  Date: 1/11/24 Results:    with PVC  Cath:  Date: Results:      METS/Exercise Tolerance:     Hematologic:  - neg hematologic  ROS     Musculoskeletal:   (+)  arthritis,             GI/Hepatic:     (+) GERD, Asymptomatic on medication,                  Renal/Genitourinary:  - neg Renal ROS     Endo:     (+)               Obesity,       Psychiatric/Substance Use:  - neg psychiatric ROS     Infectious Disease:  - neg infectious disease ROS     Malignancy:  - neg malignancy ROS     Other:  - neg other ROS            Physical Exam  Airway  Mallampati: II  TM distance: >3 FB  Neck ROM: full  Mouth opening: >= 4 cm    Cardiovascular - normal exam   Dental     Pulmonary - normal exam      Neurological - normal exam  She appears awake, alert and oriented x3.    Other Findings       OUTSIDE LABS:  CBC:   Lab Results   Component Value Date    WBC 7.8 01/11/2024    WBC 9.2 10/25/2022    HGB 11.5 (L) 01/26/2024    HGB 14.1 01/11/2024    HCT 41.3 01/11/2024    HCT 40.3 10/25/2022     01/11/2024     10/25/2022     BMP:   Lab Results   Component Value Date     04/03/2025     01/11/2024    POTASSIUM 4.0 04/03/2025    POTASSIUM 3.8 01/11/2024    CHLORIDE 100 04/03/2025    CHLORIDE 100 01/11/2024    CO2 29 04/03/2025    CO2 25 01/11/2024    BUN 11.8 04/03/2025    BUN 11.0 01/11/2024    CR 0.72 04/03/2025    CR 0.64 01/11/2024     (H) 04/03/2025     (H) 01/26/2024     COAGS:   Lab Results   Component Value Date    INR 1.02 08/03/2020     POC:   Lab Results   Component Value Date     (H) 05/08/2014     HEPATIC:   Lab Results   Component Value Date    ALBUMIN 4.0 04/03/2025    PROTTOTAL 7.6 04/03/2025    ALT 18 04/03/2025    AST 19 04/03/2025    ALKPHOS 78 04/03/2025    BILITOTAL 0.4 04/03/2025     OTHER:   Lab Results   Component Value Date    LACT 0.8 04/27/2014    BAKARI 9.8 04/03/2025    MAG 1.8 10/25/2022    LIPASE 52 04/27/2014    TSH 0.68 05/08/2017    CRP 8.4 (H) 02/18/2020    SED 27 12/29/2023       Anesthesia  "Plan    ASA Status:  2      NPO Status: NPO Appropriate   Anesthesia Type: MAC.  Airway: natural airway.  Induction: intravenous.  Maintenance: N/A.   Techniques and Equipment:       - Monitoring Plan: standard ASA monitoring     Consents    Anesthesia Plan(s) and associated risks, benefits, and realistic alternatives discussed. Questions answered and patient/representative(s) expressed understanding.     - Discussed: CRNA     - Discussed with:  Patient        - Pt is DNR/DNI Status: no DNR          Postoperative Care    Pain management: non-narcotic analgesics.     Comments:                   MARISOL Lobo CRNA    I have reviewed the pertinent notes and labs in the chart from the past 30 days and (re)examined the patient.  Any updates or changes from those notes are reflected in this note.    Clinically Significant Risk Factors Present on Admission                   # Hypertension: Noted on problem list           # Morbid Obesity: Estimated body mass index is 44.6 kg/m  as calculated from the following:    Height as of 7/7/25: 1.651 m (5' 5\").    Weight as of 7/7/25: 121.6 kg (268 lb).                    "

## 2025-07-31 ENCOUNTER — HOSPITAL ENCOUNTER (OUTPATIENT)
Facility: CLINIC | Age: 75
Discharge: HOME | End: 2025-07-31
Attending: STUDENT IN AN ORGANIZED HEALTH CARE EDUCATION/TRAINING PROGRAM | Admitting: STUDENT IN AN ORGANIZED HEALTH CARE EDUCATION/TRAINING PROGRAM
Payer: COMMERCIAL

## 2025-07-31 ENCOUNTER — ANESTHESIA (OUTPATIENT)
Dept: SURGERY | Facility: CLINIC | Age: 75
End: 2025-07-31
Payer: COMMERCIAL

## 2025-07-31 VITALS
WEIGHT: 268 LBS | OXYGEN SATURATION: 97 % | DIASTOLIC BLOOD PRESSURE: 84 MMHG | BODY MASS INDEX: 44.6 KG/M2 | TEMPERATURE: 97.4 F | SYSTOLIC BLOOD PRESSURE: 140 MMHG | RESPIRATION RATE: 18 BRPM | HEART RATE: 63 BPM

## 2025-07-31 PROCEDURE — 370N000004 HC ANESTHESIA CATARACT PACKAGE: Performed by: STUDENT IN AN ORGANIZED HEALTH CARE EDUCATION/TRAINING PROGRAM

## 2025-07-31 PROCEDURE — V2632 POST CHMBR INTRAOCULAR LENS: HCPCS | Performed by: STUDENT IN AN ORGANIZED HEALTH CARE EDUCATION/TRAINING PROGRAM

## 2025-07-31 PROCEDURE — 250N000009 HC RX 250: Performed by: STUDENT IN AN ORGANIZED HEALTH CARE EDUCATION/TRAINING PROGRAM

## 2025-07-31 PROCEDURE — 761N000008 HC RECOVERY CATRACT PACKAGE: Performed by: STUDENT IN AN ORGANIZED HEALTH CARE EDUCATION/TRAINING PROGRAM

## 2025-07-31 PROCEDURE — 250N000011 HC RX IP 250 OP 636

## 2025-07-31 PROCEDURE — 360N000007 HC CATARACT SURGICAL PACKAGE: Performed by: STUDENT IN AN ORGANIZED HEALTH CARE EDUCATION/TRAINING PROGRAM

## 2025-07-31 PROCEDURE — 250N000011 HC RX IP 250 OP 636: Performed by: STUDENT IN AN ORGANIZED HEALTH CARE EDUCATION/TRAINING PROGRAM

## 2025-07-31 DEVICE — EYE IMP IOL AMO PCL TECNIS ZCB00 20.5: Type: IMPLANTABLE DEVICE | Site: EYE | Status: FUNCTIONAL

## 2025-07-31 RX ORDER — DICLOFENAC SODIUM 1 MG/ML
1 SOLUTION/ DROPS OPHTHALMIC
Status: COMPLETED | OUTPATIENT
Start: 2025-07-31 | End: 2025-07-31

## 2025-07-31 RX ORDER — PROPARACAINE HYDROCHLORIDE 5 MG/ML
1 SOLUTION/ DROPS OPHTHALMIC ONCE
Status: DISCONTINUED | OUTPATIENT
Start: 2025-07-31 | End: 2025-07-31 | Stop reason: HOSPADM

## 2025-07-31 RX ORDER — MOXIFLOXACIN 5 MG/ML
1 SOLUTION/ DROPS OPHTHALMIC
Status: COMPLETED | OUTPATIENT
Start: 2025-07-31 | End: 2025-07-31

## 2025-07-31 RX ORDER — PHENYLEPHRINE HYDROCHLORIDE 25 MG/ML
1 SOLUTION/ DROPS OPHTHALMIC
Status: COMPLETED | OUTPATIENT
Start: 2025-07-31 | End: 2025-07-31

## 2025-07-31 RX ORDER — PROPARACAINE HYDROCHLORIDE 5 MG/ML
1 SOLUTION/ DROPS OPHTHALMIC ONCE
Status: COMPLETED | OUTPATIENT
Start: 2025-07-31 | End: 2025-07-31

## 2025-07-31 RX ORDER — POVIDONE-IODINE 5 %
1 SOLUTION, NON-ORAL OPHTHALMIC (EYE) ONCE
Status: DISCONTINUED | OUTPATIENT
Start: 2025-07-31 | End: 2025-07-31 | Stop reason: HOSPADM

## 2025-07-31 RX ORDER — TROPICAMIDE 10 MG/ML
1 SOLUTION/ DROPS OPHTHALMIC
Status: COMPLETED | OUTPATIENT
Start: 2025-07-31 | End: 2025-07-31

## 2025-07-31 RX ADMIN — MOXIFLOXACIN 1 DROP: 5 SOLUTION/ DROPS OPHTHALMIC at 10:15

## 2025-07-31 RX ADMIN — MOXIFLOXACIN 1 DROP: 5 SOLUTION/ DROPS OPHTHALMIC at 10:29

## 2025-07-31 RX ADMIN — TROPICAMIDE 1 DROP: 10 SOLUTION/ DROPS OPHTHALMIC at 10:36

## 2025-07-31 RX ADMIN — PHENYLEPHRINE HYDROCHLORIDE 1 DROP: 25 SOLUTION/ DROPS OPHTHALMIC at 10:29

## 2025-07-31 RX ADMIN — DICLOFENAC SODIUM 1 DROP: 1 SOLUTION OPHTHALMIC at 10:15

## 2025-07-31 RX ADMIN — MIDAZOLAM 2 MG: 1 INJECTION INTRAMUSCULAR; INTRAVENOUS at 11:15

## 2025-07-31 RX ADMIN — MOXIFLOXACIN 1 DROP: 5 SOLUTION/ DROPS OPHTHALMIC at 10:36

## 2025-07-31 RX ADMIN — PHENYLEPHRINE HYDROCHLORIDE 1 DROP: 25 SOLUTION/ DROPS OPHTHALMIC at 10:36

## 2025-07-31 RX ADMIN — PHENYLEPHRINE HYDROCHLORIDE 1 DROP: 25 SOLUTION/ DROPS OPHTHALMIC at 10:15

## 2025-07-31 RX ADMIN — DICLOFENAC SODIUM 1 DROP: 1 SOLUTION OPHTHALMIC at 10:29

## 2025-07-31 RX ADMIN — TROPICAMIDE 1 DROP: 10 SOLUTION/ DROPS OPHTHALMIC at 10:29

## 2025-07-31 RX ADMIN — TROPICAMIDE 1 DROP: 10 SOLUTION/ DROPS OPHTHALMIC at 10:15

## 2025-07-31 RX ADMIN — DICLOFENAC SODIUM 1 DROP: 1 SOLUTION OPHTHALMIC at 10:36

## 2025-07-31 RX ADMIN — PROPARACAINE HYDROCHLORIDE 1 DROP: 5 SOLUTION/ DROPS OPHTHALMIC at 10:14

## 2025-07-31 ASSESSMENT — ACTIVITIES OF DAILY LIVING (ADL)
ADLS_ACUITY_SCORE: 43

## 2025-07-31 NOTE — ANESTHESIA POSTPROCEDURE EVALUATION
Patient: Komal Miller    Procedure: Procedure(s):  Phacoemulsification with a standard intraocular lens implant       Anesthesia Type:  MAC    Note:  Disposition: Outpatient   Postop Pain Control: Uneventful            Sign Out: Well controlled pain   PONV: No   Neuro/Psych: Uneventful            Sign Out: Acceptable/Baseline neuro status   Airway/Respiratory: Uneventful            Sign Out: Acceptable/Baseline resp. status   CV/Hemodynamics: Uneventful            Sign Out: Acceptable CV status; No obvious hypovolemia; No obvious fluid overload   Other NRE: NONE   DID A NON-ROUTINE EVENT OCCUR? No           Last vitals:  Vitals Value Taken Time   /75 07/31/25 11:50   Temp     Pulse 62 07/31/25 11:50   Resp 18 07/31/25 11:50   SpO2 98 % 07/31/25 12:00   Vitals shown include unfiled device data.    Electronically Signed By: MARISOL Lobo CRNA  July 31, 2025  12:01 PM

## 2025-07-31 NOTE — ANESTHESIA CARE TRANSFER NOTE
Patient: Komal Miller    Procedure: Procedure(s):  Phacoemulsification with a standard intraocular lens implant       Diagnosis: Nuclear sclerotic cataract of left eye [H25.12]  Diagnosis Additional Information: No value filed.    Anesthesia Type:   MAC     Note:    Oropharynx: oropharynx clear of all foreign objects and spontaneously breathing  Level of Consciousness: awake  Oxygen Supplementation: room air    Independent Airway: airway patency satisfactory and stable  Dentition: dentition unchanged  Vital Signs Stable: post-procedure vital signs reviewed and stable  Report to RN Given: handoff report given  Patient transferred to: Phase II    Handoff Report: Identifed the Patient, Identified the Reponsible Provider, Reviewed the pertinent medical history, Discussed the surgical course, Reviewed Intra-OP anesthesia mangement and issues during anesthesia, Set expectations for post-procedure period and Allowed opportunity for questions and acknowledgement of understanding      Vitals:  Vitals Value Taken Time   /92 07/31/25 11:38   Temp     Pulse 66 07/31/25 11:38   Resp 17 07/31/25 11:38   SpO2 99 % 07/31/25 11:48   Vitals shown include unfiled device data.    Electronically Signed By: MARISOL Lobo CRNA  July 31, 2025  11:49 AM

## 2025-08-11 DIAGNOSIS — K21.00 GASTROESOPHAGEAL REFLUX DISEASE WITH ESOPHAGITIS WITHOUT HEMORRHAGE: ICD-10-CM

## 2025-08-11 RX ORDER — OMEPRAZOLE 40 MG/1
CAPSULE, DELAYED RELEASE ORAL
Qty: 90 CAPSULE | Refills: 0 | Status: SHIPPED | OUTPATIENT
Start: 2025-08-11

## (undated) DEVICE — PREP CHLORAPREP 26ML TINTED HI-LITE ORANGE 930815

## (undated) DEVICE — SU DERMABOND ADVANCED .7ML DNX12

## (undated) DEVICE — GOWN IMPERVIOUS BREATHABLE 2XL/XLONG

## (undated) DEVICE — DRSG XEROFORM 1X8"

## (undated) DEVICE — SU VICRYL 1 CT-1 27" J341H

## (undated) DEVICE — DEVICE SUT STRATAFIX PDS + 1 CT1 SPRL 45CM ABS SXPP1B430

## (undated) DEVICE — SUTURE VICRYL+ 0 27IN CT-1 UND VCP260H

## (undated) DEVICE — DRAPE C-ARM 60X42" 1013

## (undated) DEVICE — SU VICRYL 2-0 CT-2 27" UND J269H

## (undated) DEVICE — ESU ELEC BLADE 6" COATED E1450-6

## (undated) DEVICE — GLOVE BIOGEL PI INDICATOR 8.0 LF 41680

## (undated) DEVICE — HOOD SURG T7PLUS PEEL AWAY FACE SHIELD STRL LF 0416-801-100

## (undated) DEVICE — TOWEL SURG 17INW X 26INL CTTN BLUE STRL 8324B

## (undated) DEVICE — SOL WATER IRRIG 1000ML BOTTLE 07139-09

## (undated) DEVICE — SOL NACL 0.9% IRRIG 3000ML BAG 2B7477

## (undated) DEVICE — SOL NACL 0.9% IRRIG 1000ML BOTTLE 07138-09

## (undated) DEVICE — GLOVE PROTEGRITY 7.5 LATEX

## (undated) DEVICE — BLADE SAW RECIP STRK 77.5X11.18X0.76MM 0277-096-325

## (undated) DEVICE — SPONGE LAP 18X18" X8435

## (undated) DEVICE — SU MONOCRYL 3-0 PS-1 27" Y936H

## (undated) DEVICE — SUCTION MANIFOLD NEPTUNE 2 SYS 4 PORT 0702-020-000

## (undated) DEVICE — DRAPE STERI TOWEL SM 1000

## (undated) DEVICE — GLOVE UNDER INDICATOR PI SZ 6.5 LF 41665

## (undated) DEVICE — DRSG KERLIX 4 1/2"X4YDS ROLL 6730

## (undated) DEVICE — DRAPE MAYO STAND 23X54 8337

## (undated) DEVICE — GLOVE BIOGEL PI ULTRATOUCH G SZ 6.5 42165

## (undated) DEVICE — DRAPE IOBAN INCISE 36X23" 6651EZ

## (undated) DEVICE — DRAPE U SPLIT 74X120" 29440

## (undated) DEVICE — PREP SKIN SCRUB TRAY 4461A

## (undated) DEVICE — STRAP STIRRUP W/SLIP 30187-030

## (undated) DEVICE — DRAPE CONVERTORS U-DRAPE 60X72" 8476

## (undated) DEVICE — DRAIN ROUND W/RESERV KIT JACKSON PRATT 10FR 400ML SU130-402D

## (undated) DEVICE — ESU GROUND PAD UNIVERSAL W/O CORD

## (undated) DEVICE — DRSG AQUACEL AG HYDROFIBER  3.5X10" 422605

## (undated) DEVICE — DECANTER VIAL 2006S

## (undated) DEVICE — DRAPE C-ARMOR 5 SIDED 5523

## (undated) DEVICE — POSITIONER ABDUCTION PILLOW FOAM MED FP-ABDUCTM

## (undated) DEVICE — PACK TOTAL JOINT STD LATEX

## (undated) DEVICE — ESU PENCIL SMOKE EVAC W/ROCKER SWITCH 0703-047-000

## (undated) DEVICE — SUCTION IRR SYSTEM W/O TIP INTERPULSE HANDPIECE 0210-100-000

## (undated) DEVICE — SU FIBERWIRE 2 38" T-8 NDL  AR-7206

## (undated) DEVICE — GLOVE BIOGEL PI ULTRATOUCH G SZ 8.5 42185

## (undated) DEVICE — SU VICRYL 2-0 CT-1 27" UND J259H

## (undated) RX ORDER — BUPIVACAINE HYDROCHLORIDE 2.5 MG/ML
INJECTION, SOLUTION EPIDURAL; INFILTRATION; INTRACAUDAL
Status: DISPENSED
Start: 2024-01-25

## (undated) RX ORDER — HYDROMORPHONE HYDROCHLORIDE 1 MG/ML
INJECTION, SOLUTION INTRAMUSCULAR; INTRAVENOUS; SUBCUTANEOUS
Status: DISPENSED
Start: 2024-01-25

## (undated) RX ORDER — ONDANSETRON 2 MG/ML
INJECTION INTRAMUSCULAR; INTRAVENOUS
Status: DISPENSED
Start: 2024-01-25

## (undated) RX ORDER — LIDOCAINE HYDROCHLORIDE 10 MG/ML
INJECTION, SOLUTION EPIDURAL; INFILTRATION; INTRACAUDAL; PERINEURAL
Status: DISPENSED
Start: 2024-01-25

## (undated) RX ORDER — EPHEDRINE SULFATE 50 MG/ML
INJECTION, SOLUTION INTRAMUSCULAR; INTRAVENOUS; SUBCUTANEOUS
Status: DISPENSED
Start: 2024-01-25

## (undated) RX ORDER — DEXAMETHASONE SODIUM PHOSPHATE 10 MG/ML
INJECTION, SOLUTION INTRAMUSCULAR; INTRAVENOUS
Status: DISPENSED
Start: 2024-01-25

## (undated) RX ORDER — FENTANYL CITRATE 50 UG/ML
INJECTION, SOLUTION INTRAMUSCULAR; INTRAVENOUS
Status: DISPENSED
Start: 2024-01-25